# Patient Record
Sex: FEMALE | Race: WHITE | Employment: OTHER | URBAN - METROPOLITAN AREA
[De-identification: names, ages, dates, MRNs, and addresses within clinical notes are randomized per-mention and may not be internally consistent; named-entity substitution may affect disease eponyms.]

---

## 2017-01-12 ENCOUNTER — OFFICE VISIT (OUTPATIENT)
Dept: FAMILY MEDICINE CLINIC | Age: 62
End: 2017-01-12

## 2017-01-12 VITALS
OXYGEN SATURATION: 97 % | DIASTOLIC BLOOD PRESSURE: 92 MMHG | WEIGHT: 247 LBS | RESPIRATION RATE: 18 BRPM | SYSTOLIC BLOOD PRESSURE: 154 MMHG | BODY MASS INDEX: 35.36 KG/M2 | TEMPERATURE: 98 F | HEIGHT: 70 IN | HEART RATE: 62 BPM

## 2017-01-12 DIAGNOSIS — M54.50 ACUTE BILATERAL LOW BACK PAIN WITHOUT SCIATICA: Primary | ICD-10-CM

## 2017-01-12 DIAGNOSIS — F43.21 GRIEF: ICD-10-CM

## 2017-01-12 RX ORDER — DICLOFENAC SODIUM 50 MG/1
50 TABLET, DELAYED RELEASE ORAL 2 TIMES DAILY
Qty: 30 TAB | Refills: 1 | Status: SHIPPED | OUTPATIENT
Start: 2017-01-12 | End: 2018-01-15 | Stop reason: SDUPTHER

## 2017-01-12 RX ORDER — CYCLOBENZAPRINE HCL 5 MG
5 TABLET ORAL
Qty: 60 TAB | Refills: 1 | Status: SHIPPED | OUTPATIENT
Start: 2017-01-12 | End: 2020-06-25

## 2017-01-12 NOTE — MR AVS SNAPSHOT
Visit Information Date & Time Provider Department Dept. Phone Encounter #  
 1/12/2017  9:00 AM MD Irish HargroveGabriel Miłbecka 57 KAM [address] [facility] [id number] Your Appointments 1/24/2017 10:10 AM  
Nurse Visit with Elkin CoburnGabriel Harsha 57  ([facility]) Appt Note: coumadin check  $0cp [facility] [address], [address] [facility] [address]  
[phone]  
  
   
 [address], [address] [address] Upcoming Health Maintenance Date Due Hepatitis C Screening 1955 COLONOSCOPY 10/6/1974 ZOSTER VACCINE AGE 60> 10/6/2016 PAP AKA CERVICAL CYTOLOGY 10/21/2016 BREAST CANCER SCRN MAMMOGRAM 11/5/2017 DTaP/Tdap/Td series (2 - Td) 6/30/2025 Allergies as of 1/12/2017  Review Complete On: 1/12/2017 By: Beto Guardado MD  
  
 Severity Noted Reaction Type Reactions Iodinated Contrast Media - Oral And Iv Dye  09/24/2012   Side Effect Hives Lasix [Furosemide]  09/24/2012   Side Effect Hives Sulfa (Sulfonamide Antibiotics)  09/24/2012   Side Effect Hives Current Immunizations  Reviewed on 9/14/2016 Name Date Influenza Vaccine 10/21/2013 Influenza Vaccine Wicho [place]) 11/5/2014 Influenza Vaccine (Quad) PF 9/14/2016, 10/26/2015 Tdap 6/30/2015 Not reviewed this visit You Were Diagnosed With   
  
 Codes Comments Acute bilateral low back pain without sciatica    -  Primary ICD-10-CM: M54.5 ICD-9-CM: 724.2, 338.19 Grief     ICD-10-CM: C59.58 ICD-9-CM: 309.0 Vitals BP Pulse Temp Resp Height(growth percentile) Weight(growth percentile) (!) 154/92 (BP 1 Location: Left arm, BP Patient Position: Sitting) 62 98 °F (36.7 °C) 18 5' 10\" (1.778 m) 247 lb (112 kg) SpO2 BMI OB Status Smoking Status 97% 35.44 kg/m2 Postmenopausal Never Smoker Vitals History BMI and BSA Data Body Mass Index Body Surface Area  
 35.44 kg/m 2 2.35 m 2 Preferred Pharmacy Pharmacy Name Phone [facility] 2002 [facility], [address] [phone] Your Updated Medication List  
  
   
This list is accurate as of: 1/12/17  9:50 AM.  Always use your most recent med list.  
  
  
  
  
 atenolol-chlorthalidone 50-25 mg per tablet Commonly known as:  TENORETIC 50 Take 1 Tab by mouth daily. cyclobenzaprine 5 mg tablet Commonly known as:  FLEXERIL Take 1 Tab by mouth three (3) times daily as needed for Muscle Spasm(s). diclofenac EC 50 mg EC tablet Commonly known as:  VOLTAREN Take 1 Tab by mouth two (2) times a day. hydroCHLOROthiazide 12.5 mg capsule Commonly known as:  Alvia Boss TAKE ONE CAPSULE BY MOUTH ONCE DAILY  
  
 IRON (FERROUS SULFATE) PO Take  by mouth.  
  
 levothyroxine 25 mcg tablet Commonly known as:  SYNTHROID  
TAKE ONE TABLET BY MOUTH IN THE MORNING  
  
 potassium 99 mg tablet Take 99 mg by mouth daily. Pt is taking 2 tabs daily PREVACID PO Take  by mouth. traMADol 50 mg tablet Commonly known as:  ULTRAM  
TAKE ONE TABLET BY MOUTH EVERY 6 HOURS AS NEEDED FOR PAIN  
  
 traZODone 50 mg tablet Commonly known as:  Orma Paddy Take 1 Tab by mouth nightly. Prn sleep  
  
 venlafaxine 75 mg tablet Commonly known as:  [facility] Take one to two tablets every day  
  
 warfarin 7.5 mg tablet Commonly known as:  COUMADIN  
TAKE ONE TABLET BY MOUTH ONCE DAILY OR  AS  DIRECTED Prescriptions Sent to Pharmacy Refills  
 diclofenac EC (VOLTAREN) 50 mg EC tablet 1 Sig: Take 1 Tab by mouth two (2) times a day. Class: Normal  
 Pharmacy: [address], [address] Ph #: [phone] Route: Oral  
 cyclobenzaprine (FLEXERIL) 5 mg tablet 1 Sig: Take 1 Tab by mouth three (3) times daily as needed for Muscle Spasm(s).   
 Class: Normal  
 Pharm[facility] [address], [facility] [phone] Route: Oral  
  
We Performed the Following REFERRAL TO PHYSICAL THERAPY [ZLW95 Custom] Comments:  
 Please evaluate patient for low back pain. To-Do List   
 01/20/2017 9:20 AM  
  Appointment with [address] [address] at [address] [place] [address] ([phone]) Shower or bathe using soap and water. Do not use deodorant, powder, perfumes, or lotion the day of your exam.  If your prior mammograms were not [facility] please bring films with you or forward prior images 2 days before your procedure. Check in at registration 15min before your appointment time unless you were instructed to do otherwise. A script is not necessary, but if you have one, please bring it on the day of the mammogram or have it faxed to the department. [facility] [phone] [address] [place] [address]  [phone] [address]  [phone] [facility] [phone] [address] [phone] Referral Information Referral ID Referred By Referred To  
  
 [id number] Charly PIRES Not Available Visits Status Start Date End Date 1 New Request 1/12/17 1/12/18 If your referral has a status of pending review or denied, additional information will be sent to support the outcome of this decision. Patient Instructions Low Back Pain: Exercises Your Care Instructions Here are some examples of typical rehabilitation exercises for your condition. Start each exercise slowly. Ease off the exercise if you start to have pain. Your doctor or physical therapist will tell you when you can start these exercises and which ones will work best for you. How to do the exercises Press-up 1. Lie on your stomach, supporting your body with your forearms. 2. Press your elbows down into the floor to raise your upper back. As you do this, relax your stomach muscles and allow your back to arch without using your back muscles.  As your press up, do not let your hips or pelvis come off the floor. 3. Hold for 15 to 30 seconds, then relax. 4. Repeat 2 to 4 times. Alternate arm and leg (bird dog) exercise Note: Do this exercise slowly. Try to keep your body straight at all times, and do not let one hip drop lower than the other. 1. Start on the floor, on your hands and knees. 2. Tighten your belly muscles. 3. Raise one leg off the floor, and hold it straight out behind you. Be careful not to let your hip drop down, because that will twist your trunk. 4. Hold for about 6 seconds, then lower your leg and switch to the other leg. 5. Repeat 8 to 12 times on each leg. 6. Over time, work up to holding for 10 to 30 seconds each time. 7. If you feel stable and secure with your leg raised, try raising the opposite arm straight out in front of you at the same time. Knee-to-chest exercise 1. Lie on your back with your knees bent and your feet flat on the floor. 2. Bring one knee to your chest, keeping the other foot flat on the floor (or keeping the other leg straight, whichever feels better on your lower back). 3. Keep your lower back pressed to the floor. Hold for at least 15 to 30 seconds. 4. Relax, and lower the knee to the starting position. 5. Repeat with the other leg. Repeat 2 to 4 times with each leg. 6. To get more stretch, put your other leg flat on the floor while pulling your knee to your chest. 
Curl-ups 1. Lie on the floor on your back with your knees bent at a 90-degree angle. Your feet should be flat on the floor, about 12 inches from your buttocks. 2. Cross your arms over your chest. If this bothers your neck, try putting your hands behind your neck (not your head), with your elbows spread apart. 3. Slowly tighten your belly muscles and raise your shoulder blades off the floor. 4. Keep your head in line with your body, and do not press your chin to your chest. 
5. Hold this position for 1 or 2 seconds, then slowly lower yourself back down to the floor. 6. Repeat 8 to 12 times. Pelvic tilt exercise 1. Lie on your back with your knees bent. 2. \"Brace\" your stomach. This means to tighten your muscles by pulling in and imagining your belly button moving toward your spine. You should feel like your back is pressing to the floor and your hips and pelvis are rocking back. 3. Hold for about 6 seconds while you breathe smoothly. 4. Repeat 8 to 12 times. Heel dig bridging 1. Lie on your back with both knees bent and your ankles bent so that only your heels are digging into the floor. Your knees should be bent about 90 degrees. 2. Then push your heels into the floor, squeeze your buttocks, and lift your hips off the floor until your shoulders, hips, and knees are all in a straight line. 3. Hold for about 6 seconds as you continue to breathe normally, and then slowly lower your hips back down to the floor and rest for up to 10 seconds. 4. Do 8 to 12 repetitions. Hamstring stretch in doorway 1. Lie on your back in a doorway, with one leg through the open door. 2. Slide your leg up the wall to straighten your knee. You should feel a gentle stretch down the back of your leg. 3. Hold the stretch for at least 15 to 30 seconds. Do not arch your back, point your toes, or bend either knee. Keep one heel touching the floor and the other heel touching the wall. 4. Repeat with your other leg. 5. Do 2 to 4 times for each leg. Hip flexor stretch 1. Kneel on the floor with one knee bent and one leg behind you. Place your forward knee over your foot. Keep your other knee touching the floor. 2. Slowly push your hips forward until you feel a stretch in the upper thigh of your rear leg. 3. Hold the stretch for at least 15 to 30 seconds. Repeat with your other leg. 4. Do 2 to 4 times on each side. Wall sit 1. Stand with your back 10 to 12 inches away from a wall. 2. Lean into the wall until your back is flat against it. 3. Slowly slide down until your knees are slightly bent, pressing your lower back into the wall. 4. Hold for about 6 seconds, then slide back up the wall. 5. Repeat 8 to 12 times. Follow-up care is a key part of your treatment and safety. Be sure to make and go to all appointments, and call your doctor if you are having problems. It's also a good idea to know your test results and keep a list of the medicines you take. Where can you learn more? Go to http://jenelle-shravan.info/. Enter P253 in the search box to learn more about \"Low Back Pain: Exercises. \" Current as of: May 23, 2016 Content Version: 11.1 © 1914-6025 [employer]. Care instructions adapted under license by [employer] (which disclaims liability or warranty for this information). If you have questions about a medical condition or this instruction, always ask your healthcare professional. [address] any warranty or liability for your use of this information. Introducing [facility]! Jalen Domínguez introduces Code Blue patient portal. Now you can access parts of your medical record, email your doctor's office, and request medication refills online. 1. In your internet browser, go to [email or web address] 2. Click on the First Time User? Click Here link in the Sign In box. You will see the New Member Sign Up page. 3. Enter your Code Blue Access Code exactly as it appears below. You will not need to use this code after youve completed the sign-up process. If you do not sign up before the expiration date, you must request a new code. · Code Blue Access Code: [email or web address] Expires: 3/20/2017  9:55 AM 
 
4. Enter the last four digits of your Social Security Number (xxxx) and Date of Birth (mm/dd/yyyy) as indicated and click Submit. You will be taken to the next sign-up page. 5. Create a Code Blue ID.  This will be your Code Blue login ID and cannot be changed, so think of one that is secure and easy to remember. 6. Create a Phurnace Software password. You can change your password at any time. 7. Enter your Password Reset Question and Answer. This can be used at a later time if you forget your password. 8. Enter your e-mail address. You will receive e-mail notification when new information is available in [address] 9. Click Sign Up. You can now view and download portions of your medical record. 10. Click the Download Summary menu link to download a portable copy of your medical information. If you have questions, please visit the Frequently Asked Questions section of the [employer] website. Remember, Phurnace Software is NOT to be used for urgent needs. For medical emergencies, dial 911. Now available from your iPhone and Android! Please provide this summary of care documentation to your next provider. Your primary care clinician is listed as [address] If you have any questions after today's visit, please call [phone].

## 2017-01-12 NOTE — PATIENT INSTRUCTIONS

## 2017-01-12 NOTE — PROGRESS NOTES
SARKIS  Sarah Ball is a 62 y.o. female who presents with back pain that has been bothering her for 2 days. Pain is low, both sides. Started shortly after waking up about 2 days ago. Has not had any trauma to the area. No accidents. No unusual activity or lifting. What is unusual is that she has been sleeping on her 's side of the bed which is not nearly as well supported as her side. Of note she lost her  in November. Experiencing a grief reaction. She has chosen to sleep on her 's side of the bed to be near him. Tells me that she does not want to be in pain anymore, tearful. 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 long term use Coumadin    Thyroid disease        Meds:   Current Outpatient Prescriptions   Medication Sig Dispense Refill    diclofenac EC (VOLTAREN) 50 mg EC tablet Take 1 Tab by mouth two (2) times a day. 30 Tab 1    cyclobenzaprine (FLEXERIL) 5 mg tablet Take 1 Tab by mouth three (3) times daily as needed for Muscle Spasm(s). 60 Tab 1    warfarin (COUMADIN) 7.5 mg tablet TAKE ONE TABLET BY MOUTH ONCE DAILY OR  AS  DIRECTED 45 Tab 11    atenolol-chlorthalidone (TENORETIC 50) 50-25 mg per tablet Take 1 Tab by mouth daily. 30 Tab 5    traMADol (ULTRAM) 50 mg tablet TAKE ONE TABLET BY MOUTH EVERY 6 HOURS AS NEEDED FOR PAIN 60 Tab 0    levothyroxine (SYNTHROID) 25 mcg tablet TAKE ONE TABLET BY MOUTH IN THE MORNING 30 Tab 11    venlafaxine (EFFEXOR) 75 mg tablet Take one to two tablets every day 60 Tab 5    IRON, FERROUS SULFATE, PO Take  by mouth.       hydrochlorothiazide (MICROZIDE) 12.5 mg capsule TAKE ONE CAPSULE BY MOUTH ONCE DAILY 30 Cap 5    LANSOPRAZOLE (PREVACID PO) Take  by mouth.  potassium 99 mg tablet Take 99 mg by mouth daily. Pt is taking 2 tabs daily      traZODone (DESYREL) 50 mg tablet Take 1 Tab by mouth nightly. Prn sleep 30 Tab 2       Allergies: Allergies   Allergen Reactions    Iodinated Contrast Media - Oral And Iv Dye Hives    Lasix [Furosemide] Hives    Sulfa (Sulfonamide Antibiotics) Hives       Smoker:  History   Smoking Status    Never Smoker   Smokeless Tobacco    Never Used       ETOH:   History   Alcohol Use No       FH:   Family History   Problem Relation Age of Onset    Heart Disease Mother     Heart Disease Father        ROS:  As listed in HPI. In addition:  Constitutional:   No headache, fever, fatigue, weight loss or weight gain      Cardiac:    No chest pain      Resp:   No cough or shortness of breath      Neuro   No loss of consciousness, dizziness, seizures      Physical Exam:  Blood pressure (!) 154/92, pulse 62, temperature 98 °F (36.7 °C), resp. rate 18, height 5' 10\" (1.778 m), weight 247 lb (112 kg), SpO2 97 %. GEN: No apparent distress. Alert and oriented and responds to all questions appropriately. NECK:  Supple; bilateral trapezius muscle spasms that she does not complaint about but are exquisitely tender to palpation          Back: Bilateral erector spinae muscle spasms, external oblique spasms, bilateral SI joint tenderness. She is very tender in all of these points even with light palpation       Assessment and Plan     Low back pain, muscular. Most likely from sleeping in an unsupportive bed. Provided reassurance. Try Flexeril, diclofenac, heating pad. I provided her with exercises but strongly encouraged her to see physical therapy as I think she would benefit from other modalities    Grief  Had a significant grief reaction but within normal limits. Motions are playing a major role of the above pain.   Invite her to come back and speak with me for any reason      ICD-10-CM ICD-9-CM 1. Acute bilateral low back pain without sciatica M54.5 724.2 diclofenac EC (VOLTAREN) 50 mg EC tablet     338.19 REFERRAL TO PHYSICAL THERAPY      cyclobenzaprine (FLEXERIL) 5 mg tablet   2. Grief F43.20 309.0        AVS given.  Pt expressed understanding of instructions

## 2017-01-17 DIAGNOSIS — F32.A DEPRESSION, UNSPECIFIED DEPRESSION TYPE: ICD-10-CM

## 2017-01-17 RX ORDER — VENLAFAXINE 75 MG/1
75 TABLET ORAL 2 TIMES DAILY
Qty: 180 TAB | Refills: 3 | Status: SHIPPED | OUTPATIENT
Start: 2017-01-17 | End: 2018-01-29 | Stop reason: SDUPTHER

## 2017-01-24 ENCOUNTER — CLINICAL SUPPORT (OUTPATIENT)
Dept: FAMILY MEDICINE CLINIC | Age: 62
End: 2017-01-24

## 2017-01-24 VITALS
DIASTOLIC BLOOD PRESSURE: 87 MMHG | RESPIRATION RATE: 18 BRPM | HEIGHT: 70 IN | BODY MASS INDEX: 35.22 KG/M2 | WEIGHT: 246 LBS | TEMPERATURE: 97.5 F | HEART RATE: 69 BPM | OXYGEN SATURATION: 96 % | SYSTOLIC BLOOD PRESSURE: 150 MMHG

## 2017-01-24 DIAGNOSIS — Z87.74 H/O ATRIAL SEPTAL DEFECT REPAIR: ICD-10-CM

## 2017-01-24 DIAGNOSIS — Z86.711 HX PULMONARY EMBOLISM: Primary | ICD-10-CM

## 2017-01-24 LAB
INR BLD: 3.5
PT POC: 41.9 SEC
VALID INTERNAL CONTROL?: YES

## 2017-01-24 NOTE — PROGRESS NOTES
Chief Complaint   Patient presents with    Anticoagulation     Patient is here for a recheck of her INR. Patient is currently taking coumadin 7.5 daily except taking one and a half tablets on wednesdays. Visit Vitals    /87 (BP 1 Location: Right arm, BP Patient Position: Sitting)    Pulse 69    Temp 97.5 °F (36.4 °C) (Oral)    Resp 18    Ht 5' 10\" (1.778 m)    Wt 246 lb (111.6 kg)    SpO2 96%    BMI 35.3 kg/m2     Results for orders placed or performed in visit on 01/24/17   AMB POC PT/INR   Result Value Ref Range    VALID INTERNAL CONTROL POC Yes     Prothrombin time (POC) 41.9 sec    INR POC 3.5      Per Dr. Eliezer Nice patient advised to take coumadin 7.5 mg daily except take 1/2 tablet on Wednesdays. Return in 2 weeks for recheck.

## 2017-01-24 NOTE — PATIENT INSTRUCTIONS
Take 7.5 mg daily except take one half a tablet on wednesdays. Return in 2 weeks to recheck your INR.

## 2017-01-24 NOTE — MR AVS SNAPSHOT
Visit Information Date & Time Provider Department Dept. Phone Encounter #  
 1/24/2017 10:10 AM Luis Arreguin [facility] [phone] [id number] Upcoming Health Maintenance Date Due Hepatitis C Screening 1955 COLONOSCOPY 10/6/1974 ZOSTER VACCINE AGE 60> 10/6/2016 PAP AKA CERVICAL CYTOLOGY 10/21/2016 BREAST CANCER SCRN MAMMOGRAM 11/5/2017 DTaP/Tdap/Td series (2 - Td) 6/30/2025 Allergies as of 1/24/2017  Review Complete On: 1/24/2017 By: Betty Presume Severity Noted Reaction Type Reactions Iodinated Contrast Media - Oral And Iv Dye  09/24/2012   Side Effect Hives Lasix [Furosemide]  09/24/2012   Side Effect Hives Sulfa (Sulfonamide Antibiotics)  09/24/2012   Side Effect Hives Current Immunizations  Reviewed on 9/14/2016 Name Date Influenza Vaccine 10/21/2013 Influenza Vaccine Raheem Push) 11/5/2014 Influenza Vaccine (Quad) PF 9/14/2016, 10/26/2015 Tdap 6/30/2015 Not reviewed this visit You Were Diagnosed With   
  
 Codes Comments Hx pulmonary embolism    -  Primary ICD-10-CM: R56.884 ICD-9-CM: V12.55   
 H/O atrial septal defect repair     ICD-10-CM: Z98.890, Z87.74 ICD-9-CM: V15.1 Vitals BP Pulse Temp Resp Height(growth percentile) Weight(growth percentile) 150/87 (BP 1 Location: Right arm, BP Patient Position: Sitting) 69 97.5 °F (36.4 °C) (Oral) 18 5' 10\" (1.778 m) 246 lb (111.6 kg) SpO2 BMI OB Status Smoking Status 96% 35.3 kg/m2 Postmenopausal Never Smoker BMI and BSA Data Body Mass Index Body Surface Area  
 35.3 kg/m 2 2.35 m 2 Preferred Pharmacy Pharmacy Name Phone [facility] 2002 [facility] [address] [phone] Your Updated Medication List  
  
   
This list is accurate as of: 1/24/17 11:21 AM.  Always use your most recent med list.  
  
  
  
  
 atenolol-chlorthalidone 50-25 mg per tablet Commonly known as:  TENORETIC 50 Take 1 Tab by mouth daily. cyclobenzaprine 5 mg tablet Commonly known as:  FLEXERIL Take 1 Tab by mouth three (3) times daily as needed for Muscle Spasm(s). diclofenac EC 50 mg EC tablet Commonly known as:  VOLTAREN Take 1 Tab by mouth two (2) times a day. hydroCHLOROthiazide 12.5 mg capsule Commonly known as:  [place]ne Neelam TAKE ONE CAPSULE BY MOUTH ONCE DAILY  
  
 IRON (FERROUS SULFATE) PO Take  by mouth.  
  
 levothyroxine 25 mcg tablet Commonly known as:  SYNTHROID  
TAKE ONE TABLET BY MOUTH IN THE MORNING  
  
 potassium 99 mg tablet Take 99 mg by mouth daily. Pt is taking 2 tabs daily PREVACID PO Take  by mouth. traMADol 50 mg tablet Commonly known as:  ULTRAM  
TAKE ONE TABLET BY MOUTH EVERY 6 HOURS AS NEEDED FOR PAIN  
  
 traZODone 50 mg tablet Commonly known as:  Rebbecca Bunde Take 1 Tab by mouth nightly. Prn sleep  
  
 venlafaxine 75 mg tablet Commonly known as:  [facility] Take 1 Tab by mouth two (2) times a day. warfarin 7.5 mg tablet Commonly known as:  COUMADIN  
TAKE ONE TABLET BY MOUTH ONCE DAILY OR  AS  DIRECTED We Performed the Following AMB POC PT/INR [06001 CPT(R)] To-Do List   
 02/14/2017 10:40 AM  
  Appointment with [facility] 3 at [address] [place] [address] ([phone]) Shower or bathe using soap and water. Do not use deodorant, powder, perfumes, or lotion the day of your exam.  If your prior mammograms were not performed at [facility] 6 please bring films with you or forward prior images 2 days before your procedure. Check in at registration 15min before your appointment time unless you were instructed to do otherwise. A script is not necessary, but if you have one, please bring it on the day of the mammogram or have it faxed to the department.   [facility] [phone] [facility] [place]  [phone] [facility] [phone] [facility]  [phone] Count includes the [facility] [phone] [facility] [phone] [facility] Patient Instructions Take 7.5 mg daily except take one half a tablet on wednesdays. Return in 2 weeks to recheck your INR. Introducing [facility]! [employer] introduces Ohanae patient portal. Now you can access parts of your medical record, email your doctor's office, and request medication refills online. 1. In your internet browser, go to [email or web address] 2. Click on the First Time User? Click Here link in the Sign In box. You will see the New Member Sign Up page. 3. Enter your Ohanae Access Code exactly as it appears below. You will not need to use this code after youve completed the sign-up process. If you do not sign up before the expiration date, you must request a new code. · Ohanae Access Code: [email or web address] Expires: 3/20/2017  9:55 AM 
 
4. Enter the last four digits of your Social Security Number (xxxx) and Date of Birth (mm/dd/yyyy) as indicated and click Submit. You will be taken to the next sign-up page. 5. Create a Ohanae ID. This will be your Ohanae login ID and cannot be changed, so think of one that is secure and easy to remember. 6. Create a Ohanae password. You can change your password at any time. 7. Enter your Password Reset Question and Answer. This can be used at a later time if you forget your password. 8. Enter your e-mail address. You will receive e-mail notification when new information is available in [address] 9. Click Sign Up. You can now view and download portions of your medical record. 10. Click the Download Summary menu link to download a portable copy of your medical information. If you have questions, please visit the Frequently Asked Questions section of the Ohanae website. Remember, Ohanae is NOT to be used for urgent needs. For medical emergencies, dial 911. Now available from your iPhone and Android! Please provide this summary of care documentation to your next provider. Your primary care clinician is listed as [address] If you have any questions after today's visit, please call [phone].

## 2017-02-07 ENCOUNTER — CLINICAL SUPPORT (OUTPATIENT)
Dept: FAMILY MEDICINE CLINIC | Age: 62
End: 2017-02-07

## 2017-02-07 VITALS
WEIGHT: 249 LBS | SYSTOLIC BLOOD PRESSURE: 134 MMHG | HEART RATE: 88 BPM | BODY MASS INDEX: 35.65 KG/M2 | OXYGEN SATURATION: 99 % | DIASTOLIC BLOOD PRESSURE: 84 MMHG | RESPIRATION RATE: 18 BRPM | HEIGHT: 70 IN | TEMPERATURE: 98 F

## 2017-02-07 DIAGNOSIS — Z87.74 H/O ATRIAL SEPTAL DEFECT REPAIR: Primary | ICD-10-CM

## 2017-02-07 DIAGNOSIS — Z86.711 HX PULMONARY EMBOLISM: ICD-10-CM

## 2017-02-07 LAB
INR BLD: 3.3
PT POC: 39.8 SEC
VALID INTERNAL CONTROL?: YES

## 2017-02-07 NOTE — PROGRESS NOTES
Chief Complaint   Patient presents with    Anticoagulation     Patient is here for a recheck of her INR. Patient is currently taking coumadin 7.5 mg daily except take 1/2 tablet on Wednesdays. Visit Vitals    /84 (BP 1 Location: Left arm, BP Patient Position: Sitting)    Pulse 88    Temp 98 °F (36.7 °C) (Oral)    Resp 18    Ht 5' 10\" (1.778 m)    Wt 249 lb (112.9 kg)    SpO2 99%    BMI 35.73 kg/m2     Results for orders placed or performed in visit on 02/07/17   AMB POC PT/INR   Result Value Ref Range    VALID INTERNAL CONTROL POC Yes     Prothrombin time (POC) 39.8 sec    INR POC 3.3      Per Dr. Rose Berger patient advised to continue taking current dose of coumadin and return in one week to recheck INR.

## 2017-02-13 ENCOUNTER — OFFICE VISIT (OUTPATIENT)
Dept: FAMILY MEDICINE CLINIC | Age: 62
End: 2017-02-13

## 2017-02-13 VITALS
RESPIRATION RATE: 18 BRPM | TEMPERATURE: 97.6 F | HEIGHT: 70 IN | HEART RATE: 60 BPM | BODY MASS INDEX: 35.07 KG/M2 | WEIGHT: 245 LBS | DIASTOLIC BLOOD PRESSURE: 89 MMHG | SYSTOLIC BLOOD PRESSURE: 143 MMHG | OXYGEN SATURATION: 91 %

## 2017-02-13 DIAGNOSIS — I10 ESSENTIAL HYPERTENSION, BENIGN: ICD-10-CM

## 2017-02-13 DIAGNOSIS — Z82.49 FAMILY HISTORY OF PREMATURE CAD: ICD-10-CM

## 2017-02-13 DIAGNOSIS — Z13.39 SCREENING FOR ALCOHOLISM: ICD-10-CM

## 2017-02-13 DIAGNOSIS — I74.9 THROMBOEMBOLUS (HCC): ICD-10-CM

## 2017-02-13 DIAGNOSIS — Z00.00 ROUTINE GENERAL MEDICAL EXAMINATION AT A HEALTH CARE FACILITY: Primary | ICD-10-CM

## 2017-02-13 DIAGNOSIS — Z11.59 NEED FOR HEPATITIS C SCREENING TEST: ICD-10-CM

## 2017-02-13 DIAGNOSIS — E03.9 HYPOTHYROIDISM, UNSPECIFIED TYPE: ICD-10-CM

## 2017-02-13 LAB
INR BLD: 2.4
PT POC: 29 SEC
VALID INTERNAL CONTROL?: YES

## 2017-02-13 NOTE — PATIENT INSTRUCTIONS
Rotator Cuff: Exercises  Your Care Instructions  Here are some examples of typical rehabilitation exercises for your condition. Start each exercise slowly. Ease off the exercise if you start to have pain. Your doctor or physical therapist will tell you when you can start these exercises and which ones will work best for you. How to do the exercises  Pendulum swing    Note: If you have pain in your back, do not do this exercise. 1. Hold on to a table or the back of a chair with your good arm. Then bend forward a little and let your sore arm hang straight down. This exercise does not use the arm muscles. Rather, use your legs and your hips to create movement that makes your arm swing freely. 2. Use the movement from your hips and legs to guide the slightly swinging arm back and forth like a pendulum (or elephant trunk). Then guide it in circles that start small (about the size of a dinner plate). Make the circles a bit larger each day, as your pain allows. 3. Do this exercise for 5 minutes, 5 to 7 times each day. 4. As you have less pain, try bending over a little farther to do this exercise. This will increase the amount of movement at your shoulder. Posterior stretching exercise    1. Hold the elbow of your injured arm with your other hand. 2. Use your hand to pull your injured arm gently up and across your body. You will feel a gentle stretch across the back of your injured shoulder. 3. Hold for at least 15 to 30 seconds. Then slowly lower your arm. 4. Repeat 2 to 4 times. Up-the-back stretch    Note: Your doctor or physical therapist may want you to wait to do this stretch until you have regained most of your range of motion and strength. You can do this stretch in different ways. Hold any of these stretches for at least 15 to 30 seconds. Repeat them 2 to 4 times. 1. Put your hand in your back pocket. Let it rest there to stretch your shoulder.   2. With your other hand, hold your injured arm (palm outward) behind your back by the wrist. Pull your arm up gently to stretch your shoulder. 3. Next, put a towel over your other shoulder. Put the hand of your injured arm behind your back. Now hold the back end of the towel. With the other hand, hold the front end of the towel in front of your body. Pull gently on the front end of the towel. This will bring your hand farther up your back to stretch your shoulder. Overhead stretch    1. Standing about an arm's length away, grasp onto a solid surface. You could use a countertop, a doorknob, or the back of a sturdy chair. 2. With your knees slightly bent, bend forward with your arms straight. Lower your upper body, and let your shoulders stretch. 3. As your shoulders are able to stretch farther, you may need to take a step or two backward. 4. Hold for at least 15 to 30 seconds. Then stand up and relax. If you had stepped back during your stretch, step forward so you can keep your hands on the solid surface. 5. Repeat 2 to 4 times. Shoulder flexion (lying down)    Note: To make a wand for this exercise, use a piece of PVC pipe or a broom handle with the broom removed. Make the wand about a foot wider than your shoulders. 1. Lie on your back, holding a wand with both hands. Your palms should face down as you hold the wand. 2. Keeping your elbows straight, slowly raise your arms over your head. Raise them until you feel a stretch in your shoulders, upper back, and chest.  3. Hold for 15 to 30 seconds. 4. Repeat 2 to 4 times. Shoulder rotation (lying down)    Note: To make a wand for this exercise, use a piece of PVC pipe or a broom handle with the broom removed. Make the wand about a foot wider than your shoulders. 1. Lie on your back. Hold a wand with both hands with your elbows bent and palms up. 2. Keep your elbows close to your body, and move the wand across your body toward the sore arm. 3. Hold for 8 to 12 seconds. 4. Repeat 2 to 4 times.   Blake Garnett climbing (to the side)    Note: Avoid any movement that is straight to your side, and be careful not to arch your back. Your arm should stay about 30 degrees to the front of your side. 1. Stand with your side to a wall so that your fingers can just touch it at an angle about 30 degrees toward the front of your body. 2. Walk the fingers of your injured arm up the wall as high as pain permits. Try not to shrug your shoulder up toward your ear as you move your arm up. 3. Hold that position for a count of at least 15 to 20.  4. Walk your fingers back down to the starting position. 5. Repeat at least 2 to 4 times. Try to reach higher each time. Wall climbing (to the front)    Note: During this stretching exercise, be careful not to arch your back. 1. Face a wall, and stand so your fingers can just touch it. 2. Keeping your shoulder down, walk the fingers of your injured arm up the wall as high as pain permits. (Don't shrug your shoulder up toward your ear.)  3. Hold your arm in that position for at least 15 to 30 seconds. 4. Slowly walk your fingers back down to where you started. 5. Repeat at least 2 to 4 times. Try to reach higher each time. Shoulder blade squeeze    1. Stand with your arms at your sides, and squeeze your shoulder blades together. Do not raise your shoulders up as you squeeze. 2. Hold 6 seconds. 3. Repeat 8 to 12 times. Scapular exercise: Arm reach    1. Lie flat on your back. This exercise is a very slight motion that starts with your arms raised (elbows straight, arms straight). 2. From this position, reach higher toward the hemal or ceiling. Keep your elbows straight. All motion should be from your shoulder blade only. 3. Relax your arms back to where you started. 4. Repeat 8 to 12 times. Arm raise to the side    Note: During this strengthening exercise, your arm should stay about 30 degrees to the front of your side.   1. Slowly raise your injured arm to the side, with your thumb facing up. Raise your arm 60 degrees at the most (shoulder level is 90 degrees). 2. Hold the position for 3 to 5 seconds. Then lower your arm back to your side. If you need to, bring your \"good\" arm across your body and place it under the elbow as you lower your injured arm. Use your good arm to keep your injured arm from dropping down too fast.  3. Repeat 8 to 12 times. 4. When you first start out, don't hold any extra weight in your hand. As you get stronger, you may use a 1-pound to 2-pound dumbbell or a small can of food. Shoulder flexor and extensor exercise    Note: These are isometric exercises. That means you contract your muscles without actually moving. · Push forward (flex): Stand facing a wall or doorjamb, about 6 inches or less back. Hold your injured arm against your body. Make a closed fist with your thumb on top. Then gently push your hand forward into the wall with about 25% to 50% of your strength. Don't let your body move backward as you push. Hold for about 6 seconds. Relax for a few seconds. Repeat 8 to 12 times. · Push backward (extend): Stand with your back flat against a wall. Your upper arm should be against the wall, with your elbow bent 90 degrees (your hand straight ahead). Push your elbow gently back against the wall with about 25% to 50% of your strength. Don't let your body move forward as you push. Hold for about 6 seconds. Relax for a few seconds. Repeat 8 to 12 times. Scapular exercise: Wall push-ups    Note: This exercise is best done with your fingers somewhat turned out, rather than straight up and down. 1. Stand facing a wall, about 12 inches to 18 inches away. 2. Place your hands on the wall at shoulder height. 3. Slowly bend your elbows and bring your face to the wall. Keep your back and hips straight. 4. Push back to where you started. 5. Repeat 8 to 12 times. 6. When you can do this exercise against a wall comfortably, you can try it against a counter.  You can then slowly progress to the end of a couch, then to a sturdy chair, and finally to the floor. Scapular exercise: Retraction    Note: For this exercise, you will need elastic exercise material, such as surgical tubing or Thera-Band. 1. Put the band around a solid object at about waist level. (A bedpost will work well.) Each hand should hold an end of the band. 2. With your elbows at your sides and bent to 90 degrees, pull the band back. Your shoulder blades should move toward each other. Then move your arms back where you started. 3. Repeat 8 to 12 times. 4. If you have good range of motion in your shoulders, try this exercise with your arms lifted out to the sides. Keep your elbows at a 90-degree angle. Raise the elastic band up to about shoulder level. Pull the band back to move your shoulder blades toward each other. Then move your arms back where you started. Internal rotator strengthening exercise    1. Start by tying a piece of elastic exercise material to a doorknob. You can use surgical tubing or Thera-Band. 2. Stand or sit with your shoulder relaxed and your elbow bent 90 degrees. Your upper arm should rest comfortably against your side. Squeeze a rolled towel between your elbow and your body for comfort. This will help keep your arm at your side. 3. Hold one end of the elastic band in the hand of the painful arm. 4. Slowly rotate your forearm toward your body until it touches your belly. Slowly move it back to where you started. 5. Keep your elbow and upper arm firmly tucked against the towel roll or at your side. 6. Repeat 8 to 12 times. External rotator strengthening exercise    1. Start by tying a piece of elastic exercise material to a doorknob. You can use surgical tubing or Thera-Band. (You may also hold one end of the band in each hand.)  2. Stand or sit with your shoulder relaxed and your elbow bent 90 degrees. Your upper arm should rest comfortably against your side.  Squeeze a rolled towel between your elbow and your body for comfort. This will help keep your arm at your side. 3. Hold one end of the elastic band with the hand of the painful arm. 4. Start with your forearm across your belly. Slowly rotate the forearm out away from your body. Keep your elbow and upper arm tucked against the towel roll or the side of your body until you begin to feel tightness in your shoulder. Slowly move your arm back to where you started. 5. Repeat 8 to 12 times. Follow-up care is a key part of your treatment and safety. Be sure to make and go to all appointments, and call your doctor if you are having problems. It's also a good idea to know your test results and keep a list of the medicines you take. Where can you learn more? Go to http://jenelle-shravan.info/. Enter Charmaine Jiménez in the search box to learn more about \"Rotator Cuff: Exercises. \"  Current as of: May 23, 2016  Content Version: 11.1  © 6224-9158 [employer]. Care instructions adapted under license by [employer] (which disclaims liability or warranty for this information). If you have questions about a medical condition or this instruction, always ask your healthcare professional. [place] [address] any warranty or liability for your use of this information. Medicare Part B Preventive Services Limitations Recommendation Scheduled   Bone Mass Measurement  (age 72 & older, biennial) Requires diagnosis related to osteoporosis or estrogen deficiency.  Biennial benefit unless patient has history of long-term glucocorticoid tx or baseline is needed because initial test was by other method     Cardiovascular Screening Blood Tests (every 5 years)  Total cholesterol, HDL, Triglycerides Order as a panel if possible     Colorectal Cancer Screening  -Fecal occult blood test (annual)  -Flexible sigmoidoscopy (5y)  -Screening colonoscopy (10y)  -Barium Enema      Counseling to Prevent Tobacco Use (up to 8 sessions per year)  - Counseling greater than 3 and up to 10 minutes  - Counseling greater than 10 minutes Patients must be asymptomatic of tobacco-related conditions to receive as preventive service     Diabetes Screening Tests (at least every 3 years, Medicare covers annually or at 6-month intervals for prediabetic patients)    Fasting blood sugar (FBS) or glucose tolerance test (GTT) Patient must be diagnosed with one of the following:  -Hypertension, Dyslipidemia, obesity, previous impaired FBS or GTT  Or any two of the following: overweight, FH of diabetes, age ? 72, history of gestational diabetes, birth of baby weighing more than 9 pounds     Diabetes Self-Management Training (DSMT) (no USPSTF recommendation) Requires referral by treating physician for patient with diabetes or renal disease. 10 hours of initial DSMT session of no less than 30 minutes each in a continuous 12-month period. 2 hours of follow-up DSMT in subsequent years. Glaucoma Screening (no USPSTF recommendation) Diabetes mellitus, family history, , age 48 or over,  American, age 72 or over     Human Immunodeficiency Virus (HIV) Screening (annually for increased risk patients)  HIV-1 and HIV-2 by EIA, VICENTE, rapid antibody test, or oral mucosa transudate Patient must be at increased risk for HIV infection per USPSTF guidelines or pregnant. Tests covered annually for patients at increased risk. Pregnant patients may receive up to 3 test during pregnancy. Medical Nutrition Therapy (MNT) (for diabetes or renal disease not recommended schedule) Requires referral by treating physician for patient with diabetes or renal disease. Can be provided in same year as diabetes self-management training (DSMT), and CMS recommends medical nutrition therapy take place after DSMT. Up to 3 hours for initial year and 2 hours in subsequent years.      Shingles Vaccination A shingles vaccine is also recommended once in a lifetime after age 61     Seasonal Influenza Vaccination (annually)      Pneumococcal Vaccination (once after 72)      Hepatitis B Vaccinations (if medium/high risk) Medium/high risk factors:  End-stage renal disease,  Hemophiliacs who received Factor VIII or IX concentrates, Clients of institutions for the mentally retarded, Persons who live in the same house as a HepB virus carrier, Homosexual men, Illicit injectable drug abusers. Screening Mammography (biennial age 54-69) Annually (age 36 or over)     Screening Pap Tests and Pelvic Examination (up to age 79 and after 79 if unknown history or abnormal study last 10 years) Every 25 months except high risk     Ultrasound Screening for Abdominal Aortic Aneurysm (AAA) (once) Patient must be referred through [facility] and not have had a screening for abdominal aortic aneurysm before under Medicare.   Limited to patients who meet one of the following criteria:  - Men who are 73-68 years old and have smoked more than 100 cigarettes in their lifetime.  -Anyone with a FH of AAA  -Anyone recommended for screening by USPSTF

## 2017-02-13 NOTE — MR AVS SNAPSHOT
Visit Information Date & Time Provider Department Dept. Phone Encounter #  
 2/13/2017  1:00 PM Korey Unger MD [address] [facility] [phone] [id number] Upcoming Health Maintenance Date Due Hepatitis C Screening 1955 MEDICARE YEARLY EXAM 10/6/1974 COLONOSCOPY 10/6/1974 ZOSTER VACCINE AGE 60> 10/6/2016 PAP AKA CERVICAL CYTOLOGY 10/21/2016 BREAST CANCER SCRN MAMMOGRAM 11/5/2017 DTaP/Tdap/Td series (2 - Td) 6/30/2025 Allergies as of 2/13/2017  Review Complete On: 2/13/2017 By: Olya Martin Severity Noted Reaction Type Reactions Iodinated Contrast Media - Oral And Iv Dye  09/24/2012   Side Effect Hives Lasix [Furosemide]  09/24/2012   Side Effect Hives Sulfa (Sulfonamide Antibiotics)  09/24/2012   Side Effect Hives Current Immunizations  Reviewed on 9/14/2016 Name Date Influenza Vaccine 10/21/2013 Influenza Vaccine Nadean Borrero) 11/5/2014 Influenza Vaccine (Quad) PF 9/14/2016, 10/26/2015 Tdap 6/30/2015 Not reviewed this visit You Were Diagnosed With   
  
 Codes Comments Routine general medical examination at a health care facility    -  Primary ICD-10-CM: Z00.00 ICD-9-CM: V70.0 Thromboembolus ([facility] [address]     ICD-10-CM: I74.9 ICD-9-CM: 444.9 Screening for alcoholism     ICD-10-CM: Z13.89 ICD-9-CM: V79.1 Essential hypertension, benign     ICD-10-CM: I10 
ICD-9-CM: 401.1 Family history of premature CAD     ICD-10-CM: Z82.49 
ICD-9-CM: V17.3 BMI 35.0-35.9,adult     ICD-10-CM: K18.11 ICD-9-CM: V85.35 Need for hepatitis C screening test     ICD-10-CM: Z11.59 
ICD-9-CM: V73.89 Hypothyroidism, unspecified type     ICD-10-CM: E03.9 ICD-9-CM: 138. 9 Vitals BP Pulse Temp Resp Height(growth percentile) Weight(growth percentile) 143/89 (BP 1 Location: Right arm, BP Patient Position: Sitting) 60 97.6 °F (36.4 °C) 18 5' 10\" (1.778 m) 245 lb (111.1 kg) SpO2 BMI OB Status Smoking Status 91% 35.15 kg/m2 Postmenopausal Never Smoker BMI and BSA Data Body Mass Index Body Surface Area  
 35.15 kg/m 2 2.34 m 2 Preferred Pharmacy Pharmacy Name [facility] [address] [phone] Your Updated Medication List  
  
   
This list is accurate as of: 2/13/17  1:30 PM.  Always use your most recent med list.  
  
  
  
  
 atenolol-chlorthalidone 50-25 mg per tablet Commonly known as:  TENORETIC 50 Take 1 Tab by mouth daily. cyclobenzaprine 5 mg tablet Commonly known as:  FLEXERIL Take 1 Tab by mouth three (3) times daily as needed for Muscle Spasm(s). diclofenac EC 50 mg EC tablet Commonly known as:  VOLTAREN Take 1 Tab by mouth two (2) times a day. hydroCHLOROthiazide 12.5 mg capsule Commonly known as:  Toro Les TAKE ONE CAPSULE BY MOUTH ONCE DAILY  
  
 IRON (FERROUS SULFATE) PO Take  by mouth.  
  
 levothyroxine 25 mcg tablet Commonly known as:  SYNTHROID  
TAKE ONE TABLET BY MOUTH IN THE MORNING  
  
 potassium 99 mg tablet Take 99 mg by mouth daily. Pt is taking 2 tabs daily PREVACID PO Take  by mouth. traMADol 50 mg tablet Commonly known as:  ULTRAM  
TAKE ONE TABLET BY MOUTH EVERY 6 HOURS AS NEEDED FOR PAIN  
  
 traZODone 50 mg tablet Commonly known as:  Mark Fray Take 1 Tab by mouth nightly. Prn sleep  
  
 venlafaxine 75 mg tablet Commonly known as:  [facility] Take 1 Tab by mouth two (2) times a day. warfarin 7.5 mg tablet Commonly known as:  COUMADIN  
TAKE ONE TABLET BY MOUTH ONCE DAILY OR  AS  DIRECTED We Performed the Following AMB POC PT/INR [93045 CPT(R)] CBC WITH AUTOMATED DIFF [10248 CPT(R)] HCV AB W/RFLX TO MERCED [68113 CPT(R)] LIPID PANEL [28727 CPT(R)] METABOLIC PANEL, COMPREHENSIVE [92543 CPT(R)] TSH 3RD GENERATION [93528 CPT(R)] To-Do List   
 02/14/2017 10:40 AM  
  Appointment with [address] at [address] [place] [address] ([phone]) Shower or bathe using soap and water. Do not use deodorant, powder, perfumes, or lotion the day of your exam.  If your prior mammograms were not performed at [facility] 6 please bring films with you or forward prior images 2 days before your procedure. Check in at registration 15min before your appointment time unless you were instructed to do otherwise. A script is not necessary, but if you have one, please bring it on the day of the mammogram or have it faxed to the department. [facility] [phone] [facility]  [phone] [address]  [phone] [facility] [phone] [place] [phone] [facility] [phone] Patient Instructions Rotator Cuff: Exercises Your Care Instructions Here are some examples of typical rehabilitation exercises for your condition. Start each exercise slowly. Ease off the exercise if you start to have pain. Your doctor or physical therapist will tell you when you can start these exercises and which ones will work best for you. How to do the exercises Pendulum swing Note: If you have pain in your back, do not do this exercise. 1. Hold on to a table or the back of a chair with your good arm. Then bend forward a little and let your sore arm hang straight down. This exercise does not use the arm muscles. Rather, use your legs and your hips to create movement that makes your arm swing freely. 2. Use the movement from your hips and legs to guide the slightly swinging arm back and forth like a pendulum (or elephant trunk). Then guide it in circles that start small (about the size of a dinner plate). Make the circles a bit larger each day, as your pain allows. 3. Do this exercise for 5 minutes, 5 to 7 times each day. 4. As you have less pain, try bending over a little farther to do this exercise. This will increase the amount of movement at your shoulder. Posterior stretching exercise 1. Hold the elbow of your injured arm with your other hand. 2. Use your hand to pull your injured arm gently up and across your body. You will feel a gentle stretch across the back of your injured shoulder. 3. Hold for at least 15 to 30 seconds. Then slowly lower your arm. 4. Repeat 2 to 4 times. Up-the-back stretch Note: Your doctor or physical therapist may want you to wait to do this stretch until you have regained most of your range of motion and strength. You can do this stretch in different ways. Hold any of these stretches for at least 15 to 30 seconds. Repeat them 2 to 4 times. 1. Put your hand in your back pocket. Let it rest there to stretch your shoulder. 2. With your other hand, hold your injured arm (palm outward) behind your back by the wrist. Pull your arm up gently to stretch your shoulder. 3. Next, put a towel over your other shoulder. Put the hand of your injured arm behind your back. Now hold the back end of the towel. With the other hand, hold the front end of the towel in front of your body. Pull gently on the front end of the towel. This will bring your hand farther up your back to stretch your shoulder. Overhead stretch 1. Standing about an arm's length away, grasp onto a solid surface. You could use a countertop, a doorknob, or the back of a sturdy chair. 2. With your knees slightly bent, bend forward with your arms straight. Lower your upper body, and let your shoulders stretch. 3. As your shoulders are able to stretch farther, you may need to take a step or two backward. 4. Hold for at least 15 to 30 seconds. Then stand up and relax. If you had stepped back during your stretch, step forward so you can keep your hands on the solid surface. 5. Repeat 2 to 4 times. Shoulder flexion (lying down) Note: To make a wand for this exercise, use a piece of PVC pipe or a broom handle with the broom removed. Make the wand about a foot wider than your shoulders. 1. Lie on your back, holding a wand with both hands. Your palms should face down as you hold the wand. 2. Keeping your elbows straight, slowly raise your arms over your head. Raise them until you feel a stretch in your shoulders, upper back, and chest. 
3. Hold for 15 to 30 seconds. 4. Repeat 2 to 4 times. Shoulder rotation (lying down) Note: To make a wand for this exercise, use a piece of PVC pipe or a broom handle with the broom removed. Make the wand about a foot wider than your shoulders. 1. Lie on your back. Hold a wand with both hands with your elbows bent and palms up. 2. Keep your elbows close to your body, and move the wand across your body toward the sore arm. 3. Hold for 8 to 12 seconds. 4. Repeat 2 to 4 times. Wall climbing (to the side) Note: Avoid any movement that is straight to your side, and be careful not to arch your back. Your arm should stay about 30 degrees to the front of your side. 1. Stand with your side to a wall so that your fingers can just touch it at an angle about 30 degrees toward the front of your body. 2. Walk the fingers of your injured arm up the wall as high as pain permits. Try not to shrug your shoulder up toward your ear as you move your arm up. 3. Hold that position for a count of at least 15 to 20. 
4. Walk your fingers back down to the starting position. 5. Repeat at least 2 to 4 times. Try to reach higher each time. Wall climbing (to the front) Note: During this stretching exercise, be careful not to arch your back. 1. Face a wall, and stand so your fingers can just touch it. 2. Keeping your shoulder down, walk the fingers of your injured arm up the wall as high as pain permits. (Don't shrug your shoulder up toward your ear.) 3. Hold your arm in that position for at least 15 to 30 seconds. 4. Slowly walk your fingers back down to where you started. 5. Repeat at least 2 to 4 times. Try to reach higher each time. Shoulder blade squeeze 1. Stand with your arms at your sides, and squeeze your shoulder blades together. Do not raise your shoulders up as you squeeze. 2. Hold 6 seconds. 3. Repeat 8 to 12 times. Scapular exercise: Arm reach 1. Lie flat on your back. This exercise is a very slight motion that starts with your arms raised (elbows straight, arms straight). 2. From this position, reach higher toward the hemal or ceiling. Keep your elbows straight. All motion should be from your shoulder blade only. 3. Relax your arms back to where you started. 4. Repeat 8 to 12 times. Arm raise to the side Note: During this strengthening exercise, your arm should stay about 30 degrees to the front of your side. 1. Slowly raise your injured arm to the side, with your thumb facing up. Raise your arm 60 degrees at the most (shoulder level is 90 degrees). 2. Hold the position for 3 to 5 seconds. Then lower your arm back to your side. If you need to, bring your \"good\" arm across your body and place it under the elbow as you lower your injured arm. Use your good arm to keep your injured arm from dropping down too fast. 
3. Repeat 8 to 12 times. 4. When you first start out, don't hold any extra weight in your hand. As you get stronger, you may use a 1-pound to 2-pound dumbbell or a small can of food. Shoulder flexor and extensor exercise Note: These are isometric exercises. That means you contract your muscles without actually moving. · Push forward (flex): Stand facing a wall or doorjamb, about 6 inches or less back. Hold your injured arm against your body. Make a closed fist with your thumb on top. Then gently push your hand forward into the wall with about 25% to 50% of your strength. Don't let your body move backward as you push. Hold for about 6 seconds. Relax for a few seconds. Repeat 8 to 12 times. · Push backward (extend): Stand with your back flat against a wall. Your upper arm should be against the wall, with your elbow bent 90 degrees (your hand straight ahead). Push your elbow gently back against the wall with about 25% to 50% of your strength. Don't let your body move forward as you push. Hold for about 6 seconds. Relax for a few seconds. Repeat 8 to 12 times. Scapular exercise: Wall push-ups Note: This exercise is best done with your fingers somewhat turned out, rather than straight up and down. 1. Stand facing a wall, about 12 inches to 18 inches away. 2. Place your hands on the wall at shoulder height. 3. Slowly bend your elbows and bring your face to the wall. Keep your back and hips straight. 4. Push back to where you started. 5. Repeat 8 to 12 times. 6. When you can do this exercise against a wall comfortably, you can try it against a counter. You can then slowly progress to the end of a couch, then to a sturdy chair, and finally to the floor. Scapular exercise: Retraction Note: For this exercise, you will need elastic exercise material, such as surgical tubing or Thera-Band. 1. Put the band around a solid object at about waist level. (A bedpost will work well.) Each hand should hold an end of the band. 2. With your elbows at your sides and bent to 90 degrees, pull the band back. Your shoulder blades should move toward each other. Then move your arms back where you started. 3. Repeat 8 to 12 times. 4. If you have good range of motion in your shoulders, try this exercise with your arms lifted out to the sides. Keep your elbows at a 90-degree angle. Raise the elastic band up to about shoulder level. Pull the band back to move your shoulder blades toward each other. Then move your arms back where you started. Internal rotator strengthening exercise 1. Start by tying a piece of elastic exercise material to a doorknob. You can use surgical tubing or Thera-Band. 2. Stand or sit with your shoulder relaxed and your elbow bent 90 degrees. Your upper arm should rest comfortably against your side. Squeeze a rolled towel between your elbow and your body for comfort. This will help keep your arm at your side. 3. Hold one end of the elastic band in the hand of the painful arm. 4. Slowly rotate your forearm toward your body until it touches your belly. Slowly move it back to where you started. 5. Keep your elbow and upper arm firmly tucked against the towel roll or at your side. 6. Repeat 8 to 12 times. External rotator strengthening exercise 1. Start by tying a piece of elastic exercise material to a doorknob. You can use surgical tubing or Thera-Band. (You may also hold one end of the band in each hand.) 2. Stand or sit with your shoulder relaxed and your elbow bent 90 degrees. Your upper arm should rest comfortably against your side. Squeeze a rolled towel between your elbow and your body for comfort. This will help keep your arm at your side. 3. Hold one end of the elastic band with the hand of the painful arm. 4. Start with your forearm across your belly. Slowly rotate the forearm out away from your body. Keep your elbow and upper arm tucked against the towel roll or the side of your body until you begin to feel tightness in your shoulder. Slowly move your arm back to where you started. 5. Repeat 8 to 12 times. Follow-up care is a key part of your treatment and safety. Be sure to make and go to all appointments, and call your doctor if you are having problems. It's also a good idea to know your test results and keep a list of the medicines you take. Where can you learn more? Go to http://jenelle-shravan.info/. Enter Aloma Case in the search box to learn more about \"Rotator Cuff: Exercises. \" Current as of: May 23, 2016 Content Version: 11.1 © 0150-3047 [employer].  Care instructions adapted under license by [address] (which disclaims liability or warranty for this information). If you have questions about a medical condition or this instruction, always ask your healthcare professional. [address] any warranty or liability for your use of this information. Medicare Part B Preventive Services Limitations Recommendation Scheduled Bone Mass Measurement 
(age 72 & older, biennial) Requires diagnosis related to osteoporosis or estrogen deficiency. Biennial benefit unless patient has history of long-term glucocorticoid tx or baseline is needed because initial test was by other method Cardiovascular Screening Blood Tests (every 5 years) Total cholesterol, HDL, Triglycerides Order as a panel if possible Colorectal Cancer Screening 
-Fecal occult blood test (annual) -Flexible sigmoidoscopy (5y) 
-Screening colonoscopy (10y) -Barium Enema Counseling to Prevent Tobacco Use (up to 8 sessions per year) - Counseling greater than 3 and up to 10 minutes - Counseling greater than 10 minutes Patients must be asymptomatic of tobacco-related conditions to receive as preventive service Diabetes Screening Tests (at least every 3 years, Medicare covers annually or at 6-month intervals for prediabetic patients) Fasting blood sugar (FBS) or glucose tolerance test (GTT) Patient must be diagnosed with one of the following: 
-Hypertension, Dyslipidemia, obesity, previous impaired FBS or GTT 
Or any two of the following: overweight, FH of diabetes, age ? 72, history of gestational diabetes, birth of baby weighing more than 9 pounds Diabetes Self-Management Training (DSMT) (no USPSTF recommendation) Requires referral by treating physician for patient with diabetes or renal disease. 10 hours of initial DSMT session of no less than 30 minutes each in a continuous 12-month period. 2 hours of follow-up DSMT in subsequent years. Glaucoma Screening (no USPSTF recommendation) Diabetes mellitus, family history, , age 48 or over,  American, age 72 or over Human Immunodeficiency Virus (HIV) Screening (annually for increased risk patients) HIV-1 and HIV-2 by EIA, VICENTE, rapid antibody test, or oral mucosa transudate Patient must be at increased risk for HIV infection per USPSTF guidelines or pregnant. Tests covered annually for patients at increased risk. Pregnant patients may receive up to 3 test during pregnancy. Medical Nutrition Therapy (MNT) (for diabetes or renal disease not recommended schedule) Requires referral by treating physician for patient with diabetes or renal disease. Can be provided in same year as diabetes self-management training (DSMT), and CMS recommends medical nutrition therapy take place after DSMT. Up to 3 hours for initial year and 2 hours in subsequent years. Shingles Vaccination A shingles vaccine is also recommended once in a lifetime after age 61 Seasonal Influenza Vaccination (annually) Pneumococcal Vaccination (once after 65) Hepatitis B Vaccinations (if medium/high risk) Medium/high risk factors:  End-stage renal disease, Hemophiliacs who received Factor VIII or IX concentrates, Clients of institutions for the mentally retarded, Persons who live in the same house as a HepB virus carrier, Homosexual men, Illicit injectable drug abusers. Screening Mammography (biennial age 54-69) Annually (age 36 or over) Screening Pap Tests and Pelvic Examination (up to age 79 and after 79 if unknown history or abnormal study last 10 years) Every 24 months except high risk Ultrasound Screening for Abdominal Aortic Aneurysm (AAA) (once) Patient must be referred through IPPE and not have had a screening for abdominal aortic aneurysm before under Medicare.   Limited to patients who meet one of the following criteria: 
 - Men who are 73-68 years old and have smoked more than 100 cigarettes in their lifetime. 
-Anyone with a FH of AAA 
-Anyone [facility] by [facility] Introducing [place] [facility]! [employer] introduces RUN patient portal. Now you can access parts of your medical record, email your doctor's office, and request medication refills online. 1. In your internet browser, go to [email or web address] 2. Click on the First Time User? Click Here link in the Sign In box. You will see the New Member Sign Up page. 3. Enter your RUN Access Code exactly as it appears below. You will not need to use this code after youve completed the sign-up process. If you do not sign up before the expiration date, you must request a new code. · RUN Access Code: [email or web address] Expires: 3/20/2017  9:55 AM 
 
4. Enter the last four digits of your Social Security Number (xxxx) and Date of Birth (mm/dd/yyyy) as indicated and click Submit. You will be taken to the next sign-up page. 5. Create a RUN ID. This will be your RUN login ID and cannot be changed, so think of one that is secure and easy to remember. 6. Create a RUN password. You can change your password at any time. 7. Enter your Password Reset Question and Answer. This can be used at a later time if you forget your password. 8. Enter your e-mail address. You will receive e-mail notification when new information is available in [address] 9. Click Sign Up. You can now view and download portions of your medical record. 10. Click the Download Summary menu link to download a portable copy of your medical information. If you have questions, please visit the Frequently Asked Questions section of the RUN website. Remember, RUN is NOT to be used for urgent needs. For medical emergencies, dial 911. Now available from your iPhone and Android! Please provide this summary of care documentation to your next provider. Your primary care clinician is listed as [address] If you have any questions after today's visit, please call [phone].

## 2017-02-13 NOTE — PROGRESS NOTES
.  Chief Complaint   Patient presents with   Rachel Metzger Annual Wellness Visit    Coagulation disorder    Labs     . [facility]

## 2017-02-13 NOTE — ACP (ADVANCE CARE PLANNING)
Advance Care Planning (ACP) Provider Conversation Snapshot    Date of ACP Conversation: 02/13/17   Refer to honoring choices

## 2017-02-14 ENCOUNTER — HOSPITAL ENCOUNTER (OUTPATIENT)
Dept: MAMMOGRAPHY | Age: 62
Discharge: HOME | End: 2017-02-14
Attending: FAMILY MEDICINE
Payer: MEDICARE

## 2017-02-14 DIAGNOSIS — Z12.31 VISIT FOR SCREENING MAMMOGRAM: ICD-10-CM

## 2017-02-14 LAB
ALBUMIN SERPL-MCNC: 4.3 G/DL (ref 3.6–4.8)
ALBUMIN/GLOB SERPL: 1.2 {RATIO} (ref 1.1–2.5)
ALP SERPL-CCNC: 71 IU/L (ref 39–117)
ALT SERPL-CCNC: 23 IU/L (ref 0–32)
AST SERPL-CCNC: 22 IU/L (ref 0–40)
BASOPHILS # BLD AUTO: 0 X10E3/UL (ref 0–0.2)
BASOPHILS NFR BLD AUTO: 0 %
BILIRUB SERPL-MCNC: 0.5 MG/DL (ref 0–1.2)
BUN SERPL-MCNC: 12 MG/DL (ref 8–27)
BUN/CREAT SERPL: 19 (ref 11–26)
CALCIUM SERPL-MCNC: 9.3 MG/DL (ref 8.7–10.3)
CHLORIDE SERPL-SCNC: 97 MMOL/L (ref 96–106)
CHOLEST SERPL-MCNC: 176 MG/DL (ref 100–199)
CO2 SERPL-SCNC: 29 MMOL/L (ref 18–29)
CREAT SERPL-MCNC: 0.63 MG/DL (ref 0.57–1)
EOSINOPHIL # BLD AUTO: 0.1 X10E3/UL (ref 0–0.4)
EOSINOPHIL NFR BLD AUTO: 3 %
ERYTHROCYTE [DISTWIDTH] IN BLOOD BY AUTOMATED COUNT: 13.9 % (ref 12.3–15.4)
GLOBULIN SER CALC-MCNC: 3.5 G/DL (ref 1.5–4.5)
GLUCOSE SERPL-MCNC: 91 MG/DL (ref 65–99)
HCT VFR BLD AUTO: 42.6 % (ref 34–46.6)
HCV AB S/CO SERPL IA: <0.1 S/CO RATIO (ref 0–0.9)
HCV AB SERPL QL IA: NORMAL
HDLC SERPL-MCNC: 45 MG/DL
HGB BLD-MCNC: 15 G/DL (ref 11.1–15.9)
IMM GRANULOCYTES # BLD: 0 X10E3/UL (ref 0–0.1)
IMM GRANULOCYTES NFR BLD: 0 %
INTERPRETATION, 910389: NORMAL
LDLC SERPL CALC-MCNC: 107 MG/DL (ref 0–99)
LYMPHOCYTES # BLD AUTO: 2.3 X10E3/UL (ref 0.7–3.1)
LYMPHOCYTES NFR BLD AUTO: 45 %
MCH RBC QN AUTO: 29.8 PG (ref 26.6–33)
MCHC RBC AUTO-ENTMCNC: 35.2 G/DL (ref 31.5–35.7)
MCV RBC AUTO: 85 FL (ref 79–97)
MONOCYTES # BLD AUTO: 0.4 X10E3/UL (ref 0.1–0.9)
MONOCYTES NFR BLD AUTO: 7 %
NEUTROPHILS # BLD AUTO: 2.2 X10E3/UL (ref 1.4–7)
NEUTROPHILS NFR BLD AUTO: 45 %
PLATELET # BLD AUTO: 236 X10E3/UL (ref 150–379)
POTASSIUM SERPL-SCNC: 3.6 MMOL/L (ref 3.5–5.2)
PROT SERPL-MCNC: 7.8 G/DL (ref 6–8.5)
RBC # BLD AUTO: 5.04 X10E6/UL (ref 3.77–5.28)
SODIUM SERPL-SCNC: 140 MMOL/L (ref 134–144)
TRIGL SERPL-MCNC: 120 MG/DL (ref 0–149)
TSH SERPL DL<=0.005 MIU/L-ACNC: 5.67 UIU/ML (ref 0.45–4.5)
VLDLC SERPL CALC-MCNC: 24 MG/DL (ref 5–40)
WBC # BLD AUTO: 5 X10E3/UL (ref 3.4–10.8)

## 2017-02-14 PROCEDURE — 77067 SCR MAMMO BI INCL CAD: CPT

## 2017-03-13 ENCOUNTER — CLINICAL SUPPORT (OUTPATIENT)
Dept: FAMILY MEDICINE CLINIC | Age: 62
End: 2017-03-13

## 2017-03-13 VITALS
SYSTOLIC BLOOD PRESSURE: 139 MMHG | WEIGHT: 249 LBS | HEIGHT: 70 IN | HEART RATE: 62 BPM | OXYGEN SATURATION: 95 % | DIASTOLIC BLOOD PRESSURE: 90 MMHG | BODY MASS INDEX: 35.65 KG/M2

## 2017-03-13 DIAGNOSIS — Z86.711 HX PULMONARY EMBOLISM: Primary | ICD-10-CM

## 2017-03-13 DIAGNOSIS — Z51.81 ANTICOAGULATION GOAL OF INR 2 TO 3: ICD-10-CM

## 2017-03-13 DIAGNOSIS — Z79.01 ANTICOAGULATION GOAL OF INR 2 TO 3: ICD-10-CM

## 2017-03-13 LAB
INR BLD: 2.4
PT POC: 28.7 SEC
VALID INTERNAL CONTROL?: YES

## 2017-03-13 RX ORDER — GLUCOSAMINE SULFATE 1500 MG
POWDER IN PACKET (EA) ORAL DAILY
COMMUNITY

## 2017-03-13 NOTE — PATIENT INSTRUCTIONS
Continue Coumadin 7.5 mg tablet Take 1/2 tablet on Wednesdays and 7.5 mg the rest of the week. Return in one month to recheck coumadin level. Take Vitamin D 1000 units over the counter daily.

## 2017-03-13 NOTE — MR AVS SNAPSHOT
Visit Information Date & Time Provider Department Dept. Phone Encounter #  
 3/13/2017 10:10 AM Luis Arreguin [place] [id number] [phone] [id number] Upcoming Health Maintenance Date Due ZOSTER VACCINE AGE 60> 10/6/2016 PAP AKA CERVICAL CYTOLOGY 10/21/2016 MEDICARE YEARLY EXAM 2/14/2018 COLONOSCOPY 7/6/2018 BREAST CANCER SCRN MAMMOGRAM 2/14/2019 DTaP/Tdap/Td series (2 - Td) 6/30/2025 Allergies as of 3/13/2017  Review Complete On: 3/13/2017 By: Aryan Almeida LPN Severity Noted Reaction Type Reactions Iodinated Contrast Media - Oral And Iv Dye  09/24/2012   Side Effect Hives Lasix [Furosemide]  09/24/2012   Side Effect Hives Sulfa (Sulfonamide Antibiotics)  09/24/2012   Side Effect Hives Current Immunizations  Reviewed on 9/14/2016 Name Date Influenza Vaccine 10/21/2013 Influenza Vaccine Janyth Montrell) 11/5/2014 Influenza Vaccine (Quad) PF 9/14/2016, 10/26/2015 Tdap 6/30/2015 Not reviewed this visit You Were Diagnosed With   
  
 Codes Comments Hx pulmonary embolism    -  Primary ICD-10-CM: Q45.699 ICD-9-CM: V12.55 Anticoagulation goal of INR 2 to 3     ICD-10-CM: Z51.81, Z79.01 
ICD-9-CM: V58.83, V58.61 Vitals BP Pulse Height(growth percentile) Weight(growth percentile) SpO2 BMI  
 139/90 62 5' 10\" (1.778 m) 249 lb (112.9 kg) 95% 35.73 kg/m2 OB Status Smoking Status Postmenopausal Never Smoker Vitals History BMI and BSA Data Body Mass Index Body Surface Area 35.73 kg/m 2 2.36 m 2 Preferred Pharmacy Pharmacy Name Phone [facility] [address] [facility] [phone] Your Updated Medication List  
  
   
This list is accurate as of: 3/13/17 10:45 AM.  Always use your most recent med list.  
  
  
  
  
 atenolol-chlorthalidone 50-25 mg per tablet Commonly known as:  TENORETIC 50 Take 1 Tab by mouth daily. cyclobenzaprine 5 mg tablet Commonly known as:  FLEXERIL Take 1 Tab by mouth three (3) times daily as needed for Muscle Spasm(s). diclofenac EC 50 mg EC tablet Commonly known as:  VOLTAREN Take 1 Tab by mouth two (2) times a day. hydroCHLOROthiazide 12.5 mg capsule Commonly known as:  Brenna Sensor TAKE ONE CAPSULE BY MOUTH ONCE DAILY  
  
 IRON (FERROUS SULFATE) PO Take  by mouth.  
  
 levothyroxine 25 mcg tablet Commonly known as:  SYNTHROID  
TAKE ONE TABLET BY MOUTH IN THE MORNING  
  
 potassium 99 mg tablet Take 99 mg by mouth daily. Pt is taking 2 tabs daily PREVACID PO Take  by mouth. traZODone 50 mg tablet Commonly known as:  Marlin Bump Take 1 Tab by mouth nightly. Prn sleep  
  
 venlafaxine 75 mg tablet Commonly known as:  [facility] Take 1 Tab by mouth two (2) times a day. VITAMIN D3 1,000 unit Cap Generic drug:  cholecalciferol Take  by mouth daily. warfarin 7.5 mg tablet Commonly known as:  COUMADIN  
TAKE ONE TABLET BY MOUTH ONCE DAILY OR  AS  DIRECTED We Performed the Following AMB POC PT/INR [90453 CPT(R)] Patient Instructions Continue Coumadin 7.5 mg tablet Take 1/2 tablet on Wednesdays and 7.5 mg the rest of the week. Return in one month to recheck coumadin level. Take Vitamin D 1000 units over the counter daily. Introducing hospitals & HEALTH SERVICES! [employer] introduces Sgrouples patient portal. Now you can access parts of your medical record, email your doctor's office, and request medication refills online. 1. In your internet browser, go to [email or web address] 2. Click on the First Time User? Click Here link in the Sign In box. You will see the New Member Sign Up page. 3. Enter your Sgrouples Access Code exactly as it appears below. You will not need to use this code after youve completed the sign-up process.  If you do not sign up before the expiration date, you must request a new code. 
 
· Vistaar Access Code: [email or web address] Expires: 3/20/2017 10:55 AM 
 
4. Enter the last four digits of your Social Security Number (xxxx) and Date of Birth (mm/dd/yyyy) as indicated and click Submit. You will be taken to the next sign-up page. 5. Create a Vistaar ID. This will be your Vistaar login ID and cannot be changed, so think of one that is secure and easy to remember. 6. Create a Vistaar password. You can change your password at any time. 7. Enter your Password Reset Question and Answer. This can be used at a later time if you forget your password. 8. Enter your e-mail address. You will receive e-mail notification when new information is available in [address] 9. Click Sign Up. You can now view and download portions of your medical record. 10. Click the Download Summary menu link to download a portable copy of your medical information. If you have questions, please visit the Frequently Asked Questions section of the Vistaar website. Remember, Vistaar is NOT to be used for urgent needs. For medical emergencies, dial 911. Now available from your iPhone and Android! Please provide this summary of care documentation to your next provider. Your primary care clinician is listed as Abraham Posey. If you have any questions after today's visit, please call [phone].

## 2017-03-13 NOTE — PROGRESS NOTES
Patient presents for a PT/INR  See med list and patient instructions along with results for verbal orders by Dr. Oc Reese. Results for orders placed or performed in visit on 03/13/17   AMB POC PT/INR   Result Value Ref Range    VALID INTERNAL CONTROL POC Yes     Prothrombin time (POC) 28.7 sec    INR POC 2.4      Prior to Admission medications    Medication Sig Start Date End Date Taking? Authorizing Provider   cholecalciferol (VITAMIN D3) 1,000 unit cap Take  by mouth daily. Yes Historical Provider   venlafaxine (EFFEXOR) 75 mg tablet Take 1 Tab by mouth two (2) times a day. 1/17/17  Yes Jose Vaca MD   diclofenac EC (VOLTAREN) 50 mg EC tablet Take 1 Tab by mouth two (2) times a day. 1/12/17  Yes Jose Vaca MD   cyclobenzaprine (FLEXERIL) 5 mg tablet Take 1 Tab by mouth three (3) times daily as needed for Muscle Spasm(s). 1/12/17  Yes Jose Vaca MD   atenolol-chlorthalidone (TENORETIC 50) 50-25 mg per tablet Take 1 Tab by mouth daily. 10/20/16  Yes Lo Vela MD   levothyroxine (SYNTHROID) 25 mcg tablet TAKE ONE TABLET BY MOUTH IN THE MORNING 9/14/16  Yes Lo Vela MD   IRON, FERROUS SULFATE, PO Take  by mouth. Yes Historical Provider   hydrochlorothiazide (MICROZIDE) 12.5 mg capsule TAKE ONE CAPSULE BY MOUTH ONCE DAILY 12/23/15  Yes Lo Vela MD   traZODone (DESYREL) 50 mg tablet Take 1 Tab by mouth nightly. Prn sleep 9/23/15  Yes Lo Vela MD   LANSOPRAZOLE (PREVACID PO) Take  by mouth. Yes Historical Provider   potassium 99 mg tablet Take 99 mg by mouth daily. Pt is taking 2 tabs daily   Yes Historical Provider   warfarin (COUMADIN) 7.5 mg tablet TAKE ONE TABLET BY MOUTH ONCE DAILY OR  AS  DIRECTED  Patient taking differently: Take 1/2 tablet on Wednesdays and 7.5 mg the rest of the week 11/7/16   Lo Vela MD     Patient Instructions   Continue Coumadin 7.5 mg tablet Take 1/2 tablet on Wednesdays and 7.5 mg the rest of the week.  Return in one month to recheck coumadin level. Take Vitamin D 1000 units over the counter daily. She voiced understanding of all instructions and AVS given.

## 2017-04-10 ENCOUNTER — CLINICAL SUPPORT (OUTPATIENT)
Dept: FAMILY MEDICINE CLINIC | Age: 62
End: 2017-04-10

## 2017-04-10 VITALS
TEMPERATURE: 97.8 F | HEIGHT: 70 IN | DIASTOLIC BLOOD PRESSURE: 76 MMHG | SYSTOLIC BLOOD PRESSURE: 141 MMHG | HEART RATE: 58 BPM | WEIGHT: 249 LBS | OXYGEN SATURATION: 97 % | RESPIRATION RATE: 16 BRPM | BODY MASS INDEX: 35.65 KG/M2

## 2017-04-10 DIAGNOSIS — Z86.711 HX PULMONARY EMBOLISM: ICD-10-CM

## 2017-04-10 DIAGNOSIS — I74.9 THROMBOEMBOLUS (HCC): Primary | ICD-10-CM

## 2017-04-10 DIAGNOSIS — Z86.718 HISTORY OF DVT (DEEP VEIN THROMBOSIS): ICD-10-CM

## 2017-04-10 DIAGNOSIS — Z87.74 H/O ATRIAL SEPTAL DEFECT REPAIR: ICD-10-CM

## 2017-04-10 LAB
INR BLD: 2.3
PT POC: 28.1 SEC
VALID INTERNAL CONTROL?: YES

## 2017-04-10 NOTE — PROGRESS NOTES
Chief Complaint   Patient presents with    Anticoagulation     Patient is here for a recheck of her INR. Patient is currently taking coumadin 7.5 mg ( 1/2 tablet ) on Wednesdays and 7.5 mg the rest of the week. Visit Vitals    /76 (BP 1 Location: Left arm, BP Patient Position: Sitting)    Pulse (!) 58    Temp 97.8 °F (36.6 °C) (Oral)    Resp 16    Ht 5' 10\" (1.778 m)    Wt 249 lb (112.9 kg)    SpO2 97%    BMI 35.73 kg/m2     Results for orders placed or performed in visit on 04/10/17   AMB POC PT/INR   Result Value Ref Range    VALID INTERNAL CONTROL POC Yes     Prothrombin time (POC) 28.1 sec    INR POC 2.3      Patient advised to continue taking current dose of coumadin and return in one month for recheck of her INR.

## 2017-05-03 ENCOUNTER — TELEPHONE (OUTPATIENT)
Dept: FAMILY MEDICINE CLINIC | Age: 62
End: 2017-05-03

## 2017-05-03 DIAGNOSIS — I10 ESSENTIAL HYPERTENSION, BENIGN: ICD-10-CM

## 2017-05-03 RX ORDER — ATENOLOL AND CHLORTHALIDONE TABLET 50; 25 MG/1; MG/1
1 TABLET ORAL DAILY
Qty: 30 TAB | Refills: 11 | Status: SHIPPED | OUTPATIENT
Start: 2017-05-03 | End: 2018-05-15 | Stop reason: SDUPTHER

## 2017-05-17 ENCOUNTER — CLINICAL SUPPORT (OUTPATIENT)
Dept: FAMILY MEDICINE CLINIC | Age: 62
End: 2017-05-17

## 2017-05-17 VITALS
OXYGEN SATURATION: 96 % | SYSTOLIC BLOOD PRESSURE: 144 MMHG | HEART RATE: 70 BPM | WEIGHT: 249 LBS | DIASTOLIC BLOOD PRESSURE: 87 MMHG | BODY MASS INDEX: 33.72 KG/M2 | HEIGHT: 72 IN

## 2017-05-17 DIAGNOSIS — Z86.711 HX PULMONARY EMBOLISM: Primary | ICD-10-CM

## 2017-05-17 DIAGNOSIS — Z51.81 ANTICOAGULATION GOAL OF INR 2 TO 3: ICD-10-CM

## 2017-05-17 DIAGNOSIS — Z79.01 ANTICOAGULATION GOAL OF INR 2 TO 3: ICD-10-CM

## 2017-05-17 DIAGNOSIS — Z86.718 HISTORY OF DVT (DEEP VEIN THROMBOSIS): ICD-10-CM

## 2017-05-17 LAB
INR BLD: 2.1
PT POC: 24.9 SECONDS
VALID INTERNAL CONTROL?: YES

## 2017-05-17 RX ORDER — WARFARIN 7.5 MG/1
7.5 TABLET ORAL DAILY
COMMUNITY
End: 2017-11-20 | Stop reason: SDUPTHER

## 2017-05-17 NOTE — MR AVS SNAPSHOT
Visit Information Date & Time Provider Department Dept. Phone Encounter #  
 5/17/2017 10:00 AM Luis 197  [phone] [id number] Your Appointments 6/14/2017 10:00 AM  
Nurse Visit with Elkin Pham [address]  ([address]) Appt Note: pt inr $0cp [facility] [address], [address] [place] [address]  
[phone]  
  
   
 [address], [place] [address] [address] Upcoming Health Maintenance Date Due ZOSTER VACCINE AGE 60> 10/6/2016 PAP AKA CERVICAL CYTOLOGY 10/21/2016 INFLUENZA AGE 9 TO ADULT 8/1/2017 MEDICARE YEARLY EXAM 2/14/2018 COLONOSCOPY 7/6/2018 BREAST CANCER SCRN MAMMOGRAM 2/14/2019 DTaP/Tdap/Td series (2 - Td) 6/30/2025 Allergies as of 5/17/2017  Review Complete On: 5/17/2017 By: Cherie Pace LPN Severity Noted Reaction Type Reactions Iodinated Contrast Media - Oral And Iv Dye  09/24/2012   Side Effect Hives Lasix [Furosemide]  09/24/2012   Side Effect Hives Sulfa (Sulfonamide Antibiotics)  09/24/2012   Side Effect Hives Current Immunizations  Reviewed on 9/14/2016 Name Date Influenza Vaccine 10/21/2013 Influenza Vaccine Dailey Terrance) 11/5/2014 Influenza Vaccine (Quad) PF 9/14/2016, 10/26/2015 Tdap 6/30/2015 Not reviewed this visit You Were Diagnosed With   
  
 Codes Comments Hx pulmonary embolism    -  Primary ICD-10-CM: F89.891 ICD-9-CM: V12.55 Anticoagulation goal of INR 2 to 3     ICD-10-CM: Z51.81, Z79.01 
ICD-9-CM: V58.83, V58.61 History of DVT (deep vein thrombosis)     ICD-10-CM: R32.400 ICD-9-CM: V12.51 Vitals BP Pulse Height(growth percentile) Weight(growth percentile) SpO2 BMI  
 144/87 (BP 1 Location: Left arm, BP Patient Position: Sitting) 70 6' (1.829 m) 249 lb (112.9 kg) 96% 33.77 kg/m2 OB Status Smoking Status Postmenopausal Never Smoker Vitals History BMI and BSA Data Body Mass Index Body Surface Area  
 33.77 kg/m 2 2.39 m 2 Preferred Pharmacy Pharmacy Name Phone [facility] [address] [facility] [place] [address] Angy Delgadillo [phone] Your Updated Medication List  
  
   
This list is accurate as of: 5/17/17 11:37 AM.  Always use your most recent med list.  
  
  
  
  
 atenolol-chlorthalidone 50-25 mg per tablet Commonly known as:  TENORETIC 50 Take 1 Tab by mouth daily. COUMADIN 7.5 mg tablet Generic drug:  warfarin Take 7.5 mg by mouth daily. Take one tablet daily except Wednesdays take 1/2 of a tablet. cyclobenzaprine 5 mg tablet Commonly known as:  FLEXERIL Take 1 Tab by mouth three (3) times daily as needed for Muscle Spasm(s). diclofenac EC 50 mg EC tablet Commonly known as:  VOLTAREN Take 1 Tab by mouth two (2) times a day. hydroCHLOROthiazide 12.5 mg capsule Commonly known as:  Janalydyana Melvin TAKE ONE CAPSULE BY MOUTH ONCE DAILY  
  
 IRON (FERROUS SULFATE) PO Take  by mouth.  
  
 levothyroxine 25 mcg tablet Commonly known as:  SYNTHROID  
TAKE ONE TABLET BY MOUTH IN THE MORNING  
  
 potassium 99 mg tablet Take 99 mg by mouth daily. Pt is taking 2 tabs daily PREVACID PO Take  by mouth. traZODone 50 mg tablet Commonly known as:  Brittany Foil Take 1 Tab by mouth nightly. Prn sleep  
  
 venlafaxine 75 mg tablet Commonly known as:  [facility] Take 1 Tab by mouth two (2) times a day. VITAMIN D3 1,000 unit Cap Generic drug:  cholecalciferol Take  by mouth daily. May 2017 Details Sun Mon Tue Wed Thu Fri Sat  
   1  
  
  
  
   2  
  
  
  
   3  
  
  
  
   4  
  
  
  
   5  
  
  
  
   6  
  
  
  
  
  7  
  
  
  
   8  
  
  
  
   9  
  
  
  
   10  
  
  
  
   11  
  
  
  
   12  
  
  
  
   13  
  
  
  
  
  14  
  
  
  
   15  
  
  
  
   16  
  
  
  
   17  
  
3.75 mg See details 18  
  
7.5 mg  
  
   19  
  
7.5 mg  
  
   20 7.5 mg  
  
  
  21  
  
7.5 mg  
  
   22  
  
7.5 mg  
  
   23  
  
7.5 mg  
  
   24 3.75 mg  
  
   25  
  
7.5 mg  
  
   26  
  
7.5 mg  
  
   27  
  
7.5 mg  
  
  
  28  
  
7.5 mg  
  
   29  
  
7.5 mg  
  
   30  
  
7.5 mg  
  
   31  
  
3.75 mg Date Details 05/17 This INR check INR: 2.1 How to take your warfarin dose To take:  3.75 mg Take 0.5 of a 7.5 mg tablet. To take:  7.5 mg Take 1 of the 7.5 mg tablets. June 2017 Details Ro Single Tue Wed Thu Fri Sat  
      1  
  
7.5 mg  
  
   2  
  
7.5 mg  
  
   3  
  
7.5 mg  
  
  
  4  
  
7.5 mg  
  
   5  
  
7.5 mg  
  
   6  
  
7.5 mg  
  
   7  
  
3.75 mg  
  
   8  
  
7.5 mg  
  
   9  
  
7.5 mg  
  
   10  
  
7.5 mg  
  
  
  11  
  
7.5 mg  
  
   12  
  
7.5 mg  
  
   13  
  
7.5 mg  
  
   14  
  
3.75 mg  
  
   15  
  
7.5 mg  
  
   16  
  
7.5 mg  
  
   17  
  
  
  
  
  18  
  
  
  
   19  
  
  
  
   20  
  
  
  
   21  
  
  
  
   22  
  
  
  
   23  
  
  
  
   24  
  
  
  
  
  25  
  
  
  
   26  
  
  
  
   27  
  
  
  
   28  
  
  
  
   29  
  
  
  
   30  
  
  
  
   
 Date Details No additional details Date of next INR:  6/16/2017 How to take your warfarin dose To take:  3.75 mg Take 0.5 of a 7.5 mg tablet. To take:  7.5 mg Take 1 of the 7.5 mg tablets. We Performed the Following AMB POC PT/INR [04127 CPT(R)] Patient Instructions Continue Coumadin 7.5 mg tablet Take one tablet daily except Wednesdays take 1/2 of a tablet. Return in one month. Introducing [facility] & HEALTH SERVICES! [employer] introduces [employer] patient portal. Now you can access parts of your medical record, email your doctor's office, and request medication refills online. 1. In your internet browser, go to [email or web address] 2. Click on the First Time User? Click Here link in the Sign In box.  You will see the New Member Sign Up page. 3. Enter your Healthkart Access Code exactly as it appears below. You will not need to use this code after youve completed the sign-up process. If you do not sign up before the expiration date, you must request a new code. · Healthkart Access Code: [email or web address] Expires: 7/9/2017  9:58 AM 
 
4. Enter the last four digits of your Social Security Number (xxxx) and Date of Birth (mm/dd/yyyy) as indicated and click Submit. You will be taken to the next sign-up page. 5. Create a Healthkart ID. This will be your Healthkart login ID and cannot be changed, so think of one that is secure and easy to remember. 6. Create a Healthkart password. You can change your password at any time. 7. Enter your Password Reset Question and Answer. This can be used at a later time if you forget your password. 8. Enter your e-mail address. You will receive e-mail notification when new information is available in [address] 9. Click Sign Up. You can now view and download portions of your medical record. 10. Click the Download Summary menu link to download a portable copy of your medical information. If you have questions, please visit the Frequently Asked Questions section of the Healthkart website. Remember, Healthkart is NOT to be used for urgent needs. For medical emergencies, dial 911. Now available from your iPhone and Android! Please provide this summary of care documentation to your next provider. Your primary care clinician is listed as Margaret Cooper. If you have any questions after today's visit, please call [phone].

## 2017-05-17 NOTE — PROGRESS NOTES
Patient presents for a PT/INR  See med list and patient instructions along with results for verbal orders by Dr. Arpita Oliver  Results for orders placed or performed in visit on 05/17/17   AMB POC PT/INR   Result Value Ref Range    VALID INTERNAL CONTROL POC Yes     Prothrombin time (POC) 24.9 seconds    INR POC 2.1      Vitals:    05/17/17 1056   BP: 144/87   Pulse: 70   SpO2: 96%   Weight: 249 lb (112.9 kg)   Height: 6' (1.829 m)     Prior to Admission medications    Medication Sig Start Date End Date Taking? Authorizing Provider   warfarin (COUMADIN) 7.5 mg tablet Take 7.5 mg by mouth daily. Take one tablet daily except Wednesdays take 1/2 of a tablet. Yes Historical Provider   atenolol-chlorthalidone (TENORETIC 50) 50-25 mg per tablet Take 1 Tab by mouth daily. 5/3/17  Yes Bernard Arora MD   cholecalciferol (VITAMIN D3) 1,000 unit cap Take  by mouth daily. Yes Historical Provider   venlafaxine (EFFEXOR) 75 mg tablet Take 1 Tab by mouth two (2) times a day. 1/17/17  Yes Bernard Arora MD   diclofenac EC (VOLTAREN) 50 mg EC tablet Take 1 Tab by mouth two (2) times a day. 1/12/17  Yes Bernard Arora MD   cyclobenzaprine (FLEXERIL) 5 mg tablet Take 1 Tab by mouth three (3) times daily as needed for Muscle Spasm(s). 1/12/17  Yes Bernard Arora MD   levothyroxine (SYNTHROID) 25 mcg tablet TAKE ONE TABLET BY MOUTH IN THE MORNING 9/14/16  Yes Luli Gunter MD   IRON, FERROUS SULFATE, PO Take  by mouth. Yes Historical Provider   hydrochlorothiazide (MICROZIDE) 12.5 mg capsule TAKE ONE CAPSULE BY MOUTH ONCE DAILY 12/23/15  Yes Luli Gunter MD   traZODone (DESYREL) 50 mg tablet Take 1 Tab by mouth nightly. Prn sleep 9/23/15  Yes Luli Gunter MD   LANSOPRAZOLE (PREVACID PO) Take  by mouth. Yes Historical Provider   potassium 99 mg tablet Take 99 mg by mouth daily.  Pt is taking 2 tabs daily   Yes Historical Provider     Patient Instructions   Continue Coumadin 7.5 mg tablet Take one tablet daily except Wednesdays take 1/2 of a tablet. Return in one month. She voiced understanding of all instructions. She was emotional today with loss of her  we talked some about grieving and that what she is feeling is all part of grieving. She said she went to a counselor once and may go back again. I also told her seeing Dr. Bahman Dumont may be a good idea.

## 2017-05-17 NOTE — PATIENT INSTRUCTIONS
Continue Coumadin 7.5 mg tablet Take one tablet daily except Wednesdays take 1/2 of a tablet. Return in one month.

## 2017-06-14 ENCOUNTER — CLINICAL SUPPORT (OUTPATIENT)
Dept: FAMILY MEDICINE CLINIC | Age: 62
End: 2017-06-14

## 2017-06-14 VITALS
WEIGHT: 250.2 LBS | DIASTOLIC BLOOD PRESSURE: 95 MMHG | HEART RATE: 62 BPM | HEIGHT: 72 IN | BODY MASS INDEX: 33.89 KG/M2 | TEMPERATURE: 97.9 F | OXYGEN SATURATION: 97 % | SYSTOLIC BLOOD PRESSURE: 161 MMHG

## 2017-06-14 DIAGNOSIS — Z86.718 HISTORY OF DVT (DEEP VEIN THROMBOSIS): ICD-10-CM

## 2017-06-14 LAB
INR BLD: 2.3
PT POC: 27.2 SECONDS
VALID INTERNAL CONTROL?: YES

## 2017-06-14 NOTE — MR AVS SNAPSHOT
Visit Information Date & Time Provider Department Dept. Phone Encounter #  
 6/14/2017 10:00 AM Luis Arreguin [facility] [phone] [id number] Upcoming Health Maintenance Date Due ZOSTER VACCINE AGE 60> 10/6/2016 PAP AKA CERVICAL CYTOLOGY 10/21/2016 INFLUENZA AGE 9 TO ADULT 8/1/2017 MEDICARE YEARLY EXAM 2/14/2018 COLONOSCOPY 7/6/2018 BREAST CANCER SCRN MAMMOGRAM 2/14/2019 DTaP/Tdap/Td series (2 - Td) 6/30/2025 Allergies as of 6/14/2017  Review Complete On: 6/14/2017 By: Luis Demarco Severity Noted Reaction Type Reactions Iodinated Contrast Media - Oral And Iv Dye  09/24/2012   Side Effect Hives Lasix [Furosemide]  09/24/2012   Side Effect Hives Sulfa (Sulfonamide Antibiotics)  09/24/2012   Side Effect Hives Current Immunizations  Reviewed on 9/14/2016 Name Date Influenza Vaccine 10/21/2013 Influenza Vaccine Roslynn Sarks) 11/5/2014 Influenza Vaccine (Quad) PF 9/14/2016, 10/26/2015 Tdap 6/30/2015 Not reviewed this visit You Were Diagnosed With   
  
 Codes Comments History of DVT (deep vein thrombosis)     ICD-10-CM: J54.262 ICD-9-CM: V12.51 Vitals BP Pulse Temp Height(growth percentile) Weight(growth percentile) SpO2  
 (!) 161/95 62 97.9 °F (36.6 °C) 6' (1.829 m) 250 lb 3.2 oz (113.5 kg) 97% BMI OB Status Smoking Status 33.93 kg/m2 Postmenopausal Never Smoker Vitals History BMI and BSA Data Body Mass Index Body Surface Area  
 33.93 kg/m 2 2.4 m 2 Preferred Pharmacy Pharmacy Name Phone [facility] [address] [facility], [facility] [address] [facility] [phone] Your Updated Medication List  
  
   
This list is accurate as of: 6/14/17 10:17 AM.  Always use your most recent med list.  
  
  
  
  
 atenolol-chlorthalidone 50-25 mg per tablet Commonly known as:  TENORETIC 50 Take 1 Tab by mouth daily. COUMADIN 7.5 mg tablet Generic drug:  warfarin Take 7.5 mg by mouth daily. Take one tablet daily except Wednesdays take 1/2 of a tablet. cyclobenzaprine 5 mg tablet Commonly known as:  FLEXERIL Take 1 Tab by mouth three (3) times daily as needed for Muscle Spasm(s). diclofenac EC 50 mg EC tablet Commonly known as:  VOLTAREN Take 1 Tab by mouth two (2) times a day. hydroCHLOROthiazide 12.5 mg capsule Commonly known as:  Grazyna Stephania TAKE ONE CAPSULE BY MOUTH ONCE DAILY  
  
 IRON (FERROUS SULFATE) PO Take  by mouth.  
  
 levothyroxine 25 mcg tablet Commonly known as:  SYNTHROID  
TAKE ONE TABLET BY MOUTH IN THE MORNING  
  
 potassium 99 mg tablet Take 99 mg by mouth daily. Pt is taking 2 tabs daily PREVACID PO Take  by mouth. traZODone 50 mg tablet Commonly known as:  Mariea Ottawa Take 1 Tab by mouth nightly. Prn sleep  
  
 venlafaxine 75 mg tablet Commonly known as:  [facility] Take 1 Tab by mouth two (2) times a day. VITAMIN D3 1,000 unit Cap Generic drug:  cholecalciferol Take  by mouth daily. Description No changes in coumadin dose June 2017 Details Sun Mon Tue Wed Thu Fri Sat  
      1  
  
  
  
   2  
  
  
  
   3  
  
  
  
  
  4  
  
  
  
   5  
  
  
  
   6  
  
  
  
   7  
  
  
  
   8  
  
  
  
   9  
  
  
  
   10  
  
  
  
  
  11  
  
  
  
   12  
  
  
  
   13  
  
  
  
   14  
  
3.75 mg See details 15  
  
7.5 mg  
  
   16  
  
7.5 mg  
  
   17  
  
7.5 mg  
  
  
  18  
  
7.5 mg  
  
   19  
  
7.5 mg  
  
   20  
  
7.5 mg  
  
   21  
  
3.75 mg  
  
   22  
  
7.5 mg  
  
   23  
  
7.5 mg  
  
   24  
  
7.5 mg  
  
  
  25  
  
7.5 mg  
  
   26  
  
7.5 mg  
  
   27  
  
7.5 mg  
  
   28  
  
3.75 mg  
  
   29  
  
7.5 mg  
  
   30  
  
7.5 mg Date Details 06/14 This INR check INR: 2.3 How to take your warfarin dose To take:  3.75 mg Take half of a 7.5 mg tablet. To take:  7.5 mg Take one of the 7.5 mg tablets. July 2017 Details Papito Melgoza Tue Wed Thu Fri Sat  
        1  
  
7.5 mg  
  
  
  2  
  
7.5 mg  
  
   3  
  
7.5 mg  
  
   4  
  
7.5 mg  
  
   5  
  
3.75 mg  
  
   6  
  
7.5 mg  
  
   7  
  
7.5 mg  
  
   8  
  
7.5 mg  
  
  
  9  
  
7.5 mg  
  
   10  
  
7.5 mg  
  
   11  
  
7.5 mg  
  
   12  
  
3.75 mg  
  
   13  
  
7.5 mg  
  
   14  
  
7.5 mg  
  
   15  
  
  
  
  
  16  
  
  
  
   17  
  
  
  
   18  
  
  
  
   19  
  
  
  
   20  
  
  
  
   21  
  
  
  
   22  
  
  
  
  
  23  
  
  
  
   24  
  
  
  
   25  
  
  
  
   26  
  
  
  
   27  
  
  
  
   28  
  
  
  
   29  
  
  
  
  
  30  
  
  
  
   31  
  
  
  
       
 Date Details No additional details Date of next INR:  7/14/2017 How to take your warfarin dose To take:  3.75 mg Take half of a 7.5 mg tablet. To take:  7.5 mg Take one of the 7.5 mg tablets. We Performed the Following AMB POC PT/INR [73270 CPT(R)] Introducing hospitals & [facility] SERVICES! Sissy Miller introduces ToVieFor patient portal. Now you can access parts of your medical record, email your doctor's office, and request medication refills online. 1. In your internet browser, go to [email or web address] 2. Click on the First Time User? Click Here link in the Sign In box. You will see the New Member Sign Up page. 3. Enter your ToVieFor Access Code exactly as it appears below. You will not need to use this code after youve completed the sign-up process. If you do not sign up before the expiration date, you must request a new code. · ToVieFor Access Code: [email or web address] Expires: 7/9/2017  9:58 AM 
 
4. Enter the last four digits of your Social Security Number (xxxx) and Date of Birth (mm/dd/yyyy) as indicated and click Submit. You will be taken to the next sign-up page. 5. Create a Electronifie ID. This will be your Electronifie login ID and cannot be changed, so think of one that is secure and easy to remember. 6. Create a Electronifie password. You can change your password at any time. 7. Enter your Password Reset Question and Answer. This can be used at a later time if you forget your password. 8. Enter your e-mail address. You will receive e-mail notification when new information is available in [address] 9. Click Sign Up. You can now view and download portions of your medical record. 10. Click the Download Summary menu link to download a portable copy of your medical information. If you have questions, please visit the Frequently Asked Questions section of the Electronifie website. Remember, Electronifie is NOT to be used for urgent needs. For medical emergencies, dial 911. Now available from your iPhone and Android! Please provide this summary of care documentation to your next provider. Your primary care clinician is listed as Danis Melissa. If you have any questions after today's visit, please call [phone].

## 2017-06-14 NOTE — PROGRESS NOTES
Chief Complaint   Patient presents with    Anticoagulation     Patient is here for a recheck of her PT/INR. Patient is currently taking coumadin: one 7.5 mg tablet daily except Wednesdays take 1/2 of a tablet. Visit Vitals    BP (!) 161/95  Comment: patient was talking    Pulse 62    Temp 97.9 °F (36.6 °C)    Ht 6' (1.829 m)    Wt 250 lb 3.2 oz (113.5 kg)    SpO2 97%    BMI 33.93 kg/m2     161/95  137/91     Current Outpatient Prescriptions on File Prior to Visit   Medication Sig Dispense Refill    warfarin (COUMADIN) 7.5 mg tablet Take 7.5 mg by mouth daily. Take one tablet daily except Wednesdays take 1/2 of a tablet.  atenolol-chlorthalidone (TENORETIC 50) 50-25 mg per tablet Take 1 Tab by mouth daily. 30 Tab 11    cholecalciferol (VITAMIN D3) 1,000 unit cap Take  by mouth daily.  venlafaxine (EFFEXOR) 75 mg tablet Take 1 Tab by mouth two (2) times a day. 180 Tab 3    diclofenac EC (VOLTAREN) 50 mg EC tablet Take 1 Tab by mouth two (2) times a day. 30 Tab 1    cyclobenzaprine (FLEXERIL) 5 mg tablet Take 1 Tab by mouth three (3) times daily as needed for Muscle Spasm(s). 60 Tab 1    levothyroxine (SYNTHROID) 25 mcg tablet TAKE ONE TABLET BY MOUTH IN THE MORNING 30 Tab 11    IRON, FERROUS SULFATE, PO Take  by mouth.  hydrochlorothiazide (MICROZIDE) 12.5 mg capsule TAKE ONE CAPSULE BY MOUTH ONCE DAILY 30 Cap 5    traZODone (DESYREL) 50 mg tablet Take 1 Tab by mouth nightly. Prn sleep 30 Tab 2    LANSOPRAZOLE (PREVACID PO) Take  by mouth.  potassium 99 mg tablet Take 99 mg by mouth daily. Pt is taking 2 tabs daily       No current facility-administered medications on file prior to visit. Patient advised to continue taking current dose of coumadin. Patient given AVS. Coumadin Calender reviewed with patient. Patient verbalized understanding.

## 2017-07-14 ENCOUNTER — CLINICAL SUPPORT (OUTPATIENT)
Dept: FAMILY MEDICINE CLINIC | Age: 62
End: 2017-07-14

## 2017-07-14 VITALS
RESPIRATION RATE: 18 BRPM | DIASTOLIC BLOOD PRESSURE: 81 MMHG | OXYGEN SATURATION: 95 % | WEIGHT: 254.4 LBS | HEIGHT: 72 IN | TEMPERATURE: 97.7 F | HEART RATE: 59 BPM | BODY MASS INDEX: 34.46 KG/M2 | SYSTOLIC BLOOD PRESSURE: 136 MMHG

## 2017-07-14 DIAGNOSIS — I74.9 THROMBOEMBOLUS (HCC): Primary | ICD-10-CM

## 2017-07-14 LAB
INR BLD: 2.3
PT POC: 27.2 SECONDS
VALID INTERNAL CONTROL?: YES

## 2017-07-14 NOTE — PROGRESS NOTES
Pt here for INR check. INR 2.3, PT 27.2. Per verbal order from Dr Heather Hilliard, no change in current dose of Coumadin and return in 1 month for another INR check. Pt verbalized understanding.

## 2017-08-15 ENCOUNTER — CLINICAL SUPPORT (OUTPATIENT)
Dept: FAMILY MEDICINE CLINIC | Age: 62
End: 2017-08-15

## 2017-08-15 VITALS
DIASTOLIC BLOOD PRESSURE: 81 MMHG | HEIGHT: 72 IN | TEMPERATURE: 97.9 F | RESPIRATION RATE: 20 BRPM | BODY MASS INDEX: 34.95 KG/M2 | HEART RATE: 74 BPM | WEIGHT: 258 LBS | SYSTOLIC BLOOD PRESSURE: 138 MMHG | OXYGEN SATURATION: 95 %

## 2017-08-15 DIAGNOSIS — Z79.01 ANTICOAGULATION GOAL OF INR 2 TO 3: ICD-10-CM

## 2017-08-15 DIAGNOSIS — I74.9 THROMBOEMBOLUS (HCC): ICD-10-CM

## 2017-08-15 DIAGNOSIS — Z86.711 HX PULMONARY EMBOLISM: Primary | ICD-10-CM

## 2017-08-15 DIAGNOSIS — Z51.81 ANTICOAGULATION GOAL OF INR 2 TO 3: ICD-10-CM

## 2017-08-15 LAB
INR BLD: 1.7
PT POC: 20.3 SECONDS
VALID INTERNAL CONTROL?: YES

## 2017-08-15 NOTE — MR AVS SNAPSHOT
Visit Information Date & Time Provider Department Dept. Phone Encounter #  
 8/15/2017 10:20 AM Luis Arreguin [place] [address] [phone] [id number] Upcoming Health Maintenance Date Due ZOSTER VACCINE AGE 60> 8/6/2016 PAP AKA CERVICAL CYTOLOGY 10/21/2016 INFLUENZA AGE 9 TO ADULT 8/1/2017 MEDICARE YEARLY EXAM 2/14/2018 COLONOSCOPY 7/6/2018 BREAST CANCER SCRN MAMMOGRAM 2/14/2019 DTaP/Tdap/Td series (2 - Td) 6/30/2025 Allergies as of 8/15/2017  Review Complete On: 8/15/2017 By: Altagracia Thompson LPN Severity Noted Reaction Type Reactions Iodinated Contrast- Oral And Iv Dye  09/24/2012   Side Effect Hives Lasix [Furosemide]  09/24/2012   Side Effect Hives Sulfa (Sulfonamide Antibiotics)  09/24/2012   Side Effect Hives Current Immunizations  Reviewed on 9/14/2016 Name Date Influenza Vaccine 10/21/2013 Influenza Vaccine Aniceto Darlene) 11/5/2014 Influenza Vaccine (Quad) PF 9/14/2016, 10/26/2015 Tdap 6/30/2015 Not reviewed this visit You Were Diagnosed With   
  
 Codes Comments Hx pulmonary embolism    -  Primary ICD-10-CM: B16.428 ICD-9-CM: V12.55 Thromboembolus ([facility] [address]     ICD-10-CM: I74.9 ICD-9-CM: 444.9 Anticoagulation goal of INR 2 to 3     ICD-10-CM: Z51.81, Z79.01 
ICD-9-CM: V58.83, V58.61 Vitals BP Pulse Temp Resp Height(growth percentile) Weight(growth percentile) 138/81 (BP 1 Location: Left arm, BP Patient Position: Sitting) 74 97.9 °F (36.6 °C) 20 6' (1.829 m) 258 lb (117 kg) SpO2 BMI OB Status Smoking Status 95% 34.99 kg/m2 Postmenopausal Never Smoker BMI and BSA Data Body Mass Index Body Surface Area 34.99 kg/m 2 2.44 m 2 Preferred Pharmacy Pharmacy Name Phone [facility] [address] [facility], [facility] [phone] Your Updated Medication List  
  
   
This list is accurate as of: 8/15/17 10:36 AM.  Always use your most recent med list.  
  
  
  
  
 atenolol-chlorthalidone 50-25 mg per tablet Commonly known as:  TENORETIC 50 Take 1 Tab by mouth daily. COUMADIN 7.5 mg tablet Generic drug:  warfarin Take 7.5 mg by mouth daily. Take one tablet daily except Wednesdays take 1/2 of a tablet. cyclobenzaprine 5 mg tablet Commonly known as:  FLEXERIL Take 1 Tab by mouth three (3) times daily as needed for Muscle Spasm(s). diclofenac EC 50 mg EC tablet Commonly known as:  VOLTAREN Take 1 Tab by mouth two (2) times a day. hydroCHLOROthiazide 12.5 mg capsule Commonly known as:  Noretta Vanna TAKE ONE CAPSULE BY MOUTH ONCE DAILY  
  
 IRON (FERROUS SULFATE) PO Take  by mouth.  
  
 levothyroxine 25 mcg tablet Commonly known as:  SYNTHROID  
TAKE ONE TABLET BY MOUTH IN THE MORNING  
  
 potassium 99 mg tablet Take 99 mg by mouth daily. Pt is taking 2 tabs daily PREVACID PO Take  by mouth. traZODone 50 mg tablet Commonly known as:  Illene Dus Take 1 Tab by mouth nightly. Prn sleep  
  
 venlafaxine 75 mg tablet Commonly known as:  [facility] Take 1 Tab by mouth two (2) times a day. VITAMIN D3 1,000 unit Cap Generic drug:  cholecalciferol Take  by mouth daily. Description Take 7.5 mg daily August 2017 Details Sun Mon Tue Wed Thu Fri Sat  
    1  
  
  
  
   2  
  
  
  
   3  
  
  
  
   4  
  
  
  
   5  
  
  
  
  
  6  
  
  
  
   7  
  
  
  
   8  
  
  
  
   9  
  
  
  
   10  
  
  
  
   11  
  
  
  
   12  
  
  
  
  
  13  
  
  
  
   14  
  
  
  
   15  
  
7.5 mg  
See details    16  
  
7.5 mg  
  
   17  
  
7.5 mg  
  
   18  
  
7.5 mg  
  
   19  
  
7.5 mg  
  
  
  20  
  
7.5 mg  
  
   21  
  
7.5 mg  
  
   22  
  
7.5 mg  
  
   23  
  
7.5 mg  
  
   24  
  
7.5 mg  
  
   25  
  
7.5 mg  
  
   26  
  
7.5 mg  
  
  
  27  
  
7.5 mg  
  
   28  
  
7.5 mg  
  
   29  
  
7.5 mg  
  
   30  
  
  
  
   31  
  
 Date Details 08/15 This INR check INR: 1.7! Date of next INR:  8/29/2017 How to take your warfarin dose To take:  7.5 mg Take one of the 7.5 mg tablets. We Performed the Following AMB POC PT/INR [40789 CPT(R)] Introducing [facility] & [facility] SERVICES! Inez Bostones introduces Saplo patient portal. Now you can access parts of your medical record, email your doctor's office, and request medication refills online. 1. In your internet browser, go to [email or web address] 2. Click on the First Time User? Click Here link in the Sign In box. You will see the New Member Sign Up page. 3. Enter your Saplo Access Code exactly as it appears below. You will not need to use this code after youve completed the sign-up process. If you do not sign up before the expiration date, you must request a new code. · Saplo Access Code: [email or web address] Expires: 10/12/2017 10:22 AM 
 
4. Enter the last four digits of your Social Security Number (xxxx) and Date of Birth (mm/dd/yyyy) as indicated and click Submit. You will be taken to the next sign-up page. 5. Create a Saplo ID. This will be your Saplo login ID and cannot be changed, so think of one that is secure and easy to remember. 6. Create a Saplo password. You can change your password at any time. 7. Enter your Password Reset Question and Answer. This can be used at a later time if you forget your password. 8. Enter your e-mail address. You will receive e-mail notification when new information is available in [address] 9. Click Sign Up. You can now view and download portions of your medical record. 10. Click the Download Summary menu link to download a portable copy of your medical information. If you have questions, please visit the Frequently Asked Questions section of the Saplo website. Remember, Saplo is NOT to be used for urgent needs. For medical emergencies, dial 911. Now available from your iPhone and Android! Please provide this summary of care documentation to your next provider. Your primary care clinician is listed as Ghislaine Marcos. If you have any questions after today's visit, please call [phone].

## 2017-08-15 NOTE — PROGRESS NOTES
Patient presents for a PT/INR  See med list and patient instructions along with results for verbal orders by Dr. Keira Crisostomo. Results for orders placed or performed in visit on 08/15/17   AMB POC PT/INR   Result Value Ref Range    VALID INTERNAL CONTROL POC Yes     Prothrombin time (POC) 20.3 seconds    INR POC 1.7      Vitals:    08/15/17 1023   BP: 138/81   Pulse: 74   Resp: 20   Temp: 97.9 °F (36.6 °C)   SpO2: 95%   Weight: 258 lb (117 kg)   Height: 6' (1.829 m)     Prior to Admission medications    Medication Sig Start Date End Date Taking? Authorizing Provider   warfarin (COUMADIN) 7.5 mg tablet Take 7.5 mg by mouth daily. Take one tablet daily except Wednesdays take 1/2 of a tablet. Yes Historical Provider   atenolol-chlorthalidone (TENORETIC 50) 50-25 mg per tablet Take 1 Tab by mouth daily. 5/3/17  Yes Aleksandr Mendoza MD   cholecalciferol (VITAMIN D3) 1,000 unit cap Take  by mouth daily. Yes Historical Provider   venlafaxine (EFFEXOR) 75 mg tablet Take 1 Tab by mouth two (2) times a day. 1/17/17  Yes Aleksandr Mendoza MD   diclofenac EC (VOLTAREN) 50 mg EC tablet Take 1 Tab by mouth two (2) times a day. 1/12/17  Yes Aleksandr Mendoza MD   cyclobenzaprine (FLEXERIL) 5 mg tablet Take 1 Tab by mouth three (3) times daily as needed for Muscle Spasm(s). 1/12/17  Yes Aleksandr Mendoza MD   levothyroxine (SYNTHROID) 25 mcg tablet TAKE ONE TABLET BY MOUTH IN THE MORNING 9/14/16  Yes Chao Haines MD   IRON, FERROUS SULFATE, PO Take  by mouth. Yes Historical Provider   hydrochlorothiazide (MICROZIDE) 12.5 mg capsule TAKE ONE CAPSULE BY MOUTH ONCE DAILY 12/23/15  Yes Chao Haines MD   traZODone (DESYREL) 50 mg tablet Take 1 Tab by mouth nightly. Prn sleep 9/23/15  Yes Chao Haines MD   LANSOPRAZOLE (PREVACID PO) Take  by mouth. Yes Historical Provider   potassium 99 mg tablet Take 99 mg by mouth daily.  Pt is taking 2 tabs daily   Yes Historical Provider     Present dose is 3.75 mg on Wednesdays and 7.5 mg the rest of the week. Verbal orders from Dr. Irlanda Godinez are 7.5 mg every day and come back in 2 weeks. She voiced understanding of all instructions and AVS given with ulices.

## 2017-08-29 ENCOUNTER — CLINICAL SUPPORT (OUTPATIENT)
Dept: FAMILY MEDICINE CLINIC | Age: 62
End: 2017-08-29

## 2017-08-29 VITALS
RESPIRATION RATE: 18 BRPM | DIASTOLIC BLOOD PRESSURE: 95 MMHG | SYSTOLIC BLOOD PRESSURE: 151 MMHG | HEIGHT: 72 IN | OXYGEN SATURATION: 95 % | TEMPERATURE: 98.2 F | HEART RATE: 58 BPM | BODY MASS INDEX: 34.81 KG/M2 | WEIGHT: 257 LBS

## 2017-08-29 DIAGNOSIS — Z79.01 ANTICOAGULATION GOAL OF INR 2 TO 3: ICD-10-CM

## 2017-08-29 DIAGNOSIS — Z86.718 HISTORY OF DVT (DEEP VEIN THROMBOSIS): ICD-10-CM

## 2017-08-29 DIAGNOSIS — Z51.81 ANTICOAGULATION GOAL OF INR 2 TO 3: ICD-10-CM

## 2017-08-29 DIAGNOSIS — Z86.711 HX PULMONARY EMBOLISM: Primary | ICD-10-CM

## 2017-08-29 LAB
INR BLD: 3
PT POC: 35.5 SECONDS
VALID INTERNAL CONTROL?: YES

## 2017-08-29 NOTE — PROGRESS NOTES
Patient presents for a PT/INR  See med list and patient instructions along with results for verbal orders by Dr. Tiffani Painting. Results for orders placed or performed in visit on 08/29/17   AMB POC PT/INR   Result Value Ref Range    VALID INTERNAL CONTROL POC Yes     Prothrombin time (POC) 35.5 seconds    INR POC 3.0      BP is up today but is not normally up see below       Vitals:    08/29/17 1019 08/29/17 1029   BP: (!) 161/95 (!) 151/95   Pulse:  (!) 58   Resp:  18   Temp:  98.2 °F (36.8 °C)   SpO2:  95%   Weight:  257 lb (116.6 kg)   Height:  6' (1.829 m)     Prior to Admission medications    Medication Sig Start Date End Date Taking? Authorizing Provider   warfarin (COUMADIN) 7.5 mg tablet Take 7.5 mg by mouth daily. Take one tablet daily   Yes Historical Provider   atenolol-chlorthalidone (TENORETIC 50) 50-25 mg per tablet Take 1 Tab by mouth daily. 5/3/17  Yes Jenna Eden MD   cholecalciferol (VITAMIN D3) 1,000 unit cap Take  by mouth daily. Yes Historical Provider   venlafaxine (EFFEXOR) 75 mg tablet Take 1 Tab by mouth two (2) times a day. 1/17/17  Yes Jenna Eden MD   diclofenac EC (VOLTAREN) 50 mg EC tablet Take 1 Tab by mouth two (2) times a day. 1/12/17  Yes Jenna Eden MD   cyclobenzaprine (FLEXERIL) 5 mg tablet Take 1 Tab by mouth three (3) times daily as needed for Muscle Spasm(s). 1/12/17  Yes Jenna Eden MD   levothyroxine (SYNTHROID) 25 mcg tablet TAKE ONE TABLET BY MOUTH IN THE MORNING 9/14/16  Yes Amando Vera MD   IRON, FERROUS SULFATE, PO Take  by mouth. Yes Historical Provider   hydrochlorothiazide (MICROZIDE) 12.5 mg capsule TAKE ONE CAPSULE BY MOUTH ONCE DAILY 12/23/15  Yes Amando Vera MD   traZODone (DESYREL) 50 mg tablet Take 1 Tab by mouth nightly. Prn sleep 9/23/15  Yes Amando Vera MD   LANSOPRAZOLE (PREVACID PO) Take  by mouth. Yes Historical Provider   potassium 99 mg tablet Take 99 mg by mouth daily.  Pt is taking 2 tabs daily   Yes Historical Provider Per Dr. Akash Macias verbal order continue all med's as ordered and come back in 2 weeks to recheck BP and PT/INR. He said BP's are normally stable so he doesn't want to adjust medications until we recheck BP. She voiced understanding of all instructions and will come back on 9/7-8/17 because she will be out of town the next week.

## 2017-09-20 ENCOUNTER — OFFICE VISIT (OUTPATIENT)
Dept: FAMILY MEDICINE CLINIC | Age: 62
End: 2017-09-20

## 2017-09-20 VITALS
WEIGHT: 256 LBS | OXYGEN SATURATION: 93 % | HEIGHT: 72 IN | HEART RATE: 62 BPM | BODY MASS INDEX: 34.67 KG/M2 | TEMPERATURE: 97.7 F | DIASTOLIC BLOOD PRESSURE: 86 MMHG | RESPIRATION RATE: 12 BRPM | SYSTOLIC BLOOD PRESSURE: 131 MMHG

## 2017-09-20 DIAGNOSIS — Z23 ENCOUNTER FOR IMMUNIZATION: ICD-10-CM

## 2017-09-20 DIAGNOSIS — Z86.718 HISTORY OF DVT (DEEP VEIN THROMBOSIS): ICD-10-CM

## 2017-09-20 DIAGNOSIS — Z79.01 MONITORING FOR LONG-TERM ANTICOAGULANT USE: ICD-10-CM

## 2017-09-20 DIAGNOSIS — R06.09 DOE (DYSPNEA ON EXERTION): ICD-10-CM

## 2017-09-20 DIAGNOSIS — Z86.711 HX PULMONARY EMBOLISM: ICD-10-CM

## 2017-09-20 DIAGNOSIS — R06.81 WITNESSED EPISODE OF APNEA: Primary | ICD-10-CM

## 2017-09-20 DIAGNOSIS — Z51.81 MONITORING FOR LONG-TERM ANTICOAGULANT USE: ICD-10-CM

## 2017-09-20 LAB
INR BLD: 2.6
PT POC: 31.3 SECONDS
VALID INTERNAL CONTROL?: YES

## 2017-09-20 NOTE — MR AVS SNAPSHOT
Visit Information Date & Time Provider Department Dept. Phone Encounter #  
 9/20/2017  3:20 PM Bhanu Delacruz MD [address] [facility] [phone] [id number] Upcoming Health Maintenance Date Due ZOSTER VACCINE AGE 60> 8/6/2016 PAP AKA CERVICAL CYTOLOGY 10/21/2016 INFLUENZA AGE 9 TO ADULT 8/1/2017 MEDICARE YEARLY EXAM 2/14/2018 COLONOSCOPY 7/6/2018 BREAST CANCER SCRN MAMMOGRAM 2/14/2019 DTaP/Tdap/Td series (2 - Td) 6/30/2025 Allergies as of 9/20/2017  Review Complete On: 9/20/2017 By: Terri Pierre Severity Noted Reaction Type Reactions Iodinated Contrast- Oral And Iv Dye  09/24/2012   Side Effect Hives Lasix [Furosemide]  09/24/2012   Side Effect Hives Sulfa (Sulfonamide Antibiotics)  09/24/2012   Side Effect Hives Current Immunizations  Reviewed on 9/20/2017 Name Date Influenza Vaccine 10/21/2013 Influenza Vaccine Joanette [place]) 11/5/2014 Influenza Vaccine (Quad) PF 9/20/2017, 9/14/2016, 10/26/2015 Tdap 6/30/2015 Reviewed by Cam Morgan LPN on 1/52/7316 at  3:58 PM  
You Were Diagnosed With   
  
 Codes Comments Witnessed episode of apnea    -  Primary ICD-10-CM: R06.81 
ICD-9-CM: 786.03 Encounter for immunization     ICD-10-CM: C27 ICD-9-CM: V03.89 Hx pulmonary embolism     ICD-10-CM: M84.705 ICD-9-CM: V12.55 Monitoring for long-term anticoagulant use     ICD-10-CM: Z51.81, Z79.01 
ICD-9-CM: V58.61   
 MANE (dyspnea on exertion)     ICD-10-CM: R06.09 
ICD-9-CM: 786.09 Vitals BP Pulse Temp Resp Height(growth percentile) Weight(growth percentile) 131/86 (BP 1 Location: Left arm, BP Patient Position: Sitting) 62 97.7 °F (36.5 °C) 12 6' (1.829 m) 256 lb (116.1 kg) SpO2 BMI OB Status Smoking Status 93% 34.72 kg/m2 Postmenopausal Never Smoker BMI and BSA Data Body Mass Index Body Surface Area 34.72 kg/m 2 2.43 m 2 Preferred Pharmacy Pharmacy Name Phone [facility], [address] [phone] Your Updated Medication List  
  
   
This list is accurate as of: 9/20/17  4:15 PM.  Always use your most recent med list.  
  
  
  
  
 atenolol-chlorthalidone 50-25 mg per tablet Commonly known as:  TENORETIC 50 Take 1 Tab by mouth daily. COUMADIN 7.5 mg tablet Generic drug:  warfarin Take 7.5 mg by mouth daily. Take one tablet daily  
  
 cyclobenzaprine 5 mg tablet Commonly known as:  FLEXERIL Take 1 Tab by mouth three (3) times daily as needed for Muscle Spasm(s). diclofenac EC 50 mg EC tablet Commonly known as:  VOLTAREN Take 1 Tab by mouth two (2) times a day. hydroCHLOROthiazide 12.5 mg capsule Commonly known as:  Karyle Spatz TAKE ONE CAPSULE BY MOUTH ONCE DAILY  
  
 IRON (FERROUS SULFATE) PO Take  by mouth.  
  
 levothyroxine 25 mcg tablet Commonly known as:  SYNTHROID  
TAKE ONE TABLET BY MOUTH IN THE MORNING  
  
 potassium 99 mg tablet Take 99 mg by mouth daily. Pt is taking 2 tabs daily PREVACID PO Take  by mouth. traZODone 50 mg tablet Commonly known as:  [place]ia Take 1 Tab by mouth nightly. Prn sleep  
  
 venlafaxine 75 mg tablet Commonly known as:  [facility] Take 1 Tab by mouth two (2) times a day. VITAMIN D3 1,000 unit Cap Generic drug:  cholecalciferol Take  by mouth daily. We Performed the Following ADMIN INFLUENZA VIRUS VAC [ [facility]] AMB POC PT/INR [34541 CPT(R)] INFLUENZA VIRUS VAC QUAD,SPLIT,PRESV FREE SYRINGE IM [id number] CPT(R)] SLEEP MEDICINE REFERRAL [VQE366 Custom] Comments:  
 Orders: 
Direct Referral for Study - Please arrange a sleep study consult only if sleep study is positive. To-Do List   
 09/20/2017 ECG:  STRESS TEST CARDIAC Referral Information Referral ID Referred By Referred To  
  
 [id number] Jame PIRES Not Available Visits Status Start Date End Date 1 New Request 9/20/17 9/20/18 If your referral has a status of pending review or denied, additional information will be sent to support the outcome of this decision. Patient Instructions Vaccine Information Statement Influenza (Flu) Vaccine (Inactivated or Recombinant): What you need to know Many Vaccine Information Statements are available in Azeri and other languages. See www.immunize.org/vis Hojas de Información Sobre Vacunas están disponibles en Español y en muchos otros idiomas. Visite www.immunize.org/vis 1. Why get vaccinated? Influenza (flu) is a contagious disease that spreads around the [facility] every year, usually between October and May. Flu is caused by influenza viruses, and is spread mainly by coughing, sneezing, and close contact. Anyone can get flu. Flu strikes suddenly and can last several days. Symptoms vary by age, but can include: 
 fever/chills  sore throat  muscle aches  fatigue  cough  headache  runny or stuffy nose Flu can also lead to pneumonia and blood infections, and cause diarrhea and seizures in children. If you have a medical condition, such as heart or lung disease, flu can make it worse. Flu is more dangerous for some people. Infants and young children, people 72years of age and older, pregnant women, and people with certain health conditions or a weakened immune system are at greatest risk. Each year thousands of people in the [facility] die from flu, and many more are hospitalized. Flu vaccine can: 
 keep you from getting flu, 
 make flu less severe if you do get it, and 
 keep you from spreading flu to your family and other people. 2. Inactivated and recombinant flu vaccines A dose of flu vaccine is recommended every flu season. Children 6 months through 6years of age may need two doses during the same flu season. Everyone else needs only one dose each flu season. Some inactivated flu vaccines contain a very small amount of a mercury-based preservative called thimerosal. Studies have not shown thimerosal in vaccines to be harmful, but flu vaccines that do not contain thimerosal are available. There is no live flu virus in flu shots. They cannot cause the flu. There are many flu viruses, and they are always changing. Each year a new flu vaccine is made to protect against three or four viruses that are likely to cause disease in the upcoming flu season. But even when the vaccine doesnt exactly match these viruses, it may still provide some protection Flu vaccine cannot prevent: 
 flu that is caused by a virus not covered by the vaccine, or 
 illnesses that look like flu but are not. It takes about 2 weeks for protection to develop after vaccination, and protection lasts through the flu season. 3. Some people should not get this vaccine Tell the person who is giving you the vaccine:  If you have any severe, life-threatening allergies. If you ever had a life-threatening allergic reaction after a dose of flu vaccine, or have a severe allergy to any part of this vaccine, you may be advised not to get vaccinated. Most, but not all, types of flu vaccine contain a small amount of egg protein.  If you ever had Guillain-Barré Syndrome (also called GBS). Some people with a history of GBS should not get this vaccine. This should be discussed with your doctor.  If you are not feeling well. It is usually okay to get flu vaccine when you have a mild illness, but you might be asked to come back when you feel better. 4. Risks of a vaccine reaction With any medicine, including vaccines, there is a chance of reactions. These are usually mild and go away on their own, but serious reactions are also possible. Most people who get a flu shot do not have any problems with it. Minor problems following a flu shot include:  
 soreness, redness, or swelling where the shot was given  hoarseness  sore, red or itchy eyes  cough  fever  aches  headache  itching  fatigue If these problems occur, they usually begin soon after the shot and last 1 or 2 days. More serious problems following a flu shot can include the following:  There may be a small increased risk of Guillain-Barré Syndrome (GBS) after inactivated flu vaccine. This risk has been estimated at 1 or 2 additional cases per million people vaccinated. This is much lower than the risk of severe complications from flu, which can be prevented by flu vaccine.  Young children who get the flu shot along with pneumococcal vaccine (PCV13) and/or DTaP vaccine at the same time might be slightly more likely to have a seizure caused by fever. Ask your doctor for more information. Tell your doctor if a child who is getting flu vaccine has ever had a seizure. Problems that could happen after any injected vaccine:  People sometimes faint after a medical procedure, including vaccination. Sitting or lying down for about 15 minutes can help prevent fainting, and injuries caused by a fall. Tell your doctor if you feel dizzy, or have vision changes or ringing in the ears.  Some people get severe pain in the shoulder and have difficulty moving the arm where a shot was given. This happens very rarely.  Any medication can cause a severe allergic reaction. Such reactions from a vaccine are very rare, estimated at about 1 in a million doses, and would happen within a few minutes to a few hours after the vaccination. As with any medicine, there is a very remote chance of a vaccine causing a serious injury or death. The safety of vaccines is always being monitored. For more information, visit: www.cdc.gov/vaccinesafety/ 
 
5. What if there is a serious reaction? What should I look for?  Look for anything that concerns you, such as signs of a severe allergic reaction, very high fever, or unusual behavior. Signs of a severe allergic reaction can include hives, swelling of the face and throat, difficulty breathing, a fast heartbeat, dizziness, and weakness  usually within a few minutes to a few hours after the vaccination. What should I do?  If you think it is a severe allergic reaction or other emergency that cant wait, call 9-1-1 and get the person to the nearest hospital. Otherwise, call your doctor.  Reactions should be reported to the Vaccine Adverse Event Reporting System (VAERS). Your doctor should file this report, or you can do it yourself through  the VAERS web site at www.vaers. [facility].gov, or by calling 5-836.741.8624. VAERS does not give medical advice. 6. The National Vaccine Injury Compensation Program 
 
The [facility] Vaccine Injury Compensation Program (VICP) is a federal program that was created to compensate people who may have been injured by certain vaccines. Persons who believe they may have been injured by a vaccine can learn about the program and about filing a claim by calling 1-466.298.3959 or visiting the [facility] [address] website at www.[facility].gov/vaccinecompensation. There is a time limit to file a claim for compensation. 7. How can I learn more?  Ask your healthcare provider. He or she can give you the vaccine package insert or suggest other sources of information.  Call your local or state health department.  Contact the Centers for Disease Control and Prevention (CDC): 
- Call 3-463.923.4602 (1-800-CDC-INFO) or 
- Visit CDCs website at www.cdc.gov/flu Vaccine Information Statement Inactivated Influenza Vaccine 8/7/2015 
[address] [id number]-62 Department of Health and [employer] Centers for Disease Control and Prevention Office Use Only Introducing [address]! Earl Simmons introduces [employer] patient portal. Now you can access parts of your medical record, email your doctor's office, and request medication refills online. 1. In your internet browser, go to [email or web address] 2. Click on the First Time User? Click Here link in the Sign In box. You will see the New Member Sign Up page. 3. Enter your [employer] Access Code exactly as it appears below. You will not need to use this code after youve completed the sign-up process. If you do not sign up before the expiration date, you must request a new code. · [employer] Access Code: [email or web address] Expires: 10/12/2017 10:22 AM 
 
4. Enter the last four digits of your Social Security Number (xxxx) and Date of Birth (mm/dd/yyyy) as indicated and click Submit. You will be taken to the next sign-up page. 5. Create a Teladoc ID. This will be your Tel[employer] login ID and cannot be changed, so think of one that is secure and easy to remember. 6. Create a Tel[employer] password. You can change your password at any time. 7. Enter your Password Reset Question and Answer. This can be used at a later time if you forget your password. 8. Enter your e-mail address. You will receive e-mail notification when new information is available in [address] 9. Click Sign Up. You can now view and download portions of your medical record. 10. Click the Download Summary menu link to download a portable copy of your medical information. If you have questions, please visit the Frequently Asked Questions section of the [employer] website. Remember, [employer] is NOT to be used for urgent needs. For medical emergencies, dial 911. Now available from your iPhone and Android! Please provide this summary of care documentation to your next provider. Your primary care clinician is listed as Noah Nelson. If you have any questions after today's visit, please call [phone].

## 2017-09-20 NOTE — PROGRESS NOTES
SARKIS  Cristiane Lee is a 62 y.o. female who presents with concern about fatigue and dyspnea on exertion. Says that she just wants to sleep all the time. Thinks that she is a little bit on the depressed side. She is more tearful than usual today. Lost her  in December and has had good visits and bad visits with us. She is having a bad visit today. Has been thinking about him more recently. Went out to his shed and was reminded of him. Just went to [place] with a bunch of her girlfriends. Says that she does just fine when she is around lots of people. While down in [place] she noticed a few things. Her girlfriends who are sleeping in the same room as her told her that she stopped breathing at night when she was asleep. This worried her. She also noticed that when she had to walk up to the third floor condo she would get about 1-1/2 flights of stairs and before having to stop and catch her breath. Felt like her heart was racing. This worried her. This is much more exercise than she typically gets. Can do one flight of stairs no problem. Did not have chest pain. Recovered within a few minutes and was able to continue her climb. She wants to go to the gym but wants to make sure her heart is okay. Has gained about 10 pounds in the last year. This bothers her because her clothes do not fit, she wants to try to lose weight hence going to the gym. 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 long term use Coumadin    Thyroid disease        Meds:   Current Outpatient Prescriptions   Medication Sig Dispense Refill    warfarin (COUMADIN) 7.5 mg tablet Take 7.5 mg by mouth daily.  Take one tablet daily      atenolol-chlorthalidone (TENORETIC 50) 50-25 mg per tablet Take 1 Tab by mouth daily. 30 Tab 11    cholecalciferol (VITAMIN D3) 1,000 unit cap Take  by mouth daily.  venlafaxine (EFFEXOR) 75 mg tablet Take 1 Tab by mouth two (2) times a day. 180 Tab 3    diclofenac EC (VOLTAREN) 50 mg EC tablet Take 1 Tab by mouth two (2) times a day. 30 Tab 1    cyclobenzaprine (FLEXERIL) 5 mg tablet Take 1 Tab by mouth three (3) times daily as needed for Muscle Spasm(s). 60 Tab 1    levothyroxine (SYNTHROID) 25 mcg tablet TAKE ONE TABLET BY MOUTH IN THE MORNING 30 Tab 11    IRON, FERROUS SULFATE, PO Take  by mouth.  hydrochlorothiazide (MICROZIDE) 12.5 mg capsule TAKE ONE CAPSULE BY MOUTH ONCE DAILY 30 Cap 5    traZODone (DESYREL) 50 mg tablet Take 1 Tab by mouth nightly. Prn sleep 30 Tab 2    LANSOPRAZOLE (PREVACID PO) Take  by mouth.  potassium 99 mg tablet Take 99 mg by mouth daily. Pt is taking 2 tabs daily         Allergies: Allergies   Allergen Reactions    Iodinated Contrast- Oral And Iv Dye Hives    Lasix [Furosemide] Hives    Sulfa (Sulfonamide Antibiotics) Hives       Smoker:  History   Smoking Status    Never Smoker   Smokeless Tobacco    Never Used       ETOH:   History   Alcohol Use No       FH:   Family History   Problem Relation Age of Onset    Heart Disease Mother     Heart Disease Father        ROS:   As listed in HPI. In addition:  Constitutional:   No headache, fever, fatigue, weight loss or weight gain      Cardiac:    No chest pain      Resp:   No cough or shortness of breath      Neuro   No loss of consciousness, dizziness, seizures      Physical Exam:  Blood pressure 131/86, pulse 62, temperature 97.7 °F (36.5 °C), resp. rate 12, height 6' (1.829 m), weight 256 lb (116.1 kg), SpO2 93 %. GEN: No apparent distress. Alert and oriented and responds to all questions appropriately. NEUROLOGIC:  No focal neurologic deficits. Strength and sensation grossly intact. Coordination and gait grossly intact. EXT: Well perfused.   Lymphedema  SKIN: No obvious rashes. CV regular rate and rhythm no murmur  Lungs clear to auscultation bilaterally       Assessment and Plan     Witnessed apnea  [place] sleepiness score 12, stop bang 6  Direct referral for study    INR check, 2.6  No change    Dyspnea on exertion  Sounds like she is out of shape, able to do 1 flight of stairs without 1.5 flights of stairs. Wants to go to the gym. Stress test would be appropriate, treadmill preferred    Obese,  Recommended weight watchers over gym if she has tight finances. Grief  Going on for 10 months. She does feel like she is slowly getting better. I have not seen her for a while so cannot speak to this. Does have a counselor but is only seen her 3 times. Encouraged her to get back in with counselor. To continue the Effexor    Sleeping well, not really taking trazodone. Since she is feeling a little fatigued offered to check thyroid level, she declined for now      ICD-10-CM ICD-9-CM    1. Witnessed episode of apnea R06.81 786.03 SLEEP MEDICINE REFERRAL   2. Encounter for immunization Z23 V03.89 INFLUENZA VIRUS VAC QUAD,SPLIT,PRESV FREE SYRINGE IM      ADMIN INFLUENZA VIRUS VAC   3. Hx pulmonary embolism--due to DVT Z86.711 V12.55 AMB POC PT/INR   4. Monitoring for long-term anticoagulant use Z51.81 V58.61 AMB POC PT/INR    Z79.01     5. MANE (dyspnea on exertion) R06.09 786.09 STRESS TEST CARDIAC       AVS given.  Pt expressed understanding of instructions

## 2017-09-20 NOTE — PATIENT INSTRUCTIONS
Vaccine Information Statement    Influenza (Flu) Vaccine (Inactivated or Recombinant): What you need to know    Many Vaccine Information Statements are available in Syriac and other languages. See www.immunize.org/vis  Hojas de Información Sobre Vacunas están disponibles en Español y en muchos otros idiomas. Visite www.immunize.org/vis    1. Why get vaccinated? Influenza (flu) is a contagious disease that spreads around the [place] every year, usually between October and May. Flu is caused by influenza viruses, and is spread mainly by coughing, sneezing, and close contact. Anyone can get flu. Flu strikes suddenly and can last several days. Symptoms vary by age, but can include:   fever/chills   sore throat   muscle aches   fatigue   cough   headache    runny or stuffy nose    Flu can also lead to pneumonia and blood infections, and cause diarrhea and seizures in children. If you have a medical condition, such as heart or lung disease, flu can make it worse. Flu is more dangerous for some people. Infants and young children, people 72years of age and older, pregnant women, and people with certain health conditions or a weakened immune system are at greatest risk. Each year thousands of people in the [facility] die from flu, and many more are hospitalized. Flu vaccine can:   keep you from getting flu,   make flu less severe if you do get it, and   keep you from spreading flu to your family and other people. 2. Inactivated and recombinant flu vaccines    A dose of flu vaccine is recommended every flu season. Children 6 months through 6years of age may need two doses during the same flu season. Everyone else needs only one dose each flu season.        Some inactivated flu vaccines contain a very small amount of a mercury-based preservative called thimerosal. Studies have not shown thimerosal in vaccines to be harmful, but flu vaccines that do not contain thimerosal are available. There is no live flu virus in flu shots. They cannot cause the flu. There are many flu viruses, and they are always changing. Each year a new flu vaccine is made to protect against three or four viruses that are likely to cause disease in the upcoming flu season. But even when the vaccine doesnt exactly match these viruses, it may still provide some protection    Flu vaccine cannot prevent:   flu that is caused by a virus not covered by the vaccine, or   illnesses that look like flu but are not. It takes about 2 weeks for protection to develop after vaccination, and protection lasts through the flu season. 3. Some people should not get this vaccine    Tell the person who is giving you the vaccine:     If you have any severe, life-threatening allergies. If you ever had a life-threatening allergic reaction after a dose of flu vaccine, or have a severe allergy to any part of this vaccine, you may be advised not to get vaccinated. Most, but not all, types of flu vaccine contain a small amount of egg protein.  If you ever had Guillain-Barré Syndrome (also called GBS). Some people with a history of GBS should not get this vaccine. This should be discussed with your doctor.  If you are not feeling well. It is usually okay to get flu vaccine when you have a mild illness, but you might be asked to come back when you feel better. 4. Risks of a vaccine reaction    With any medicine, including vaccines, there is a chance of reactions. These are usually mild and go away on their own, but serious reactions are also possible. Most people who get a flu shot do not have any problems with it.      Minor problems following a flu shot include:    soreness, redness, or swelling where the shot was given     hoarseness   sore, red or itchy eyes   cough   fever   aches   headache   itching   fatigue  If these problems occur, they usually begin soon after the shot and last 1 or 2 days. More serious problems following a flu shot can include the following:     There may be a small increased risk of Guillain-Barré Syndrome (GBS) after inactivated flu vaccine. This risk has been estimated at 1 or 2 additional cases per million people vaccinated. This is much lower than the risk of severe complications from flu, which can be prevented by flu vaccine.  Young children who get the flu shot along with pneumococcal vaccine (PCV13) and/or DTaP vaccine at the same time might be slightly more likely to have a seizure caused by fever. Ask your doctor for more information. Tell your doctor if a child who is getting flu vaccine has ever had a seizure. Problems that could happen after any injected vaccine:      People sometimes faint after a medical procedure, including vaccination. Sitting or lying down for about 15 minutes can help prevent fainting, and injuries caused by a fall. Tell your doctor if you feel dizzy, or have vision changes or ringing in the ears.  Some people get severe pain in the shoulder and have difficulty moving the arm where a shot was given. This happens very rarely.  Any medication can cause a severe allergic reaction. Such reactions from a vaccine are very rare, estimated at about 1 in a million doses, and would happen within a few minutes to a few hours after the vaccination. As with any medicine, there is a very remote chance of a vaccine causing a serious injury or death. The safety of vaccines is always being monitored. For more information, visit: www.cdc.gov/vaccinesafety/    5. What if there is a serious reaction? What should I look for?  Look for anything that concerns you, such as signs of a severe allergic reaction, very high fever, or unusual behavior.     Signs of a severe allergic reaction can include hives, swelling of the face and throat, difficulty breathing, a fast heartbeat, dizziness, and weakness - usually within a few minutes to a few hours after the vaccination. What should I do?  If you think it is a severe allergic reaction or other emergency that cant wait, call 9-1-1 and get the person to the nearest hospital. Otherwise, call your doctor.  Reactions should be reported to the Vaccine Adverse Event Reporting System (VAERS). Your doctor should file this report, or you can do it yourself through  the VAERS web site at www.vaers. [facility].gov, or by calling 3-280.906.4701. VAERS does not give medical advice. 6. The National Vaccine Injury Compensation Program    The [facility] Vaccine Injury Compensation Program (VICP) is a federal program that was created to compensate people who may have been injured by certain vaccines. Persons who believe they may have been injured by a vaccine can learn about the program and about filing a claim by calling 4-970.873.7904 or visiting the [employer] website at www.[facility].gov/vaccinecompensation. There is a time limit to file a claim for compensation. 7. How can I learn more?  Ask your healthcare provider. He or she can give you the vaccine package insert or suggest other sources of information.  Call your local or state health department.  Contact the Centers for Disease Control and Prevention (CDC):  - Call 2-552.611.1799 (1-800-CDC-INFO) or  - Visit CDCs website at www.cdc.gov/flu    Vaccine Information Statement   Inactivated Influenza Vaccine   8/7/2015  [address] VI[address] HUGO Johnson [id number]    Department of Health and Human Services  Centers for Disease Control and Prevention    Office Use Only

## 2017-09-20 NOTE — PROGRESS NOTES
Shira Bullard is a 62 y.o. female who presents for routine immunizations. She denies any symptoms , reactions or allergies that would exclude them from being immunized today. Risks and adverse reactions were discussed and the VIS was given to them. All questions were addressed. She was observed for 10 min post injection. There were no reactions observed.     Nidia Danielson LPN

## 2017-09-21 ENCOUNTER — TELEPHONE (OUTPATIENT)
Dept: SLEEP MEDICINE | Age: 62
End: 2017-09-21

## 2017-09-21 DIAGNOSIS — G47.33 OBSTRUCTIVE SLEEP APNEA (ADULT) (PEDIATRIC): Primary | ICD-10-CM

## 2017-09-21 NOTE — TELEPHONE ENCOUNTER
STUDY ORDER CONFIRMATION  Janett Gagan 1955      Type of Study Requested: Direct Referral for Study - Please arrange a sleep study consult only if sleep study is positive. Referring Physician: Constantino Hallman    Date of Study: 11/5/17  Spoke with: patient         Height: 5'10  Weight: 255  (over 499 lb can only be done at [facility])  BMI: 41      Snoring      yes    Excessive Daytime Sleepiness   yes    Witnessed Apnea     yes    Hypertension      yes    Cardiovascular disease (CHF-Heart Failure)  no    COPD or Emphysema?    no  On Oxygen? lpm Day/Night   no    Restless Leg Symptoms-   Do you experience an uncomfortable sensation in your legs   especially at night?    no   Kicking?     no   Twitching?     no    Do you experience insomnia?   no  Sleep talking/walking? yes  Do you ever wake with a rapid heart rate?  yes  Unusual movements in sleep (violent, flailing limbs? )no  Night Terrors?      no  Sleep Paralysis or Sleep Hallucinations:  (while waking from sleep or dream, you cannot move) no  Do you/have you had seizures?   no  Have you had a Stroke, CVA or TIA?   no       When? Do you take any medications for the following? Pain       no  Anxiety       yes  Sleep       no               Have you been in hospital?    no   Has it been at least 72 hrs. since discharge? no   Discharge Date   (If not at least 72 hrs, cannot see pt. for study)    Are you currently on an antibiotic?   no  If yes, for what reason? Do you need anything from us for your employer? no    Are you a CDL, DOT or other Certified ? no     Have you had a sleep study before? Yes- 2002 no records available  If yes, when and where? n  Are you currently using CPAP?   no  If yes, what is the setting? Do you have any special needs?   Wheelchair      no  Help to and from the bathroom   no  Other?      no    (If yes to any special needs a care giver may need to come with the patient and stay the night.)    Will someone need to accompany you on the night of your study? (Primarily for parents of minors, patients with special needs, elderly, long distance travel). Other family,  may stay until study begins. \"We want to be sure to take the best care of you. Are there Cultural or Spiritual needs that we should be aware of or are there any concerns that I can address for you?        no    If yes, describe:        Attach Medication List    If yes to any \"*\" or special needs, discuss with the Lead Tech    Notes:     Study date:11/5/17  Time:9:30pm Location:[facility]      Fior Aceves    Date: 9/21/17

## 2017-09-25 DIAGNOSIS — G47.33 OBSTRUCTIVE SLEEP APNEA (ADULT) (PEDIATRIC): ICD-10-CM

## 2017-09-25 NOTE — TELEPHONE ENCOUNTER
STUDY ORDER CONFIRMATION    Study Indication:   G47.33  Study: Diagnostic polysomnogram - CPT 61456: Split Study if patient meets the criteria.   Study Instructions / additional orders:

## 2017-09-27 ENCOUNTER — TELEPHONE (OUTPATIENT)
Dept: FAMILY MEDICINE CLINIC | Age: 62
End: 2017-09-27

## 2017-09-27 NOTE — TELEPHONE ENCOUNTER
[facility] cardiology letting Dr Bassem Vera know that the normal stress test could not be done. She said that there is a note in the chart regarding this.

## 2017-10-02 DIAGNOSIS — E03.9 HYPOTHYROIDISM, UNSPECIFIED TYPE: ICD-10-CM

## 2017-10-02 RX ORDER — LEVOTHYROXINE SODIUM 25 UG/1
TABLET ORAL
Qty: 30 TAB | Refills: 11 | Status: SHIPPED | OUTPATIENT
Start: 2017-10-02 | End: 2018-02-28 | Stop reason: SDUPTHER

## 2017-10-16 ENCOUNTER — TELEPHONE (OUTPATIENT)
Dept: FAMILY MEDICINE CLINIC | Age: 62
End: 2017-10-16

## 2017-10-20 ENCOUNTER — CLINICAL SUPPORT (OUTPATIENT)
Dept: FAMILY MEDICINE CLINIC | Age: 62
End: 2017-10-20

## 2017-10-20 VITALS
WEIGHT: 254 LBS | OXYGEN SATURATION: 95 % | HEIGHT: 72 IN | TEMPERATURE: 98.1 F | RESPIRATION RATE: 20 BRPM | BODY MASS INDEX: 34.4 KG/M2 | SYSTOLIC BLOOD PRESSURE: 139 MMHG | HEART RATE: 64 BPM | DIASTOLIC BLOOD PRESSURE: 88 MMHG

## 2017-10-20 DIAGNOSIS — Z51.81 ANTICOAGULATION GOAL OF INR 2 TO 3: ICD-10-CM

## 2017-10-20 DIAGNOSIS — Z79.01 ANTICOAGULATION GOAL OF INR 2 TO 3: ICD-10-CM

## 2017-10-20 DIAGNOSIS — R06.09 DOE (DYSPNEA ON EXERTION): ICD-10-CM

## 2017-10-20 DIAGNOSIS — Z86.718 HISTORY OF DVT (DEEP VEIN THROMBOSIS): ICD-10-CM

## 2017-10-20 DIAGNOSIS — Z86.711 HX PULMONARY EMBOLISM: Primary | ICD-10-CM

## 2017-10-20 LAB
INR BLD: 2.8
PT POC: 34.4 SECONDS
VALID INTERNAL CONTROL?: YES

## 2017-10-20 NOTE — PROGRESS NOTES
Patient presents for a PT/INR  See med list and patient instructions along with results for verbal orders by Dr. Luis E Albert. Results for orders placed or performed in visit on 10/20/17   AMB POC PT/INR   Result Value Ref Range    VALID INTERNAL CONTROL POC Yes     Prothrombin time (POC) 34.4 seconds    INR POC 2.8      Vitals:    10/20/17 1014   BP: 139/88   Pulse: 64   Resp: 20   Temp: 98.1 °F (36.7 °C)   SpO2: 95%   Weight: 254 lb (115.2 kg)   Height: 6' (1.829 m)     Prior to Admission medications    Medication Sig Start Date End Date Taking? Authorizing Provider   levothyroxine (SYNTHROID) 25 mcg tablet TAKE ONE TABLET BY MOUTH IN THE MORNING 10/2/17  Yes Codey Estrada MD   warfarin (COUMADIN) 7.5 mg tablet Take 7.5 mg by mouth daily. Take one tablet daily   Yes Historical Provider   atenolol-chlorthalidone (TENORETIC 50) 50-25 mg per tablet Take 1 Tab by mouth daily. 5/3/17  Yes Codey Estrada MD   cholecalciferol (VITAMIN D3) 1,000 unit cap Take  by mouth daily. Yes Historical Provider   venlafaxine (EFFEXOR) 75 mg tablet Take 1 Tab by mouth two (2) times a day. 1/17/17  Yes Codey Estrada MD   diclofenac EC (VOLTAREN) 50 mg EC tablet Take 1 Tab by mouth two (2) times a day. 1/12/17  Yes Codey Estrada MD   cyclobenzaprine (FLEXERIL) 5 mg tablet Take 1 Tab by mouth three (3) times daily as needed for Muscle Spasm(s). 1/12/17  Yes Codey Estrada MD   IRON, FERROUS SULFATE, PO Take  by mouth. Yes Historical Provider   hydrochlorothiazide (MICROZIDE) 12.5 mg capsule TAKE ONE CAPSULE BY MOUTH ONCE DAILY 12/23/15  Yes Felecia Valladares MD   traZODone (DESYREL) 50 mg tablet Take 1 Tab by mouth nightly. Prn sleep 9/23/15  Yes Felecia Valladares MD   LANSOPRAZOLE (PREVACID PO) Take  by mouth. Yes Historical Provider   potassium 99 mg tablet Take 99 mg by mouth daily. Pt is taking 2 tabs daily   Yes Historical Provider     Per Dr. Esthela Looney verbal order she is to stay on coumadin 7.5 mg daily.   See anti-coag episode for instructions which are on AVS.    AVS and verbal instructions given she voiced understanding of all instructions.   Also per Dr. Eddi Diamond verbal order nuclear stress test ordered because they were unable to do regular stress test.

## 2017-11-05 ENCOUNTER — HOSPITAL ENCOUNTER (OUTPATIENT)
Dept: SLEEP MEDICINE | Age: 62
Discharge: HOME | End: 2017-11-05
Payer: MEDICARE

## 2017-11-05 VITALS
BODY MASS INDEX: 35.77 KG/M2 | WEIGHT: 255.5 LBS | HEART RATE: 70 BPM | TEMPERATURE: 97.8 F | DIASTOLIC BLOOD PRESSURE: 103 MMHG | OXYGEN SATURATION: 96 % | SYSTOLIC BLOOD PRESSURE: 177 MMHG | HEIGHT: 71 IN

## 2017-11-05 PROCEDURE — 95810 POLYSOM 6/> YRS 4/> PARAM: CPT

## 2017-11-13 ENCOUNTER — OFFICE VISIT (OUTPATIENT)
Dept: SLEEP MEDICINE | Age: 62
End: 2017-11-13

## 2017-11-13 VITALS
BODY MASS INDEX: 35.84 KG/M2 | OXYGEN SATURATION: 95 % | DIASTOLIC BLOOD PRESSURE: 87 MMHG | TEMPERATURE: 97.6 F | SYSTOLIC BLOOD PRESSURE: 130 MMHG | WEIGHT: 256 LBS | HEART RATE: 63 BPM | HEIGHT: 71 IN

## 2017-11-13 DIAGNOSIS — I10 ESSENTIAL HYPERTENSION, BENIGN: ICD-10-CM

## 2017-11-13 DIAGNOSIS — G47.33 OBSTRUCTIVE SLEEP APNEA SYNDROME: Primary | ICD-10-CM

## 2017-11-13 NOTE — PATIENT INSTRUCTIONS
[address] [facility] [place] [place], [address]  Tel.  [phone]  Fax. [address] [facility] [address]  [place], [address] [facility]  Tel.  [phone]  Fax. [phone] [address]  Tel.  [phone]  Fax. [phone]     Learning About CPAP for Sleep Apnea  What is CPAP? CPAP is a small machine that you use at home every night while you sleep. It increases air pressure in your throat to keep your airway open. When you have sleep apnea, this can help you sleep better so you feel much better. CPAP stands for \"continuous positive airway pressure. \"  The CPAP machine will have one of the following:  · A mask that covers your nose and mouth  · Prongs that fit into your nose  · A mask that covers your nose only, the most common type. This type is called NCPAP. The N stands for \"nasal.\"  Why is it done? CPAP is usually the best treatment for obstructive sleep apnea. It is the first treatment choice and the most widely used. Your doctor may suggest CPAP if you have:  · Moderate to severe sleep apnea. · Sleep apnea and coronary artery disease (CAD) or heart failure. How does it help? · CPAP can help you have more normal sleep, so you feel less sleepy and more alert during the daytime. · CPAP may help keep heart failure or other heart problems from getting worse. · NCPAP may help lower your blood pressure. · If you use CPAP, your bed partner may also sleep better because you are not snoring or restless. What are the side effects? Some people who use CPAP have:  · A dry or stuffy nose and a sore throat. · Irritated skin on the face. · Sore eyes. · Bloating. If you have any of these problems, work with your doctor to fix them. Here are some things you can try:  · Be sure the mask or nasal prongs fit well. · See if your doctor can adjust the pressure of your CPAP. · If your nose is dry, try a humidifier.   · If your nose is runny or stuffy, try decongestant medicine or a steroid nasal spray. If these things do not help, you might try a different type of machine. Some machines have air pressure that adjusts on its own. Others have air pressures that are different when you breathe in than when you breathe out. This may reduce discomfort caused by too much pressure in your nose. Where can you learn more? Go to [employer]be  Enter Janece Fuentes in the search box to learn more about \"Learning About CPAP for Sleep Apnea. \"   © 1044-5075 Healthwise, [employer]. Care instructions adapted under license by [employer] (which disclaims liability or warranty for this information). This care instruction is for use with your licensed healthcare professional. If you have questions about a medical condition or this instruction, always ask your healthcare professional. [address] any warranty or liability for your use of this information. Content Version: 6.2.15694; Last Revised: January 11, 2010  PROPER SLEEP HYGIENE    What to avoid  · Do not have drinks with caffeine, such as coffee or black tea, for 8 hours before bed. · Do not smoke or use other types of tobacco near bedtime. Nicotine is a stimulant and can keep you awake. · Avoid drinking alcohol late in the evening, because it can cause you to wake in the middle of the night. · Do not eat a big meal close to bedtime. If you are hungry, eat a light snack. · Do not drink a lot of water close to bedtime, because the need to urinate may wake you up during the night. · Do not read or watch TV in bed. Use the bed only for sleeping and sexual activity. What to try  · Go to bed at the same time every night, and wake up at the same time every morning. Do not take naps during the day. · Keep your bedroom quiet, dark, and cool. · Get regular exercise, but not within 3 to 4 hours of your bedtime. .  · Sleep on a comfortable pillow and mattress.   · If watching the clock makes you anxious, turn it facing away from you so you cannot see the time. · If you worry when you lie down, start a worry book. Well before bedtime, write down your worries, and then set the book and your concerns aside. · Try meditation or other relaxation techniques before you go to bed. · If you cannot fall asleep, get up and go to another room until you feel sleepy. Do something relaxing. Repeat your bedtime routine before you go to bed again. · Make your house quiet and calm about an hour before bedtime. Turn down the lights, turn off the TV, log off the computer, and turn down the volume on music. This can help you relax after a busy day. Drowsy Driving: The [employer] cites drowsiness as a causing factor in more than 528,360 police reported crashes annually, resulting in 76,000 injuries and 1,500 deaths. Other surveys suggest 55% of people polled have driven while drowsy in the past year, 23% had fallen asleep but not crashed, 3% crashed, and 2% had and accident due to drowsy driving. Who is at risk? Young Drivers: One study of drowsy driving accidents states that 55% of the drivers were under 25 years. Of those, 75% were male. Shift Workers and Travelers: People who work overnight or travel across time zones frequently are at higher risk of experiencing Circadian Rhythm Disorders. They are trying to work and function when their body is programed to sleep. Sleep Deprived: Lack of sleep has a serious impact on your ability to pay attention or focus on a task. Consistently getting less than the average of 8 hours your body needs creates partial or cumulative sleep deprivation. Untreated Sleep Disorders: Sleep Apnea, Narcolepsy, R.L.S., and other sleep disorders (untreated) prevent a person from getting enough restful sleep. This leads to excessive daytime sleepiness and increases the risk for drowsy driving accidents by up to 7 times.   Medications / Alcohol: Even over the counter medications can cause drowsiness. Medications that impair a drivers attention should have a warning label. Alcohol naturally makes you sleepy and on its own can cause accidents. Combined with excessive drowsiness its effects are amplified. Signs of Drowsy Driving:   * You don't remember driving the last few miles   * You may drift out of your sofia   * You are unable to focus and your thoughts wander   * You may yawn more often than normal   * You have difficulty keeping your eyes open / nodding off   * Missing traffic signs, speeding, or tailgating  Prevention-   Good sleep hygiene, lifestyle and behavioral choices have the most impact on drowsy driving. There is no substitute for sleep and the average person requires 8 hours nightly. If you find yourself driving drowsy, stop and sleep. Consider the sleep hygiene tips provided during your visit as well. Medication Refill Policy: Refills for all medications require 1 week advance notice. Please have your pharmacy fax a refill request. We are unable to fax, or call in \"controled substance\" medications and you will need to pick these prescriptions up from our office. Direct Hit Activation    Thank you for requesting access to Direct Hit. Please follow the instructions below to securely access and download your online medical record. Direct Hit allows you to send messages to your doctor, view your test results, renew your prescriptions, schedule appointments, and more. How Do I Sign Up? 1. In your internet browser, go to [email or web address]. 2. Click on the First Time User? Click Here link in the Sign In box. You will see the New Member Sign Up page. 3. Enter your Direct Hit Access Code exactly as it appears below. You will not need to use this code after youve completed the sign-up process. If you do not sign up before the expiration date, you must request a new code.     Direct Hit Access Code: [email or web address]  Expires: 2018  4:04 PM (This is the date your [employer] access code will )    4. Enter the last four digits of your Social Security Number (xxxx) and Date of Birth (mm/dd/yyyy) as indicated and click Submit. You will be taken to the next sign-up page. 5. Create a [employer] ID. This will be your [employer] login ID and cannot be changed, so think of one that is secure and easy to remember. 6. Create a [employer] password. You can change your password at any time. 7. Enter your Password Reset Question and Answer. This can be used at a later time if you forget your password. 8. Enter your e-mail address. You will receive e-mail notification when new information is available in [address] 9. Click Sign Up. You can now view and download portions of your medical record. 10. Click the Download Summary menu link to download a portable copy of your medical information. Additional Information    If you have questions, please call [phone]. Remember, [employer] is NOT to be used for urgent needs. For medical emergencies, dial 911.

## 2017-11-13 NOTE — PROGRESS NOTES
217 [facility] [place]. [place], [address]  Tel.  [phone]  Fax. 100 [facility] 60  SISTER [facility], [address]  Tel.  [phone]  Fax. [phone] [facility]  Joselin Amos  Tel.  [phone]  Fax. [phone]         Subjective:      Janett Singleton is an 65 y.o. female referred for evaluation for a sleep disorder. She complains of snoring, snorting, periods of not breathing associated with excessive daytime sleepiness. Symptoms began a few years ago, gradually worsening since that time. She usually can fall asleep in 30 minutes. Family or house members note snoring, periods of not breathing. She denies falling asleep while driving. Hu Vazquez does wake up frequently at night. She is not bothered by waking up too early and left unable to get back to sleep. She actually sleeps about 11 hours at night and wakes up about 3 times during the night. She does not work shifts:  .   Janett indicates she does get too little sleep at night. Her bedtime is 0830. She awakens at 0730. She does take naps. She takes 5 naps a week lasting 1, Hour(s). She has the following observed behaviors: Loud snoring, Light snoring, Sleep talking, Pauses in breathing, Twitching of legs or feet;  . Other remarks: waking with gasp or snort    Polysomnogram was performed and the results of the study were explained to the patient. Please refer to interpretation report for further details. Apnea/Hypopnea index of 26 which indicates moderate apnea. She continues to have snoring, snorting, periods of not breathing.       [place] Sleepiness Score: 9       Allergies   Allergen Reactions    Iodinated Contrast- Oral And Iv Dye Hives    Lasix [Furosemide] Hives    Sulfa (Sulfonamide Antibiotics) Hives         Current Outpatient Prescriptions:     levothyroxine (SYNTHROID) 25 mcg tablet, TAKE ONE TABLET BY MOUTH IN THE MORNING, Disp: 30 Tab, Rfl: 11    warfarin (COUMADIN) 7.5 mg tablet, Take 7.5 mg by mouth daily. Take one tablet daily, Disp: , Rfl:     atenolol-chlorthalidone (TENORETIC 50) 50-25 mg per tablet, Take 1 Tab by mouth daily. , Disp: 30 Tab, Rfl: 11    cholecalciferol (VITAMIN D3) 1,000 unit cap, Take  by mouth daily. , Disp: , Rfl:     venlafaxine (EFFEXOR) 75 mg tablet, Take 1 Tab by mouth two (2) times a day., Disp: 180 Tab, Rfl: 3    diclofenac EC (VOLTAREN) 50 mg EC tablet, Take 1 Tab by mouth two (2) times a day., Disp: 30 Tab, Rfl: 1    cyclobenzaprine (FLEXERIL) 5 mg tablet, Take 1 Tab by mouth three (3) times daily as needed for Muscle Spasm(s). , Disp: 60 Tab, Rfl: 1    IRON, FERROUS SULFATE, PO, Take  by mouth., Disp: , Rfl:     hydrochlorothiazide (MICROZIDE) 12.5 mg capsule, TAKE ONE CAPSULE BY MOUTH ONCE DAILY, Disp: 30 Cap, Rfl: 5    traZODone (DESYREL) 50 mg tablet, Take 1 Tab by mouth nightly. Prn sleep, Disp: 30 Tab, Rfl: 2    LANSOPRAZOLE (PREVACID PO), Take  by mouth., Disp: , Rfl:     potassium 99 mg tablet, Take 99 mg by mouth daily. Pt is taking 2 tabs daily, Disp: , Rfl:      She  has a past medical history of Arthritis; ASD (atrial septal defect); Depression; Hypertension; Lymphedema of leg; Thromboembolus ([address] and Thyroid disease. She  has a past surgical history that includes cardiac surg procedure unlist; gyn; orthopaedic; and colonoscopy,jerry lowery (7/16/2015). She family history includes Heart Disease in her father and mother. She  reports that she has never smoked. She has never used smokeless tobacco. She reports that she does not drink alcohol or use illicit drugs. Review of Systems:  Constitutional:  + weight gain. Eyes:  No blurred vision.   CVS:  No significant chest pain  Pulm:  No significant shortness of breath  GI:  No significant nausea or vomiting  :  No significant nocturia  Musculoskeletal:  No significant joint pain at night  Skin:  No significant rashes  Neuro:  No significant dizziness   Psych:  Treated for depression since the death of her  less than a year ago. Sleep Review of Systems: notable for no difficulty falling asleep; +frequent awakenings at night;  regular dreaming noted; no nightmares ; no early morning headaches; no memory problems; no concentration issues; no history of any automobile or occupational accidents due to daytime drowsiness. Objective:     Visit Vitals    /87    Pulse 63    Temp 97.6 °F (36.4 °C) (Oral)    Ht 5' 10.5\" (1.791 m)    Wt 256 lb (116.1 kg)    SpO2 95%    BMI 36.21 kg/m2         General:   Not in acute distress   Eyes:  Anicteric sclerae, no obvious strabismus   Nose:  No obvious nasal septum deviation    Oropharynx:   Class 3 oropharyngeal outlet, thick tongue base, enlarged and boggy uvula, low-lying soft palate, narrow tonsilo-pharyngeal pilars   Tonsils:   tonsils are present and normal   Neck:   Neck circ. in \"inches\": 16; midline trachea   Chest/Lungs:  Equal lung expansion, clear on auscultation    CVS:  Normal rate, regular rhythm; no JVD   Skin:  Warm to touch; no obvious rashes   Neuro:  No focal deficits ; no obvious tremor    Psych:  Normal affect,  normal countenance;          Assessment:       ICD-10-CM ICD-9-CM    1. Obstructive sleep apnea syndrome G47.33 327.23 SLEEP LAB (PAP TITRATION)   2. Essential hypertension, benign I10 401.1          Plan:     * Obstructive Sleep Apnea - treatment options were reviewed with the patient in detail today and she  Would like to proceed with a trial of CPAP. We have thus scheduled her for a PAP trial and mask fitting session. she was encouraged to start a dedicated weight loss regimen. she will be seen in the sleep disorder center 4-6 weeks after CPAP initiation to gauge treatment response and compliance. All of her questions were addressed. * * She was provided information on sleep apnea including coresponding risk factors and the importance of proper treatment.   * Counseling was provided regarding proper sleep hygiene and safe driving. * I have reviewed medicare requirements regarding PAP usage  I have counseled the patient regarding the benefits of weight loss. 2. Hypertension - she continues on her current regimen. I have reviewed the relationship between hypertension as it relates to sleep-disordered breathing. Thank you for allowing us to participate in your patient's medical care. We'll keep you updated on these investigations.   Trey Louis MD  Diplomate in Sleep Medicine  [facility]

## 2017-11-13 NOTE — Clinical Note
Thank you for the referral.  I will keep you informed of her progress.  [address], [place]eo

## 2017-11-15 DIAGNOSIS — R06.09 DYSPNEA ON EXERTION: Primary | ICD-10-CM

## 2017-11-15 NOTE — PROGRESS NOTES
Insurance will not cover stress test but will cover Stress echo.  Per Dr. Jameel Acosta verbal order it was put in

## 2017-11-16 ENCOUNTER — HOSPITAL ENCOUNTER (OUTPATIENT)
Dept: NON INVASIVE DIAGNOSTICS | Age: 62
Discharge: HOME | End: 2017-11-16
Attending: FAMILY MEDICINE
Payer: MEDICARE

## 2017-11-16 ENCOUNTER — HOSPITAL ENCOUNTER (OUTPATIENT)
Dept: NUCLEAR MEDICINE | Age: 62
Discharge: HOME | End: 2017-11-16
Attending: FAMILY MEDICINE
Payer: MEDICARE

## 2017-11-16 DIAGNOSIS — R06.09 DYSPNEA ON EXERTION: ICD-10-CM

## 2017-11-16 LAB
ATTENDING PHYSICIAN, CST07: NORMAL
DIAGNOSIS, 93000: NORMAL
DUKE TM SCORE RESULT, CST14: NORMAL
DUKE TREADMILL SCORE, CST13: 5456
ECG INTERP BEFORE EX, CST11: NORMAL
ECG INTERP DURING EX, CST12: NORMAL
FUNCTIONAL CAPACITY, CST17: NORMAL
KNOWN CARDIAC CONDITION, CST08: NORMAL
MAX. DIASTOLIC BP, CST04: 81 MMHG
MAX. HEART RATE, CST05: 70 BPM
MAX. SYSTOLIC BP, CST03: 150 MMHG
OVERALL BP RESPONSE TO EXERCISE, CST16: NORMAL
OVERALL HR RESPONSE TO EXERCISE, CST15: NORMAL
PEAK EX METS, CST10: 1 METS
PROTOCOL NAME, CST01: NORMAL
TEST INDICATION, CST09: NORMAL

## 2017-11-16 PROCEDURE — 74011250636 HC RX REV CODE- 250/636

## 2017-11-16 PROCEDURE — 78451 HT MUSCLE IMAGE SPECT SING: CPT

## 2017-11-16 PROCEDURE — 93017 CV STRESS TEST TRACING ONLY: CPT

## 2017-11-16 RX ORDER — SODIUM CHLORIDE 0.9 % (FLUSH) 0.9 %
SYRINGE (ML) INJECTION
Status: COMPLETED
Start: 2017-11-16 | End: 2017-11-16

## 2017-11-16 RX ORDER — SODIUM CHLORIDE 0.9 % (FLUSH) 0.9 %
10 SYRINGE (ML) INJECTION AS NEEDED
Status: DISCONTINUED | OUTPATIENT
Start: 2017-11-16 | End: 2017-11-20 | Stop reason: HOSPADM

## 2017-11-16 RX ADMIN — REGADENOSON 0.4 MG: 0.08 INJECTION, SOLUTION INTRAVENOUS at 08:52

## 2017-11-16 RX ADMIN — Medication 10 ML: at 08:52

## 2017-11-17 ENCOUNTER — CLINICAL SUPPORT (OUTPATIENT)
Dept: FAMILY MEDICINE CLINIC | Age: 62
End: 2017-11-17

## 2017-11-17 ENCOUNTER — TELEPHONE (OUTPATIENT)
Dept: FAMILY MEDICINE CLINIC | Age: 62
End: 2017-11-17

## 2017-11-17 VITALS
TEMPERATURE: 98 F | SYSTOLIC BLOOD PRESSURE: 139 MMHG | HEIGHT: 71 IN | BODY MASS INDEX: 35.56 KG/M2 | OXYGEN SATURATION: 96 % | DIASTOLIC BLOOD PRESSURE: 77 MMHG | HEART RATE: 64 BPM | WEIGHT: 254 LBS | RESPIRATION RATE: 16 BRPM

## 2017-11-17 DIAGNOSIS — Z86.711 HX PULMONARY EMBOLISM: ICD-10-CM

## 2017-11-17 DIAGNOSIS — Z87.74 H/O ATRIAL SEPTAL DEFECT REPAIR: Primary | ICD-10-CM

## 2017-11-17 LAB
INR BLD: 2.5
PT POC: 29.6 SECONDS
VALID INTERNAL CONTROL?: YES

## 2017-11-17 NOTE — PROGRESS NOTES
Chief Complaint   Patient presents with    Anticoagulation     Patient is here for a recheck of her INR. Patient is taking 7.5 mg of coumadin daily. Visit Vitals    /77 (BP 1 Location: Left arm, BP Patient Position: Sitting)    Pulse 64    Temp 98 °F (36.7 °C) (Oral)    Resp 16    Ht 5' 10.5\" (1.791 m)    Wt 254 lb (115.2 kg)    SpO2 96%    BMI 35.93 kg/m2     Results for orders placed or performed in visit on 11/17/17   AMB POC PT/INR   Result Value Ref Range    VALID INTERNAL CONTROL POC Yes     Prothrombin time (POC) 29.6 seconds    INR POC 2.5      Patient advised to continue taking current dose of coumadin and return in one month for recheck.

## 2017-11-17 NOTE — MR AVS SNAPSHOT
Visit Information Date & Time Provider Department Dept. Phone Encounter #  
 11/17/2017 10:00 AM Luis Arreguin [facility] 174 [phone] [id number] Upcoming Health Maintenance Date Due ZOSTER VACCINE AGE 60> 8/6/2016 PAP AKA CERVICAL CYTOLOGY 10/21/2016 MEDICARE YEARLY EXAM 2/14/2018 COLONOSCOPY 7/6/2018 BREAST CANCER SCRN MAMMOGRAM 2/14/2019 DTaP/Tdap/Td series (2 - Td) 6/30/2025 Allergies as of 11/17/2017  Review Complete On: 11/17/2017 By: Charla Jones Severity Noted Reaction Type Reactions Iodinated Contrast- Oral And Iv Dye  09/24/2012   Side Effect Hives Lasix [Furosemide]  09/24/2012   Side Effect Hives Sulfa (Sulfonamide Antibiotics)  09/24/2012   Side Effect Hives Current Immunizations  Reviewed on 9/20/2017 Name Date Influenza Vaccine 10/21/2013 Influenza Vaccine Veronia Yvonne) 11/5/2014 Influenza Vaccine (Quad) PF 9/20/2017, 9/14/2016, 10/26/2015 Tdap 6/30/2015 Not reviewed this visit You Were Diagnosed With   
  
 Codes Comments H/O atrial septal defect repair    -  Primary ICD-10-CM: J45.323, Z87.74 ICD-9-CM: V15.1 Hx pulmonary embolism     ICD-10-CM: F07.208 ICD-9-CM: V12.55 Vitals BP Pulse Temp Resp Height(growth percentile) Weight(growth percentile) 139/77 (BP 1 Location: Left arm, BP Patient Position: Sitting) 64 98 °F (36.7 °C) (Oral) 16 5' 10.5\" (1.791 m) 254 lb (115.2 kg) SpO2 BMI OB Status Smoking Status 96% 35.93 kg/m2 Postmenopausal Never Smoker Vitals History BMI and BSA Data Body Mass Index Body Surface Area  
 35.93 kg/m 2 2.39 m 2 Preferred Pharmacy Pharmacy Name Phone [facility] 2002 [facility], [address] [facility] [phone] Your Updated Medication List  
  
   
This list is accurate as of: 11/17/17 10:17 AM.  Always use your most recent med list.  
  
  
  
  
 atenolol-chlorthalidone 50-25 mg per tablet Commonly known as:  TENORETIC 50 Take 1 Tab by mouth daily. COUMADIN 7.5 mg tablet Generic drug:  warfarin Take 7.5 mg by mouth daily. Take one tablet daily  
  
 cyclobenzaprine 5 mg tablet Commonly known as:  FLEXERIL Take 1 Tab by mouth three (3) times daily as needed for Muscle Spasm(s). diclofenac EC 50 mg EC tablet Commonly known as:  VOLTAREN Take 1 Tab by mouth two (2) times a day. hydroCHLOROthiazide 12.5 mg capsule Commonly known as:  Tovar Sandra TAKE ONE CAPSULE BY MOUTH ONCE DAILY  
  
 IRON (FERROUS SULFATE) PO Take  by mouth.  
  
 levothyroxine 25 mcg tablet Commonly known as:  SYNTHROID  
TAKE ONE TABLET BY MOUTH IN THE MORNING  
  
 potassium 99 mg tablet Take 99 mg by mouth daily. Pt is taking 2 tabs daily PREVACID PO Take  by mouth. traZODone 50 mg tablet Commonly known as:  Emaline Sober Take 1 Tab by mouth nightly. Prn sleep  
  
 venlafaxine 75 mg tablet Commonly known as:  [facility] Take 1 Tab by mouth two (2) times a day. VITAMIN D3 1,000 unit Cap Generic drug:  cholecalciferol Take  by mouth daily. November 2017 Details Sun Mon Tue Wed Thu Fri Sat  
     1  
  
  
  
   2  
  
  
  
   3  
  
  
  
   4  
  
  
  
  
  5  
  
  
  
   6  
  
  
  
   7  
  
  
  
   8  
  
  
  
   9  
  
  
  
   10  
  
  
  
   11  
  
  
  
  
  12  
  
  
  
   13  
  
  
  
   14  
  
  
  
   15  
  
  
  
   16  
  
  
  
   17  
  
7.5 mg  
See details 18  
  
7.5 mg  
  
  
  19  
  
7.5 mg  
  
   20  
  
7.5 mg  
  
   21  
  
7.5 mg  
  
   22  
  
7.5 mg  
  
   23  
  
7.5 mg  
  
   24  
  
7.5 mg  
  
   25  
  
7.5 mg  
  
  
  26  
  
7.5 mg  
  
   27  
  
7.5 mg  
  
   28  
  
7.5 mg  
  
   29  
  
7.5 mg  
  
   30  
  
7.5 mg Date Details 11/17 This INR check INR: 2.5 How to take your warfarin dose To take:  7.5 mg Take one of the 7.5 mg tablets. December 2017 Details Norberto Chamberlain Wed Thu Fri Sat  
       1  
  
7.5 mg  
  
   2  
  
7.5 mg  
  
  
  3  
  
7.5 mg  
  
   4  
  
7.5 mg  
  
   5  
  
7.5 mg  
  
   6  
  
7.5 mg  
  
   7  
  
7.5 mg  
  
   8  
  
7.5 mg  
  
   9  
  
7.5 mg  
  
  
  10  
  
7.5 mg  
  
   11  
  
7.5 mg  
  
   12  
  
7.5 mg  
  
   13  
  
7.5 mg  
  
   14  
  
7.5 mg  
  
   15  
  
7.5 mg  
  
   16  
  
  
  
  
  17  
  
  
  
   18  
  
  
  
   19  
  
  
  
   20  
  
  
  
   21  
  
  
  
   22  
  
  
  
   23  
  
  
  
  
  24  
  
  
  
   25  
  
  
  
   26  
  
  
  
   27  
  
  
  
   28  
  
  
  
   29  
  
  
  
   30  
  
  
  
  
  31  
  
  
  
        
 Date Details No additional details Date of next INR:  12/15/2017 How to take your warfarin dose To take:  7.5 mg Take one of the 7.5 mg tablets. We Performed the Following AMB POC PT/INR [82695 CPT(R)] To-Do List   
 11/17/2017  1:00 PM  
  Appointment with [address] 2 at [facility] ([phone])  
  
 01/09/2018 9:30 PM  
  Appointment with 13[facility] 3 [facility] at [facility] ([phone]) 1. Do not take a nap the day of the study  2. No caffeine after 12 noon the day of the study  3. Bring a 2 piece sleeping garment  4. Hair should be clean and dry, no oils, sprays, powders and remove wigs, weaves or other hair accessories  6. Patient should eat dinner prior to arriving for the test and a light breakfast will be provided upon discharge in the morning  7. Patient encouraged to bring activity items such as books, magazines, laptop, IPAD, etc, as well as toiletry items needed for the next morning  8. Bring all medications with you to the center  9. For specific questions please contact the sleep center directly, 830a to 5p  10. Do not arrive more than 15 minutes prior to appt time and use the doorbell to enter the sleep center. Introducing [facility] & HEALTH SERVICES! [facility] introduces [employer] patient portal. Now you can access parts of your medical record, email your doctor's office, and request medication refills online. 1. In your internet browser, go to [email or web address] 2. Click on the First Time User? Click Here link in the Sign In box. You will see the New Member Sign Up page. 3. Enter your [employer] Access Code exactly as it appears below. You will not need to use this code after youve completed the sign-up process. If you do not sign up before the expiration date, you must request a new code. · [employer] Access Code: [email or web address] Expires: 1/16/2018  4:04 PM 
 
4. Enter the last four digits of your Social Security Number (xxxx) and Date of Birth (mm/dd/yyyy) as indicated and click Submit. You will be taken to the next sign-up page. 5. Create a [employer] ID. This will be your [employer] login ID and cannot be changed, so think of one that is secure and easy to remember. 6. Create a [employer] password. You can change your password at any time. 7. Enter your Password Reset Question and Answer. This can be used at a later time if you forget your password. 8. Enter your e-mail address. You will receive e-mail notification when new information is available in [address] 9. Click Sign Up. You can now view and download portions of your medical record. 10. Click the Download Summary menu link to download a portable copy of your medical information. If you have questions, please visit the Frequently Asked Questions section of the [employer] website. Remember, [employer] is NOT to be used for urgent needs. For medical emergencies, dial 911. Now available from your iPhone and Android! Please provide this summary of care documentation to your next provider. Your primary care clinician is listed as Jared Miller. If you have any questions after today's visit, please call [phone].

## 2017-11-20 NOTE — TELEPHONE ENCOUNTER
Chief Complaint   Patient presents with    Medication Refill     Last seen 9/20/17  Last INR 11/17/17

## 2017-11-21 RX ORDER — WARFARIN 7.5 MG/1
7.5 TABLET ORAL DAILY
Qty: 90 TAB | Refills: 3 | Status: SHIPPED | OUTPATIENT
Start: 2017-11-21 | End: 2018-12-04 | Stop reason: SDUPTHER

## 2017-12-15 ENCOUNTER — CLINICAL SUPPORT (OUTPATIENT)
Dept: FAMILY MEDICINE CLINIC | Age: 62
End: 2017-12-15

## 2017-12-15 VITALS
HEIGHT: 71 IN | HEART RATE: 64 BPM | BODY MASS INDEX: 35.78 KG/M2 | SYSTOLIC BLOOD PRESSURE: 143 MMHG | WEIGHT: 255.6 LBS | DIASTOLIC BLOOD PRESSURE: 88 MMHG | OXYGEN SATURATION: 97 % | RESPIRATION RATE: 12 BRPM | TEMPERATURE: 98.5 F

## 2017-12-15 DIAGNOSIS — Z79.01 ANTICOAGULATION GOAL OF INR 2 TO 3: ICD-10-CM

## 2017-12-15 DIAGNOSIS — I74.9 THROMBOEMBOLUS (HCC): Primary | ICD-10-CM

## 2017-12-15 DIAGNOSIS — Z51.81 ANTICOAGULATION GOAL OF INR 2 TO 3: ICD-10-CM

## 2017-12-15 LAB
INR BLD: 3.6
PT POC: 43.1 SECONDS
VALID INTERNAL CONTROL?: YES

## 2017-12-15 NOTE — MR AVS SNAPSHOT
Visit Information Date & Time Provider Department Dept. Phone Encounter #  
 12/15/2017  1:10 PM Luis Arreguin [place] [address] [phone] [id number] Upcoming Health Maintenance Date Due ZOSTER VACCINE AGE 60> 8/6/2016 PAP AKA CERVICAL CYTOLOGY 10/21/2016 MEDICARE YEARLY EXAM 2/14/2018 COLONOSCOPY 7/6/2018 DTaP/Tdap/Td series (2 - Td) 6/30/2025 Allergies as of 12/15/2017  Review Complete On: 12/15/2017 By: Cassie Verma LPN Severity Noted Reaction Type Reactions Iodinated Contrast- Oral And Iv Dye  09/24/2012   Side Effect Hives Lasix [Furosemide]  09/24/2012   Side Effect Hives Sulfa (Sulfonamide Antibiotics)  09/24/2012   Side Effect Hives Current Immunizations  Reviewed on 9/20/2017 Name Date Influenza Vaccine 10/21/2013 Influenza Vaccine Janyth Montrell) 11/5/2014 Influenza Vaccine (Quad) PF 9/20/2017, 9/14/2016, 10/26/2015 Tdap 6/30/2015 Not reviewed this visit You Were Diagnosed With   
  
 Codes Comments [facility]    -  Primary ICD-10-CM: I74.9 ICD-9-CM: 444.9 Anticoagulation goal of INR 2 to 3     ICD-10-CM: Z51.81, Z79.01 
ICD-9-CM: V58.83, V58.61 Vitals BP Pulse Temp Resp Height(growth percentile) Weight(growth percentile) 143/88 (BP 1 Location: Left arm, BP Patient Position: Sitting) 64 98.5 °F (36.9 °C) (Oral) 12 5' 10.5\" (1.791 m) 255 lb 9.6 oz (115.9 kg) SpO2 BMI OB Status Smoking Status 97% 36.16 kg/m2 Postmenopausal Never Smoker Vitals History BMI and BSA Data Body Mass Index Body Surface Area  
 36.16 kg/m 2 2.4 m 2 Preferred Pharmacy Pharmacy Name Phone [facility] 2002 [facility], [facility] [phone] Your Updated Medication List  
  
   
This list is accurate as of: 12/15/17  1:56 PM.  Always use your most recent med list.  
  
  
  
  
 atenolol-chlorthalidone 50-25 mg per tablet Commonly known as:  TENORETIC 50 Take 1 Tab by mouth daily. cyclobenzaprine 5 mg tablet Commonly known as:  FLEXERIL Take 1 Tab by mouth three (3) times daily as needed for Muscle Spasm(s). diclofenac EC 50 mg EC tablet Commonly known as:  VOLTAREN Take 1 Tab by mouth two (2) times a day. hydroCHLOROthiazide 12.5 mg capsule Commonly known as:  Ferdie Cea TAKE ONE CAPSULE BY MOUTH ONCE DAILY  
  
 IRON (FERROUS SULFATE) PO Take  by mouth.  
  
 levothyroxine 25 mcg tablet Commonly known as:  SYNTHROID  
TAKE ONE TABLET BY MOUTH IN THE MORNING  
  
 potassium 99 mg tablet Take 99 mg by mouth daily. Pt is taking 2 tabs daily PREVACID PO Take  by mouth. traZODone 50 mg tablet Commonly known as:  Lin Au Take 1 Tab by mouth nightly. Prn sleep  
  
 venlafaxine 75 mg tablet Commonly known as:  [facility] Take 1 Tab by mouth two (2) times a day. VITAMIN D3 1,000 unit Cap Generic drug:  cholecalciferol Take  by mouth daily. warfarin 7.5 mg tablet Commonly known as:  COUMADIN Take 1 Tab by mouth daily. Take one tablet daily Description 12/15/17 change coumadin dose to 0.5 tab on Tuesdays and Thursday, and the rest of the week 1 tablet per day. December 2017 Details Sun Mon Tue Wed Thu Fri Sat  
       1  
  
  
  
   2  
  
  
  
  
  3  
  
  
  
   4  
  
  
  
   5  
  
  
  
   6  
  
  
  
   7  
  
  
  
   8  
  
  
  
   9  
  
  
  
  
  10  
  
  
  
   11  
  
  
  
   12  
  
  
  
   13  
  
  
  
   14  
  
  
  
   15  
  
7.5 mg  
See details    16  
  
7.5 mg  
  
  
  17  
  
7.5 mg  
  
   18  
  
7.5 mg  
  
   19  
  
3.75 mg  
  
   20  
  
7.5 mg  
  
   21  
  
3.75 mg  
  
   22  
  
7.5 mg  
  
   23  
  
7.5 mg  
  
  
  24  
  
7.5 mg  
  
   25  
  
7.5 mg  
  
   26  
  
3.75 mg  
  
   27  
  
7.5 mg  
  
   28  
  
3.75 mg  
  
   29  
  
7.5 mg  
  
   30  
  
  
  
  
  31  
  
  
  
 Date Details 12/15 This INR check INR: 3.6! Date of next INR:  12/29/2017 How to take your warfarin dose To take:  3.75 mg Take half of a 7.5 mg tablet. To take:  7.5 mg Take one of the 7.5 mg tablets. We Performed the Following AMB POC PT/INR [77050 CPT(R)] To-Do List   
 01/09/2018 9:30 PM  
  Appointment with BED 3 [facility] at [facility] [address] [place], [place] [address]ie [facility] ([phone]) 1. Do not take a nap the day of the study  2. No caffeine after 12 noon the day of the study  3. Bring a 2 piece sleeping garment  4. Hair should be clean and dry, no oils, sprays, powders and remove wigs, weaves or other hair accessories  6. Patient should eat dinner prior to arriving for the test and a light breakfast will be provided upon discharge in the morning  7. Patient encouraged to bring activity items such as books, magazines, laptop, IPAD, etc, as well as toiletry items needed for the next morning  8. Bring all medications with you to the center  9. For specific questions please contact the sleep center directly, 830a to 5p  10. Do not arrive more than 15 minutes prior to appt time and use the doorbell to enter the sleep center. Introducing [facility] & HEALTH SERVICES! Celia Rowe introduces [employer] patient portal. Now you can access parts of your medical record, email your doctor's office, and request medication refills online. 1. In your internet browser, go to [email or web address] 2. Click on the First Time User? Click Here link in the Sign In box. You will see the New Member Sign Up page. 3. Enter your [employer] Access Code exactly as it appears below. You will not need to use this code after youve completed the sign-up process. If you do not sign up before the expiration date, you must request a new code. · [employer] Access Code: [email or web address] Expires: 1/16/2018  4:04 PM 
 
4.  Enter the last four digits of your Social Security Number (xxxx) and Date of Birth (mm/dd/yyyy) as indicated and click Submit. You will be taken to the next sign-up page. 5. Create a [employer] Mobile ID. This will be your [employer] Mobile login ID and cannot be changed, so think of one that is secure and easy to remember. 6. Create a [employer] Mobile password. You can change your password at any time. 7. Enter your Password Reset Question and Answer. This can be used at a later time if you forget your password. 8. Enter your e-mail address. You will receive e-mail notification when new information is available in [address] 9. Click Sign Up. You can now view and download portions of your medical record. 10. Click the Download Summary menu link to download a portable copy of your medical information. If you have questions, please visit the Frequently Asked Questions section of the [employer] Mobile website. Remember, [employer] Mobile is NOT to be used for urgent needs. For medical emergencies, dial 911. Now available from your iPhone and Android! Please provide this summary of care documentation to your next provider. Your primary care clinician is listed as James Horton. If you have any questions after today's visit, please call [phone].

## 2017-12-15 NOTE — PROGRESS NOTES
Presents for pt/inr due to anticoagulation management. Results for orders placed or performed in visit on 12/15/17   AMB POC PT/INR   Result Value Ref Range    VALID INTERNAL CONTROL POC Yes     Prothrombin time (POC) 43.1 seconds    INR POC 3.6      Visit Vitals    /88 (BP 1 Location: Left arm, BP Patient Position: Sitting)    Pulse 64    Temp 98.5 °F (36.9 °C) (Oral)    Resp 12    Ht 5' 10.5\" (1.791 m)    Wt 255 lb 9.6 oz (115.9 kg)    SpO2 97%    BMI 36.16 kg/m2     Prior to Admission medications    Medication Sig Start Date End Date Taking? Authorizing Provider   warfarin (COUMADIN) 7.5 mg tablet Take 1 Tab by mouth daily. Take one tablet daily 11/21/17  Yes Zohreh Badillo MD   levothyroxine (SYNTHROID) 25 mcg tablet TAKE ONE TABLET BY MOUTH IN THE MORNING 10/2/17  Yes Zohreh Badillo MD   atenolol-chlorthalidone (TENORETIC 50) 50-25 mg per tablet Take 1 Tab by mouth daily. 5/3/17  Yes Zohreh Badillo MD   cholecalciferol (VITAMIN D3) 1,000 unit cap Take  by mouth daily. Yes Historical Provider   venlafaxine (EFFEXOR) 75 mg tablet Take 1 Tab by mouth two (2) times a day. 1/17/17  Yes Zohreh Badillo MD   diclofenac EC (VOLTAREN) 50 mg EC tablet Take 1 Tab by mouth two (2) times a day. 1/12/17  Yes Zohreh Badillo MD   cyclobenzaprine (FLEXERIL) 5 mg tablet Take 1 Tab by mouth three (3) times daily as needed for Muscle Spasm(s). 1/12/17  Yes Zohreh Badillo MD   IRON, FERROUS SULFATE, PO Take  by mouth. Yes Historical Provider   hydrochlorothiazide (MICROZIDE) 12.5 mg capsule TAKE ONE CAPSULE BY MOUTH ONCE DAILY 12/23/15  Yes Jayda Aquino MD   traZODone (DESYREL) 50 mg tablet Take 1 Tab by mouth nightly. Prn sleep 9/23/15  Yes Jayda Aquino MD   LANSOPRAZOLE (PREVACID PO) Take  by mouth. Yes Historical Provider   potassium 99 mg tablet Take 99 mg by mouth daily.  Pt is taking 2 tabs daily   Yes Historical Provider     Verbal orders received from Dr. Daly Gutirerez to change coumadin dose on Tuesdays and Thursdays to 0.5 tabs and the rest of the week take 1 tab. AVS given with anticoag calender. Pt verbalized understanding of verbal orders and pt instructions.

## 2017-12-26 ENCOUNTER — OFFICE VISIT (OUTPATIENT)
Dept: FAMILY MEDICINE CLINIC | Age: 62
End: 2017-12-26

## 2017-12-26 VITALS
TEMPERATURE: 98.2 F | BODY MASS INDEX: 35.42 KG/M2 | RESPIRATION RATE: 12 BRPM | DIASTOLIC BLOOD PRESSURE: 85 MMHG | HEIGHT: 71 IN | HEART RATE: 71 BPM | WEIGHT: 253 LBS | OXYGEN SATURATION: 94 % | SYSTOLIC BLOOD PRESSURE: 125 MMHG

## 2017-12-26 DIAGNOSIS — J06.9 URI WITH COUGH AND CONGESTION: Primary | ICD-10-CM

## 2017-12-26 DIAGNOSIS — J34.89 SINUS PRESSURE: ICD-10-CM

## 2017-12-26 DIAGNOSIS — R52 BODY ACHES: ICD-10-CM

## 2017-12-26 RX ORDER — BENZONATATE 200 MG/1
200 CAPSULE ORAL
Qty: 21 CAP | Refills: 1 | Status: SHIPPED | OUTPATIENT
Start: 2017-12-26 | End: 2018-01-02

## 2017-12-26 RX ORDER — DOXYCYCLINE 100 MG/1
100 TABLET ORAL 2 TIMES DAILY
Qty: 20 TAB | Refills: 0 | Status: SHIPPED | OUTPATIENT
Start: 2017-12-26 | End: 2018-01-05

## 2018-01-09 ENCOUNTER — CLINICAL SUPPORT (OUTPATIENT)
Dept: FAMILY MEDICINE CLINIC | Age: 63
End: 2018-01-09

## 2018-01-09 VITALS
TEMPERATURE: 98 F | HEIGHT: 71 IN | WEIGHT: 255 LBS | DIASTOLIC BLOOD PRESSURE: 85 MMHG | RESPIRATION RATE: 16 BRPM | OXYGEN SATURATION: 95 % | HEART RATE: 63 BPM | BODY MASS INDEX: 35.7 KG/M2 | SYSTOLIC BLOOD PRESSURE: 159 MMHG

## 2018-01-09 DIAGNOSIS — Z86.711 HX PULMONARY EMBOLISM: Primary | ICD-10-CM

## 2018-01-09 DIAGNOSIS — I74.9 THROMBOEMBOLUS (HCC): ICD-10-CM

## 2018-01-09 LAB
INR BLD: 2.2
PT POC: 26.2 SECONDS
VALID INTERNAL CONTROL?: YES

## 2018-01-09 NOTE — PROGRESS NOTES
Chief Complaint   Patient presents with    Anticoagulation     Patient is here for a recheck of her INR. Patient is taking 1/2 tablet on Tuesday and Thursday. Taking 7.5 mg all other days. Visit Vitals    /85 (BP 1 Location: Left arm, BP Patient Position: Sitting)    Pulse 63    Temp 98 °F (36.7 °C) (Oral)    Resp 16    Ht 5' 10.5\" (1.791 m)    Wt 255 lb (115.7 kg)    SpO2 95%    BMI 36.07 kg/m2     Results for orders placed or performed in visit on 01/09/18   AMB POC PT/INR   Result Value Ref Range    VALID INTERNAL CONTROL POC Yes     Prothrombin time (POC) 26.2 seconds    INR POC 2.2      Patient advised to continue taking coumadin as directed and return in one month  For recheck of INR. Patient verbalized understanding.

## 2018-01-09 NOTE — MR AVS SNAPSHOT
Visit Information Date & Time Provider Department Dept. Phone Encounter #  
 1/9/2018 10:10 AM Luis Arreguin [place] [address] [phone] [id number] Upcoming Health Maintenance Date Due ZOSTER VACCINE AGE 60> 8/6/2016 PAP AKA CERVICAL CYTOLOGY 10/21/2016 COLONOSCOPY 7/6/2018 MEDICARE YEARLY EXAM 2/14/2018 BREAST CANCER SCRN MAMMOGRAM 2/14/2019 DTaP/Tdap/Td series (2 - Td) 6/30/2025 Allergies as of 1/9/2018  Review Complete On: 1/9/2018 By: Francis Huerta Severity Noted Reaction Type Reactions Iodinated Contrast- Oral And Iv Dye  09/24/2012   Side Effect Hives Lasix [Furosemide]  09/24/2012   Side Effect Hives Sulfa (Sulfonamide Antibiotics)  09/24/2012   Side Effect Hives Current Immunizations  Reviewed on 9/20/2017 Name Date Influenza Vaccine 10/21/2013 Influenza Vaccine Teodora New) 11/5/2014 Influenza Vaccine (Quad) PF 9/20/2017, 9/14/2016, 10/26/2015 Tdap 6/30/2015 Not reviewed this visit You Were Diagnosed With   
  
 Codes Comments Hx pulmonary embolism    -  Primary ICD-10-CM: N46.414 ICD-9-CM: V12.55 Thromboembolus ([address]     ICD-10-CM: I74.9 ICD-9-CM: 804. 9 Vitals BP Pulse Temp Resp Height(growth percentile) Weight(growth percentile) 159/85 (BP 1 Location: Left arm, BP Patient Position: Sitting) 63 98 °F (36.7 °C) (Oral) 16 5' 10.5\" (1.791 m) 255 lb (115.7 kg) SpO2 BMI OB Status Smoking Status 95% 36.07 kg/m2 Postmenopausal Never Smoker BMI and BSA Data Body Mass Index Body Surface Area 36.07 kg/m 2 2.4 m 2 Preferred Pharmacy Pharmacy Name Phone [facility] [address] [facility] [phone] Your Updated Medication List  
  
   
This list is accurate as of: 1/9/18 10:56 AM.  Always use your most recent med list.  
  
  
  
  
 atenolol-chlorthalidone 50-25 mg per tablet Commonly known as:  TENORETIC 50 Take 1 Tab by mouth daily. cyclobenzaprine 5 mg tablet Commonly known as:  FLEXERIL Take 1 Tab by mouth three (3) times daily as needed for Muscle Spasm(s). diclofenac EC 50 mg EC tablet Commonly known as:  VOLTAREN Take 1 Tab by mouth two (2) times a day. hydroCHLOROthiazide 12.5 mg capsule Commonly known as:  Donnald Limb TAKE ONE CAPSULE BY MOUTH ONCE DAILY  
  
 IRON (FERROUS SULFATE) PO Take  by mouth.  
  
 levothyroxine 25 mcg tablet Commonly known as:  SYNTHROID  
TAKE ONE TABLET BY MOUTH IN THE MORNING  
  
 potassium 99 mg tablet Take 99 mg by mouth daily. Pt is taking 2 tabs daily PREVACID PO Take  by mouth. traZODone 50 mg tablet Commonly known as:  Daniel Singh Take 1 Tab by mouth nightly. Prn sleep  
  
 venlafaxine 75 mg tablet Commonly known as:  [facility] [place] Take 1 Tab by mouth two (2) times a day. VITAMIN D3 1,000 unit Cap Generic drug:  cholecalciferol Take  by mouth daily. warfarin 7.5 mg tablet Commonly known as:  COUMADIN Take 1 Tab by mouth daily. Take one tablet daily January 2018 Details Sun Mon Tue Wed Thu Fri Sat  
   1  
  
  
  
   2  
  
  
  
   3  
  
  
  
   4  
  
  
  
   5  
  
  
  
   6  
  
  
  
  
  7  
  
  
  
   8  
  
  
  
   9  
  
3.75 mg See details 10  
  
7.5 mg  
  
   11  
  
3.75 mg  
  
   12  
  
7.5 mg  
  
   13  
  
7.5 mg  
  
  
  14  
  
7.5 mg  
  
   15  
  
7.5 mg  
  
   16  
  
3.75 mg  
  
   17  
  
7.5 mg  
  
   18  
  
3.75 mg  
  
   19  
  
7.5 mg  
  
   20  
  
7.5 mg  
  
  
  21  
  
7.5 mg  
  
   22  
  
7.5 mg  
  
   23  
  
3.75 mg  
  
   24  
  
7.5 mg  
  
   25  
  
3.75 mg  
  
   26  
  
7.5 mg  
  
   27  
  
7.5 mg  
  
  
  28  
  
7.5 mg  
  
   29  
  
7.5 mg  
  
   30  
  
3.75 mg  
  
   31  
  
7.5 mg Date Details 01/09 This INR check INR: 2.2 Date of next INR: No date specified How to take your warfarin dose To take:  3.75 mg Take half of a 7.5 mg tablet. To take:  7.5 mg Take one of the 7.5 mg tablets. We Performed the Following AMB POC PT/INR [13985 CPT(R)] To-Do List   
 01/09/2018  9:30 PM  
  Appointment with [facility] at [address] 1 [address] ([phone]) 1. Do not take a nap the day of the study  2. No caffeine after 12 noon the day of the study  3. Bring a 2 piece sleeping garment  4. Hair should be clean and dry, no oils, sprays, powders and remove wigs, weaves or other hair accessories  6. Patient should eat dinner prior to arriving for the test and a light breakfast will be provided upon discharge in the morning  7. Patient encouraged to bring activity items such as books, magazines, laptop, IPAD, etc, as well as toiletry items needed for the next morning  8. Bring all medications with you to the center  9. For specific questions please contact the sleep center directly, [phone] to [phone]  10. Do not arrive more than 15 minutes prior to appt time and use the doorbell to enter the sleep center. Introducing [facility]! Izzy Davenport introduces [employer] patient portal. Now you can access parts of your medical record, email your doctor's office, and request medication refills online. 1. In your internet browser, go to [email or web address] 2. Click on the First Time User? Click Here link in the Sign In box. You will see the New Member Sign Up page. 3. Enter your [employer] Access Code exactly as it appears below. You will not need to use this code after youve completed the sign-up process. If you do not sign up before the expiration date, you must request a new code. · [employer] Access Code: [email or web address] Expires: 1/16/2018  4:04 PM 
 
4. Enter the last four digits of your Social Security Number (xxxx) and Date of Birth (mm/dd/yyyy) as indicated and click Submit. You will be taken to the next sign-up page. 5. Create a [employer] ID. This will be your [employer] login ID and cannot be changed, so think of one that is secure and easy to remember. 6. Create a [employer] password. You can change your password at any time. 7. Enter your Password Reset Question and Answer. This can be used at a later time if you forget your password. 8. Enter your e-mail address. You will receive e-mail notification when new information is available in [address] 9. Click Sign Up. You can now view and download portions of your medical record. 10. Click the Download Summary menu link to download a portable copy of your medical information. If you have questions, please visit the Frequently Asked Questions section of the [employer] website. Remember, [employer] is NOT to be used for urgent needs. For medical emergencies, dial 911. Now available from your iPhone and Android! Please provide this summary of care documentation to your next provider. Your primary care clinician is listed as Yolette Riggs. If you have any questions after today's visit, please call [phone].

## 2018-01-15 ENCOUNTER — OFFICE VISIT (OUTPATIENT)
Dept: FAMILY MEDICINE CLINIC | Age: 63
End: 2018-01-15

## 2018-01-15 VITALS
RESPIRATION RATE: 16 BRPM | DIASTOLIC BLOOD PRESSURE: 88 MMHG | HEIGHT: 71 IN | BODY MASS INDEX: 35.56 KG/M2 | TEMPERATURE: 98.1 F | HEART RATE: 69 BPM | SYSTOLIC BLOOD PRESSURE: 148 MMHG | WEIGHT: 254 LBS | OXYGEN SATURATION: 96 %

## 2018-01-15 DIAGNOSIS — M70.42 PREPATELLAR BURSITIS OF LEFT KNEE: Primary | ICD-10-CM

## 2018-01-15 DIAGNOSIS — M25.562 ACUTE PAIN OF LEFT KNEE: ICD-10-CM

## 2018-01-15 RX ORDER — DICLOFENAC SODIUM 75 MG/1
75 TABLET, DELAYED RELEASE ORAL
Qty: 60 TAB | Refills: 2 | Status: SHIPPED | OUTPATIENT
Start: 2018-01-15 | End: 2020-06-25

## 2018-01-15 RX ORDER — DOXYCYCLINE 100 MG/1
100 TABLET ORAL 2 TIMES DAILY
Qty: 20 TAB | Refills: 0 | Status: SHIPPED | OUTPATIENT
Start: 2018-01-15 | End: 2018-01-25

## 2018-01-15 NOTE — PROGRESS NOTES
SARKIS Bledsoe is a 65 y.o. female who presents with left knee pain after a fall. She fell 2 days ago on concrete. She was carrying boxes and tripped over something that resulted in the fall. Landed directly on the left knee. Experienced immediate pain but was able to bear weight. Experience swelling and bruising immediately afterward and thinks that it is getting a little bit better every day but because of a history of blood clots she came in because of her concern. Took a picture of the knee yesterday to show me and it does in fact look less purple but a little bit more red. 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 long term use Coumadin    Thyroid disease        Meds:   Current Outpatient Prescriptions   Medication Sig Dispense Refill    diclofenac EC (VOLTAREN) 75 mg EC tablet Take 1 Tab by mouth two (2) times daily as needed. 60 Tab 2    doxycycline (ADOXA) 100 mg tablet Take 1 Tab by mouth two (2) times a day for 10 days. 20 Tab 0    warfarin (COUMADIN) 7.5 mg tablet Take 1 Tab by mouth daily. Take one tablet daily (Patient taking differently: Take 7.5 mg by mouth daily. Take 1/2 tab on Tuesday and Thursday. Take 7.5 mg rest of days.) 90 Tab 3    levothyroxine (SYNTHROID) 25 mcg tablet TAKE ONE TABLET BY MOUTH IN THE MORNING 30 Tab 11    atenolol-chlorthalidone (TENORETIC 50) 50-25 mg per tablet Take 1 Tab by mouth daily. 30 Tab 11    cholecalciferol (VITAMIN D3) 1,000 unit cap Take  by mouth daily.  venlafaxine (EFFEXOR) 75 mg tablet Take 1 Tab by mouth two (2) times a day. 180 Tab 3    cyclobenzaprine (FLEXERIL) 5 mg tablet Take 1 Tab by mouth three (3) times daily as needed for Muscle Spasm(s). 60 Tab 1    IRON, FERROUS SULFATE, PO Take  by mouth.       hydrochlorothiazide (MICROZIDE) 12.5 mg capsule TAKE ONE CAPSULE BY MOUTH ONCE DAILY 30 Cap 5    traZODone (DESYREL) 50 mg tablet Take 1 Tab by mouth nightly. Prn sleep 30 Tab 2    LANSOPRAZOLE (PREVACID PO) Take  by mouth.  potassium 99 mg tablet Take 99 mg by mouth daily. Pt is taking 2 tabs daily         Allergies: Allergies   Allergen Reactions    Iodinated Contrast- Oral And Iv Dye Hives    Lasix [Furosemide] Hives    Sulfa (Sulfonamide Antibiotics) Hives       Smoker:  History   Smoking Status    Never Smoker   Smokeless Tobacco    Never Used       ETOH:   History   Alcohol Use No       FH:   Family History   Problem Relation Age of Onset    Heart Disease Mother     Heart Disease Father        ROS:   As listed in HPI. In addition:  Constitutional:   No headache, fever, fatigue, weight loss or weight gain      Cardiac:    No chest pain      Resp:   No cough or shortness of breath      Neuro   No loss of consciousness, dizziness, seizures      Physical Exam:  Blood pressure 148/88, pulse 69, temperature 98.1 °F (36.7 °C), resp. rate 16, height 5' 10.5\" (1.791 m), weight 254 lb (115.2 kg), SpO2 96 %. GEN: No apparent distress. Alert and oriented and responds to all questions appropriately. NEUROLOGIC:  No focal neurologic deficits. Strength and sensation grossly intact. Coordination and gait grossly intact. EXT: Well perfused. No edema. SKIN: No obvious rashes. Left knee examined, significant bruising as pictured with some central erythema. Prepatellar bursa appears to be involved. The entire area of bruising is full and tense which could represent a hematoma or a bursitis. There are some fluid-filled bulla overlying the bursa. It is mildly tender to palpation but not exquisitely so. Gait is normal, non-limping, not painful to walk. Assessment and Plan     Prepatellar bursitis due to trauma. Infection? Hard to tell. Not as tender as one would expect with infection.   Patient also feels like she is doing a little bit better every day. Conservative treatment would be compression, NSAIDs, ice to start with that. Doxycycline in an abundance of caution  Educated extensively that if she feels like this is getting worse or not getting better she is to see me in 2-3 days. If she is doing a little better every day can forego this. ICD-10-CM ICD-9-CM    1. Prepatellar bursitis of left knee M70.42 726.65 diclofenac EC (VOLTAREN) 75 mg EC tablet      doxycycline (ADOXA) 100 mg tablet   2. Acute pain of left knee M25.562 719.46        AVS given.  Pt expressed understanding of instructions

## 2018-01-24 ENCOUNTER — OFFICE VISIT (OUTPATIENT)
Dept: FAMILY MEDICINE CLINIC | Age: 63
End: 2018-01-24

## 2018-01-24 VITALS
WEIGHT: 258 LBS | RESPIRATION RATE: 12 BRPM | SYSTOLIC BLOOD PRESSURE: 154 MMHG | OXYGEN SATURATION: 94 % | HEIGHT: 71 IN | TEMPERATURE: 97.9 F | BODY MASS INDEX: 36.12 KG/M2 | DIASTOLIC BLOOD PRESSURE: 82 MMHG | HEART RATE: 61 BPM

## 2018-01-24 DIAGNOSIS — M71.10 SEPTIC BURSITIS: ICD-10-CM

## 2018-01-24 DIAGNOSIS — M70.42 PREPATELLAR BURSITIS OF LEFT KNEE: Primary | ICD-10-CM

## 2018-01-24 DIAGNOSIS — I10 ESSENTIAL HYPERTENSION, BENIGN: ICD-10-CM

## 2018-01-24 NOTE — PATIENT INSTRUCTIONS
Issa Gaston  1 pm appointment  Please arrive by 12:40         Kneecap Bursitis: Care Instructions  Your Care Instructions    Bursitis is inflammation of the bursa. A bursa is a small sac of fluid that cushions a joint and helps it move easily. Bursitis of the kneecap is inflammation of the bursa found between the front of the kneecap and the skin. Kneeling for a long time can cause kneecap bursitis, which can develop into an egg-shaped bump on the front of the kneecap. Bursitis usually gets better if you avoid the activity that caused it. If it lasts or gets worse despite home treatment, your doctor may draw fluid from the bursa through a needle. This may relieve your pain and help your doctor know if you have an infection. If so, your doctor will prescribe antibiotics. If you have inflammation only, you may get a corticosteroid shot to reduce swelling and pain. Your doctor may recommend physical therapy and stretching activities. Rarely, surgery is needed to drain or remove the bursa. Follow-up care is a key part of your treatment and safety. Be sure to make and go to all appointments, and call your doctor if you are having problems. It's also a good idea to know your test results and keep a list of the medicines you take. How can you care for yourself at home? · Put ice or a cold pack on your kneecap for 10 to 20 minutes at a time. Put a thin cloth between the ice and your skin. · After 3 days of using ice, you may use heat on your kneecap. You can use a hot water bottle, a heating pad set on low, or a warm, moist towel. · Prop up the sore leg on a pillow when you ice it or anytime you sit or lie down during the next 3 days. Try to keep it above the level of your heart. This will help reduce swelling. · Rest your knee. Stop any activities that cause pain. Switch to activities that do not stress your knee. · Take your medicines exactly as prescribed.  Call your doctor if you think you are having a problem with your medicine. · Ask your doctor if you can take an over-the-counter pain medicine, such as acetaminophen (Tylenol), ibuprofen (Advil, Motrin), or naproxen (Aleve). Be safe with medicines. Read and follow all instructions on the label. · To prevent and ease kneecap bursitis during work, play, and daily activities:  [address] when kneeling on hard surfaces. Avoid kneeling for too long at a time. ¨ Strengthen and stretch your leg muscles. ¨ Avoid deep knee bends. · You can slowly return to the activity that caused the pain, but do it with less effort until you can do it without pain or swelling. Be sure to warm up before and stretch after you do the activity. When should you call for help? Call your doctor now or seek immediate medical care if:  ? · You have a fever. ? · You have increased swelling or redness in your knee area. ? · You cannot use your knee, or the pain in your knee gets worse. ? Watch closely for changes in your health, and be sure to contact your doctor if:  ? · You have pain for 2 weeks or longer despite home treatment. Where can you learn more? Go to http://jenelle-shravan.info/. Enter J336 in the search box to learn more about \"Kneecap Bursitis: Care Instructions. \"  Current as of: March 21, 2017  Content Version: 11.4  © 8196-6529 [employer]. Care instructions adapted under license by [employer] (which disclaims liability or warranty for this information). If you have questions about a medical condition or this instruction, always ask your healthcare professional. [place] [address] any warranty or liability for your use of this information.

## 2018-01-24 NOTE — PROGRESS NOTES
SARKIS Amor is a 65 y.o. female who presents to follow-up on left knee prepatellar bursitis. Recall that she fell on 1/13 and landed directly on the left knee. Experienced immediate pain but was able to bear weight. This area the knee immediately bruised and she took a picture on 1/14 which allowed me to compare during her visit on 1/15. I placed her on doxycycline because of the concern of possible septic bursitis and asked her to return for failure to improve. Also placed her on diclofenac and recommended compression. Over the last week it has gotten less purple and more red. It is still very swollen. Does not hurt as much. 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 long term use Coumadin    Thyroid disease        Meds:   Current Outpatient Prescriptions   Medication Sig Dispense Refill    diclofenac EC (VOLTAREN) 75 mg EC tablet Take 1 Tab by mouth two (2) times daily as needed. 60 Tab 2    doxycycline (ADOXA) 100 mg tablet Take 1 Tab by mouth two (2) times a day for 10 days. 20 Tab 0    warfarin (COUMADIN) 7.5 mg tablet Take 1 Tab by mouth daily. Take one tablet daily (Patient taking differently: Take 7.5 mg by mouth daily. Take 1/2 tab on Tuesday and Thursday. Take 7.5 mg rest of days.) 90 Tab 3    levothyroxine (SYNTHROID) 25 mcg tablet TAKE ONE TABLET BY MOUTH IN THE MORNING 30 Tab 11    atenolol-chlorthalidone (TENORETIC 50) 50-25 mg per tablet Take 1 Tab by mouth daily. 30 Tab 11    cholecalciferol (VITAMIN D3) 1,000 unit cap Take  by mouth daily.  venlafaxine (EFFEXOR) 75 mg tablet Take 1 Tab by mouth two (2) times a day. 180 Tab 3    cyclobenzaprine (FLEXERIL) 5 mg tablet Take 1 Tab by mouth three (3) times daily as needed for Muscle Spasm(s).  60 Tab 1    IRON, FERROUS SULFATE, PO Take  by mouth.  hydrochlorothiazide (MICROZIDE) 12.5 mg capsule TAKE ONE CAPSULE BY MOUTH ONCE DAILY 30 Cap 5    traZODone (DESYREL) 50 mg tablet Take 1 Tab by mouth nightly. Prn sleep 30 Tab 2    LANSOPRAZOLE (PREVACID PO) Take  by mouth.  potassium 99 mg tablet Take 99 mg by mouth daily. Pt is taking 2 tabs daily         Allergies: Allergies   Allergen Reactions    Iodinated Contrast- Oral And Iv Dye Hives    Lasix [Furosemide] Hives    Sulfa (Sulfonamide Antibiotics) Hives       Smoker:  History   Smoking Status    Never Smoker   Smokeless Tobacco    Never Used       ETOH:   History   Alcohol Use No       FH:   Family History   Problem Relation Age of Onset    Heart Disease Mother     Heart Disease Father        ROS:   As listed in HPI. In addition:  Constitutional:   No headache, fever, fatigue, weight loss or weight gain      Cardiac:    No chest pain      Resp:   No cough or shortness of breath      Neuro   No loss of consciousness, dizziness, seizures      Physical Exam:  Blood pressure 154/82, pulse 61, temperature 97.9 °F (36.6 °C), resp. rate 12, height 5' 10.5\" (1.791 m), weight 258 lb (117 kg), SpO2 94 %. GEN: No apparent distress. Alert and oriented and responds to all questions appropriately. NEUROLOGIC:  No focal neurologic deficits. Strength and sensation grossly intact. Coordination and gait grossly intact. EXT: Well perfused. No edema. SKIN: No obvious rashes. Left knee examined, not tense swelling overlying the left bursa. Painful to palpation. Now erythema anxious. There is a scab over the bursa that is a healing bulla from last week. There is no drainage from the bursa. The skin overlying is erythematous. Not obviously cellulitic. See picture.   She continues to not be painful to walk, gait is normal               Assessment and Plan     Prepatellar bursitis, concern for septic bursitis at this point given the course  No systemic symptoms. Continue doxycycline to complete the antibiotic course. Will run out Friday morning. Continue NSAIDs if they are effective  Continue Tylenol if effective    I think she needs to be evaluated for drainage. Has seen [facility] in the past Dr. Breezy Browne    Blood pressure is elevated will give her the benefit of the neck and pain. Need to keep an eye on this though since she is always pretty borderline. ICD-10-CM ICD-9-CM    1. Prepatellar bursitis of left knee M70.42 726.65    2. Septic bursitis M71.10 727.3      041.9    3. Essential hypertension, benign I10 401.1        AVS given.  Pt expressed understanding of instructions

## 2018-01-24 NOTE — MR AVS SNAPSHOT
[address] [facility] 
 
 
 [address], [place] [address] [place] [address] [facility] 
[phone] Patient: Antione Amor MRN: [id number] :1955 Visit Information Date & Time Provider Department Dept. Phone Encounter #  
 2018 10:00 AM MD Vero Knott [phone] [id number] Your Appointments 2018 10:10 AM  
[facility]  [facility] Appt Note: coumadin [address], [address] [facility] [address]  
[phone]  
  
   
 [address], [place] [address] [address] Upcoming Health Maintenance Date Due ZOSTER VACCINE AGE 60> 2016 PAP AKA CERVICAL CYTOLOGY 10/21/2016 COLONOSCOPY 2018 MEDICARE YEARLY EXAM 2018 BREAST CANCER SCRN MAMMOGRAM 2019 DTaP/Tdap/Td series (2 - Td) 2025 Allergies as of 2018  Review Complete On: 1/15/2018 By: Umu Palafox MD  
  
 Severity Noted Reaction Type Reactions Iodinated Contrast- Oral And Iv Dye  2012   Side Effect Hives Lasix [Furosemide]  2012   Side Effect Hives Sulfa (Sulfonamide Antibiotics)  2012   Side Effect Hives Current Immunizations  Reviewed on 2017 Name Date Influenza Vaccine 10/21/2013 Influenza Vaccine Deborha Inoue) 2014 Influenza Vaccine (Quad) PF 2017, 2016, 10/26/2015 Tdap 2015 Not reviewed this visit Vitals BP Pulse Temp Resp Height(growth percentile) Weight(growth percentile) 154/82 (BP 1 Location: Left arm, BP Patient Position: Sitting) 61 97.9 °F (36.6 °C) 12 5' 10.5\" (1.791 m) 258 lb (117 kg) SpO2 BMI OB Status Smoking Status 94% 36.5 kg/m2 Postmenopausal Never Smoker BMI and BSA Data Body Mass Index Body Surface Area  
 36.5 kg/m 2 2.41 m 2 Preferred Pharmacy Pharmacy Name Phone [facility] [phone] [facility] [phone] Your Updated Medication List  
  
   
This list is accurate as of: 1/24/18 11:01 AM.  Always use your most recent med list.  
  
  
  
  
 atenolol-chlorthalidone 50-25 mg per tablet Commonly known as:  TENORETIC 50 Take 1 Tab by mouth daily. cyclobenzaprine 5 mg tablet Commonly known as:  FLEXERIL Take 1 Tab by mouth three (3) times daily as needed for Muscle Spasm(s). diclofenac EC 75 mg EC tablet Commonly known as:  VOLTAREN Take 1 Tab by mouth two (2) times daily as needed. doxycycline 100 mg tablet Commonly known as:  ADOXA Take 1 Tab by mouth two (2) times a day for 10 days. hydroCHLOROthiazide 12.5 mg capsule Commonly known as:  Sen Najjar TAKE ONE CAPSULE BY MOUTH ONCE DAILY  
  
 IRON (FERROUS SULFATE) PO Take  by mouth.  
  
 levothyroxine 25 mcg tablet Commonly known as:  SYNTHROID  
TAKE ONE TABLET BY MOUTH IN THE MORNING  
  
 potassium 99 mg tablet Take 99 mg by mouth daily. Pt is taking 2 tabs daily PREVACID PO Take  by mouth. traZODone 50 mg tablet Commonly known as:  Lojennifer Boo Take 1 Tab by mouth nightly. Prn sleep  
  
 venlafaxine 75 mg tablet Commonly known as:  [facility] Take 1 Tab by mouth two (2) times a day. VITAMIN D3 1,000 unit Cap Generic drug:  cholecalciferol Take  by mouth daily. warfarin 7.5 mg tablet Commonly known as:  COUMADIN Take 1 Tab by mouth daily. Take one tablet daily Patient Instructions Srinath Corrales [address] 1 pm appointment Please arrive by 12:40 Kneecap Bursitis: Care Instructions Your Care Instructions Bursitis is inflammation of the bursa. A bursa is a small sac of fluid that cushions a joint and helps it move easily.  Bursitis of the kneecap is inflammation of the bursa found between the front of the kneecap and the skin. Kneeling for a long time can cause kneecap bursitis, which can develop into an egg-shaped bump on the front of the kneecap. Bursitis usually gets better if you avoid the activity that caused it. If it lasts or gets worse despite home treatment, your doctor may draw fluid from the bursa through a needle. This may relieve your pain and help your doctor know if you have an infection. If so, your doctor will prescribe antibiotics. If you have inflammation only, you may get a corticosteroid shot to reduce swelling and pain. Your doctor may recommend physical therapy and stretching activities. Rarely, surgery is needed to drain or remove the bursa. Follow-up care is a key part of your treatment and safety. Be sure to make and go to all appointments, and call your doctor if you are having problems. It's also a good idea to know your test results and keep a list of the medicines you take. How can you care for yourself at home? · Put ice or a cold pack on your kneecap for 10 to 20 minutes at a time. Put a thin cloth between the ice and your skin. · After 3 days of using ice, you may use heat on your kneecap. You can use a hot water bottle, a heating pad set on low, or a warm, moist towel. · Prop up the sore leg on a pillow when you ice it or anytime you sit or lie down during the next 3 days. Try to keep it above the level of your heart. This will help reduce swelling. · Rest your knee. Stop any activities that cause pain. Switch to activities that do not stress your knee. · Take your medicines exactly as prescribed. Call your doctor if you think you are having a problem with your medicine. · Ask your doctor if you can take an over-the-counter pain medicine, such as acetaminophen (Tylenol), ibuprofen (Advil, Motrin), or naproxen (Aleve). Be safe with medicines. Read and follow all instructions on the label. · To prevent and ease kneecap bursitis during work, play, and daily activities: ¨ Wear kneepads when kneeling on hard surfaces. Avoid kneeling for too long at a time. ¨ Strengthen and stretch your leg muscles. ¨ Avoid deep knee bends. · You can slowly return to the activity that caused the pain, but do it with less effort until you can do it without pain or swelling. Be sure to warm up before and stretch after you do the activity. When should you call for help? Call your doctor now or seek immediate medical care if: 
? · You have a fever. ? · You have increased swelling or redness in your knee area. ? · You cannot use your knee, or the pain in your knee gets worse. ? Watch closely for changes in your health, and be sure to contact your doctor if: 
? · You have pain for 2 weeks or longer despite home treatment. Where can you learn more? Go to http://jenelle-shravan.info/. Enter T407 in the search box to learn more about \"Kneecap Bursitis: Care Instructions. \" Current as of: March 21, 2017 Content Version: 11.4 © 2165-5842 [employer]. Care instructions adapted under license by [employer] (which disclaims liability or warranty for this information). If you have questions about a medical condition or this instruction, always ask your healthcare professional. [address] any warranty or liability for your use of this information. Introducing [facility]! Geovanna Hickey introduces [employer] patient portal. Now you can access parts of your medical record, email your doctor's office, and request medication refills online. 1. In your internet browser, go to [email or web address] 2. Click on the First Time User? Click Here link in the Sign In box. You will see the New Member Sign Up page. 3. Enter your [employer] Access Code exactly as it appears below. You will not need to use this code after youve completed the sign-up process.  If you do not sign up before the expiration date, you must request a new code. · [employer] Access Code: [email or web address] Expires: 4/24/2018 11:01 AM 
 
4. Enter the last four digits of your Social Security Number (xxxx) and Date of Birth (mm/dd/yyyy) as indicated and click Submit. You will be taken to the next sign-up page. 5. Create a [employer] ID. This will be your [employer] login ID and cannot be changed, so think of one that is secure and easy to remember. 6. Create a [employer] password. You can change your password at any time. 7. Enter your Password Reset Question and Answer. This can be used at a later time if you forget your password. 8. Enter your e-mail address. You will receive e-mail notification when new information is available in [address] 9. Click Sign Up. You can now view and download portions of your medical record. 10. Click the Download Summary menu link to download a portable copy of your medical information. If you have questions, please visit the Frequently Asked Questions section of the [employer] website. Remember, [employer] is NOT to be used for urgent needs. For medical emergencies, dial 911. Now available from your iPhone and Android! Please provide this summary of care documentation to your next provider. Your primary care clinician is listed as Reny Raphael. If you have any questions after today's visit, please call [phone].

## 2018-01-29 DIAGNOSIS — F32.A DEPRESSION, UNSPECIFIED DEPRESSION TYPE: ICD-10-CM

## 2018-01-29 RX ORDER — VENLAFAXINE 75 MG/1
TABLET ORAL
Qty: 180 TAB | Refills: 3 | Status: SHIPPED | OUTPATIENT
Start: 2018-01-29 | End: 2019-02-11 | Stop reason: SDUPTHER

## 2018-01-29 NOTE — TELEPHONE ENCOUNTER
Patient down to last 2 pills so she says she needs med as soon as possible.  She can be reached at [phone]

## 2018-02-13 ENCOUNTER — CLINICAL SUPPORT (OUTPATIENT)
Dept: FAMILY MEDICINE CLINIC | Age: 63
End: 2018-02-13

## 2018-02-13 VITALS
SYSTOLIC BLOOD PRESSURE: 139 MMHG | RESPIRATION RATE: 20 BRPM | OXYGEN SATURATION: 98 % | DIASTOLIC BLOOD PRESSURE: 88 MMHG | TEMPERATURE: 98 F | HEART RATE: 63 BPM | HEIGHT: 70 IN | WEIGHT: 258 LBS | BODY MASS INDEX: 36.94 KG/M2

## 2018-02-13 DIAGNOSIS — I74.9 THROMBOEMBOLUS (HCC): Primary | ICD-10-CM

## 2018-02-13 DIAGNOSIS — Z86.711 HX PULMONARY EMBOLISM: ICD-10-CM

## 2018-02-13 LAB
INR BLD: 2.1
PT POC: 25.4 SECONDS
VALID INTERNAL CONTROL?: YES

## 2018-02-13 NOTE — PROGRESS NOTES
Patient presents for a PT/INR  See med list and patient instructions along with results for verbal orders by Dr. Obdulio Sr. Results for orders placed or performed in visit on 02/13/18   AMB POC PT/INR   Result Value Ref Range    VALID INTERNAL CONTROL POC Yes     Prothrombin time (POC) 25.4 seconds    INR POC 2.1         Vitals:    02/13/18 1038   BP: 139/88   Pulse: 63   Resp: 20   Temp: 98 °F (36.7 °C)   TempSrc: Oral   SpO2: 98%   Weight: 258 lb (117 kg)   Height: 5' 10\" (1.778 m)     Prior to Admission medications    Medication Sig Start Date End Date Taking? Authorizing Provider   venlafaxine (EFFEXOR) 75 mg tablet TAKE ONE TABLET BY MOUTH TWICE DAILY 1/29/18  Yes Mae Leija NP   diclofenac EC (VOLTAREN) 75 mg EC tablet Take 1 Tab by mouth two (2) times daily as needed. 1/15/18  Yes Caty Werner MD   warfarin (COUMADIN) 7.5 mg tablet Take 1 Tab by mouth daily. Take one tablet daily  Patient taking differently: Take 7.5 mg by mouth daily. Take 1/2 tab on Tuesday and Thursday. Take 7.5 mg rest of days. 11/21/17  Yes Caty Werner MD   levothyroxine (SYNTHROID) 25 mcg tablet TAKE ONE TABLET BY MOUTH IN THE MORNING 10/2/17  Yes Caty Werner MD   atenolol-chlorthalidone (TENORETIC 50) 50-25 mg per tablet Take 1 Tab by mouth daily. 5/3/17  Yes Caty Werner MD   cholecalciferol (VITAMIN D3) 1,000 unit cap Take  by mouth daily. Yes Historical Provider   cyclobenzaprine (FLEXERIL) 5 mg tablet Take 1 Tab by mouth three (3) times daily as needed for Muscle Spasm(s). 1/12/17  Yes Caty Werner MD   IRON, FERROUS SULFATE, PO Take  by mouth. Yes Historical Provider   hydrochlorothiazide (MICROZIDE) 12.5 mg capsule TAKE ONE CAPSULE BY MOUTH ONCE DAILY 12/23/15  Yes Brigitte Garza MD   traZODone (DESYREL) 50 mg tablet Take 1 Tab by mouth nightly. Prn sleep 9/23/15  Yes Brigitte Garza MD   LANSOPRAZOLE (PREVACID PO) Take  by mouth. Yes Historical Provider   potassium 99 mg tablet Take 99 mg by mouth daily. Pt is taking 2 tabs daily   Yes Historical Provider     Per Dr. Cade Do order she is to continue coumadin 3.75 mg on Tuesdays and Thursdays and 7.5 mg the rest of the week. See anti-coag episode for instructions which are on AVS.    AVS and verbal instructions given she voiced understanding of all instructions.

## 2018-02-15 ENCOUNTER — OFFICE VISIT (OUTPATIENT)
Dept: FAMILY MEDICINE CLINIC | Age: 63
End: 2018-02-15

## 2018-02-15 VITALS
RESPIRATION RATE: 16 BRPM | WEIGHT: 256 LBS | HEART RATE: 62 BPM | TEMPERATURE: 98.2 F | HEIGHT: 70 IN | SYSTOLIC BLOOD PRESSURE: 160 MMHG | DIASTOLIC BLOOD PRESSURE: 90 MMHG | OXYGEN SATURATION: 97 % | BODY MASS INDEX: 36.65 KG/M2

## 2018-02-15 DIAGNOSIS — I10 ESSENTIAL HYPERTENSION, BENIGN: Primary | ICD-10-CM

## 2018-02-15 DIAGNOSIS — Z77.21 EXPOSURE TO POTENTIALLY HAZARDOUS BODY FLUIDS: ICD-10-CM

## 2018-02-15 DIAGNOSIS — E03.9 HYPOTHYROIDISM, UNSPECIFIED TYPE: ICD-10-CM

## 2018-02-15 DIAGNOSIS — Z72.51 UNPROTECTED SEXUAL INTERCOURSE: ICD-10-CM

## 2018-02-15 DIAGNOSIS — N89.8 VAGINAL ODOR: ICD-10-CM

## 2018-02-15 DIAGNOSIS — N89.8 VAGINAL DISCHARGE: ICD-10-CM

## 2018-02-15 DIAGNOSIS — I89.0 CHRONIC ACQUIRED LYMPHEDEMA: ICD-10-CM

## 2018-02-15 LAB
BILIRUB UR QL STRIP: NEGATIVE
GLUCOSE UR-MCNC: NEGATIVE MG/DL
KETONES P FAST UR STRIP-MCNC: NEGATIVE MG/DL
PH UR STRIP: 6.5 [PH] (ref 4.6–8)
PROT UR QL STRIP: NEGATIVE
SP GR UR STRIP: 1.01 (ref 1–1.03)
UA UROBILINOGEN AMB POC: NORMAL (ref 0.2–1)
URINALYSIS CLARITY POC: CLEAR
URINALYSIS COLOR POC: YELLOW
URINE BLOOD POC: NEGATIVE
URINE LEUKOCYTES POC: NEGATIVE
URINE NITRITES POC: NEGATIVE

## 2018-02-15 NOTE — PROGRESS NOTES
Chief Complaint   Patient presents with    Personal Problem     Patient is here to be evaluated for vaginal odor.

## 2018-02-15 NOTE — PROGRESS NOTES
Subjective:     Janett Angelo is a 65 y.o. female who presents today with the following:  Chief Complaint   Patient presents with    Personal Problem     Vaginal odor  Patient recently started intercourse with new partner, he noted vaginal odor. Denies noticing this herself. Denies vaginal discharge, fever, chills, nausea, vomiting, abdominal pain. Does not use protection during intercourse. Does not have any other sexual partners; does not think her partner does either. HTN  Janett Angelo is a 65 y.o. female with hypertension. Hypertension ROS: taking medications as instructed, no medication side effects noted, no TIA's, no chest pain on exertion, no dyspnea on exertion, no swelling of ankles. New concerns: taking medications regularly but is nervous today to come to the doctor. Hypothyroid  Patient with history of hypothyroid. Taking Synthroid without issues. Denies constipation, weight gain, fatigue. Labs checked about a year ago. ROS:  Gen: denies fever, chills, fatigue, weight loss, weight gain  HEENT:denies blurry vision, nasal congestion, sore throat  Resp: denies dypsnea, cough, wheezing  CV: denies chest pain, palpitations, lower extremity edema  Abd: denies nausea, vomiting, diarrhea, constipation  Neuro: denies numbness/tingling  Endo: denies polyuria, polydipsia, heat/cold intolerance  Heme: no lymphadenopathy    Allergies   Allergen Reactions    Iodinated Contrast- Oral And Iv Dye Hives    Lasix [Furosemide] Hives    Sulfa (Sulfonamide Antibiotics) Hives         Current Outpatient Prescriptions:     venlafaxine (EFFEXOR) 75 mg tablet, TAKE ONE TABLET BY MOUTH TWICE DAILY, Disp: 180 Tab, Rfl: 3    diclofenac EC (VOLTAREN) 75 mg EC tablet, Take 1 Tab by mouth two (2) times daily as needed. , Disp: 60 Tab, Rfl: 2    warfarin (COUMADIN) 7.5 mg tablet, Take 1 Tab by mouth daily. Take one tablet daily (Patient taking differently: Take 7.5 mg by mouth daily.  Take 1/2 tab on Tuesday and Thursday. Take 7.5 mg rest of days.), Disp: 90 Tab, Rfl: 3    levothyroxine (SYNTHROID) 25 mcg tablet, TAKE ONE TABLET BY MOUTH IN THE MORNING, Disp: 30 Tab, Rfl: 11    atenolol-chlorthalidone (TENORETIC 50) 50-25 mg per tablet, Take 1 Tab by mouth daily. , Disp: 30 Tab, Rfl: 11    cholecalciferol (VITAMIN D3) 1,000 unit cap, Take  by mouth daily. , Disp: , Rfl:     cyclobenzaprine (FLEXERIL) 5 mg tablet, Take 1 Tab by mouth three (3) times daily as needed for Muscle Spasm(s). , Disp: 60 Tab, Rfl: 1    IRON, FERROUS SULFATE, PO, Take  by mouth., Disp: , Rfl:     hydrochlorothiazide (MICROZIDE) 12.5 mg capsule, TAKE ONE CAPSULE BY MOUTH ONCE DAILY, Disp: 30 Cap, Rfl: 5    traZODone (DESYREL) 50 mg tablet, Take 1 Tab by mouth nightly. Prn sleep, Disp: 30 Tab, Rfl: 2    LANSOPRAZOLE (PREVACID PO), Take  by mouth., Disp: , Rfl:     potassium 99 mg tablet, Take 99 mg by mouth daily. Pt is taking 2 tabs daily, Disp: , Rfl:     Past Medical History:   Diagnosis Date    Arthritis     ASD (atrial septal defect)     age 7 septal defect closures MCV    Depression     Hypertension     Lymphedema of leg     Thromboembolus ([address]     hx bilateral DVT.  long term use Coumadin    Thyroid disease        Past Surgical History:   Procedure Laterality Date    CARDIAC SURG PROCEDURE RILEYIST      Vicki Johnson  7/16/2015         HX GYN      btl    HX ORTHOPAEDIC      carpal tunnel x3       History   Smoking Status    Never Smoker   Smokeless Tobacco    Never Used       Social History     Social History    Marital status:      Spouse name: N/A    Number of children: N/A    Years of education: N/A     Social History Main Topics    Smoking status: Never Smoker    Smokeless tobacco: Never Used    Alcohol use No    Drug use: No    Sexual activity: Yes     Partners: Male     Other Topics Concern    None     Social History Narrative       Family History   Problem Relation Age of Onset    Heart Disease Mother     Heart Disease Father          Objective:     Visit Vitals    BP (!) 167/96 (BP 1 Location: Left arm, BP Patient Position: Sitting)    Pulse 62    Temp 98.2 °F (36.8 °C) (Oral)    Resp 16    Ht 5' 10\" (1.778 m)    Wt 256 lb (116.1 kg)    SpO2 97%    BMI 36.73 kg/m2     Gen: alert, oriented, no acute distress  Head: normocephalic, atraumatic  Eyes:sclera clear, conjunctiva clear  Oral: moist mucus membranes, no oral lesions, no pharyngeal exudate, no pharyngeal erythema  Neck: symmetric normal sized thyroid, no carotid bruits, no JVD  Resp: Normal work of breathing, lungs CTAB, no w/r/r  CV: S1, S2 normal.  No murmurs, rubs, or gallops. Abd:  Normal bowel sounds. Soft, not tender, not distended. :  External genitalia without erythema, exudate or discharge. Vaginal vault is without discharge. No cervical motion tenderness is seen. No masses are palpated. Results for orders placed or performed in visit on 02/15/18   AMB POC URINALYSIS DIP STICK AUTO W/O MICRO   Result Value Ref Range    Color (UA POC) Yellow     Clarity (UA POC) Clear     Glucose (UA POC) Negative Negative    Bilirubin (UA POC) Negative Negative    Ketones (UA POC) Negative Negative    Specific gravity (UA POC) 1.015 1.001 - 1.035    Blood (UA POC) Negative Negative    pH (UA POC) 6.5 4.6 - 8.0    Protein (UA POC) Negative Negative    Urobilinogen (UA POC) 0.2 mg/dL 0.2 - 1    Nitrites (UA POC) Negative Negative    Leukocyte esterase (UA POC) Negative Negative       Assessment/ Plan:   Diagnoses and all orders for this visit:    1. Essential hypertension, benign  -     METABOLIC PANEL, COMPREHENSIVE  -     T4, FREE  -     TSH 3RD GENERATION  -     HIV 1/2 AG/AB, 4TH GENERATION,W RFLX CONFIRM    2. Chronic acquired lymphedema--legs--onset age ~36yo  -     LIPID PANEL  -     METABOLIC PANEL, COMPREHENSIVE  -     T4, FREE  -     TSH 3RD GENERATION  -     HIV 1/2 AG/AB, 4TH GENERATION,W RFLX CONFIRM    3. Hypothyroidism, unspecified type  -     LIPID PANEL  -     METABOLIC PANEL, COMPREHENSIVE  -     T4, FREE  -     TSH 3RD GENERATION  -     HIV 1/2 AG/AB, 4TH GENERATION,W RFLX CONFIRM  -     T3 TOTAL    4. Exposure to potentially hazardous body fluids  -     METABOLIC PANEL, COMPREHENSIVE  -     HIV 1/2 AG/AB, 4TH GENERATION,W RFLX CONFIRM  -     NUSWAB VAGINITIS PLUS    5. Vaginal odor  -     NUSWAB VAGINITIS PLUS    6. Unprotected sexual intercourse  -     NUSWAB VAGINITIS PLUS    7. Vaginal discharge  -     NUSWAB VAGINITIS PLUS  -     AMB POC URINALYSIS DIP STICK AUTO W/O MICRO     Discussed that ABN was triggered for Nuswab, patient voiced understanding. Check labs, follow up based on results. Due for medicare wellness. Verbal and written instructions (see AVS) provided.  Patient expresses understanding of diagnosis and treatment plan. Joann Camara.  Sumi Saavedra MD

## 2018-02-17 LAB
A VAGINAE DNA VAG QL NAA+PROBE: NORMAL SCORE
ALBUMIN SERPL-MCNC: 4.2 G/DL (ref 3.6–4.8)
ALBUMIN/GLOB SERPL: 1.3 {RATIO} (ref 1.2–2.2)
ALP SERPL-CCNC: 73 IU/L (ref 39–117)
ALT SERPL-CCNC: 28 IU/L (ref 0–32)
AST SERPL-CCNC: 20 IU/L (ref 0–40)
BILIRUB SERPL-MCNC: 0.4 MG/DL (ref 0–1.2)
BUN SERPL-MCNC: 12 MG/DL (ref 8–27)
BUN/CREAT SERPL: 16 (ref 12–28)
BVAB2 DNA VAG QL NAA+PROBE: NORMAL SCORE
C ALBICANS DNA VAG QL NAA+PROBE: NEGATIVE
C GLABRATA DNA VAG QL NAA+PROBE: NEGATIVE
C TRACH RRNA SPEC QL NAA+PROBE: NEGATIVE
CALCIUM SERPL-MCNC: 9.2 MG/DL (ref 8.7–10.3)
CHLORIDE SERPL-SCNC: 99 MMOL/L (ref 96–106)
CHOLEST SERPL-MCNC: 209 MG/DL (ref 100–199)
CO2 SERPL-SCNC: 28 MMOL/L (ref 18–29)
CREAT SERPL-MCNC: 0.76 MG/DL (ref 0.57–1)
GFR SERPLBLD CREATININE-BSD FMLA CKD-EPI: 85 ML/MIN/1.73
GFR SERPLBLD CREATININE-BSD FMLA CKD-EPI: 98 ML/MIN/1.73
GLOBULIN SER CALC-MCNC: 3.3 G/DL (ref 1.5–4.5)
GLUCOSE SERPL-MCNC: 120 MG/DL (ref 65–99)
HDLC SERPL-MCNC: 57 MG/DL
HIV 1+2 AB+HIV1 P24 AG SERPL QL IA: NON REACTIVE
INTERPRETATION, 910389: NORMAL
LDLC SERPL CALC-MCNC: 127 MG/DL (ref 0–99)
MEGA1 DNA VAG QL NAA+PROBE: NORMAL SCORE
N GONORRHOEA RRNA SPEC QL NAA+PROBE: NEGATIVE
POTASSIUM SERPL-SCNC: 3.7 MMOL/L (ref 3.5–5.2)
PROT SERPL-MCNC: 7.5 G/DL (ref 6–8.5)
SODIUM SERPL-SCNC: 142 MMOL/L (ref 134–144)
T VAGINALIS RRNA SPEC QL NAA+PROBE: NEGATIVE
T3 SERPL-MCNC: 151 NG/DL (ref 71–180)
T4 FREE SERPL-MCNC: 1.17 NG/DL (ref 0.82–1.77)
TRIGL SERPL-MCNC: 124 MG/DL (ref 0–149)
TSH SERPL DL<=0.005 MIU/L-ACNC: 6.39 UIU/ML (ref 0.45–4.5)
VLDLC SERPL CALC-MCNC: 25 MG/DL (ref 5–40)

## 2018-02-26 ENCOUNTER — TELEPHONE (OUTPATIENT)
Dept: FAMILY MEDICINE CLINIC | Age: 63
End: 2018-02-26

## 2018-02-26 DIAGNOSIS — E03.9 HYPOTHYROIDISM, UNSPECIFIED TYPE: ICD-10-CM

## 2018-02-26 NOTE — TELEPHONE ENCOUNTER
Labs show no sign of GYN infection. Her thyroid function is a little low-we should increase her dose of thyroid medication to 50 mcg and recheck in 6 weeks. If she is ok with this I will send in new dose to [facility] in [facility].

## 2018-02-28 RX ORDER — LEVOTHYROXINE SODIUM 50 UG/1
TABLET ORAL
Qty: 30 TAB | Refills: 2 | Status: SHIPPED | OUTPATIENT
Start: 2018-02-28 | End: 2018-07-06 | Stop reason: SDUPTHER

## 2018-02-28 NOTE — PROGRESS NOTES
Results given in phone encounter. She will follow up in 6 weeks for thyroid recheck after increasing dose of medicine.

## 2018-02-28 NOTE — TELEPHONE ENCOUNTER
Ms marisol notified of results voiced understanding and said to send new Rx to [address] [place] [address].

## 2018-03-13 ENCOUNTER — CLINICAL SUPPORT (OUTPATIENT)
Dept: FAMILY MEDICINE CLINIC | Age: 63
End: 2018-03-13

## 2018-03-13 VITALS
OXYGEN SATURATION: 96 % | RESPIRATION RATE: 18 BRPM | SYSTOLIC BLOOD PRESSURE: 135 MMHG | HEIGHT: 70 IN | DIASTOLIC BLOOD PRESSURE: 83 MMHG | HEART RATE: 70 BPM | BODY MASS INDEX: 36.65 KG/M2 | WEIGHT: 256 LBS | TEMPERATURE: 98 F

## 2018-03-13 DIAGNOSIS — Z51.81 ANTICOAGULATION GOAL OF INR 2 TO 3: ICD-10-CM

## 2018-03-13 DIAGNOSIS — Z86.711 HX PULMONARY EMBOLISM: Primary | ICD-10-CM

## 2018-03-13 DIAGNOSIS — Z79.01 ANTICOAGULATION GOAL OF INR 2 TO 3: ICD-10-CM

## 2018-03-13 LAB
INR BLD: 2
PT POC: 24.4 SECONDS
VALID INTERNAL CONTROL?: YES

## 2018-03-13 NOTE — MR AVS SNAPSHOT
Fabian Call 
 
 
 [address] 
[phone] Patient: Rusty Ascencio MRN: [id number] :1955 Visit Information Date & Time Provider Department Dept. Phone Encounter #  
 3/13/2018 10:10 AM MD Irish KelseyGabriel Miła 57 [facility] 846 [phone] [id number] Upcoming Health Maintenance Date Due ZOSTER VACCINE AGE 60> 2016 PAP AKA CERVICAL CYTOLOGY 10/21/2016 MEDICARE YEARLY EXAM 2018 COLONOSCOPY 2018 BREAST CANCER SCRN MAMMOGRAM 2019 DTaP/Tdap/Td series (2 - Td) 2025 Allergies as of 3/13/2018  Review Complete On: 3/13/2018 By: Al Asif LPN Severity Noted Reaction Type Reactions Iodinated Contrast- Oral And Iv Dye  2012   Side Effect Hives Lasix [Furosemide]  2012   Side Effect Hives Sulfa (Sulfonamide Antibiotics)  2012   Side Effect Hives Current Immunizations  Reviewed on 2017 Name Date Influenza Vaccine 10/21/2013 Influenza Vaccine Lyle Raker) 2014 Influenza Vaccine (Quad) PF 2017, 2016, 10/26/2015 Tdap 2015 Not reviewed this visit You Were Diagnosed With   
  
 Codes Comments Hx pulmonary embolism    -  Primary ICD-10-CM: E72.281 ICD-9-CM: V12.55 Anticoagulation goal of INR 2 to 3     ICD-10-CM: Z51.81, Z79.01 
ICD-9-CM: V58.83, V58.61 Vitals BP Pulse Temp Resp Height(growth percentile) Weight(growth percentile) 135/83 (BP 1 Location: Right arm, BP Patient Position: Sitting) 70 98 °F (36.7 °C) (Oral) 18 5' 10\" (1.778 m) 256 lb (116.1 kg) SpO2 BMI OB Status Smoking Status 96% 36.73 kg/m2 Postmenopausal Never Smoker BMI and BSA Data Body Mass Index Body Surface Area  
 36.73 kg/m 2 2.39 m 2 Preferred Pharmacy Pharmacy Name Phone [facility]  [address]ins [address] [facility] [phone] Your Updated Medication List  
  
   
This list is accurate as of 3/13/18 10:16 AM.  Always use your most recent med list.  
  
  
  
  
 atenolol-chlorthalidone 50-25 mg per tablet Commonly known as:  TENORETIC 50 Take 1 Tab by mouth daily. cyclobenzaprine 5 mg tablet Commonly known as:  FLEXERIL Take 1 Tab by mouth three (3) times daily as needed for Muscle Spasm(s). diclofenac EC 75 mg EC tablet Commonly known as:  VOLTAREN Take 1 Tab by mouth two (2) times daily as needed. hydroCHLOROthiazide 12.5 mg capsule Commonly known as:  Rehana Carrillo TAKE ONE CAPSULE BY MOUTH ONCE DAILY  
  
 IRON (FERROUS SULFATE) PO Take  by mouth.  
  
 levothyroxine 50 mcg tablet Commonly known as:  SYNTHROID  
TAKE ONE TABLET BY MOUTH IN THE MORNING  
  
 potassium 99 mg tablet Take 99 mg by mouth daily. Pt is taking 2 tabs daily PREVACID PO Take  by mouth. traZODone 50 mg tablet Commonly known as:  Cezar Darter Take 1 Tab by mouth nightly. Prn sleep  
  
 venlafaxine 75 mg tablet Commonly known as:  EFFEXOR  
TAKE ONE TABLET BY MOUTH TWICE DAILY  
  
 VITAMIN D3 1,000 unit Cap Generic drug:  cholecalciferol Take  by mouth daily. warfarin 7.5 mg tablet Commonly known as:  COUMADIN Take 1 Tab by mouth daily. Take one tablet daily March 2018 Details Sun Mon Tue Wed Thu Fri Sat  
      1  
  
  
  
   2  
  
  
  
   3  
  
  
  
  
  4  
  
  
  
   5  
  
  
  
   6  
  
  
  
   7  
  
  
  
   8  
  
  
  
   9  
  
  
  
   10  
  
  
  
  
  11  
  
  
  
   12  
  
  
  
   13  
  
3.75 mg See details    14  
  
7.5 mg  
  
   15  
  
3.75 mg  
  
   16  
  
7.5 mg  
  
   17  
  
7.5 mg  
  
  
  18  
  
7.5 mg  
  
   19  
  
7.5 mg  
  
   20  
  
3.75 mg  
  
   21  
  
7.5 mg  
  
   22  
  
3.75 mg  
  
   23  
  
7.5 mg  
  
   24  
  
7.5 mg  
  
  
  25  
  
7.5 mg  
  
   26  
  
7.5 mg  
  
   27  
  
3.75 mg  
  
   28  
  
7.5 mg  
  
 29  
  
3.75 mg  
  
   30  
  
7.5 mg  
  
   31  
  
7.5 mg Date Details 03/13 This INR check INR: 2.0 How to take your warfarin dose To take:  3.75 mg Take half of a 7.5 mg tablet. To take:  7.5 mg Take one of the 7.5 mg tablets. April 2018 Details Tawnya Andrade Tue Wed Thu Fri Sat  
  1  
  
7.5 mg  
  
   2  
  
7.5 mg  
  
   3  
  
3.75 mg  
  
   4  
  
7.5 mg  
  
   5  
  
3.75 mg  
  
   6  
  
7.5 mg  
  
   7  
  
7.5 mg  
  
  
  8  
  
7.5 mg  
  
   9  
  
7.5 mg  
  
   10  
  
3.75 mg  
  
   11  
  
7.5 mg  
  
   12  
  
3.75 mg  
  
   13  
  
7.5 mg  
  
   14  
  
  
  
  
  15  
  
  
  
   16  
  
  
  
   17  
  
  
  
   18  
  
  
  
   19  
  
  
  
   20  
  
  
  
   21  
  
  
  
  
  22  
  
  
  
   23  
  
  
  
   24  
  
  
  
   25  
  
  
  
   26  
  
  
  
   27  
  
  
  
   28  
  
  
  
  
  29  
  
  
  
   30  
  
  
  
       
 Date Details No additional details Date of next INR:  4/13/2018 How to take your warfarin dose To take:  3.75 mg Take half of a 7.5 mg tablet. To take:  7.5 mg Take one of the 7.5 mg tablets. We Performed the Following AMB POC PT/INR [90640 CPT(R)] Introducing [facility] & [facility] SERVICES! [employer] introduces [employer] patient portal. Now you can access parts of your medical record, email your doctor's office, and request medication refills online. 1. In your internet browser, go to [email or web address] 2. Click on the First Time User? Click Here link in the Sign In box. You will see the New Member Sign Up page. 3. Enter your [employer] Access Code exactly as it appears below. You will not need to use this code after youve completed the sign-up process. If you do not sign up before the expiration date, you must request a new code. · [employer] Access Code: [email or web address] Expires: 4/24/2018 12:01 PM 
 
 4. Enter the last four digits of your Social Security Number (xxxx) and Date of Birth (mm/dd/yyyy) as indicated and click Submit. You will be taken to the next sign-up page. 5. Create a [employer] ID. This will be your [employer] login ID and cannot be changed, so think of one that is secure and easy to remember. 6. Create a [employer] password. You can change your password at any time. 7. Enter your Password Reset Question and Answer. This can be used at a later time if you forget your password. 8. Enter your e-mail address. You will receive e-mail notification when new information is available in [address] 9. Click Sign Up. You can now view and download portions of your medical record. 10. Click the Download Summary menu link to download a portable copy of your medical information. If you have questions, please visit the Frequently Asked Questions section of the [employer] website. Remember, [employer] is NOT to be used for urgent needs. For medical emergencies, dial 911. Now available from your iPhone and Android! Please provide this summary of care documentation to your next provider. Your primary care clinician is listed as Rehan Lr. If you have any questions after today's visit, please call [phone].

## 2018-03-13 NOTE — PROGRESS NOTES
Patient presents for a PT/INR  See med list and patient instructions along with results for verbal orders by Dr. Johanna Gramajo  Results for orders placed or performed in visit on 03/13/18   AMB POC PT/INR   Result Value Ref Range    VALID INTERNAL CONTROL POC Yes     Prothrombin time (POC) 24.4 seconds    INR POC 2.0         Vitals:    03/13/18 1013   BP: 135/83   Pulse: 70   Resp: 18   Temp: 98 °F (36.7 °C)   TempSrc: Oral   SpO2: 96%   Weight: 256 lb (116.1 kg)   Height: 5' 10\" (1.778 m)     Prior to Admission medications    Medication Sig Start Date End Date Taking? Authorizing Provider   levothyroxine (SYNTHROID) 50 mcg tablet TAKE ONE TABLET BY MOUTH IN THE MORNING 2/28/18  Yes Rene Garcia MD   venlafaxine (EFFEXOR) 75 mg tablet TAKE ONE TABLET BY MOUTH TWICE DAILY 1/29/18  Yes Mae Leija NP   diclofenac EC (VOLTAREN) 75 mg EC tablet Take 1 Tab by mouth two (2) times daily as needed. 1/15/18  Yes Annie Henderson MD   warfarin (COUMADIN) 7.5 mg tablet Take 1 Tab by mouth daily. Take one tablet daily  Patient taking differently: Take 7.5 mg by mouth daily. Take 1/2 tab on Tuesday and Thursday. Take 7.5 mg rest of days. 11/21/17  Yes Annie Henderson MD   atenolol-chlorthalidone (TENORETIC 50) 50-25 mg per tablet Take 1 Tab by mouth daily. 5/3/17  Yes Annie Henderson MD   cholecalciferol (VITAMIN D3) 1,000 unit cap Take  by mouth daily. Yes Historical Provider   cyclobenzaprine (FLEXERIL) 5 mg tablet Take 1 Tab by mouth three (3) times daily as needed for Muscle Spasm(s). 1/12/17  Yes Annie Henderson MD   IRON, FERROUS SULFATE, PO Take  by mouth. Yes Historical Provider   hydrochlorothiazide (MICROZIDE) 12.5 mg capsule TAKE ONE CAPSULE BY MOUTH ONCE DAILY 12/23/15  Yes Frieda Halsted, MD   traZODone (DESYREL) 50 mg tablet Take 1 Tab by mouth nightly. Prn sleep 9/23/15  Yes Frieda Halsted, MD   LANSOPRAZOLE (PREVACID PO) Take  by mouth.    Yes Historical Provider   potassium 99 mg tablet Take 99 mg by mouth daily. Pt is taking 2 tabs daily   Yes Historical Provider     Per Dr. Kelvin Fish verbal order continue current dose of coumadin 3.75 mg on Tuesdays and Thursdays and recheck in one month. See anti-coag episode for instructions which are on AVS.    AVS and verbal instructions given she voiced understanding of all instructions.

## 2018-03-16 ENCOUNTER — HOSPITAL ENCOUNTER (OUTPATIENT)
Dept: MAMMOGRAPHY | Age: 63
Discharge: HOME | End: 2018-03-16
Attending: FAMILY MEDICINE
Payer: MEDICARE

## 2018-03-16 DIAGNOSIS — Z12.39 SCREENING BREAST EXAMINATION: ICD-10-CM

## 2018-03-16 PROCEDURE — 77067 SCR MAMMO BI INCL CAD: CPT

## 2018-04-10 ENCOUNTER — CLINICAL SUPPORT (OUTPATIENT)
Dept: FAMILY MEDICINE CLINIC | Age: 63
End: 2018-04-10

## 2018-04-10 VITALS
RESPIRATION RATE: 18 BRPM | HEART RATE: 64 BPM | HEIGHT: 70 IN | BODY MASS INDEX: 36.51 KG/M2 | DIASTOLIC BLOOD PRESSURE: 84 MMHG | WEIGHT: 255 LBS | TEMPERATURE: 98.3 F | SYSTOLIC BLOOD PRESSURE: 141 MMHG | OXYGEN SATURATION: 96 %

## 2018-04-10 DIAGNOSIS — Z86.711 HX PULMONARY EMBOLISM: Primary | ICD-10-CM

## 2018-04-10 DIAGNOSIS — Z51.81 ANTICOAGULATION GOAL OF INR 2 TO 3: ICD-10-CM

## 2018-04-10 DIAGNOSIS — Z79.01 ANTICOAGULATION GOAL OF INR 2 TO 3: ICD-10-CM

## 2018-04-10 LAB
INR BLD: 2.6
PT POC: 30.6 SECONDS
VALID INTERNAL CONTROL?: YES

## 2018-04-10 NOTE — PROGRESS NOTES
Patient presents for a PT/INR  See med list and patient instructions along with results for verbal orders by Dr. Radha Hall [facility]  Results for orders placed or performed in visit on 04/10/18   AMB POC PT/INR   Result Value Ref Range    VALID INTERNAL CONTROL POC Yes     Prothrombin time (POC) 30.6 seconds    INR POC 2.6         Vitals:    04/10/18 1036   BP: 141/84   Pulse: 64   Resp: 18   Temp: 98.3 °F (36.8 °C)   TempSrc: Oral   SpO2: 96%   Weight: 255 lb (115.7 kg)   Height: 5' 10\" (1.778 m)     Prior to Admission medications    Medication Sig Start Date End Date Taking? Authorizing Provider   levothyroxine (SYNTHROID) 50 mcg tablet TAKE ONE TABLET BY MOUTH IN THE MORNING 2/28/18  Yes Mary Cantrell MD   venlafaxine (EFFEXOR) 75 mg tablet TAKE ONE TABLET BY MOUTH TWICE DAILY 1/29/18  Yes Mae Leija NP   diclofenac EC (VOLTAREN) 75 mg EC tablet Take 1 Tab by mouth two (2) times daily as needed. 1/15/18  Yes Fadia Brown MD   warfarin (COUMADIN) 7.5 mg tablet Take 1 Tab by mouth daily. Take one tablet daily  Patient taking differently: Take 7.5 mg by mouth daily. Take 1/2 tab on Tuesday and Thursday. Take 7.5 mg rest of days. 11/21/17  Yes Fadia Brown MD   atenolol-chlorthalidone (TENORETIC 50) 50-25 mg per tablet Take 1 Tab by mouth daily. 5/3/17  Yes Fadia Brown MD   cholecalciferol (VITAMIN D3) 1,000 unit cap Take  by mouth daily. Yes Historical Provider   cyclobenzaprine (FLEXERIL) 5 mg tablet Take 1 Tab by mouth three (3) times daily as needed for Muscle Spasm(s). 1/12/17  Yes Fadia Brown MD   IRON, FERROUS SULFATE, PO Take  by mouth. Yes Historical Provider   hydrochlorothiazide (MICROZIDE) 12.5 mg capsule TAKE ONE CAPSULE BY MOUTH ONCE DAILY 12/23/15  Yes Chato Aguayo MD   traZODone (DESYREL) 50 mg tablet Take 1 Tab by mouth nightly. Prn sleep 9/23/15  Yes Chato Aguayo MD   LANSOPRAZOLE (PREVACID PO) Take  by mouth.    Yes Historical Provider   potassium 99 mg tablet Take 99 mg by mouth daily. Pt is taking 2 tabs daily   Yes Historical Provider     Per Dr. Jaguar Blair verbal instructions she is to continue coumadin 7.5 mg tablet, taking 1/2 a tablet Tuesdays and Thursdays and one tablet daily the rest of the week. See anti-coag episode for instructions which are on AVS.    AVS and verbal instructions given she voiced understanding of all instructions.

## 2018-04-10 NOTE — MR AVS SNAPSHOT
[facility] 
 
 
 [address] 
[phone] Patient: Sallie Dorman MRN: [id number] :1955 Visit Information Date & Time Provider Department Dept. Phone Encounter #  
 4/10/2018 10:10 AM Donnella Ahumada, MD [facility] [phone] [id number] Upcoming Health Maintenance Date Due ZOSTER VACCINE AGE 60> 2016 PAP AKA CERVICAL CYTOLOGY 10/21/2016 MEDICARE YEARLY EXAM 3/14/2018 COLONOSCOPY 2018 BREAST CANCER SCRN MAMMOGRAM 3/16/2020 DTaP/Tdap/Td series (2 - Td) 2025 Allergies as of 4/10/2018  Review Complete On: 4/10/2018 By: Alireza Riggins LPN Severity Noted Reaction Type Reactions Iodinated Contrast- Oral And Iv Dye  2012   Side Effect Hives Lasix [Furosemide]  2012   Side Effect Hives Sulfa (Sulfonamide Antibiotics)  2012   Side Effect Hives Current Immunizations  Reviewed on 2017 Name Date Influenza Vaccine 10/21/2013 Influenza Vaccine Deri Mulling) 2014 Influenza Vaccine (Quad) PF 2017, 2016, 10/26/2015 Tdap 2015 Not reviewed this visit You Were Diagnosed With   
  
 Codes Comments Hx pulmonary embolism    -  Primary ICD-10-CM: A58.404 ICD-9-CM: V12.55 Anticoagulation goal of INR 2 to 3     ICD-10-CM: Z51.81, Z79.01 
ICD-9-CM: V58.83, V58.61 Vitals BP Pulse Temp Resp Height(growth percentile) Weight(growth percentile) 141/84 (BP 1 Location: Left arm, BP Patient Position: Sitting) 64 98.3 °F (36.8 °C) (Oral) 18 5' 10\" (1.778 m) 255 lb (115.7 kg) SpO2 BMI OB Status Smoking Status 96% 36.59 kg/m2 Postmenopausal Never Smoker BMI and BSA Data Body Mass Index Body Surface Area  
 36.59 kg/m 2 2.39 m 2 Preferred Pharmacy Pharmacy Name Phone [facility]  [address] [facility] [phone] Your Updated Medication List  
  
   
This list is accurate as of 4/10/18 10:43 AM.  Always use your most recent med list.  
  
  
  
  
 atenolol-chlorthalidone 50-25 mg per tablet Commonly known as:  TENORETIC 50 Take 1 Tab by mouth daily. cyclobenzaprine 5 mg tablet Commonly known as:  FLEXERIL Take 1 Tab by mouth three (3) times daily as needed for Muscle Spasm(s). diclofenac EC 75 mg EC tablet Commonly known as:  VOLTAREN Take 1 Tab by mouth two (2) times daily as needed. hydroCHLOROthiazide 12.5 mg capsule Commonly known as:  Omar Dose TAKE ONE CAPSULE BY MOUTH ONCE DAILY  
  
 IRON (FERROUS SULFATE) PO Take  by mouth.  
  
 levothyroxine 50 mcg tablet Commonly known as:  SYNTHROID  
TAKE ONE TABLET BY MOUTH IN THE MORNING  
  
 potassium 99 mg tablet Take 99 mg by mouth daily. Pt is taking 2 tabs daily PREVACID PO Take  by mouth. traZODone 50 mg tablet Commonly known as:  Cassandra Runner Take 1 Tab by mouth nightly. Prn sleep  
  
 venlafaxine 75 mg tablet Commonly known as:  EFFEXOR  
TAKE ONE TABLET BY MOUTH TWICE DAILY  
  
 VITAMIN D3 1,000 unit Cap Generic drug:  cholecalciferol Take  by mouth daily. warfarin 7.5 mg tablet Commonly known as:  COUMADIN Take 1 Tab by mouth daily. Take one tablet daily April 2018 Details Sun Mon Tue Wed Thu Fri Sat  
  1  
  
  
  
   2  
  
  
  
   3  
  
  
  
   4  
  
  
  
   5  
  
  
  
   6  
  
  
  
   7  
  
  
  
  
  8  
  
  
  
   9 10  
  
3.75 mg See details 11  
  
7.5 mg  
  
   12  
  
3.75 mg  
  
   13  
  
7.5 mg  
  
   14  
  
7.5 mg  
  
  
  15  
  
7.5 mg  
  
   16  
  
7.5 mg  
  
   17  
  
3.75 mg  
  
   18  
  
7.5 mg  
  
   19  
  
3.75 mg  
  
   20  
  
7.5 mg  
  
   21  
  
7.5 mg  
  
  
  22  
  
7.5 mg  
  
   23  
  
7.5 mg  
  
   24 3.75 mg  
  
   25  
  
7.5 mg  
  
   26  
  
3.75 mg  
  
   27  
  
7.5 mg  
  
   28 7.5 mg  
  
  
  29  
  
7.5 mg  
  
   30  
  
7.5 mg Date Details 04/10 This INR check INR: 2.6 How to take your warfarin dose To take:  3.75 mg Take half of a 7.5 mg tablet. To take:  7.5 mg Take one of the 7.5 mg tablets. May 2018 Details Alisia Hazard Tue Wed Thu Fri Sat  
    1  
  
3.75 mg  
  
   2  
  
7.5 mg  
  
   3  
  
3.75 mg  
  
   4  
  
7.5 mg  
  
   5  
  
7.5 mg  
  
  
  6  
  
7.5 mg  
  
   7  
  
7.5 mg  
  
   8  
  
3.75 mg  
  
   9  
  
  
  
   10  
  
  
  
   11  
  
  
  
   12  
  
  
  
  
  13  
  
  
  
   14  
  
  
  
   15  
  
  
  
   16  
  
  
  
   17  
  
  
  
   18  
  
  
  
   19  
  
  
  
  
  20  
  
  
  
   21  
  
  
  
   22  
  
  
  
   23  
  
  
  
   24  
  
  
  
   25  
  
  
  
   26  
  
  
  
  
  27  
  
  
  
   28  
  
  
  
   29  
  
  
  
   30  
  
  
  
   31  
  
  
  
    
 Date Details No additional details Date of next INR:  5/8/2018 How to take your warfarin dose To take:  3.75 mg Take half of a 7.5 mg tablet. To take:  7.5 mg Take one of the 7.5 mg tablets. We Performed the Following AMB POC PT/INR [23682 CPT(R)] Introducing [facility] & [facility] SERVICES! Dear Janett: Thank you for requesting a "[employer] account. Our records indicate that you already have an active "[employer] account. You can access your account anytime at [email or web address] Did you know that you can access your hospital and ER discharge instructions at any time in "[employer]? You can also review all of your test results from your hospital stay or ER visit. Additional Information If you have questions, please visit the Frequently Asked Questions section of the "[employer] website at [email or web address] Remember, "[employer] is NOT to be used for urgent needs. For medical emergencies, dial 911. Now available from your iPhone and Android! Please provide this summary of care documentation to your next provider. Your primary care clinician is listed as Darrian Pineda. If you have any questions after today's visit, please call [phone].

## 2018-04-18 ENCOUNTER — OFFICE VISIT (OUTPATIENT)
Dept: FAMILY MEDICINE CLINIC | Age: 63
End: 2018-04-18

## 2018-04-18 VITALS
TEMPERATURE: 94 F | HEIGHT: 70 IN | BODY MASS INDEX: 36.36 KG/M2 | OXYGEN SATURATION: 94 % | RESPIRATION RATE: 14 BRPM | WEIGHT: 254 LBS | HEART RATE: 69 BPM | DIASTOLIC BLOOD PRESSURE: 78 MMHG | SYSTOLIC BLOOD PRESSURE: 136 MMHG

## 2018-04-18 DIAGNOSIS — E66.01 SEVERE OBESITY (BMI 35.0-39.9) WITH COMORBIDITY (HCC): ICD-10-CM

## 2018-04-18 DIAGNOSIS — I10 ESSENTIAL HYPERTENSION, BENIGN: ICD-10-CM

## 2018-04-18 DIAGNOSIS — S76.012A STRAIN OF HIP FLEXOR, LEFT, INITIAL ENCOUNTER: ICD-10-CM

## 2018-04-18 DIAGNOSIS — M70.62 TROCHANTERIC BURSITIS, LEFT HIP: ICD-10-CM

## 2018-04-18 DIAGNOSIS — I74.9 THROMBOEMBOLUS (HCC): ICD-10-CM

## 2018-04-18 DIAGNOSIS — I82.409 RECURRENT DEEP VEIN THROMBOSIS (DVT) (HCC): ICD-10-CM

## 2018-04-18 DIAGNOSIS — M17.12 PATELLOFEMORAL ARTHRITIS OF LEFT KNEE: ICD-10-CM

## 2018-04-18 DIAGNOSIS — M71.22 BAKER'S CYST OF KNEE, LEFT: Primary | ICD-10-CM

## 2018-04-18 LAB
INR BLD: 2.2
PT POC: 26.1 SECONDS
VALID INTERNAL CONTROL?: YES

## 2018-04-18 NOTE — PROGRESS NOTES
SARKIS Dillard is a 65 y.o. female who presents with pain behind her left knee that started 3 days ago. She is concerned about a blood clot. She saw me a few months ago for prepatellar bursitis caused by falling on her knee. Was seen by orthopedics for this who suggested conservative therapy. She never did end up getting it drained. She had an x-ray done at the orthopedist which showed patellofemoral arthritis and to a lesser extent and medial and lateral arthritis. Last month or so patient has been going to the gym. She has been feeling pretty good but for the last week has gotten dizzy and has not been to the gym. She has been doing spring cleaning instead which involves quite a lot of up and down and twisting motions. She also has done a lot of walking. Sometime in the midst of all this she developed pain behind the knee joint and stadium knee stiffness    PMHx:  Past Medical History:   Diagnosis Date    Arthritis     ASD (atrial septal defect)     age 7 septal defect closures MCV    Depression     Hypertension     Lymphedema of leg     Thromboembolus ([address]     hx bilateral DVT. long term use Coumadin    Thyroid disease        Meds:   Current Outpatient Prescriptions   Medication Sig Dispense Refill    levothyroxine (SYNTHROID) 50 mcg tablet TAKE ONE TABLET BY MOUTH IN THE MORNING 30 Tab 2    venlafaxine (EFFEXOR) 75 mg tablet TAKE ONE TABLET BY MOUTH TWICE DAILY 180 Tab 3    diclofenac EC (VOLTAREN) 75 mg EC tablet Take 1 Tab by mouth two (2) times daily as needed. 60 Tab 2    warfarin (COUMADIN) 7.5 mg tablet Take 1 Tab by mouth daily. Take one tablet daily (Patient taking differently: Take 7.5 mg by mouth daily. Take 1/2 tab on Tuesday and Thursday. Take 7.5 mg rest of days.) 90 Tab 3    atenolol-chlorthalidone (TENORETIC 50) 50-25 mg per tablet Take 1 Tab by mouth daily. 30 Tab 11    cholecalciferol (VITAMIN D3) 1,000 unit cap Take  by mouth daily.       cyclobenzaprine (FLEXERIL) 5 mg tablet Take 1 Tab by mouth three (3) times daily as needed for Muscle Spasm(s). 60 Tab 1    IRON, FERROUS SULFATE, PO Take  by mouth.  hydrochlorothiazide (MICROZIDE) 12.5 mg capsule TAKE ONE CAPSULE BY MOUTH ONCE DAILY 30 Cap 5    traZODone (DESYREL) 50 mg tablet Take 1 Tab by mouth nightly. Prn sleep 30 Tab 2    LANSOPRAZOLE (PREVACID PO) Take  by mouth.  potassium 99 mg tablet Take 99 mg by mouth daily. Pt is taking 2 tabs daily         Allergies: Allergies   Allergen Reactions    Iodinated Contrast- Oral And Iv Dye Hives    Lasix [Furosemide] Hives    Sulfa (Sulfonamide Antibiotics) Hives       Smoker:  History   Smoking Status    Never Smoker   Smokeless Tobacco    Never Used       ETOH:   History   Alcohol Use No       FH:   Family History   Problem Relation Age of Onset    Heart Disease Mother     Heart Disease Father        ROS:   As listed in HPI. In addition:  Constitutional:   No headache, fever, fatigue, weight loss or weight gain      Cardiac:    No chest pain      Resp:   No cough or shortness of breath      Neuro   No loss of consciousness, dizziness, seizures      Physical Exam:  Blood pressure 136/78, pulse 69, temperature (!) 94 °F (34.4 °C), resp. rate 14, height 5' 10\" (1.778 m), weight 254 lb (115.2 kg), SpO2 94 %. GEN: No apparent distress. Alert and oriented and responds to all questions appropriately. NEUROLOGIC:  No focal neurologic deficits. Strength and sensation grossly intact. Coordination and gait grossly intact. EXT: Well perfused. No edema. SKIN: No obvious rashes. The exam is somewhat limited by body habitus. The knee joint is stable. There is medial joint line tenderness and minimal pain with patellar grind.   There is significant swelling in the popliteal space which is a little high for a Baker's cyst but certainly feels cystic       Assessment and Plan     Patellofemoral arthritis, Baker's cyst, change in gait is resulting in left trochanteric bursitis and left hip flexor strain. Went over the underlying cause of these issues beginning with the knee arthritis and emphasized the need to treat her knee arthritis in order to improve the pain caused by a Baker's cyst  Conservative therapy would be physical therapy, continue going to the gym, compression of the knee joint  Steroid injection would be optional and she declines today  Needs to avoid limping to avoid exacerbating her trochanteric bursitis. Emphasized need for weight loss    INR is 2.2 today continue current Coumadin    Hypertension well controlled    She recently had follow-up on chronic medical issues in February 2018. Will be due for wellness visit and in August (six-month follow-up)      ICD-10-CM ICD-9-CM    1. Baker's cyst of knee, left M71.22 727.51    2. Thromboembolus (HCC) I74.9 444.9 AMB POC PT/INR   3. Severe obesity (BMI 35.0-39. 9) with comorbidity ([facility] E66.01 278.01    4. Patellofemoral arthritis of left knee M17.12 716.96    5. Trochanteric bursitis, left hip M70.62 726.5    6. Strain of hip flexor, left, initial encounter S76.012A 843.9    7. Recurrent deep vein thrombosis (DVT) ([facility]) I82.409 453.40    8. Essential hypertension, benign I10 401.1        AVS given.  Pt expressed understanding of instructions

## 2018-04-18 NOTE — MR AVS SNAPSHOT
Amber Harder 
 
 
 [address], [place] [address] [place] [address] [facility] 
[phone] Patient: Adis Murphy MRN: [id number] :[id number] Visit Information Date & Time Provider Department Dept. Phone Encounter #  
 2018  8:40 AM MD Vero Dixon [phone] [id number] Your Appointments 2018 10:10 AM  
Nurse Visit with MD Vero Dixon  ([facility]) Appt Note: inr  
 [address], [address] [address] [facility] [address]  
[phone]  
  
   
 [address], [place] [address] [address] Upcoming Health Maintenance Date Due ZOSTER VACCINE AGE 60> 2016 PAP AKA CERVICAL CYTOLOGY 10/21/2016 MEDICARE YEARLY EXAM 3/14/2018 COLONOSCOPY 2018 BREAST CANCER SCRN MAMMOGRAM 3/16/2020 DTaP/Tdap/Td series (2 - Td) 2025 Allergies as of 2018  Review Complete On: 2018 By: Petr Abie Severity Noted Reaction Type Reactions Iodinated Contrast- Oral And Iv Dye  2012   Side Effect Hives Lasix [Furosemide]  2012   Side Effect Hives Sulfa (Sulfonamide Antibiotics)  2012   Side Effect Hives Current Immunizations  Reviewed on 2017 Name Date Influenza Vaccine 10/21/2013 Influenza Vaccine Remus Ligas) 2014 Influenza Vaccine (Quad) PF 2017, 2016, 10/26/2015 Tdap 2015 Not reviewed this visit You Were Diagnosed With   
  
 Codes Comments [facility]    -  Primary ICD-10-CM: I74.9 ICD-9-CM: 304. 9 Vitals BP Pulse Temp Resp Height(growth percentile) Weight(growth percentile) 136/78 69 (!) 94 °F (34.4 °C) 14 5' 10\" (1.778 m) 254 lb (115.2 kg) SpO2 BMI OB Status Smoking Status 94% 36.45 kg/m2 Postmenopausal Never Smoker Vitals History BMI and BSA Data  Body Mass Index Body Surface Area  
 36.45 kg/m 2 2.39 m 2  
  
  
 Preferred Pharmacy Pharmacy Name Phone [facility] [phone] [facility] [phone] Your Updated Medication List  
  
   
This list is accurate as of 4/18/18  9:07 AM.  Always use your most recent med list.  
  
  
  
  
 atenolol-chlorthalidone 50-25 mg per tablet Commonly known as:  TENORETIC 50 Take 1 Tab by mouth daily. cyclobenzaprine 5 mg tablet Commonly known as:  FLEXERIL Take 1 Tab by mouth three (3) times daily as needed for Muscle Spasm(s). diclofenac EC 75 mg EC tablet Commonly known as:  VOLTAREN Take 1 Tab by mouth two (2) times daily as needed. hydroCHLOROthiazide 12.5 mg capsule Commonly known as:  Trish Rosado TAKE ONE CAPSULE BY MOUTH ONCE DAILY  
  
 IRON (FERROUS SULFATE) PO Take  by mouth.  
  
 levothyroxine 50 mcg tablet Commonly known as:  SYNTHROID  
TAKE ONE TABLET BY MOUTH IN THE MORNING  
  
 potassium 99 mg tablet Take 99 mg by mouth daily. Pt is taking 2 tabs daily PREVACID PO Take  by mouth. traZODone 50 mg tablet Commonly known as:  Angelika Hesselbach Take 1 Tab by mouth nightly. Prn sleep  
  
 venlafaxine 75 mg tablet Commonly known as:  EFFEXOR  
TAKE ONE TABLET BY MOUTH TWICE DAILY  
  
 VITAMIN D3 1,000 unit Cap Generic drug:  cholecalciferol Take  by mouth daily. warfarin 7.5 mg tablet Commonly known as:  COUMADIN Take 1 Tab by mouth daily. Take one tablet daily We Performed the Following AMB POC PT/INR [68988 CPT(R)] Patient Instructions Baker's Cyst: Care Instructions Your Care Instructions A Baker's cyst is a swelling behind the knee. It may cause pain or stiffness when you bend your knee or straighten it all the way. Baker's cysts are also called popliteal cysts. If you have arthritis or another condition that is the cause of the Baker's cyst, your doctor may treat that condition.  A Baker's cyst may go away on its own. If not, or if it is causing a lot of discomfort, your doctor may drain the fluid that has built up behind the knee. In some cases, a Baker's cyst is removed in surgery. There are things you can do at home, such as staying off your leg, to reduce the swelling and pain. Follow-up care is a key part of your treatment and safety. Be sure to make and go to all appointments, and call your doctor if you are having problems. It's also a good idea to know your test results and keep a list of the medicines you take. How can you care for yourself at home? · Rest your knee as much as possible. · Ask your doctor if you can take an over-the-counter pain medicine, such as acetaminophen (Tylenol), ibuprofen (Advil, Motrin), or naproxen (Aleve). Be safe with medicines. Read and follow all instructions on the label. · Use a cane, a crutch, a walker, or another device if you need help to get around. These can help rest your knees. · If you have an elastic bandage, make sure it is snug but not so tight that your leg is numb, tingles, or swells below the bandage. Ask your doctor if you can loosen the bandage if it is too tight. · Follow your doctor's instructions about how much weight you can put on your knee. · Stay at a healthy weight. Being overweight puts extra strain on your knee. When should you call for help? Call 911 anytime you think you may need emergency care. For example, call if: 
? · You have chest pain, are short of breath, or you cough up blood. ?Call your doctor now or seek immediate medical care if: 
? · You have new or worse pain. ? · Your foot is cool or pale or changes color. ? · You have tingling, weakness, or numbness in your foot or toes. ? · You have signs of a blood clot in your leg (called a deep vein thrombosis), such as: 
¨ Pain in your calf, back of the knee, thigh, or groin. ¨ Redness or swelling in your leg. ?Watch closely for changes in your health, and be sure to contact your doctor if: 
? · You do not get better as expected. Where can you learn more? Go to http://jenelle-shravan.info/. Enter X416 in the search box to learn more about \"Baker's Cyst: Care Instructions. \" Current as of: March 21, 2017 Content Version: 11.4 © 0354-1687 [employer]. Care instructions adapted under license by [employer] (which disclaims liability or warranty for this information). If you have questions about a medical condition or this instruction, always ask your healthcare professional. [place] [address] any warranty or liability for your use of this information. Knee Arthritis: Exercises Your Care Instructions Here are some examples of exercises for knee arthritis. Start each exercise slowly. Ease off the exercise if you start to have pain. Your doctor or physical therapist will tell you when you can start these exercises and which ones will work best for you. How to do the exercises Knee flexion with heel slide 1. Lie on your back with your knees bent. 2. Slide your heel back by bending your affected knee as far as you can. Then hook your other foot around your ankle to help pull your heel even farther back. 3. Hold for about 6 seconds, then rest for up to 10 seconds. 4. Repeat 8 to 12 times. 5. Switch legs and repeat steps 1 through 4, even if only one knee is sore. [facility] 1. Sit with your affected leg straight and supported on the floor or a firm bed. Place a small, rolled-up towel under your knee. Your other leg should be bent, with that foot flat on the floor. 2. Tighten the thigh muscles of your affected leg by pressing the back of your knee down into the towel. 3. Hold for about 6 seconds, then rest for up to 10 seconds. 4. Repeat 8 to 12 times. 5. Switch legs and repeat steps 1 through 4, even if only one knee is sore. Straight-leg raises to the front 1. Lie on your back with your good knee bent so that your foot rests flat on the floor. Your affected leg should be straight. Make sure that your low back has a normal curve. You should be able to slip your hand in between the floor and the small of your back, with your palm touching the floor and your back touching the back of your hand. 2. Tighten the thigh muscles in your affected leg by pressing the back of your knee flat down to the floor. Hold your knee straight. 3. Keeping the thigh muscles tight and your leg straight, lift your affected leg up so that your heel is about 12 inches off the floor. Hold for about 6 seconds, then lower slowly. 4. Relax for up to 10 seconds between repetitions. 5. Repeat 8 to 12 times. 6. Switch legs and repeat steps 1 through 5, even if only one knee is sore. Active knee flexion 1. Lie on your stomach with your knees straight. If your kneecap is uncomfortable, roll up a washcloth and put it under your leg just above your kneecap. 2. Lift the foot of your affected leg by bending the knee so that you bring the foot up toward your buttock. If this motion hurts, try it without bending your knee quite as far. This may help you avoid any painful motion. 3. Slowly move your leg up and down. 4. Repeat 8 to 12 times. 5. Switch legs and repeat steps 1 through 4, even if only one knee is sore. Quadriceps stretch (facedown) 1. Lie flat on your stomach, and rest your face on the floor. 2. Wrap a towel or belt strap around the lower part of your affected leg. Then use the towel or belt strap to slowly pull your heel toward your buttock until you feel a stretch. 3. Hold for about 15 to 30 seconds, then relax your leg against the towel or belt strap. 4. Repeat 2 to 4 times. 5. Switch legs and repeat steps 1 through 4, even if only one knee is sore. Stationary exercise bike 1. If you do not have a stationary exercise bike at home, you can find one to ride at your local health club or community center. 2. Adjust the height of the bike seat so that your knee is slightly bent when your leg is extended downward. If your knee hurts when the pedal reaches the top, you can raise the seat so that your knee does not bend as much. 3. Start slowly. At first, try to do 5 to 10 minutes of cycling with little to no resistance. Then increase your time and the resistance bit by bit until you can do 20 to 30 minutes without pain. 4. If you start to have pain, rest your knee until your pain gets back to the level that is normal for you. Or cycle for less time or with less effort. Follow-up care is a key part of your treatment and safety. Be sure to make and go to all appointments, and call your doctor if you are having problems. It's also a good idea to know your test results and keep a list of the medicines you take. Where can you learn more? Go to http://jenelle-shravan.info/. Enter C159 in the search box to learn more about \"Knee Arthritis: Exercises. \" Current as of: March 21, 2017 Content Version: 11.4 © 8346-9780 [employer]. Care instructions adapted under license by [employer] (which disclaims liability or warranty for this information). If you have questions about a medical condition or this instruction, always ask your healthcare professional. [place] [address] any warranty or liability for your use of this information. Introducing \[facility] [place] [facility]\"" & HEALTH SERVICES! Dear Janett: Thank you for requesting a [facility] account. Our records indicate that you already have an active [facility] account. You can access your account anytime at [email or web address] Did you know that you can access your hospital and ER discharge instructions at any time in [facility]?   You can also review all of your test results from your hospital stay or ER visit. Additional Information If you have questions, please visit the Frequently Asked Questions section of the [employer] website at [email or web address] Remember, [employer] is NOT to be used for urgent needs. For medical emergencies, dial 911. Now available from your iPhone and Android! Please provide this summary of care documentation to your next provider. Your primary care clinician is listed as Delfino Sanchez. If you have any questions after today's visit, please call [phone].

## 2018-04-18 NOTE — PATIENT INSTRUCTIONS
Baker's Cyst: Care Instructions  Your Care Instructions    A Baker's cyst is a swelling behind the knee. It may cause pain or stiffness when you bend your knee or straighten it all the way. Baker's cysts are also called popliteal cysts. If you have arthritis or another condition that is the cause of the Baker's cyst, your doctor may treat that condition. A Baker's cyst may go away on its own. If not, or if it is causing a lot of discomfort, your doctor may drain the fluid that has built up behind the knee. In some cases, a Baker's cyst is removed in surgery. There are things you can do at home, such as staying off your leg, to reduce the swelling and pain. Follow-up care is a key part of your treatment and safety. Be sure to make and go to all appointments, and call your doctor if you are having problems. It's also a good idea to know your test results and keep a list of the medicines you take. How can you care for yourself at home? · Rest your knee as much as possible. · Ask your doctor if you can take an over-the-counter pain medicine, such as acetaminophen (Tylenol), ibuprofen (Advil, Motrin), or naproxen (Aleve). Be safe with medicines. Read and follow all instructions on the label. · Use a cane, a crutch, a walker, or another device if you need help to get around. These can help rest your knees. · If you have an elastic bandage, make sure it is snug but not so tight that your leg is numb, tingles, or swells below the bandage. Ask your doctor if you can loosen the bandage if it is too tight. · Follow your doctor's instructions about how much weight you can put on your knee. · Stay at a healthy weight. Being overweight puts extra strain on your knee. When should you call for help? Call 911 anytime you think you may need emergency care. For example, call if:  ? · You have chest pain, are short of breath, or you cough up blood.    ?Call your doctor now or seek immediate medical care if:  ? · You have new or worse pain. ? · Your foot is cool or pale or changes color. ? · You have tingling, weakness, or numbness in your foot or toes. ? · You have signs of a blood clot in your leg (called a deep vein thrombosis), such as:  ¨ Pain in your calf, back of the knee, thigh, or groin. ¨ Redness or swelling in your leg. ? Watch closely for changes in your health, and be sure to contact your doctor if:  ? · You do not get better as expected. Where can you learn more? Go to http://jenelle-shravan.info/. Enter A541 in the search box to learn more about \"Baker's Cyst: Care Instructions. \"  Current as of: March 21, 2017  Content Version: 11.4  © 0685-6687 [employer]. Care instructions adapted under license by [employer] (which disclaims liability or warranty for this information). If you have questions about a medical condition or this instruction, always ask your healthcare professional. [place] [address] any warranty or liability for your use of this information. Knee Arthritis: Exercises  Your Care Instructions  Here are some examples of exercises for knee arthritis. Start each exercise slowly. Ease off the exercise if you start to have pain. Your doctor or physical therapist will tell you when you can start these exercises and which ones will work best for you. How to do the exercises  Knee flexion with heel slide    1. Lie on your back with your knees bent. 2. Slide your heel back by bending your affected knee as far as you can. Then hook your other foot around your ankle to help pull your heel even farther back. 3. Hold for about 6 seconds, then rest for up to 10 seconds. 4. Repeat 8 to 12 times. 5. Switch legs and repeat steps 1 through 4, even if only one knee is sore. Quad sets    1. Sit with your affected leg straight and supported on the floor or a firm bed. Place a small, rolled-up towel under your knee.  Your other leg should be bent, with that foot flat on the floor. 2. Tighten the thigh muscles of your affected leg by pressing the back of your knee down into the towel. 3. Hold for about 6 seconds, then rest for up to 10 seconds. 4. Repeat 8 to 12 times. 5. Switch legs and repeat steps 1 through 4, even if only one knee is sore. Straight-leg raises to the front    1. Lie on your back with your good knee bent so that your foot rests flat on the floor. Your affected leg should be straight. Make sure that your low back has a normal curve. You should be able to slip your hand in between the floor and the small of your back, with your palm touching the floor and your back touching the back of your hand. 2. Tighten the thigh muscles in your affected leg by pressing the back of your knee flat down to the floor. Hold your knee straight. 3. Keeping the thigh muscles tight and your leg straight, lift your affected leg up so that your heel is about 12 inches off the floor. Hold for about 6 seconds, then lower slowly. 4. Relax for up to 10 seconds between repetitions. 5. Repeat 8 to 12 times. 6. Switch legs and repeat steps 1 through 5, even if only one knee is sore. Active knee flexion    1. Lie on your stomach with your knees straight. If your kneecap is uncomfortable, roll up a washcloth and put it under your leg just above your kneecap. 2. Lift the foot of your affected leg by bending the knee so that you bring the foot up toward your buttock. If this motion hurts, try it without bending your knee quite as far. This may help you avoid any painful motion. 3. Slowly move your leg up and down. 4. Repeat 8 to 12 times. 5. Switch legs and repeat steps 1 through 4, even if only one knee is sore. Quadriceps stretch (facedown)    1. Lie flat on your stomach, and rest your face on the floor. 2. Wrap a towel or belt strap around the lower part of your affected leg.  Then use the towel or belt strap to slowly pull your heel toward your buttock until you feel a stretch. 3. Hold for about 15 to 30 seconds, then relax your leg against the towel or belt strap. 4. Repeat 2 to 4 times. 5. Switch legs and repeat steps 1 through 4, even if only one knee is sore. Stationary exercise bike    1. If you do not have a stationary exercise bike at home, you can find one to ride at your local health club or community center. 2. Adjust the height of the bike seat so that your knee is slightly bent when your leg is extended downward. If your knee hurts when the pedal reaches the top, you can raise the seat so that your knee does not bend as much. 3. Start slowly. At first, try to do 5 to 10 minutes of cycling with little to no resistance. Then increase your time and the resistance bit by bit until you can do 20 to 30 minutes without pain. 4. If you start to have pain, rest your knee until your pain gets back to the level that is normal for you. Or cycle for less time or with less effort. Follow-up care is a key part of your treatment and safety. Be sure to make and go to all appointments, and call your doctor if you are having problems. It's also a good idea to know your test results and keep a list of the medicines you take. Where can you learn more? Go to http://jenelle-shravan.info/. Enter C159 in the search box to learn more about \"Knee Arthritis: Exercises. \"  Current as of: March 21, 2017  Content Version: 11.4  © 2112-0740 Healthwise, Incorporated. Care instructions adapted under license by [employer] (which disclaims liability or warranty for this information). If you have questions about a medical condition or this instruction, always ask your healthcare professional. [address] any warranty or liability for your use of this information.

## 2018-04-23 ENCOUNTER — TELEPHONE (OUTPATIENT)
Dept: FAMILY MEDICINE CLINIC | Age: 63
End: 2018-04-23

## 2018-04-23 NOTE — TELEPHONE ENCOUNTER
Patient seen in ER on Friday night.  She wanted to know if she should still f/u with physical therapy

## 2018-04-24 ENCOUNTER — OFFICE VISIT (OUTPATIENT)
Dept: FAMILY MEDICINE CLINIC | Age: 63
End: 2018-04-24

## 2018-04-24 VITALS
BODY MASS INDEX: 36.65 KG/M2 | SYSTOLIC BLOOD PRESSURE: 166 MMHG | OXYGEN SATURATION: 95 % | DIASTOLIC BLOOD PRESSURE: 93 MMHG | HEIGHT: 70 IN | HEART RATE: 62 BPM | WEIGHT: 256 LBS | TEMPERATURE: 98.2 F | RESPIRATION RATE: 18 BRPM

## 2018-04-24 DIAGNOSIS — M25.562 ACUTE PAIN OF LEFT KNEE: Primary | ICD-10-CM

## 2018-04-24 DIAGNOSIS — M25.462 KNEE EFFUSION, LEFT: ICD-10-CM

## 2018-04-24 DIAGNOSIS — I10 ESSENTIAL HYPERTENSION, BENIGN: ICD-10-CM

## 2018-04-24 DIAGNOSIS — I82.409 RECURRENT DEEP VEIN THROMBOSIS (DVT) (HCC): ICD-10-CM

## 2018-04-24 NOTE — PROGRESS NOTES
Mary Lou Hankins is a 65 y.o. female    Chief Complaint   Patient presents with   [facility] Follow Up     went to Emergency Center on 4/20/18 for left leg pain     1. Have you been to the ER, urgent care clinic since your last visit? Hospitalized since your last visit?see above    2. Have you seen or consulted any other health care providers outside of the [facility] since your last visit? Include any pap smears or colon screening.  No      Health Maintenance Due   Topic Date Due    ZOSTER VACCINE AGE 60>  08/06/2016    PAP AKA CERVICAL CYTOLOGY  10/21/2016    MEDICARE YEARLY EXAM  03/14/2018    COLONOSCOPY  07/06/2018

## 2018-04-24 NOTE — PATIENT INSTRUCTIONS

## 2018-04-24 NOTE — PROGRESS NOTES
HPI  Geri Morris is a 65 y.o. female who presents to follow-up from ER visit \"[facility]\" on 4/20 for left knee pain. Evidently she was out shopping that day. Noticed a little pop when she turned a corner on that knee and by the time she got home she felt like there was so much swelling and pain that she could barely get out of the car. She was transported to the emergency room by a relative. They asked a lot of questions about why she was taking Coumadin chronically that really worried her and they did an ultrasound of the leg looking for a DVT which was apparently negative but she said that there was something about the ultrasound results that worried her but could not remember what it was. She does not have the results with her today. The emergency room visit was on Friday. She was feeling better by Saturday. She was able to walk around normally by Saturday evening and has been feeling progressively better since then. It is still bothering her a little bit when she walks. It is still stiffening up when she sits. She has an appointment for physical therapy this Wednesday. She is taking Aleve for symptoms with mild relief. She is wearing a knee sleeve    PMHx:  Past Medical History:   Diagnosis Date    Arthritis     ASD (atrial septal defect)     age 7 septal defect closures MCV    Depression     Hypertension     Lymphedema of leg     Thromboembolus ([address]     hx bilateral DVT. long term use Coumadin    Thyroid disease        Meds:   Current Outpatient Prescriptions   Medication Sig Dispense Refill    levothyroxine (SYNTHROID) 50 mcg tablet TAKE ONE TABLET BY MOUTH IN THE MORNING 30 Tab 2    venlafaxine (EFFEXOR) 75 mg tablet TAKE ONE TABLET BY MOUTH TWICE DAILY 180 Tab 3    diclofenac EC (VOLTAREN) 75 mg EC tablet Take 1 Tab by mouth two (2) times daily as needed. 60 Tab 2    warfarin (COUMADIN) 7.5 mg tablet Take 1 Tab by mouth daily.  Take one tablet daily (Patient taking differently: Take 7.5 mg by mouth daily. Take 1/2 tab on Tuesday and Thursday. Take 7.5 mg rest of days.) 90 Tab 3    atenolol-chlorthalidone (TENORETIC 50) 50-25 mg per tablet Take 1 Tab by mouth daily. 30 Tab 11    cholecalciferol (VITAMIN D3) 1,000 unit cap Take  by mouth daily.  cyclobenzaprine (FLEXERIL) 5 mg tablet Take 1 Tab by mouth three (3) times daily as needed for Muscle Spasm(s). 60 Tab 1    IRON, FERROUS SULFATE, PO Take  by mouth.  hydrochlorothiazide (MICROZIDE) 12.5 mg capsule TAKE ONE CAPSULE BY MOUTH ONCE DAILY 30 Cap 5    traZODone (DESYREL) 50 mg tablet Take 1 Tab by mouth nightly. Prn sleep 30 Tab 2    LANSOPRAZOLE (PREVACID PO) Take  by mouth.  potassium 99 mg tablet Take 99 mg by mouth daily. Pt is taking 2 tabs daily         Allergies: Allergies   Allergen Reactions    Iodinated Contrast- Oral And Iv Dye Hives    Lasix [Furosemide] Hives    Sulfa (Sulfonamide Antibiotics) Hives       Smoker:  History   Smoking Status    Never Smoker   Smokeless Tobacco    Never Used       ETOH:   History   Alcohol Use No       FH:   Family History   Problem Relation Age of Onset    Heart Disease Mother     Heart Disease Father        ROS:   As listed in HPI. In addition:  Constitutional:   No headache, fever, fatigue, weight loss or weight gain      Cardiac:    No chest pain      Resp:   No cough or shortness of breath      Neuro   No loss of consciousness, dizziness, seizures      Physical Exam:  Blood pressure (!) 166/93, pulse 62, temperature 98.2 °F (36.8 °C), temperature source Oral, resp. rate 18, height 5' 10\" (1.778 m), weight 256 lb (116.1 kg), SpO2 95 %. GEN: No apparent distress. Alert and oriented and responds to all questions appropriately. NEUROLOGIC:  No focal neurologic deficits. Strength and sensation grossly intact. Coordination and gait grossly intact. EXT: Well perfused. No edema. SKIN: No obvious rashes. Left knee examined.   Since I saw her last there is a large effusion on the knee that is new. The Baker's cyst feels about the same size. Be a prepatellar bursa effusion that is a little bigger than the last visit a week ago but it is hard to tell. There is some medial joint line tenderness that is stable from last visit       Assessment and Plan     Left knee arthritis, likely mild meniscal injury while shopping. This resulted in an effusion of the symptoms of brought her to the emergency room. Recommended she proceed with physical therapy. Because she is feeling better every day she might be able to tolerate physical therapy as she currently is. She will likely benefit from drainage and injection of the knee joint but she would like to hold off on this for now. She declines orthopedic referral.  She does not want to consider any type of surgery. She will follow-up if she feels the need for injection to help her work with physical therapy    Continue NSAIDs    Continue knee sleeve compression    She had a duplex at the emergency room that I would like to get the records for from [facility] emergency center    Hypertension  Elevated today because of pain/anxiety. Typically well controlled. Keep an eye on this      ICD-10-CM ICD-9-CM    1. Acute pain of left knee M25.562 719.46    2. Knee effusion, left M25.462 719.06    3. Recurrent deep vein thrombosis (DVT) ([facility]) I82.409 453.40    4. Essential hypertension, benign I10 401.1        AVS given.  Pt expressed understanding of instructions

## 2018-05-08 ENCOUNTER — CLINICAL SUPPORT (OUTPATIENT)
Dept: FAMILY MEDICINE CLINIC | Age: 63
End: 2018-05-08

## 2018-05-08 VITALS
SYSTOLIC BLOOD PRESSURE: 147 MMHG | WEIGHT: 256 LBS | HEIGHT: 70 IN | RESPIRATION RATE: 16 BRPM | TEMPERATURE: 98.2 F | DIASTOLIC BLOOD PRESSURE: 86 MMHG | OXYGEN SATURATION: 95 % | BODY MASS INDEX: 36.65 KG/M2 | HEART RATE: 64 BPM

## 2018-05-08 DIAGNOSIS — I82.409 RECURRENT DEEP VEIN THROMBOSIS (DVT) (HCC): Primary | ICD-10-CM

## 2018-05-08 DIAGNOSIS — Z86.711 HX PULMONARY EMBOLISM: ICD-10-CM

## 2018-05-08 DIAGNOSIS — Z79.01 ANTICOAGULATION GOAL OF INR 2 TO 3: ICD-10-CM

## 2018-05-08 DIAGNOSIS — Z51.81 ANTICOAGULATION GOAL OF INR 2 TO 3: ICD-10-CM

## 2018-05-08 LAB
INR BLD: 3.1
PT POC: 37.1 SECONDS
VALID INTERNAL CONTROL?: YES

## 2018-05-08 RX ORDER — NAPROXEN SODIUM 220 MG
220 TABLET ORAL
COMMUNITY
End: 2020-10-16

## 2018-05-08 NOTE — PROGRESS NOTES
Patient presents for a PT/INR  See med list and patient instructions along with results for verbal orders by Dr. Avery Stapleton  Results for orders placed or performed in visit on 05/08/18   AMB POC PT/INR   Result Value Ref Range    VALID INTERNAL CONTROL POC Yes     Prothrombin time (POC) 37.1 seconds    INR POC 3.1         Vitals:    05/08/18 1510   BP: 147/86   Pulse: 64   Resp: 16   Temp: 98.2 °F (36.8 °C)   TempSrc: Oral   SpO2: 95%   Weight: 256 lb (116.1 kg)   Height: 5' 10\" (1.778 m)     Prior to Admission medications    Medication Sig Start Date End Date Taking? Authorizing Provider   naproxen sodium (ALEVE) 220 mg tablet Take 220 mg by mouth two (2) times daily as needed. Yes Historical Provider   levothyroxine (SYNTHROID) 50 mcg tablet TAKE ONE TABLET BY MOUTH IN THE MORNING 2/28/18  Yes Celestina Davis MD   venlafaxine (EFFEXOR) 75 mg tablet TAKE ONE TABLET BY MOUTH TWICE DAILY 1/29/18  Yes Mae Leija NP   diclofenac EC (VOLTAREN) 75 mg EC tablet Take 1 Tab by mouth two (2) times daily as needed. 1/15/18  Yes Philly Malagon MD   warfarin (COUMADIN) 7.5 mg tablet Take 1 Tab by mouth daily. Take one tablet daily  Patient taking differently: Take 7.5 mg by mouth daily. Take 1/2 tab on Tuesday and Thursday. Take 7.5 mg rest of days. 11/21/17  Yes Philly Malagon MD   atenolol-chlorthalidone (TENORETIC 50) 50-25 mg per tablet Take 1 Tab by mouth daily. 5/3/17  Yes Philly Malagon MD   cholecalciferol (VITAMIN D3) 1,000 unit cap Take  by mouth daily. Yes Historical Provider   cyclobenzaprine (FLEXERIL) 5 mg tablet Take 1 Tab by mouth three (3) times daily as needed for Muscle Spasm(s). 1/12/17  Yes Philly Malagon MD   IRON, FERROUS SULFATE, PO Take  by mouth. Yes Historical Provider   hydrochlorothiazide (MICROZIDE) 12.5 mg capsule TAKE ONE CAPSULE BY MOUTH ONCE DAILY 12/23/15  Yes Carolyn Mccain MD   traZODone (DESYREL) 50 mg tablet Take 1 Tab by mouth nightly.  Prn sleep 9/23/15  Yes Carolyn Mccain MD LANSOPRAZOLE (PREVACID PO) Take  by mouth. Yes Historical Provider   potassium 99 mg tablet Take 99 mg by mouth daily. Pt is taking 2 tabs daily   Yes Historical Provider     Per Dr. Trudy Brown verbal order continue present dose and return in 2 weeks  See anti-coag episode for instructions which are on AVS.    AVS and verbal instructions given she voiced understanding of all instructions.

## 2018-05-14 ENCOUNTER — OFFICE VISIT (OUTPATIENT)
Dept: FAMILY MEDICINE CLINIC | Age: 63
End: 2018-05-14

## 2018-05-14 VITALS
WEIGHT: 255 LBS | HEIGHT: 70 IN | HEART RATE: 77 BPM | BODY MASS INDEX: 36.51 KG/M2 | RESPIRATION RATE: 14 BRPM | TEMPERATURE: 98 F | SYSTOLIC BLOOD PRESSURE: 155 MMHG | DIASTOLIC BLOOD PRESSURE: 78 MMHG | OXYGEN SATURATION: 93 %

## 2018-05-14 DIAGNOSIS — M25.562 ACUTE PAIN OF LEFT KNEE: Primary | ICD-10-CM

## 2018-05-14 DIAGNOSIS — M25.462 EFFUSION OF LEFT KNEE: ICD-10-CM

## 2018-05-14 RX ORDER — TRIAMCINOLONE ACETONIDE 40 MG/ML
40 INJECTION, SUSPENSION INTRA-ARTICULAR; INTRAMUSCULAR ONCE
Qty: 1 ML | Refills: 0
Start: 2018-05-14 | End: 2018-05-14

## 2018-05-14 RX ORDER — LIDOCAINE HYDROCHLORIDE 10 MG/ML
3 INJECTION INFILTRATION; PERINEURAL ONCE
Qty: 3 ML | Refills: 0
Start: 2018-05-14 | End: 2018-05-14

## 2018-05-14 NOTE — PATIENT INSTRUCTIONS
Knee Arthritis: Exercises  Your Care Instructions  Here are some examples of exercises for knee arthritis. Start each exercise slowly. Ease off the exercise if you start to have pain. Your doctor or physical therapist will tell you when you can start these exercises and which ones will work best for you. How to do the exercises  Knee flexion with heel slide    1. Lie on your back with your knees bent. 2. Slide your heel back by bending your affected knee as far as you can. Then hook your other foot around your ankle to help pull your heel even farther back. 3. Hold for about 6 seconds, then rest for up to 10 seconds. 4. Repeat 8 to 12 times. 5. Switch legs and repeat steps 1 through 4, even if only one knee is sore. Quad sets    1. Sit with your affected leg straight and supported on the floor or a firm bed. Place a small, rolled-up towel under your knee. Your other leg should be bent, with that foot flat on the floor. 2. Tighten the thigh muscles of your affected leg by pressing the back of your knee down into the towel. 3. Hold for about 6 seconds, then rest for up to 10 seconds. 4. Repeat 8 to 12 times. 5. Switch legs and repeat steps 1 through 4, even if only one knee is sore. Straight-leg raises to the front    1. Lie on your back with your good knee bent so that your foot rests flat on the floor. Your affected leg should be straight. Make sure that your low back has a normal curve. You should be able to slip your hand in between the floor and the small of your back, with your palm touching the floor and your back touching the back of your hand. 2. Tighten the thigh muscles in your affected leg by pressing the back of your knee flat down to the floor. Hold your knee straight. 3. Keeping the thigh muscles tight and your leg straight, lift your affected leg up so that your heel is about 12 inches off the floor. Hold for about 6 seconds, then lower slowly.   4. Relax for up to 10 seconds between repetitions. 5. Repeat 8 to 12 times. 6. Switch legs and repeat steps 1 through 5, even if only one knee is sore. Active knee flexion    1. Lie on your stomach with your knees straight. If your kneecap is uncomfortable, roll up a washcloth and put it under your leg just above your kneecap. 2. Lift the foot of your affected leg by bending the knee so that you bring the foot up toward your buttock. If this motion hurts, try it without bending your knee quite as far. This may help you avoid any painful motion. 3. Slowly move your leg up and down. 4. Repeat 8 to 12 times. 5. Switch legs and repeat steps 1 through 4, even if only one knee is sore. Quadriceps stretch (facedown)    1. Lie flat on your stomach, and rest your face on the floor. 2. Wrap a towel or belt strap around the lower part of your affected leg. Then use the towel or belt strap to slowly pull your heel toward your buttock until you feel a stretch. 3. Hold for about 15 to 30 seconds, then relax your leg against the towel or belt strap. 4. Repeat 2 to 4 times. 5. Switch legs and repeat steps 1 through 4, even if only one knee is sore. Stationary exercise bike    1. If you do not have a stationary exercise bike at home, you can find one to ride at your local health club or community center. 2. Adjust the height of the bike seat so that your knee is slightly bent when your leg is extended downward. If your knee hurts when the pedal reaches the top, you can raise the seat so that your knee does not bend as much. 3. Start slowly. At first, try to do 5 to 10 minutes of cycling with little to no resistance. Then increase your time and the resistance bit by bit until you can do 20 to 30 minutes without pain. 4. If you start to have pain, rest your knee until your pain gets back to the level that is normal for you. Or cycle for less time or with less effort. Follow-up care is a key part of your treatment and safety.  Be sure to make and go to all appointments, and call your doctor if you are having problems. It's also a good idea to know your test results and keep a list of the medicines you take. Where can you learn more? Go to http://jenelle-shravan.info/. Enter C159 in the search box to learn more about \"Knee Arthritis: Exercises. \"  Current as of: March 21, 2017  Content Version: 11.4  © 9718-0219 [employer]. Care instructions adapted under license by [employer] (which disclaims liability or warranty for this information). If you have questions about a medical condition or this instruction, always ask your healthcare professional. [address] any warranty or liability for your use of this information.

## 2018-05-14 NOTE — MR AVS SNAPSHOT
Danis Wyman 
 
 
 [address], [place] [place] [address] 
[phone] Patient: Cristiane Lee MRN: [id number] :1955 Visit Information Date & Time Provider Department Dept. Phone Encounter #  
 2018  4:00 PM MD Vero Jordan [phone] [id number] Your Appointments 2018 10:15 AM  
Nurse Visit with MD Vero Jordan 57  ([facility]) Appt Note: inr  
 [address], [address] [facility] [address]  
[phone]  
  
   
 [address], [address] [address] Upcoming Health Maintenance Date Due ZOSTER VACCINE AGE 60> 2016 PAP AKA CERVICAL CYTOLOGY 10/21/2016 MEDICARE YEARLY EXAM 3/14/2018 COLONOSCOPY 2018 Influenza Age 5 to Adult 2018 BREAST CANCER SCRN MAMMOGRAM 3/16/2020 DTaP/Tdap/Td series (2 - Td) 2025 Allergies as of 2018  Review Complete On: 2018 By: Saba Lee Severity Noted Reaction Type Reactions Iodinated Contrast- Oral And Iv Dye  2012   Side Effect Hives Lasix [Furosemide]  2012   Side Effect Hives Sulfa (Sulfonamide Antibiotics)  2012   Side Effect Hives Current Immunizations  Reviewed on 2017 Name Date Influenza Vaccine 10/21/2013 Influenza Vaccine Stanford Mahmood) 2014 Influenza Vaccine (Quad) PF 2017, 2016, 10/26/2015 Tdap 2015 Not reviewed this visit You Were Diagnosed With   
  
 Codes Comments Acute pain of left knee    -  Primary ICD-10-CM: D66.371 ICD-9-CM: 719.46 Vitals BP Pulse Temp Resp Height(growth percentile) Weight(growth percentile) 155/78 (BP 1 Location: Left arm, BP Patient Position: Sitting) 77 98 °F (36.7 °C) 14 5' 10\" (1.778 m) 255 lb (115.7 kg) SpO2 BMI OB Status Smoking Status 93% 36.59 kg/m2 Postmenopausal Never Smoker Vitals History BMI and BSA Data Body Mass Index Body Surface Area  
 36.59 kg/m 2 2.39 m 2 Preferred Pharmacy Pharmacy Name Phone [facility] [address] [facility] [phone] Your Updated Medication List  
  
   
This list is accurate as of 5/14/18  4:57 PM.  Always use your most recent med list.  
  
  
  
  
 ALEVE 220 mg tablet Generic drug:  naproxen sodium Take 220 mg by mouth two (2) times daily as needed. atenolol-chlorthalidone 50-25 mg per tablet Commonly known as:  TENORETIC 50 Take 1 Tab by mouth daily. cyclobenzaprine 5 mg tablet Commonly known as:  FLEXERIL Take 1 Tab by mouth three (3) times daily as needed for Muscle Spasm(s). diclofenac EC 75 mg EC tablet Commonly known as:  VOLTAREN Take 1 Tab by mouth two (2) times daily as needed. hydroCHLOROthiazide 12.5 mg capsule Commonly known as:  Trish Rosado TAKE ONE CAPSULE BY MOUTH ONCE DAILY  
  
 IRON (FERROUS SULFATE) PO Take  by mouth.  
  
 levothyroxine 50 mcg tablet Commonly known as:  SYNTHROID  
TAKE ONE TABLET BY MOUTH IN THE MORNING  
  
 lidocaine 10 mg/mL (1 %) injection Commonly known as:  XYLOCAINE  
3 mL by Intra artICUlar route once for 1 dose. potassium 99 mg tablet Take 99 mg by mouth daily. Pt is taking 2 tabs daily PREVACID PO Take  by mouth. traZODone 50 mg tablet Commonly known as:  Angelika Dunlapbach Take 1 Tab by mouth nightly. Prn sleep  
  
 triamcinolone acetonide 40 mg/mL injection Commonly known as:  KENALOG  
1 mL by IntraMUSCular route once for 1 dose. venlafaxine 75 mg tablet Commonly known as:  EFFEXOR  
TAKE ONE TABLET BY MOUTH TWICE DAILY  
  
 VITAMIN D3 1,000 unit Cap Generic drug:  cholecalciferol Take  by mouth daily. warfarin 7.5 mg tablet Commonly known as:  COUMADIN Take 1 Tab by mouth daily. Take one tablet daily We Performed the Following DRAIN/INJECT LARGE JOINT/BURSA [id number] CPT(R)] TRIAMCINOLONE ACETONIDE INJ [ \[facility] [place] [facility]\""] Patient Instructions Knee Arthritis: Exercises Your Care Instructions Here are some examples of exercises for knee arthritis. Start each exercise slowly. Ease off the exercise if you start to have pain. Your doctor or physical therapist will tell you when you can start these exercises and which ones will work best for you. How to do the exercises Knee flexion with heel slide 1. Lie on your back with your knees bent. 2. Slide your heel back by bending your affected knee as far as you can. Then hook your other foot around your ankle to help pull your heel even farther back. 3. Hold for about 6 seconds, then rest for up to 10 seconds. 4. Repeat 8 to 12 times. 5. Switch legs and repeat steps 1 through 4, even if only one knee is sore. BenchBanking 1. Sit with your affected leg straight and supported on the floor or a firm bed. Place a small, rolled-up towel under your knee. Your other leg should be bent, with that foot flat on the floor. 2. Tighten the thigh muscles of your affected leg by pressing the back of your knee down into the towel. 3. Hold for about 6 seconds, then rest for up to 10 seconds. 4. Repeat 8 to 12 times. 5. Switch legs and repeat steps 1 through 4, even if only one knee is sore. Straight-leg raises to the front 1. Lie on your back with your good knee bent so that your foot rests flat on the floor. Your affected leg should be straight. Make sure that your low back has a normal curve. You should be able to slip your hand in between the floor and the small of your back, with your palm touching the floor and your back touching the back of your hand. 2. Tighten the thigh muscles in your affected leg by pressing the back of your knee flat down to the floor. Hold your knee straight.  
3. Keeping the thigh muscles tight and your leg straight, lift your affected leg up so that your heel is about 12 inches off the floor. Hold for about 6 seconds, then lower slowly. 4. Relax for up to 10 seconds between repetitions. 5. Repeat 8 to 12 times. 6. Switch legs and repeat steps 1 through 5, even if only one knee is sore. Active knee flexion 1. Lie on your stomach with your knees straight. If your kneecap is uncomfortable, roll up a washcloth and put it under your leg just above your kneecap. 2. Lift the foot of your affected leg by bending the knee so that you bring the foot up toward your buttock. If this motion hurts, try it without bending your knee quite as far. This may help you avoid any painful motion. 3. Slowly move your leg up and down. 4. Repeat 8 to 12 times. 5. Switch legs and repeat steps 1 through 4, even if only one knee is sore. Quadriceps stretch (facedown) 1. Lie flat on your stomach, and rest your face on the floor. 2. Wrap a towel or belt strap around the lower part of your affected leg. Then use the towel or belt strap to slowly pull your heel toward your buttock until you feel a stretch. 3. Hold for about 15 to 30 seconds, then relax your leg against the towel or belt strap. 4. Repeat 2 to 4 times. 5. Switch legs and repeat steps 1 through 4, even if only one knee is sore. Stationary exercise bike 1. If you do not have a stationary exercise bike at home, you can find one to ride at your local health club or community center. 2. Adjust the height of the bike seat so that your knee is slightly bent when your leg is extended downward. If your knee hurts when the pedal reaches the top, you can raise the seat so that your knee does not bend as much. 3. Start slowly. At first, try to do 5 to 10 minutes of cycling with little to no resistance. Then increase your time and the resistance bit by bit until you can do 20 to 30 minutes without pain.  
4. If you start to have pain, rest your knee until your pain gets back to the level that is normal for you. Or cycle for less time or with less effort. Follow-up care is a key part of your treatment and safety. Be sure to make and go to all appointments, and call your doctor if you are having problems. It's also a good idea to know your test results and keep a list of the medicines you take. Where can you learn more? Go to http://jenelle-shravan.info/. Enter C159 in the search box to learn more about \"Knee Arthritis: Exercises. \" Current as of: March 21, 2017 Content Version: 11.4 © 1628-7556 [employer] [place]. Care instructions adapted under license by [employer] (which disclaims liability or warranty for this information). If you have questions about a medical condition or this instruction, always ask your healthcare professional. [address] any warranty or liability for your use of this information. Introducing [facility]! Dear Janett: Thank you for requesting a SegmentF[employer] account. Our records indicate that you already have an active SegmentF[employer] account. You can access your account anytime at [email or web address] Did you know that you can access your hospital and ER discharge instructions at any time in SegmentFault? You can also review all of your test results from your hospital stay or ER visit. Additional Information If you have questions, please visit the Frequently Asked Questions section of the SegmentFault website at [email or web address] Remember, SegmentFault is NOT to be used for urgent needs. For medical emergencies, dial 911. Now available from your iPhone and Android! Please provide this summary of care documentation to your next provider. Your primary care clinician is listed as Rehan Valverde. If you have any questions after today's visit, please call [phone].

## 2018-05-14 NOTE — PROGRESS NOTES
HPI Sharyle Setter is a 65 y.o. female who presents with left knee pain. she is seen a few times for this recently. Started about 1 month ago. Started out after doing some spring cleaning which involved a lot of up and down and twisting motions. Developed a little effusion, pain behind the knee joint and stadium knee stiffness. Refer to physical therapy. NSAIDs. About a week later ended up in the emergency room because she felt a pop in her knee when she turned a corner on that knee. The pain was so significant and brought her to the emergency room. They did a duplex which I do not have the records of. Since that time she has been wearing a knee sleeve and taking NSAIDs. 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 long term use Coumadin    Thyroid disease        Meds:   Current Outpatient Prescriptions   Medication Sig Dispense Refill    triamcinolone acetonide (KENALOG) 40 mg/mL injection 1 mL by IntraMUSCular route once for 1 dose. 1 mL 0    lidocaine (XYLOCAINE) 10 mg/mL (1 %) injection 3 mL by Intra artICUlar route once for 1 dose. 3 mL 0    naproxen sodium (ALEVE) 220 mg tablet Take 220 mg by mouth two (2) times daily as needed.  levothyroxine (SYNTHROID) 50 mcg tablet TAKE ONE TABLET BY MOUTH IN THE MORNING 30 Tab 2    venlafaxine (EFFEXOR) 75 mg tablet TAKE ONE TABLET BY MOUTH TWICE DAILY 180 Tab 3    diclofenac EC (VOLTAREN) 75 mg EC tablet Take 1 Tab by mouth two (2) times daily as needed. 60 Tab 2    warfarin (COUMADIN) 7.5 mg tablet Take 1 Tab by mouth daily. Take one tablet daily (Patient taking differently: Take 7.5 mg by mouth daily. Take 1/2 tab on Tuesday and Thursday. Take 7.5 mg rest of days.) 90 Tab 3    atenolol-chlorthalidone (TENORETIC 50) 50-25 mg per tablet Take 1 Tab by mouth daily. 30 Tab 11    cholecalciferol (VITAMIN D3) 1,000 unit cap Take  by mouth daily.  cyclobenzaprine (FLEXERIL) 5 mg tablet Take 1 Tab by mouth three (3) times daily as needed for Muscle Spasm(s). 60 Tab 1    IRON, FERROUS SULFATE, PO Take  by mouth.  hydrochlorothiazide (MICROZIDE) 12.5 mg capsule TAKE ONE CAPSULE BY MOUTH ONCE DAILY 30 Cap 5    traZODone (DESYREL) 50 mg tablet Take 1 Tab by mouth nightly. Prn sleep 30 Tab 2    LANSOPRAZOLE (PREVACID PO) Take  by mouth.  potassium 99 mg tablet Take 99 mg by mouth daily. Pt is taking 2 tabs daily         Allergies: Allergies   Allergen Reactions    Iodinated Contrast- Oral And Iv Dye Hives    Lasix [Furosemide] Hives    Sulfa (Sulfonamide Antibiotics) Hives       Smoker:  History   Smoking Status    Never Smoker   Smokeless Tobacco    Never Used       ETOH:   History   Alcohol Use No       FH:   Family History   Problem Relation Age of Onset    Heart Disease Mother     Heart Disease Father        ROS:   As listed in HPI. In addition:  Constitutional:   No headache, fever, fatigue, weight loss or weight gain      Cardiac:    No chest pain      Resp:   No cough or shortness of breath      Neuro   No loss of consciousness, dizziness, seizures      Physical Exam:  Blood pressure 155/78, pulse 77, temperature 98 °F (36.7 °C), resp. rate 14, height 5' 10\" (1.778 m), weight 255 lb (115.7 kg), SpO2 93 %. GEN: No apparent distress. Alert and oriented and responds to all questions appropriately. NEUROLOGIC:  No focal neurologic deficits. Strength and sensation grossly intact. Coordination and gait grossly intact. EXT: Well perfused. No edema. SKIN: No obvious rashes. Moderate to severe knee effusion. Exam is limited by her body habitus and a swollen prepatellar bursa (not inflamed).   The knee in general is tender to palpation with no particular point tenderness. There is pain in the popliteal space medially and swelling laterally. No obvious crepitus. Assessment and Plan     Knee effusion, knee pain  Strong suspicion for Baker's cyst based on exam  Based on the history she has given me over the last month this appears to be exacerbated by twisting motion. Likely arthritis combined with meniscal injury. No improvement in the last month, might of even worsened a little bit acutely. She has tried physical therapy, NSAIDs, compression sleeve, ice. She would like to consider injection today. Based on exam decided to see if draining the effusion will help. Lateral approach was used. Injection site was identified and marked and cleaned with ChloraPrep and anesthetized with cold spray. A 21-gauge needle on a 20 cc syringe was advanced into the joint space with a small amount of clear serous fluid (immeasurable) obtained before patient clamped down because of discomfort. Unable to get to relax so abandoned into the joint injection. The needle was grasped in place with hemostats and using sterile technique the 20 cc syringe was removed and replaced with medication syringe. Mixture of 40 mg Kenalog, 3 cc 2% lidocaine was injected into the joint. 5 minutes after procedure patient walked around and noticed significant improvement in her joint pain. She was still limping a little bit but admits this is probably out of habit. Continue NSAIDs, compression. Do not overdo it on the knee. Continue physical therapy      ICD-10-CM ICD-9-CM    1. Acute pain of left knee M25.562 719.46 DRAIN/INJECT LARGE JOINT/BURSA      TRIAMCINOLONE ACETONIDE INJ      triamcinolone acetonide (KENALOG) 40 mg/mL injection      lidocaine (XYLOCAINE) 10 mg/mL (1 %) injection   2. Effusion of left knee M25.462 719.06        AVS given.  Pt expressed understanding of instructions    [address] NOTE        Chart reviewed for the following:   Silvia Boswell MD, have reviewed the History, Physical and updated the Allergic reactions for June [address] performed immediately prior to start of procedure:   Silvia Boswell MD, have performed the following reviews on June Howard prior to the start of the procedure:            * Patient was identified by name and date of birth   * Agreement on procedure being performed was verified  * Risks and Benefits explained to the patient  * Procedure site verified and marked as necessary  * Patient was positioned for comfort  * Consent was signed and verified     Time: 450      Date of procedure: 5/14/2018    Procedure performed by:  Ellie Don MD    Provider assisted by: Self    Patient assisted by: friend    How tolerated by patient: tolerated the procedure well with no complications    Post Procedural Pain Scale: 0 - No Hurt    Comments: none

## 2018-05-15 DIAGNOSIS — I10 ESSENTIAL HYPERTENSION, BENIGN: ICD-10-CM

## 2018-05-16 RX ORDER — ATENOLOL AND CHLORTHALIDONE TABLET 50; 25 MG/1; MG/1
TABLET ORAL
Qty: 30 TAB | Refills: 11 | Status: SHIPPED | OUTPATIENT
Start: 2018-05-16 | End: 2018-06-14 | Stop reason: RX

## 2018-05-29 ENCOUNTER — CLINICAL SUPPORT (OUTPATIENT)
Dept: FAMILY MEDICINE CLINIC | Age: 63
End: 2018-05-29

## 2018-05-29 VITALS
OXYGEN SATURATION: 98 % | TEMPERATURE: 98.2 F | DIASTOLIC BLOOD PRESSURE: 79 MMHG | WEIGHT: 252.4 LBS | HEIGHT: 70 IN | RESPIRATION RATE: 16 BRPM | SYSTOLIC BLOOD PRESSURE: 157 MMHG | BODY MASS INDEX: 36.13 KG/M2 | HEART RATE: 62 BPM

## 2018-05-29 DIAGNOSIS — M25.562 CHRONIC PAIN OF LEFT KNEE: ICD-10-CM

## 2018-05-29 DIAGNOSIS — I82.409 RECURRENT DEEP VEIN THROMBOSIS (DVT) (HCC): Primary | ICD-10-CM

## 2018-05-29 DIAGNOSIS — G89.29 CHRONIC PAIN OF LEFT KNEE: ICD-10-CM

## 2018-05-29 LAB
INR BLD: 1.6
PT POC: NORMAL SECONDS
VALID INTERNAL CONTROL?: YES

## 2018-05-29 NOTE — MR AVS SNAPSHOT
[facility] 
 
 
 [address] [place] [address] 
[phone] Patient: Cyndi Tavares MRN: [id number] Visit Information Date & Time Provider Department Dept. Phone Encounter #  
 2018 10:15 AM Arnaldo Espinosa MD [address] [facility] [phone] [id number] Upcoming Health Maintenance Date Due ZOSTER VACCINE AGE 60> 2016 PAP AKA CERVICAL CYTOLOGY 10/21/2016 MEDICARE YEARLY EXAM 3/14/2018 COLONOSCOPY 2018 Influenza Age 5 to Adult 2018 BREAST CANCER SCRN MAMMOGRAM 3/16/2020 DTaP/Tdap/Td series (2 - Td) 2025 Allergies as of 2018  Review Complete On: 2018 By: Arnaldo Espinosa MD  
  
 Severity Noted Reaction Type Reactions Iodinated Contrast- Oral And Iv Dye  2012   Side Effect Hives Lasix [Furosemide]  2012   Side Effect Hives Sulfa (Sulfonamide Antibiotics)  2012   Side Effect Hives Current Immunizations  Reviewed on 2017 Name Date Influenza Vaccine 10/21/2013 Influenza Vaccine Vee Saber) 2014 Influenza Vaccine (Quad) PF 2017, 2016, 10/26/2015 Tdap 2015 Not reviewed this visit You Were Diagnosed With   
  
 Codes Comments Recurrent deep vein thrombosis (DVT) (HCC)    -  Primary ICD-10-CM: R87.936 ICD-9-CM: 453.40 Vitals BP Pulse Temp Resp Height(growth percentile) Weight(growth percentile) 157/79 (BP 1 Location: Right arm, BP Patient Position: Sitting) 62 98.2 °F (36.8 °C) (Oral) 16 5' 10\" (1.778 m) 252 lb 6.4 oz (114.5 kg) SpO2 BMI OB Status Smoking Status 98% 36.22 kg/m2 Postmenopausal Never Smoker Vitals History BMI and BSA Data Body Mass Index Body Surface Area  
 36.22 kg/m 2 2.38 m 2 Preferred Pharmacy Pharmacy Name Phone [facility]  [address], [address] [place] [phone] Your Updated Medication List  
 This list is accurate as of 5/29/18 11:13 AM.  Always use your most recent med list.  
  
  
  
  
 ALEVE 220 mg tablet Generic drug:  naproxen sodium Take 220 mg by mouth two (2) times daily as needed. atenolol-chlorthalidone 50-25 mg per tablet Commonly known as:  TENORETIC  
TAKE ONE TABLET BY MOUTH ONCE DAILY  
  
 cyclobenzaprine 5 mg tablet Commonly known as:  FLEXERIL Take 1 Tab by mouth three (3) times daily as needed for Muscle Spasm(s). diclofenac EC 75 mg EC tablet Commonly known as:  VOLTAREN Take 1 Tab by mouth two (2) times daily as needed. hydroCHLOROthiazide 12.5 mg capsule Commonly known as:  Connee Mage TAKE ONE CAPSULE BY MOUTH ONCE DAILY  
  
 IRON (FERROUS SULFATE) PO Take  by mouth.  
  
 levothyroxine 50 mcg tablet Commonly known as:  SYNTHROID  
TAKE ONE TABLET BY MOUTH IN THE MORNING  
  
 potassium 99 mg tablet Take 99 mg by mouth daily. Pt is taking 2 tabs daily PREVACID PO Take  by mouth. traZODone 50 mg tablet Commonly known as:  Samule Grills Take 1 Tab by mouth nightly. Prn sleep  
  
 venlafaxine 75 mg tablet Commonly known as:  EFFEXOR  
TAKE ONE TABLET BY MOUTH TWICE DAILY  
  
 VITAMIN D3 1,000 unit Cap Generic drug:  cholecalciferol Take  by mouth daily. warfarin 7.5 mg tablet Commonly known as:  COUMADIN Take 1 Tab by mouth daily. Take one tablet daily May 2018 Details Sun Mon Tue Wed Thu Fri Sat  
    1  
  
  
  
   2  
  
  
  
   3  
  
  
  
   4  
  
  
  
   5  
  
  
  
  
  6  
  
  
  
   7  
  
  
  
   8  
  
  
  
   9  
  
  
  
   10  
  
  
  
   11  
  
  
  
   12  
  
  
  
  
  13  
  
  
  
   14  
  
  
  
   15  
  
  
  
   16  
  
  
  
   17  
  
  
  
   18  
  
  
  
   19  
  
  
  
  
  20  
  
  
  
   21  
  
  
  
   22  
  
  
  
   23  
  
  
  
   24  
  
  
  
   25  
  
  
  
   26  
  
  
  
  
  27  
  
  
  
   28  
  
  
  
   29  
  
7.5 mg  
 See details 30  
  
7.5 mg  
  
   31  
  
7.5 mg Date Details 05/29 This INR check INR: 1.6! How to take your warfarin dose To take:  7.5 mg Take one of the 7.5 mg tablets. June 2018 Details Blake Gómez Tue Wed Thu Fri Sat  
       1  
  
7.5 mg  
  
   2  
  
7.5 mg  
  
  
  3  
  
7.5 mg  
  
   4  
  
7.5 mg  
  
   5  
  
7.5 mg  
  
   6  
  
7.5 mg  
  
   7  
  
7.5 mg  
  
   8  
  
7.5 mg  
  
   9  
  
7.5 mg  
  
  
  10  
  
7.5 mg  
  
   11  
  
7.5 mg  
  
   12  
  
7.5 mg  
  
   13  
  
  
  
   14  
  
  
  
   15  
  
  
  
   16  
  
  
  
  
  17  
  
  
  
   18  
  
  
  
   19  
  
  
  
   20  
  
  
  
   21  
  
  
  
   22  
  
  
  
   23  
  
  
  
  
  24  
  
  
  
   25  
  
  
  
   26  
  
  
  
   27  
  
  
  
   28  
  
  
  
   29  
  
  
  
   30  
  
  
  
  
 Date Details No additional details Date of next INR:  6/12/2018 How to take your warfarin dose To take:  7.5 mg Take one of the 7.5 mg tablets. We Performed the Following AMB POC PT/INR [18674 CPT(R)] Introducing [facility] SERVICES! Dear Janett: Thank you for requesting a [employer] account. Our records indicate that you already have an active [employer] account. You can access your account anytime at [email or web address] Did you know that you can access your hospital and ER discharge instructions at any time in [employer]? You can also review all of your test results from your hospital stay or ER visit. Additional Information If you have questions, please visit the Frequently Asked Questions section of the [employer] website at [email or web address] Remember, [employer] is NOT to be used for urgent needs. For medical emergencies, dial 911. Now available from your iPhone and Android! Please provide this summary of care documentation to your next provider. Your primary care clinician is listed as Trey Werner. If you have any questions after today's visit, please call [phone].

## 2018-05-29 NOTE — PROGRESS NOTES
Chief Complaint   Patient presents with    Coagulation disorder     check       1. Have you been to the ER, urgent care clinic since your last visit? Hospitalized since your last visit? no    2. Have you seen or consulted any other health care providers outside of the [address] [place] [address] since your last visit? Include any pap smears or colon screening.   no

## 2018-05-29 NOTE — PROGRESS NOTES
SARKIS Prabhakar is a 65 y.o. female who presents for Coumadin check. She has been eating more salads in the last 3 weeks since we have seen her. Her INR was 3.1 at the last check and is 1.6 today with no medication change. She has been avoiding dark leafy vegetables but did not realize that the sounds that she was eating her vitamin K. She is trying to eat healthier and would like to sustain her current diet. I given her a injection in the left knee at our last visit. It is significantly improved. Almost no pain. 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 long term use Coumadin    Thyroid disease        Meds:   Current Outpatient Prescriptions   Medication Sig Dispense Refill    atenolol-chlorthalidone (TENORETIC) 50-25 mg per tablet TAKE ONE TABLET BY MOUTH ONCE DAILY 30 Tab 11    naproxen sodium (ALEVE) 220 mg tablet Take 220 mg by mouth two (2) times daily as needed.  levothyroxine (SYNTHROID) 50 mcg tablet TAKE ONE TABLET BY MOUTH IN THE MORNING 30 Tab 2    venlafaxine (EFFEXOR) 75 mg tablet TAKE ONE TABLET BY MOUTH TWICE DAILY 180 Tab 3    diclofenac EC (VOLTAREN) 75 mg EC tablet Take 1 Tab by mouth two (2) times daily as needed. 60 Tab 2    warfarin (COUMADIN) 7.5 mg tablet Take 1 Tab by mouth daily. Take one tablet daily (Patient taking differently: Take 7.5 mg by mouth daily. Take 1/2 tab on Tuesday and Thursday. Take 7.5 mg rest of days.) 90 Tab 3    cholecalciferol (VITAMIN D3) 1,000 unit cap Take  by mouth daily.  cyclobenzaprine (FLEXERIL) 5 mg tablet Take 1 Tab by mouth three (3) times daily as needed for Muscle Spasm(s). 60 Tab 1    IRON, FERROUS SULFATE, PO Take  by mouth.       hydrochlorothiazide (MICROZIDE) 12.5 mg capsule TAKE ONE CAPSULE BY MOUTH ONCE DAILY 30 Cap 5    traZODone (DESYREL) 50 mg tablet Take 1 Tab by mouth nightly. Prn sleep 30 Tab 2    LANSOPRAZOLE (PREVACID PO) Take  by mouth.  potassium 99 mg tablet Take 99 mg by mouth daily. Pt is taking 2 tabs daily         Allergies: Allergies   Allergen Reactions    Iodinated Contrast- Oral And Iv Dye Hives    Lasix [Furosemide] Hives    Sulfa (Sulfonamide Antibiotics) Hives       Smoker:  History   Smoking Status    Never Smoker   Smokeless Tobacco    Never Used       ETOH:   History   Alcohol Use No       FH:   Family History   Problem Relation Age of Onset    Heart Disease Mother     Heart Disease Father        ROS:   As listed in HPI. In addition:  Constitutional:   No headache, fever, fatigue, weight loss or weight gain      Cardiac:    No chest pain      Resp:   No cough or shortness of breath      Neuro   No loss of consciousness, dizziness, seizures      Physical Exam:  Blood pressure 157/79, pulse 62, temperature 98.2 °F (36.8 °C), temperature source Oral, resp. rate 16, height 5' 10\" (1.778 m), weight 252 lb 6.4 oz (114.5 kg), SpO2 98 %. GEN: No apparent distress. Alert and oriented and responds to all questions appropriately. NEUROLOGIC:  No focal neurologic deficits. Strength and sensation grossly intact. Coordination and gait grossly intact. EXT: Well perfused. No edema. SKIN: No obvious rashes. Assessment and Plan     Chronic anticoagulation  INR 1.6 today. Currently taking Coumadin 7.5 mg 5 days, 3.75 milligrams 2 days. Has recently increased her solid intake trying to eat healthier  Increase 15% to 7.5 mg daily  Follow-up 2 weeks for Coumadin check. Knee pain, suspect arthritis and meniscus injury. Significant improvement after steroid injection. She has decided not to go back to physical therapy for the moment but would like to go back to the gym.   Encourage this and asked her to take it easy. If develops knee pain while going to the gym would be worthwhile going back to physical therapy as a next step      ICD-10-CM ICD-9-CM    1. Recurrent deep vein thrombosis (DVT) (HCC) I82.409 453.40 AMB POC PT/INR   2. Chronic pain of left knee M25.562 719.46     G89.29 338.29        AVS given.  Pt expressed understanding of instructions

## 2018-06-12 ENCOUNTER — CLINICAL SUPPORT (OUTPATIENT)
Dept: FAMILY MEDICINE CLINIC | Age: 63
End: 2018-06-12

## 2018-06-12 VITALS
HEART RATE: 59 BPM | RESPIRATION RATE: 20 BRPM | OXYGEN SATURATION: 96 % | SYSTOLIC BLOOD PRESSURE: 136 MMHG | DIASTOLIC BLOOD PRESSURE: 88 MMHG | BODY MASS INDEX: 36.51 KG/M2 | WEIGHT: 255 LBS | TEMPERATURE: 98 F | HEIGHT: 70 IN

## 2018-06-12 DIAGNOSIS — Z86.711 HX PULMONARY EMBOLISM: Primary | ICD-10-CM

## 2018-06-12 LAB
INR BLD: 2.7
PT POC: 32.5 SECONDS
VALID INTERNAL CONTROL?: YES

## 2018-06-12 NOTE — PROGRESS NOTES
Chief Complaint   Patient presents with    Coagulation disorder     PT INR CHECK     June Janith Sacks is a 65 y.o. female who presents today for Anticoagulation monitoring. Indication: PE  INR Goal: 2.0-3.0. Current dose:  Coumadin 7.5 mg daily. Missed Coumadin Doses:  None  Medication Changes:  no  Dietary Changes:  yes - Increase in green, leafy salads    Symptoms: taking coumadin appropriately without any bleeding. Latest INRs:  Lab Results   Component Value Date/Time    INR 1.4 (H) 12/28/2012 11:10 AM    INR POC 1.6 05/29/2018 10:38 AM    INR POC 3.1 05/08/2018 03:09 PM    INR POC 2.2 04/18/2018 08:49 AM    Prothrombin time 14.9 (H) 12/28/2012 11:10 AM        New Coumadin dose:.current treatment plan is effective, no change in therapy per Dr. Musa Sims. Next check to be scheduled for  4 weeks per Dr. Musa Sims.

## 2018-06-14 RX ORDER — HYDROCHLOROTHIAZIDE 25 MG/1
25 TABLET ORAL DAILY
Qty: 90 TAB | Refills: 3 | Status: SHIPPED | OUTPATIENT
Start: 2018-06-14 | End: 2019-06-12 | Stop reason: SDUPTHER

## 2018-06-14 NOTE — TELEPHONE ENCOUNTER
Patient's atenolol-chlorthalidone is on back order. Pharmacy is requesting an alternative medication. Will send in hydrochlorothiazide 25 mg. This is a similar medication and she has been on this in the past.  Please double check with patient to make sure she is not currently taking this medication. (I am pretty sure she is no longer taking it although it is still on her medication list, last prescribed in 2015)    This will be a slight dose change. Need to check her blood pressure about 2 weeks after starting medicine.   Can be done as part of Coumadin check

## 2018-06-26 ENCOUNTER — CLINICAL SUPPORT (OUTPATIENT)
Dept: FAMILY MEDICINE CLINIC | Age: 63
End: 2018-06-26

## 2018-06-26 VITALS
HEART RATE: 80 BPM | WEIGHT: 257 LBS | HEIGHT: 70 IN | SYSTOLIC BLOOD PRESSURE: 150 MMHG | TEMPERATURE: 98.5 F | OXYGEN SATURATION: 97 % | BODY MASS INDEX: 36.79 KG/M2 | DIASTOLIC BLOOD PRESSURE: 89 MMHG | RESPIRATION RATE: 18 BRPM

## 2018-06-26 DIAGNOSIS — I82.409 RECURRENT DEEP VEIN THROMBOSIS (DVT) (HCC): ICD-10-CM

## 2018-06-26 DIAGNOSIS — Z79.01 ANTICOAGULATION GOAL OF INR 2 TO 3: ICD-10-CM

## 2018-06-26 DIAGNOSIS — Z86.711 HX PULMONARY EMBOLISM: Primary | ICD-10-CM

## 2018-06-26 DIAGNOSIS — Z51.81 ANTICOAGULATION GOAL OF INR 2 TO 3: ICD-10-CM

## 2018-06-26 LAB
INR BLD: 3.1
PT POC: 37.5 SECONDS
VALID INTERNAL CONTROL?: YES

## 2018-06-26 NOTE — MR AVS SNAPSHOT
[address] [facility] 
 
 
 [address], [place] [address] [place] [address] [facility] 
[phone] Patient: Rody Dillard MRN: [id number] :1955 Visit Information Date & Time Provider Department Dept. Phone Encounter #  
 2018 11:10 AM MD Vero Weaver [phone] [id number] Your Appointments 7/10/2018  9:30 AM  
Nurse Visit with MD Vero Weaver 58  ([address]) Appt Note: inr  
 [address], [address] [facility] [address]  
[phone]  
  
   
 [address], [place] [address] [address] Upcoming Health Maintenance Date Due ZOSTER VACCINE AGE 60> 2016 PAP AKA CERVICAL CYTOLOGY 10/21/2016 MEDICARE YEARLY EXAM 3/14/2018 COLONOSCOPY 2018 Influenza Age 5 to Adult 2018 BREAST CANCER SCRN MAMMOGRAM 3/16/2020 DTaP/Tdap/Td series (2 - Td) 2025 Allergies as of 2018  Review Complete On: 2018 By: Shelley Sawyer LPN Severity Noted Reaction Type Reactions Iodinated Contrast- Oral And Iv Dye  2012   Side Effect Hives Lasix [Furosemide]  2012   Side Effect Hives Sulfa (Sulfonamide Antibiotics)  2012   Side Effect Hives Current Immunizations  Reviewed on 2017 Name Date Influenza Vaccine 10/21/2013 Influenza Vaccine Mace Payor) 2014 Influenza Vaccine (Quad) PF 2017, 2016, 10/26/2015 Tdap 2015 Not reviewed this visit You Were Diagnosed With   
  
 Codes Comments Hx pulmonary embolism    -  Primary ICD-10-CM: B50.238 ICD-9-CM: V12.55 Recurrent deep vein thrombosis (DVT) (HCC)     ICD-10-CM: I82.409 ICD-9-CM: 453.40 Anticoagulation goal of INR 2 to 3     ICD-10-CM: Z51.81, Z79.01 
ICD-9-CM: V58.83, V58.61 Vitals BP Pulse Temp Resp Height(growth percentile) Weight(growth percentile) 150/89 (BP 1 Location: Right arm, BP Patient Position: Sitting) 80 98.5 °F (36.9 °C) 18 5' 10\" (1.778 m) 257 lb (116.6 kg) SpO2 BMI OB Status Smoking Status 97% 36.88 kg/m2 Postmenopausal Never Smoker BMI and BSA Data Body Mass Index Body Surface Area  
 36.88 kg/m 2 2.4 m 2 Preferred Pharmacy Pharmacy Name Phone [facility] [address] [facility] Cristofer Robles [address] [facility] [phone] Your Updated Medication List  
  
   
This list is accurate as of 6/26/18 11:44 AM.  Always use your most recent med list.  
  
  
  
  
 ALEVE 220 mg tablet Generic drug:  naproxen sodium Take 220 mg by mouth two (2) times daily as needed. cyclobenzaprine 5 mg tablet Commonly known as:  FLEXERIL Take 1 Tab by mouth three (3) times daily as needed for Muscle Spasm(s). diclofenac EC 75 mg EC tablet Commonly known as:  VOLTAREN Take 1 Tab by mouth two (2) times daily as needed. hydroCHLOROthiazide 25 mg tablet Commonly known as:  HYDRODIURIL Take 1 Tab by mouth daily. IRON (FERROUS SULFATE) PO Take  by mouth.  
  
 levothyroxine 50 mcg tablet Commonly known as:  SYNTHROID  
TAKE ONE TABLET BY MOUTH IN THE MORNING  
  
 potassium 99 mg tablet Take 99 mg by mouth daily. Pt is taking 2 tabs daily PREVACID PO Take  by mouth. traZODone 50 mg tablet Commonly known as:  Judithann Lade Take 1 Tab by mouth nightly. Prn sleep  
  
 venlafaxine 75 mg tablet Commonly known as:  EFFEXOR  
TAKE ONE TABLET BY MOUTH TWICE DAILY  
  
 VITAMIN D3 1,000 unit Cap Generic drug:  cholecalciferol Take  by mouth daily. warfarin 7.5 mg tablet Commonly known as:  COUMADIN Take 1 Tab by mouth daily. Take one tablet daily June 2018 Details Sun Mon Tue Wed Thu Fri Sat  
       1  
  
  
  
   2  
  
  
  
  
  3  
  
  
  
   4  
  
  
  
   5  
  
  
  
   6  
  
  
  
   7  
  
  
  
   8  
  
  
  
   9  
  
  
  
  
  10  
  
 11  
  
  
  
   12  
  
  
  
   13  
  
  
  
   14  
  
  
  
   15  
  
  
  
   16  
  
  
  
  
  17  
  
  
  
   18  
  
  
  
   19  
  
  
  
   20  
  
  
  
   21  
  
  
  
   22  
  
  
  
   23  
  
  
  
  
  24  
  
  
  
   25  
  
  
  
   26  
  
7.5 mg  
See details 27  
  
7.5 mg  
  
   28  
  
7.5 mg  
  
   29  
  
7.5 mg  
  
   30  
  
7.5 mg Date Details 06/26 This INR check INR: 3.1! How to take your warfarin dose To take:  7.5 mg Take one of the 7.5 mg tablets. July 2018 Details Hayes Rang Tue Wed Thu Fri Sat  
  1  
  
7.5 mg  
  
   2  
  
7.5 mg  
  
   3  
  
7.5 mg  
  
   4  
  
7.5 mg  
  
   5  
  
7.5 mg  
  
   6  
  
7.5 mg  
  
   7  
  
7.5 mg  
  
  
  8  
  
7.5 mg  
  
   9  
  
7.5 mg  
  
   10  
  
7.5 mg  
  
   11  
  
  
  
   12  
  
  
  
   13  
  
  
  
   14  
  
  
  
  
  15  
  
  
  
   16  
  
  
  
   17  
  
  
  
   18  
  
  
  
   19  
  
  
  
   20  
  
  
  
   21  
  
  
  
  
  22  
  
  
  
   23  
  
  
  
   24  
  
  
  
   25  
  
  
  
   26  
  
  
  
   27  
  
  
  
   28  
  
  
  
  
  29  
  
  
  
   30  
  
  
  
   31  
  
  
  
      
 Date Details No additional details Date of next INR:  7/10/2018 How to take your warfarin dose To take:  7.5 mg Take one of the 7.5 mg tablets. We Performed the Following AMB POC PT/INR [01801 CPT(R)] Introducing [facility]! Dear Janett: Thank you for requesting a [employer] account. Our records indicate that you already have an active [employer] account. You can access your account anytime at [email or web address] Did you know that you can access your hospital and ER discharge instructions at any time in [employer]? You can also review all of your test results from your hospital stay or ER visit. Additional Information If you have questions, please visit the Frequently Asked Questions section of the [employer] website at [email or web address] Remember, [employer] is NOT to be used for urgent needs. For medical emergencies, dial 911. Now available from your iPhone and Android! Please provide this summary of care documentation to your next provider. Your primary care clinician is listed as Kayla Mancini. If you have any questions after today's visit, please call [phone].

## 2018-06-26 NOTE — PROGRESS NOTES
Patient presents for a PT/INR  See med list and patient instructions along with results for verbal orders by Dr. Bryson Drivers  Results for orders placed or performed in visit on 06/26/18   AMB POC PT/INR   Result Value Ref Range    VALID INTERNAL CONTROL POC Yes     Prothrombin time (POC) 37.5 seconds    INR POC 3.1         Vitals:    06/26/18 1114   BP: 150/89   Pulse: 80   Resp: 18   Temp: 98.5 °F (36.9 °C)   SpO2: 97%   Weight: 257 lb (116.6 kg)   Height: 5' 10\" (1.778 m)     Prior to Admission medications    Medication Sig Start Date End Date Taking? Authorizing Provider   hydroCHLOROthiazide (HYDRODIURIL) 25 mg tablet Take 1 Tab by mouth daily. 6/14/18  Yes Aleena Fernández MD   naproxen sodium (ALEVE) 220 mg tablet Take 220 mg by mouth two (2) times daily as needed. Yes Historical Provider   levothyroxine (SYNTHROID) 50 mcg tablet TAKE ONE TABLET BY MOUTH IN THE MORNING 2/28/18  Yes Pernell Mayorga MD   venlafaxine (EFFEXOR) 75 mg tablet TAKE ONE TABLET BY MOUTH TWICE DAILY 1/29/18  Yes Mae Leija NP   warfarin (COUMADIN) 7.5 mg tablet Take 1 Tab by mouth daily. Take one tablet daily  Patient taking differently: Take 7.5 mg by mouth daily. Take 1/2 tab on Tuesday and Thursday. Take 7.5 mg rest of days. 11/21/17  Yes Aleena Fernández MD   cholecalciferol (VITAMIN D3) 1,000 unit cap Take  by mouth daily. Yes Historical Provider   cyclobenzaprine (FLEXERIL) 5 mg tablet Take 1 Tab by mouth three (3) times daily as needed for Muscle Spasm(s). 1/12/17  Yes Aleena Fernández MD   IRON, FERROUS SULFATE, PO Take  by mouth. Yes Historical Provider   traZODone (DESYREL) 50 mg tablet Take 1 Tab by mouth nightly. Prn sleep 9/23/15  Yes Bette Fleming MD   LANSOPRAZOLE (PREVACID PO) Take  by mouth. Yes Historical Provider   potassium 99 mg tablet Take 99 mg by mouth daily. Pt is taking 2 tabs daily   Yes Historical Provider   diclofenac EC (VOLTAREN) 75 mg EC tablet Take 1 Tab by mouth two (2) times daily as needed. 1/15/18   Barry Morin MD    Per Dr. Sharmaine Lobo continue coumadin 7.5 mg daily. He also said to have her set up a 2 week appointment with her to address her edema in legs feet and ankles, and her generalized achy joints  See anti-coag episode for instructions which are on AVS.    AVS and verbal instructions given she voiced understanding of all instructions.

## 2018-07-03 DIAGNOSIS — E03.9 HYPOTHYROIDISM, UNSPECIFIED TYPE: ICD-10-CM

## 2018-07-05 RX ORDER — LEVOTHYROXINE SODIUM 50 UG/1
TABLET ORAL
Qty: 30 TAB | Refills: 2 | OUTPATIENT
Start: 2018-07-05

## 2018-07-06 DIAGNOSIS — E03.9 HYPOTHYROIDISM, UNSPECIFIED TYPE: ICD-10-CM

## 2018-07-06 RX ORDER — LEVOTHYROXINE SODIUM 50 UG/1
TABLET ORAL
Qty: 30 TAB | Refills: 2 | Status: CANCELLED | OUTPATIENT
Start: 2018-07-06

## 2018-07-06 RX ORDER — LEVOTHYROXINE SODIUM 50 UG/1
TABLET ORAL
Qty: 30 TAB | Refills: 2 | Status: SHIPPED | OUTPATIENT
Start: 2018-07-06 | End: 2018-07-09 | Stop reason: SDUPTHER

## 2018-07-10 ENCOUNTER — OFFICE VISIT (OUTPATIENT)
Dept: FAMILY MEDICINE CLINIC | Age: 63
End: 2018-07-10

## 2018-07-10 VITALS
OXYGEN SATURATION: 94 % | HEIGHT: 70 IN | DIASTOLIC BLOOD PRESSURE: 103 MMHG | TEMPERATURE: 97.8 F | WEIGHT: 258 LBS | SYSTOLIC BLOOD PRESSURE: 159 MMHG | BODY MASS INDEX: 36.94 KG/M2 | HEART RATE: 80 BPM

## 2018-07-10 DIAGNOSIS — Z86.711 HX PULMONARY EMBOLISM: ICD-10-CM

## 2018-07-10 DIAGNOSIS — I89.0 CHRONIC ACQUIRED LYMPHEDEMA: ICD-10-CM

## 2018-07-10 DIAGNOSIS — I10 ESSENTIAL HYPERTENSION: ICD-10-CM

## 2018-07-10 DIAGNOSIS — I10 ESSENTIAL HYPERTENSION, BENIGN: ICD-10-CM

## 2018-07-10 DIAGNOSIS — M75.102 ROTATOR CUFF SYNDROME OF LEFT SHOULDER: Primary | ICD-10-CM

## 2018-07-10 LAB
INR BLD: 2.7
PT POC: 32.3 SECONDS
VALID INTERNAL CONTROL?: YES

## 2018-07-10 RX ORDER — LEVOTHYROXINE SODIUM 50 UG/1
TABLET ORAL
Qty: 30 TAB | Refills: 2 | Status: SHIPPED | OUTPATIENT
Start: 2018-07-10 | End: 2018-12-12 | Stop reason: SDUPTHER

## 2018-07-10 NOTE — MR AVS SNAPSHOT
[address] [facility] [address] 
 
 
 [address] 
[phone] Patient: Inna Rico MRN: [id number] :1955 Visit Information Date & Time Provider Department Dept. Phone Encounter #  
 7/10/2018 10:40 AM Korey Unger MD [address] [facility] [phone] [id number] Upcoming Health Maintenance Date Due ZOSTER VACCINE AGE 60> 2016 PAP AKA CERVICAL CYTOLOGY 10/21/2016 MEDICARE YEARLY EXAM 3/14/2018 COLONOSCOPY 2018 Influenza Age 5 to Adult 2018 BREAST CANCER SCRN MAMMOGRAM 3/16/2020 DTaP/Tdap/Td series (2 - Td) 2025 Allergies as of 7/10/2018  Review Complete On: 7/10/2018 By: Olya Martin Severity Noted Reaction Type Reactions Iodinated Contrast- Oral And Iv Dye  2012   Side Effect Hives Lasix [Furosemide]  2012   Side Effect Hives Sulfa (Sulfonamide Antibiotics)  2012   Side Effect Hives Current Immunizations  Reviewed on 2017 Name Date Influenza Vaccine 10/21/2013 Influenza Vaccine Nadean Borrero) 2014 Influenza Vaccine (Quad) PF 2017, 2016, 10/26/2015 Tdap 2015 Not reviewed this visit You Were Diagnosed With   
  
 Codes Comments Hx pulmonary embolism    -  Primary ICD-10-CM: X57.131 ICD-9-CM: V12.55 Rotator cuff syndrome of left shoulder     ICD-10-CM: M75.102 ICD-9-CM: 726.10 Essential hypertension     ICD-10-CM: I10 
ICD-9-CM: 401.9 Vitals Height(growth percentile) OB Status Smoking Status 5' 10\" (1.778 m) Postmenopausal Never Smoker Preferred Pharmacy Pharmacy Name Phone [facility]  Cristofer Blevins [address] [facility] [phone] Your Updated Medication List  
  
   
This list is accurate as of 7/10/18 10:45 AM.  Always use your most recent med list.  
  
  
  
  
 ALEVE 220 mg tablet Generic drug:  naproxen sodium Take 220 mg by mouth two (2) times daily as needed. cyclobenzaprine 5 mg tablet Commonly known as:  FLEXERIL Take 1 Tab by mouth three (3) times daily as needed for Muscle Spasm(s). diclofenac EC 75 mg EC tablet Commonly known as:  VOLTAREN Take 1 Tab by mouth two (2) times daily as needed. hydroCHLOROthiazide 25 mg tablet Commonly known as:  HYDRODIURIL Take 1 Tab by mouth daily. IRON (FERROUS SULFATE) PO Take  by mouth.  
  
 levothyroxine 50 mcg tablet Commonly known as:  SYNTHROID  
TAKE ONE TABLET BY MOUTH IN THE MORNING  
  
 potassium 99 mg tablet Take 99 mg by mouth daily. Pt is taking 2 tabs daily PREVACID PO Take  by mouth. traZODone 50 mg tablet Commonly known as:  Mitchell Cower Take 1 Tab by mouth nightly. Prn sleep  
  
 venlafaxine 75 mg tablet Commonly known as:  EFFEXOR  
TAKE ONE TABLET BY MOUTH TWICE DAILY  
  
 VITAMIN D3 1,000 unit Cap Generic drug:  cholecalciferol Take  by mouth daily. warfarin 7.5 mg tablet Commonly known as:  COUMADIN Take 1 Tab by mouth daily. Take one tablet daily July 2018 Details Sun Mon Tue Wed Thu Fri Sat  
  1  
  
  
  
   2  
  
  
  
   3  
  
  
  
   4  
  
  
  
   5  
  
  
  
   6  
  
  
  
   7  
  
  
  
  
  8  
  
  
  
   9  
  
  
  
   10  
  
7.5 mg  
See details 11  
  
7.5 mg  
  
   12  
  
7.5 mg  
  
   13  
  
7.5 mg  
  
   14  
  
7.5 mg  
  
  
  15  
  
7.5 mg  
  
   16  
  
7.5 mg  
  
   17  
  
7.5 mg  
  
   18  
  
7.5 mg  
  
   19  
  
7.5 mg  
  
   20  
  
7.5 mg  
  
   21  
  
7.5 mg  
  
  
  22  
  
7.5 mg  
  
   23  
  
7.5 mg  
  
   24  
  
7.5 mg  
  
   25  
  
7.5 mg  
  
   26  
  
7.5 mg  
  
   27  
  
7.5 mg  
  
   28  
  
7.5 mg  
  
  
  29  
  
7.5 mg  
  
   30  
  
7.5 mg  
  
   31  
  
7.5 mg Date Details 07/10 This INR check INR: 2.7 How to take your warfarin dose To take:  7.5 mg Take one of the 7.5 mg tablets. August 2018 Details Carolina Chamberlain Wed Thu Fri Sat  
     1  
  
7.5 mg  
  
   2  
  
7.5 mg  
  
   3  
  
7.5 mg  
  
   4  
  
7.5 mg  
  
  
  5  
  
7.5 mg  
  
   6  
  
7.5 mg  
  
   7  
  
7.5 mg  
  
   8  
  
7.5 mg  
  
   9  
  
7.5 mg  
  
   10  
  
7.5 mg  
  
   11  
  
  
  
  
  12  
  
  
  
   13  
  
  
  
   14  
  
  
  
   15  
  
  
  
   16  
  
  
  
   17  
  
  
  
   18  
  
  
  
  
  19  
  
  
  
   20  
  
  
  
   21  
  
  
  
   22  
  
  
  
   23  
  
  
  
   24  
  
  
  
   25  
  
  
  
  
  26  
  
  
  
   27  
  
  
  
   28  
  
  
  
   29  
  
  
  
   30  
  
  
  
   31  
  
  
  
   
 Date Details No additional details Date of next INR:  8/10/2018 How to take your warfarin dose To take:  7.5 mg Take one of the 7.5 mg tablets. We Performed the Following AMB POC PT/INR [67310 CPT(R)] Patient Instructions Knee Arthritis: Exercises Your Care Instructions Here are some examples of exercises for knee arthritis. Start each exercise slowly. Ease off the exercise if you start to have pain. Your doctor or physical therapist will tell you when you can start these exercises and which ones will work best for you. How to do the exercises Knee flexion with heel slide 1. Lie on your back with your knees bent. 2. Slide your heel back by bending your affected knee as far as you can. Then hook your other foot around your ankle to help pull your heel even farther back. 3. Hold for about 6 seconds, then rest for up to 10 seconds. 4. Repeat 8 to 12 times. 5. Switch legs and repeat steps 1 through 4, even if only one knee is sore. [employer] 1. Sit with your affected leg straight and supported on the floor or a firm bed. Place a small, rolled-up towel under your knee. Your other leg should be bent, with that foot flat on the floor. 2. Tighten the thigh muscles of your affected leg by pressing the back of your knee down into the towel. 3. Hold for about 6 seconds, then rest for up to 10 seconds. 4. Repeat 8 to 12 times. 5. Switch legs and repeat steps 1 through 4, even if only one knee is sore. Straight-leg raises to the front 1. Lie on your back with your good knee bent so that your foot rests flat on the floor. Your affected leg should be straight. Make sure that your low back has a normal curve. You should be able to slip your hand in between the floor and the small of your back, with your palm touching the floor and your back touching the back of your hand. 2. Tighten the thigh muscles in your affected leg by pressing the back of your knee flat down to the floor. Hold your knee straight. 3. Keeping the thigh muscles tight and your leg straight, lift your affected leg up so that your heel is about 12 inches off the floor. Hold for about 6 seconds, then lower slowly. 4. Relax for up to 10 seconds between repetitions. 5. Repeat 8 to 12 times. 6. Switch legs and repeat steps 1 through 5, even if only one knee is sore. Active knee flexion 1. Lie on your stomach with your knees straight. If your kneecap is uncomfortable, roll up a washcloth and put it under your leg just above your kneecap. 2. Lift the foot of your affected leg by bending the knee so that you bring the foot up toward your buttock. If this motion hurts, try it without bending your knee quite as far. This may help you avoid any painful motion. 3. Slowly move your leg up and down. 4. Repeat 8 to 12 times. 5. Switch legs and repeat steps 1 through 4, even if only one knee is sore. Quadriceps stretch (facedown) 1. Lie flat on your stomach, and rest your face on the floor. 2. Wrap a towel or belt strap around the lower part of your affected leg. Then use the towel or belt strap to slowly pull your heel toward your buttock until you feel a stretch. 3. Hold for about 15 to 30 seconds, then relax your leg against the towel or belt strap. 4. Repeat 2 to 4 times. 5. Switch legs and repeat steps 1 through 4, even if only one knee is sore. Stationary exercise bike 1. If you do not have a stationary exercise bike at home, you can find one to ride at your local health club or community center. 2. Adjust the height of the bike seat so that your knee is slightly bent when your leg is extended downward. If your knee hurts when the pedal reaches the top, you can raise the seat so that your knee does not bend as much. 3. Start slowly. At first, try to do 5 to 10 minutes of cycling with little to no resistance. Then increase your time and the resistance bit by bit until you can do 20 to 30 minutes without pain. 4. If you start to have pain, rest your knee until your pain gets back to the level that is normal for you. Or cycle for less time or with less effort. Follow-up care is a key part of your treatment and safety. Be sure to make and go to all appointments, and call your doctor if you are having problems. It's also a good idea to know your test results and keep a list of the medicines you take. Where can you learn more? Go to http://jenelle-shravan.info/. Enter C159 in the search box to learn more about \"Knee Arthritis: Exercises. \" Current as of: March 21, 2017 Content Version: 11.4 © 1959-6313 [employer]. Care instructions adapted under license by [employer] (which disclaims liability or warranty for this information). If you have questions about a medical condition or this instruction, always ask your healthcare professional. [place] [address] any warranty or liability for your use of this information. Rotator Cuff: Exercises Your Care Instructions Here are some examples of typical rehabilitation exercises for your condition. Start each exercise slowly. Ease off the exercise if you start to have pain. Your doctor or physical therapist will tell you when you can start these exercises and which ones will work best for you. How to do the exercises Pendulum swing If you have pain in your back, do not do this exercise. 6. Hold on to a table or the back of a chair with your good arm. Then bend forward a little and let your sore arm hang straight down. This exercise does not use the arm muscles. Rather, use your legs and your hips to create movement that makes your arm swing freely. 7. Use the movement from your hips and legs to guide the slightly swinging arm back and forth like a pendulum (or elephant trunk). Then guide it in circles that start small (about the size of a dinner plate). Make the circles a bit larger each day, as your pain allows. 8. Do this exercise for 5 minutes, 5 to 7 times each day. 9. As you have less pain, try bending over a little farther to do this exercise. This will increase the amount of movement at your shoulder. Posterior stretching exercise 6. Hold the elbow of your injured arm with your other hand. 7. Use your hand to pull your injured arm gently up and across your body. You will feel a gentle stretch across the back of your injured shoulder. 8. Hold for at least 15 to 30 seconds. Then slowly lower your arm. 9. Repeat 2 to 4 times. Up-the-back stretch Your doctor or physical therapist may want you to wait to do this stretch until you have regained most of your range of motion and strength. You can do this stretch in different ways. Hold any of these stretches for at least 15 to 30 seconds. Repeat them 2 to 4 times. 7. Put your hand in your back pocket. Let it rest there to stretch your shoulder. 8. With your other hand, hold your injured arm (palm outward) behind your back by the wrist. Pull your arm up gently to stretch your shoulder. 9. Next, put a towel over your other shoulder. Put the hand of your injured arm behind your back. Now hold the back end of the towel. With the other hand, hold the front end of the towel in front of your body. Pull gently on the front end of the towel. This will bring your hand farther up your back to stretch your shoulder. Overhead stretch 6. Standing about an arm's length away, grasp onto a solid surface. You could use a countertop, a doorknob, or the back of a sturdy chair. 7. With your knees slightly bent, bend forward with your arms straight. Lower your upper body, and let your shoulders stretch. 8. As your shoulders are able to stretch farther, you may need to take a step or two backward. 9. Hold for at least 15 to 30 seconds. Then stand up and relax. If you had stepped back during your stretch, step forward so you can keep your hands on the solid surface. 10. Repeat 2 to 4 times. Shoulder flexion (lying down) To make a wand for this exercise, use a piece of PVC pipe or a broom handle with the broom removed. Make the wand about a foot wider than your shoulders. 6. Lie on your back, holding a wand with both hands. Your palms should face down as you hold the wand. 7. Keeping your elbows straight, slowly raise your arms over your head. Raise them until you feel a stretch in your shoulders, upper back, and chest. 
8. Hold for 15 to 30 seconds. 9. Repeat 2 to 4 times. Shoulder rotation (lying down) To make a wand for this exercise, use a piece of PVC pipe or a broom handle with the broom removed. Make the wand about a foot wider than your shoulders. 5. Lie on your back. Hold a wand with both hands with your elbows bent and palms up. 6. Keep your elbows close to your body, and move the wand across your body toward the sore arm. 7. Hold for 8 to 12 seconds. 8. Repeat 2 to 4 times. Wall climbing (to the side) Avoid any movement that is straight to your side, and be careful not to arch your back. Your arm should stay about 30 degrees to the front of your side. 1. Stand with your side to a wall so that your fingers can just touch it at an angle about 30 degrees toward the front of your body. 2. Walk the fingers of your injured arm up the wall as high as pain permits. Try not to shrug your shoulder up toward your ear as you move your arm up. 3. Hold that position for a count of at least 15 to 20. 
4. Walk your fingers back down to the starting position. 5. Repeat at least 2 to 4 times. Try to reach higher each time. Wall climbing (to the front) During this stretching exercise, be careful not to arch your back. 1. Face a wall, and stand so your fingers can just touch it. 2. Keeping your shoulder down, walk the fingers of your injured arm up the wall as high as pain permits. (Don't shrug your shoulder up toward your ear.) 3. Hold your arm in that position for at least 15 to 30 seconds. 4. Slowly walk your fingers back down to where you started. 5. Repeat at least 2 to 4 times. Try to reach higher each time. Shoulder blade squeeze 1. Stand with your arms at your sides, and squeeze your shoulder blades together. Do not raise your shoulders up as you squeeze. 2. Hold 6 seconds. 3. Repeat 8 to 12 times. Scapular exercise: Arm reach 1. Lie flat on your back. This exercise is a very slight motion that starts with your arms raised (elbows straight, arms straight). 2. From this position, reach higher toward the hemal or ceiling. Keep your elbows straight. All motion should be from your shoulder blade only. 3. Relax your arms back to where you started. 4. Repeat 8 to 12 times. Arm raise to the side During this strengthening exercise, your arm should stay about 30 degrees to the front of your side. 1. Slowly raise your injured arm to the side, with your thumb facing up. Raise your arm 60 degrees at the most (shoulder level is 90 degrees). 2. Hold the position for 3 to 5 seconds. Then lower your arm back to your side. If you need to, bring your \"good\" arm across your body and place it under the elbow as you lower your injured arm. Use your good arm to keep your injured arm from dropping down too fast. 
3. Repeat 8 to 12 times. 4. When you first start out, don't hold any extra weight in your hand. As you get stronger, you may use a 1-pound to 2-pound dumbbell or a small can of food. Shoulder flexor and extensor exercise These are isometric exercises. That means you contract your muscles without actually moving. 1. Push forward (flex): Stand facing a wall or doorjamb, about 6 inches or less back. Hold your injured arm against your body. Make a closed fist with your thumb on top. Then gently push your hand forward into the wall with about 25% to 50% of your strength. Don't let your body move backward as you push. Hold for about 6 seconds. Relax for a few seconds. Repeat 8 to 12 times. 2. Push backward (extend): Stand with your back flat against a wall. Your upper arm should be against the wall, with your elbow bent 90 degrees (your hand straight ahead). Push your elbow gently back against the wall with about 25% to 50% of your strength. Don't let your body move forward as you push. Hold for about 6 seconds. Relax for a few seconds. Repeat 8 to 12 times. Scapular exercise: Wall push-ups This exercise is best done with your fingers somewhat turned out, rather than straight up and down. 1. Stand facing a wall, about 12 inches to [address] your hands on the wall at shoulder height. 3. Slowly bend your elbows and bring your face to the wall. Keep your back and hips straight. 4. Push back to where you started. 5. Repeat 8 to 12 times. 6. When you can do this exercise against a wall comfortably, you can try it against a counter. You can then slowly progress to the end of a couch, then to a sturdy chair, and finally to the floor. Scapular exercise: Retraction For this exercise, you will need elastic exercise material, such as surgical tubing or Thera-Band. 1. Put the band around a solid object at about waist level. (A bedpost will work well.) Each hand should hold an end of the band. 2. With your elbows at your sides and bent to 90 degrees, pull the band back. Your shoulder blades should move toward each other. Then move your arms back where you started. 3. Repeat 8 to 12 times. 4. If you have good range of motion in your shoulders, try this exercise with your arms lifted out to the sides. Keep your elbows at a 90-degree angle. Raise the elastic band up to about shoulder level. Pull the band back to move your shoulder blades toward each other. Then move your arms back where you started. Internal rotator strengthening exercise 1. Start by tying a piece of elastic exercise material to a doorknob. You can use surgical tubing or Thera-Band. 2. Stand or sit with your shoulder relaxed and your elbow bent 90 degrees. Your upper arm should rest comfortably against your side. Squeeze a rolled towel between your elbow and your body for comfort. This will help keep your arm at your side. 3. Hold one end of the elastic band in the hand of the painful arm. 4. Slowly rotate your forearm toward your body until it touches your belly. Slowly move it back to where you started. 5. Keep your elbow and upper arm firmly tucked against the towel roll or at your side. 6. Repeat 8 to 12 times. External rotator strengthening exercise 1. Start by tying a piece of elastic exercise material to a doorknob. You can use surgical tubing or Thera-Band. (You may also hold one end of the band in each hand.) 2. Stand or sit with your shoulder relaxed and your elbow bent 90 degrees. Your upper arm should rest comfortably against your side. Squeeze a rolled towel between your elbow and your body for comfort. This will help keep your arm at your side. 3. Hold one end of the elastic band with the hand of the painful arm. 4. Start with your forearm across your belly. Slowly rotate the forearm out away from your body. Keep your elbow and upper arm tucked against the towel roll or the side of your body until you begin to feel tightness in your shoulder. Slowly move your arm back to where you started. 5. Repeat 8 to 12 times. Follow-up care is a key part of your treatment and safety. Be sure to make and go to all appointments, and call your doctor if you are having problems. It's also a good idea to know your test results and keep a list of the medicines you take. Where can you learn more? Go to http://jenelle-shravan.info/. Enter Saritha Carrasquillo in the search box to learn more about \"Rotator Cuff: Exercises. \" Current as of: March 21, 2017 Content Version: 11.4 © 9622-7796 [employer]. Care instructions adapted under license by [employer] (which disclaims liability or warranty for this information). If you have questions about a medical condition or this instruction, always ask your healthcare professional. [place] [address] any warranty or liability for your use of this information. Introducing [facility]! Dear Janett: Thank you for requesting a [employer] account. Our records indicate that you already have an active [employer] account. You can access your account anytime at [email or web address] Did you know that you can access your hospital and ER discharge instructions at any time in [employer]? You can also review all of your test results from your hospital stay or ER visit. Additional Information If you have questions, please visit the Frequently Asked Questions section of the [employer] website at [email or web address] Remember, [employer] is NOT to be used for urgent needs. For medical emergencies, dial 911. Now available from your iPhone and Android! Please provide this summary of care documentation to your next provider. Your primary care clinician is listed as Missael Beverly. If you have any questions after today's visit, please call [phone].

## 2018-07-10 NOTE — PATIENT INSTRUCTIONS
Knee Arthritis: Exercises  Your Care Instructions  Here are some examples of exercises for knee arthritis. Start each exercise slowly. Ease off the exercise if you start to have pain. Your doctor or physical therapist will tell you when you can start these exercises and which ones will work best for you. How to do the exercises  Knee flexion with heel slide    1. Lie on your back with your knees bent. 2. Slide your heel back by bending your affected knee as far as you can. Then hook your other foot around your ankle to help pull your heel even farther back. 3. Hold for about 6 seconds, then rest for up to 10 seconds. 4. Repeat 8 to 12 times. 5. Switch legs and repeat steps 1 through 4, even if only one knee is sore. Quad sets    1. Sit with your affected leg straight and supported on the floor or a firm bed. Place a small, rolled-up towel under your knee. Your other leg should be bent, with that foot flat on the floor. 2. Tighten the thigh muscles of your affected leg by pressing the back of your knee down into the towel. 3. Hold for about 6 seconds, then rest for up to 10 seconds. 4. Repeat 8 to 12 times. 5. Switch legs and repeat steps 1 through 4, even if only one knee is sore. Straight-leg raises to the front    1. Lie on your back with your good knee bent so that your foot rests flat on the floor. Your affected leg should be straight. Make sure that your low back has a normal curve. You should be able to slip your hand in between the floor and the small of your back, with your palm touching the floor and your back touching the back of your hand. 2. Tighten the thigh muscles in your affected leg by pressing the back of your knee flat down to the floor. Hold your knee straight. 3. Keeping the thigh muscles tight and your leg straight, lift your affected leg up so that your heel is about 12 inches off the floor. Hold for about 6 seconds, then lower slowly.   4. Relax for up to 10 seconds between repetitions. 5. Repeat 8 to 12 times. 6. Switch legs and repeat steps 1 through 5, even if only one knee is sore. Active knee flexion    1. Lie on your stomach with your knees straight. If your kneecap is uncomfortable, roll up a washcloth and put it under your leg just above your kneecap. 2. Lift the foot of your affected leg by bending the knee so that you bring the foot up toward your buttock. If this motion hurts, try it without bending your knee quite as far. This may help you avoid any painful motion. 3. Slowly move your leg up and down. 4. Repeat 8 to 12 times. 5. Switch legs and repeat steps 1 through 4, even if only one knee is sore. Quadriceps stretch (facedown)    1. Lie flat on your stomach, and rest your face on the floor. 2. Wrap a towel or belt strap around the lower part of your affected leg. Then use the towel or belt strap to slowly pull your heel toward your buttock until you feel a stretch. 3. Hold for about 15 to 30 seconds, then relax your leg against the towel or belt strap. 4. Repeat 2 to 4 times. 5. Switch legs and repeat steps 1 through 4, even if only one knee is sore. Stationary exercise bike    1. If you do not have a stationary exercise bike at home, you can find one to ride at your local health club or community center. 2. Adjust the height of the bike seat so that your knee is slightly bent when your leg is extended downward. If your knee hurts when the pedal reaches the top, you can raise the seat so that your knee does not bend as much. 3. Start slowly. At first, try to do 5 to 10 minutes of cycling with little to no resistance. Then increase your time and the resistance bit by bit until you can do 20 to 30 minutes without pain. 4. If you start to have pain, rest your knee until your pain gets back to the level that is normal for you. Or cycle for less time or with less effort. Follow-up care is a key part of your treatment and safety.  Be sure to make and go to all appointments, and call your doctor if you are having problems. It's also a good idea to know your test results and keep a list of the medicines you take. Where can you learn more? Go to http://jenelle-shravan.info/. Enter C159 in the search box to learn more about \"Knee Arthritis: Exercises. \"  Current as of: March 21, 2017  Content Version: 11.4  © 2006-2017 [employer]. Care instructions adapted under license by [employer] (which disclaims liability or warranty for this information). If you have questions about a medical condition or this instruction, always ask your healthcare professional. [place] [address] any warranty or liability for your use of this information. Rotator Cuff: Exercises  Your Care Instructions  Here are some examples of typical rehabilitation exercises for your condition. Start each exercise slowly. Ease off the exercise if you start to have pain. Your doctor or physical therapist will tell you when you can start these exercises and which ones will work best for you. How to do the exercises  Pendulum swing    If you have pain in your back, do not do this exercise. 6. Hold on to a table or the back of a chair with your good arm. Then bend forward a little and let your sore arm hang straight down. This exercise does not use the arm muscles. Rather, use your legs and your hips to create movement that makes your arm swing freely. 7. Use the movement from your hips and legs to guide the slightly swinging arm back and forth like a pendulum (or elephant trunk). Then guide it in circles that start small (about the size of a dinner plate). Make the circles a bit larger each day, as your pain allows. 8. Do this exercise for 5 minutes, 5 to 7 times each day. 9. As you have less pain, try bending over a little farther to do this exercise. This will increase the amount of movement at your shoulder.   Posterior stretching exercise    6. Hold the elbow of your injured arm with your other hand. 7. Use your hand to pull your injured arm gently up and across your body. You will feel a gentle stretch across the back of your injured shoulder. 8. Hold for at least 15 to 30 seconds. Then slowly lower your arm. 9. Repeat 2 to 4 times. Up-the-back stretch    Your doctor or physical therapist may want you to wait to do this stretch until you have regained most of your range of motion and strength. You can do this stretch in different ways. Hold any of these stretches for at least 15 to 30 seconds. Repeat them 2 to 4 times. 7. Put your hand in your back pocket. Let it rest there to stretch your shoulder. 8. With your other hand, hold your injured arm (palm outward) behind your back by the wrist. Pull your arm up gently to stretch your shoulder. 9. Next, put a towel over your other shoulder. Put the hand of your injured arm behind your back. Now hold the back end of the towel. With the other hand, hold the front end of the towel in front of your body. Pull gently on the front end of the towel. This will bring your hand farther up your back to stretch your shoulder. Overhead stretch    6. Standing about an arm's length away, grasp onto a solid surface. You could use a countertop, a doorknob, or the back of a sturdy chair. 7. With your knees slightly bent, bend forward with your arms straight. Lower your upper body, and let your shoulders stretch. 8. As your shoulders are able to stretch farther, you may need to take a step or two backward. 9. Hold for at least 15 to 30 seconds. Then stand up and relax. If you had stepped back during your stretch, step forward so you can keep your hands on the solid surface. 10. Repeat 2 to 4 times. Shoulder flexion (lying down)    To make a wand for this exercise, use a piece of PVC pipe or a broom handle with the broom removed. Make the wand about a foot wider than your shoulders.   6. Lie on your back, holding a wand with both hands. Your palms should face down as you hold the wand. 7. Keeping your elbows straight, slowly raise your arms over your head. Raise them until you feel a stretch in your shoulders, upper back, and chest.  8. Hold for 15 to 30 seconds. 9. Repeat 2 to 4 times. Shoulder rotation (lying down)    To make a wand for this exercise, use a piece of PVC pipe or a broom handle with the broom removed. Make the wand about a foot wider than your shoulders. 5. Lie on your back. Hold a wand with both hands with your elbows bent and palms up. 6. Keep your elbows close to your body, and move the wand across your body toward the sore arm. 7. Hold for 8 to 12 seconds. 8. Repeat 2 to 4 times. Wall climbing (to the side)    Avoid any movement that is straight to your side, and be careful not to arch your back. Your arm should stay about 30 degrees to the front of your side. 1. Stand with your side to a wall so that your fingers can just touch it at an angle about 30 degrees toward the front of your body. 2. Walk the fingers of your injured arm up the wall as high as pain permits. Try not to shrug your shoulder up toward your ear as you move your arm up. 3. Hold that position for a count of at least 15 to 20.  4. Walk your fingers back down to the starting position. 5. Repeat at least 2 to 4 times. Try to reach higher each time. Wall climbing (to the front)    During this stretching exercise, be careful not to arch your back. 1. Face a wall, and stand so your fingers can just touch it. 2. Keeping your shoulder down, walk the fingers of your injured arm up the wall as high as pain permits. (Don't shrug your shoulder up toward your ear.)  3. Hold your arm in that position for at least 15 to 30 seconds. 4. Slowly walk your fingers back down to where you started. 5. Repeat at least 2 to 4 times. Try to reach higher each time. Shoulder blade squeeze    1.  Stand with your arms at your sides, and squeeze your shoulder blades together. Do not raise your shoulders up as you squeeze. 2. Hold 6 seconds. 3. Repeat 8 to 12 times. Scapular exercise: Arm reach    1. Lie flat on your back. This exercise is a very slight motion that starts with your arms raised (elbows straight, arms straight). 2. From this position, reach higher toward the hemal or ceiling. Keep your elbows straight. All motion should be from your shoulder blade only. 3. Relax your arms back to where you started. 4. Repeat 8 to 12 times. Arm raise to the side    During this strengthening exercise, your arm should stay about 30 degrees to the front of your side. 1. Slowly raise your injured arm to the side, with your thumb facing up. Raise your arm 60 degrees at the most (shoulder level is 90 degrees). 2. Hold the position for 3 to 5 seconds. Then lower your arm back to your side. If you need to, bring your \"good\" arm across your body and place it under the elbow as you lower your injured arm. Use your good arm to keep your injured arm from dropping down too fast.  3. Repeat 8 to 12 times. 4. When you first start out, don't hold any extra weight in your hand. As you get stronger, you may use a 1-pound to 2-pound dumbbell or a small can of food. Shoulder flexor and extensor exercise    These are isometric exercises. That means you contract your muscles without actually moving. 1. Push forward (flex): Stand facing a wall or doorjamb, about 6 inches or less back. Hold your injured arm against your body. Make a closed fist with your thumb on top. Then gently push your hand forward into the wall with about 25% to 50% of your strength. Don't let your body move backward as you push. Hold for about 6 seconds. Relax for a few seconds. Repeat 8 to 12 times. 2. Push backward (extend): Stand with your back flat against a wall. Your upper arm should be against the wall, with your elbow bent 90 degrees (your hand straight ahead).  Push your elbow gently back against the wall with about 25% to 50% of your strength. Don't let your body move forward as you push. Hold for about 6 seconds. Relax for a few seconds. Repeat 8 to 12 times. Scapular exercise: Wall push-ups    This exercise is best done with your fingers somewhat turned out, rather than straight up and down. 1. Stand facing a wall, about 12 inches to 18 inches away. 2. Place your hands on the wall at shoulder height. 3. Slowly bend your elbows and bring your face to the wall. Keep your back and hips straight. 4. Push back to where you started. 5. Repeat 8 to 12 times. 6. When you can do this exercise against a wall comfortably, you can try it against a counter. You can then slowly progress to the end of a couch, then to a sturdy chair, and finally to the floor. Scapular exercise: Retraction    For this exercise, you will need elastic exercise material, such as surgical tubing or Thera-Band. 1. Put the band around a solid object at about waist level. (A bedpost will work well.) Each hand should hold an end of the band. 2. With your elbows at your sides and bent to 90 degrees, pull the band back. Your shoulder blades should move toward each other. Then move your arms back where you started. 3. Repeat 8 to 12 times. 4. If you have good range of motion in your shoulders, try this exercise with your arms lifted out to the sides. Keep your elbows at a 90-degree angle. Raise the elastic band up to about shoulder level. Pull the band back to move your shoulder blades toward each other. Then move your arms back where you started. Internal rotator strengthening exercise    1. Start by tying a piece of elastic exercise material to a doorknob. You can use surgical tubing or Thera-Band. 2. Stand or sit with your shoulder relaxed and your elbow bent 90 degrees. Your upper arm should rest comfortably against your side. Squeeze a rolled towel between your elbow and your body for comfort.  This will help keep your arm at your side. 3. Hold one end of the elastic band in the hand of the painful arm. 4. Slowly rotate your forearm toward your body until it touches your belly. Slowly move it back to where you started. 5. Keep your elbow and upper arm firmly tucked against the towel roll or at your side. 6. Repeat 8 to 12 times. External rotator strengthening exercise    1. Start by tying a piece of elastic exercise material to a doorknob. You can use surgical tubing or Thera-Band. (You may also hold one end of the band in each hand.)  2. Stand or sit with your shoulder relaxed and your elbow bent 90 degrees. Your upper arm should rest comfortably against your side. Squeeze a rolled towel between your elbow and your body for comfort. This will help keep your arm at your side. 3. Hold one end of the elastic band with the hand of the painful arm. 4. Start with your forearm across your belly. Slowly rotate the forearm out away from your body. Keep your elbow and upper arm tucked against the towel roll or the side of your body until you begin to feel tightness in your shoulder. Slowly move your arm back to where you started. 5. Repeat 8 to 12 times. Follow-up care is a key part of your treatment and safety. Be sure to make and go to all appointments, and call your doctor if you are having problems. It's also a good idea to know your test results and keep a list of the medicines you take. Where can you learn more? Go to http://jenelle-shravan.info/. Enter Lizett Asp in the search box to learn more about \"Rotator Cuff: Exercises. \"  Current as of: March 21, 2017  Content Version: 11.4  © 4567-7887 [employer]. Care instructions adapted under license by [employer] (which disclaims liability or warranty for this information).  If you have questions about a medical condition or this instruction, always ask your healthcare professional. [employer] disclaims any warranty or liability for your use of this information.

## 2018-07-10 NOTE — PROGRESS NOTES
SARKIS  Dm Milton is a 65 y.o. female who presents with a complaint of bilateral lower extremity edema, left knee pain/stiffness, left hip discomfort, left shoulder pain. As it has become harder this summer she has noticed increased lower extremity edema. When she wakes up in the morning her ankles are slim and shortly after she gets up on her feet she notices that the bilateral ankles swell. This is mostly cosmetically displeasing to her. Her left knee is bothering her. I have injected this in the latter half of May. That injection gave her relief for 5-6 weeks. She has done physical therapy on this knee and has continued to do home exercises for this knee. She is limping a little bit and as a result appears to have developed trochanteric bursitis. More chronically (on the course of years) she has left shoulder pain that has been bothering her. This will tweak if she goes into certain positions. It aches at night sometimes. She has never done physical therapy for this so she did see an orthopedist for this in the past but does not remember much from that visit she has never had shoulder surgery. The pain is on the lateral aspect of the shoulder and is worse with overhead motions    PMHx:  Past Medical History:   Diagnosis Date    Arthritis     ASD (atrial septal defect)     age 7 septal defect closures MCV    Depression     Hypertension     Lymphedema of leg     Thromboembolus ([address]     hx bilateral DVT. long term use Coumadin    Thyroid disease        Meds:   Current Outpatient Prescriptions   Medication Sig Dispense Refill    levothyroxine (SYNTHROID) 50 mcg tablet TAKE ONE TABLET BY MOUTH IN THE MORNING 30 Tab 2    hydroCHLOROthiazide (HYDRODIURIL) 25 mg tablet Take 1 Tab by mouth daily. 90 Tab 3    naproxen sodium (ALEVE) 220 mg tablet Take 220 mg by mouth two (2) times daily as needed.       venlafaxine (EFFEXOR) 75 mg tablet TAKE ONE TABLET BY MOUTH TWICE DAILY 180 Tab 3    diclofenac EC (VOLTAREN) 75 mg EC tablet Take 1 Tab by mouth two (2) times daily as needed. 60 Tab 2    warfarin (COUMADIN) 7.5 mg tablet Take 1 Tab by mouth daily. Take one tablet daily (Patient taking differently: Take 7.5 mg by mouth daily. Take 1/2 tab on Tuesday and Thursday. Take 7.5 mg rest of days.) 90 Tab 3    cholecalciferol (VITAMIN D3) 1,000 unit cap Take  by mouth daily.  cyclobenzaprine (FLEXERIL) 5 mg tablet Take 1 Tab by mouth three (3) times daily as needed for Muscle Spasm(s). 60 Tab 1    IRON, FERROUS SULFATE, PO Take  by mouth.  traZODone (DESYREL) 50 mg tablet Take 1 Tab by mouth nightly. Prn sleep 30 Tab 2    LANSOPRAZOLE (PREVACID PO) Take  by mouth.  potassium 99 mg tablet Take 99 mg by mouth daily. Pt is taking 2 tabs daily         Allergies: Allergies   Allergen Reactions    Iodinated Contrast- Oral And Iv Dye Hives    Lasix [Furosemide] Hives    Sulfa (Sulfonamide Antibiotics) Hives       Smoker:  History   Smoking Status    Never Smoker   Smokeless Tobacco    Never Used       ETOH:   History   Alcohol Use No       FH:   Family History   Problem Relation Age of Onset    Heart Disease Mother     Heart Disease Father        ROS:   As listed in HPI. In addition:  Constitutional:   No headache, fever, fatigue, weight loss or weight gain      Cardiac:    No chest pain      Resp:   No cough or shortness of breath      Neuro   No loss of consciousness, dizziness, seizures      Physical Exam:  Blood pressure (!) 159/103, pulse 80, temperature 97.8 °F (36.6 °C), height 5' 10\" (1.778 m), weight 258 lb (117 kg), SpO2 94 %. GEN: No apparent distress. Alert and oriented and responds to all questions appropriately. NEUROLOGIC:  No focal neurologic deficits. Strength and sensation grossly intact. Coordination and gait grossly intact. EXT: Well perfused. No edema. SKIN: No obvious rashes. Shoulders examined bilaterally.   There is a full range of motion but increased discomfort at the extremes of internal and external rotation. This pain is on the top of the shoulder down the lateral aspect of the biceps. Biceps tendon is intact, all the rotator cuff muscles have full strength but there is pain on the evaluation of the left supraspinatus and the infraspinatus. The Funes impingement sign is positive on the left. Not evaluated on the right  Left knee examined, remains swollen although has a prepatellar bursitis that is still resolving making this part exam more difficult. There is pain in the popliteal bursa from what I believe is a Baker's cyst       Assessment and Plan     History DVT, chronic anticoagulation  INR 2.7 today  Continue Coumadin 7.5 mg daily    Rotator cuff syndrome on the left shoulder, bursitis, supraspinatus involvement. Went over stretches and exercises that she should do for this. Provided with a stretch band. Might benefit from formal physical therapy but she declines for now. Left knee arthritis, Baker's cyst, swelling. Limping causing a left trochanteric bursitis. A little early for injection but she shares with me that she might be going on a road trip next week. She is trying to get out of it but might find it interesting. If she feels like she needs to be comfortable for this road trip we can do the injection a little early. She will let me know next week what she would like to do. Lower extremity edema  Recommend compression. She has a stable weight. This appears to be venous insufficiency and is probably contributing to a little bit of achiness in the leg and probably not helping her knee arthritis    Hypertension, elevated, did not come down a recheck. This is unusual.  Monitor      ICD-10-CM ICD-9-CM    1. Rotator cuff syndrome of left shoulder M75.102 726.10    2. Hx pulmonary embolism--due to DVT Z86.711 V12.55 AMB POC PT/INR   3. Essential hypertension I10 401.9    4.  Chronic acquired lymphedema--legs--onset age ~40yo I80.0 457.1    5. Essential hypertension, benign I10 401.1        AVS given.  Pt expressed understanding of instructions

## 2018-07-10 NOTE — PROGRESS NOTES
.  Chief Complaint   Patient presents with    Follow-up    Knee Pain    Swelling     both legs     . Raz Cabrera

## 2018-08-07 ENCOUNTER — CLINICAL SUPPORT (OUTPATIENT)
Dept: FAMILY MEDICINE CLINIC | Age: 63
End: 2018-08-07

## 2018-08-07 VITALS
SYSTOLIC BLOOD PRESSURE: 149 MMHG | OXYGEN SATURATION: 97 % | RESPIRATION RATE: 16 BRPM | HEIGHT: 70 IN | DIASTOLIC BLOOD PRESSURE: 85 MMHG | BODY MASS INDEX: 37.08 KG/M2 | TEMPERATURE: 97.8 F | WEIGHT: 259 LBS | HEART RATE: 76 BPM

## 2018-08-07 DIAGNOSIS — Z51.81 ANTICOAGULATION GOAL OF INR 2 TO 3: ICD-10-CM

## 2018-08-07 DIAGNOSIS — Z79.01 ANTICOAGULATION GOAL OF INR 2 TO 3: ICD-10-CM

## 2018-08-07 DIAGNOSIS — Z87.74 H/O ATRIAL SEPTAL DEFECT REPAIR: Primary | ICD-10-CM

## 2018-08-07 LAB
INR BLD: 3.2
PT POC: 38 SECONDS
VALID INTERNAL CONTROL?: YES

## 2018-08-07 NOTE — PROGRESS NOTES
PT/INR drawn per Dr. Chela Ortega    Results for orders placed or performed in visit on 08/07/18   AMB POC PT/INR   Result Value Ref Range    VALID INTERNAL CONTROL POC Yes     Prothrombin time (POC) 38.0 seconds    INR POC 3.2      Prior to Admission medications    Medication Sig Start Date End Date Taking? Authorizing Provider   levothyroxine (SYNTHROID) 50 mcg tablet TAKE ONE TABLET BY MOUTH IN THE MORNING 7/10/18  Yes Audie Dillard MD   hydroCHLOROthiazide (HYDRODIURIL) 25 mg tablet Take 1 Tab by mouth daily. 6/14/18  Yes Jared Miller MD   naproxen sodium (ALEVE) 220 mg tablet Take 220 mg by mouth two (2) times daily as needed. Yes Historical Provider   venlafaxine (EFFEXOR) 75 mg tablet TAKE ONE TABLET BY MOUTH TWICE DAILY 1/29/18  Yes Mae Leija NP   diclofenac EC (VOLTAREN) 75 mg EC tablet Take 1 Tab by mouth two (2) times daily as needed. 1/15/18  Yes Jared Miller MD   warfarin (COUMADIN) 7.5 mg tablet Take 1 Tab by mouth daily. Take one tablet daily  Patient taking differently: Take 7.5 mg by mouth daily. Take 1/2 tab on Tuesday and Thursday. Take 7.5 mg rest of days. 11/21/17  Yes Jared Miller MD   cholecalciferol (VITAMIN D3) 1,000 unit cap Take  by mouth daily. Yes Historical Provider   cyclobenzaprine (FLEXERIL) 5 mg tablet Take 1 Tab by mouth three (3) times daily as needed for Muscle Spasm(s). 1/12/17  Yes Jared Miller MD   IRON, FERROUS SULFATE, PO Take  by mouth. Yes Historical Provider   LANSOPRAZOLE (PREVACID PO) Take  by mouth. Yes Historical Provider   potassium 99 mg tablet Take 99 mg by mouth daily. Pt is taking 2 tabs daily   Yes Historical Provider   traZODone (DESYREL) 50 mg tablet Take 1 Tab by mouth nightly. Prn sleep 9/23/15   Cecelia Posey MD     No changes to coumadin medication per Dr. Chela Ortega.  Recheck in 2 weeks

## 2018-08-07 NOTE — MR AVS SNAPSHOT
[address] [facility] 
 
 
 [address] 
[phone] Patient: Park Laboy MRN: [id number] :1955 Visit Information Date & Time Provider Department Dept. Phone Encounter #  
 2018 10:10 AM Chad Fernandes MD [address] [facility] [[email or web address]] [phone] [id number] Upcoming Health Maintenance Date Due ZOSTER VACCINE AGE 60> 2016 PAP AKA CERVICAL CYTOLOGY 10/21/2016 MEDICARE YEARLY EXAM 3/14/2018 COLONOSCOPY 2018 Influenza Age 5 to Adult 2018 BREAST CANCER SCRN MAMMOGRAM 3/16/2020 DTaP/Tdap/Td series (2 - Td) 2025 Allergies as of 2018  Review Complete On: 2018 By: Sebastián Porras Severity Noted Reaction Type Reactions Iodinated Contrast- Oral And Iv Dye  2012   Side Effect Hives Lasix [Furosemide]  2012   Side Effect Hives Sulfa (Sulfonamide Antibiotics)  2012   Side Effect Hives Current Immunizations  Reviewed on 2017 Name Date Influenza Vaccine 10/21/2013 Influenza Vaccine [place]) 2014 Influenza Vaccine (Quad) PF 2017, 2016, 10/26/2015 Tdap 2015 Not reviewed this visit You Were Diagnosed With   
  
 Codes Comments H/O atrial septal defect repair    -  Primary ICD-10-CM: Z87.74 ICD-9-CM: V15.1 Anticoagulation goal of INR 2 to 3     ICD-10-CM: Z51.81, Z79.01 
ICD-9-CM: V58.83, V58.61 Vitals BP Pulse Temp Resp Height(growth percentile) Weight(growth percentile) 149/85 (BP 1 Location: Left arm, BP Patient Position: Sitting) 76 97.8 °F (36.6 °C) (Oral) 16 5' 10\" (1.778 m) 259 lb (117.5 kg) SpO2 BMI OB Status Smoking Status 97% 37.16 kg/m2 Postmenopausal Never Smoker Vitals History BMI and BSA Data Body Mass Index Body Surface Area  
 37.16 kg/m 2 2.41 m 2 Preferred Pharmacy Pharmacy Name Phone [facility] [address] [facility], [address]les [address] 9 [facility] [phone] Your Updated Medication List  
  
   
This list is accurate as of 8/7/18 10:34 AM.  Always use your most recent med list.  
  
  
  
  
 ALEVE 220 mg tablet Generic drug:  naproxen sodium Take 220 mg by mouth two (2) times daily as needed. cyclobenzaprine 5 mg tablet Commonly known as:  FLEXERIL Take 1 Tab by mouth three (3) times daily as needed for Muscle Spasm(s). diclofenac EC 75 mg EC tablet Commonly known as:  VOLTAREN Take 1 Tab by mouth two (2) times daily as needed. hydroCHLOROthiazide 25 mg tablet Commonly known as:  HYDRODIURIL Take 1 Tab by mouth daily. IRON (FERROUS SULFATE) PO Take  by mouth.  
  
 levothyroxine 50 mcg tablet Commonly known as:  SYNTHROID  
TAKE ONE TABLET BY MOUTH IN THE MORNING  
  
 potassium 99 mg tablet Take 99 mg by mouth daily. Pt is taking 2 tabs daily PREVACID PO Take  by mouth. traZODone 50 mg tablet Commonly known as:  Mark Fray Take 1 Tab by mouth nightly. Prn sleep  
  
 venlafaxine 75 mg tablet Commonly known as:  EFFEXOR  
TAKE ONE TABLET BY MOUTH TWICE DAILY  
  
 VITAMIN D3 1,000 unit Cap Generic drug:  cholecalciferol Take  by mouth daily. warfarin 7.5 mg tablet Commonly known as:  COUMADIN Take 1 Tab by mouth daily. Take one tablet daily August 2018 Details Sun Mon Tue Wed Thu Fri Sat  
     1  
  
  
  
   2  
  
  
  
   3  
  
  
  
   4  
  
  
  
  
  5  
  
  
  
   6  
  
  
  
   7  
  
7.5 mg  
See details    8  
  
7.5 mg  
  
   9  
  
7.5 mg  
  
   10  
  
7.5 mg  
  
   11  
  
7.5 mg  
  
  
  12  
  
7.5 mg  
  
   13  
  
7.5 mg  
  
   14  
  
7.5 mg  
  
   15  
  
7.5 mg  
  
   16  
  
7.5 mg  
  
   17  
  
7.5 mg  
  
   18  
  
7.5 mg  
  
  
  19  
  
7.5 mg  
  
   20  
  
7.5 mg  
  
   21  
  
7.5 mg  
  
   22  
  
  
  
   23  
  
  
  
   24  
  
  
  
   25  
 26  
  
  
  
   27  
  
  
  
   28  
  
  
  
   29  
  
  
  
   30  
  
  
  
   31  
  
  
  
   
 Date Details 08/07 This INR check INR: 3. 2! Date of next INR:  8/21/2018 How to take your warfarin dose To take:  7.5 mg Take one of the 7.5 mg tablets. We Performed the Following AMB POC PT/INR [07526 CPT(R)] Introducing [facility] SERVICES! Dear Janett: Thank you for requesting a Spr[employer] account. Our records indicate that you already have an active SproutBox account. You can access your account anytime at [email or web address] Did you know that you can access your hospital and ER discharge instructions at any time in SproutBox? You can also review all of your test results from your hospital stay or ER visit. Additional Information If you have questions, please visit the Frequently Asked Questions section of the SproutBox website at [email or web address] Remember, SproutBox is NOT to be used for urgent needs. For medical emergencies, dial 911. Now available from your iPhone and Android! Please provide this summary of care documentation to your next provider. Your primary care clinician is listed as Saurabh Davenport. If you have any questions after today's visit, please call [phone].

## 2018-08-21 ENCOUNTER — CLINICAL SUPPORT (OUTPATIENT)
Dept: FAMILY MEDICINE CLINIC | Age: 63
End: 2018-08-21

## 2018-08-21 VITALS
DIASTOLIC BLOOD PRESSURE: 89 MMHG | TEMPERATURE: 98 F | HEART RATE: 68 BPM | SYSTOLIC BLOOD PRESSURE: 178 MMHG | RESPIRATION RATE: 12 BRPM | BODY MASS INDEX: 37.22 KG/M2 | OXYGEN SATURATION: 97 % | WEIGHT: 260 LBS | HEIGHT: 70 IN

## 2018-08-21 DIAGNOSIS — Z86.711 HX PULMONARY EMBOLISM: Primary | ICD-10-CM

## 2018-08-21 DIAGNOSIS — I82.409 RECURRENT DEEP VEIN THROMBOSIS (DVT) (HCC): ICD-10-CM

## 2018-08-21 LAB
INR BLD: 27.2
PT POC: 2.3 SECONDS
VALID INTERNAL CONTROL?: YES

## 2018-08-21 NOTE — PROGRESS NOTES
Chief Complaint   Patient presents with   [employer] Anticoagulation     Janett Man is a 65 y.o. female who presents today for Anticoagulation monitoring. Indication: DVT  INR Goal: 2-3  Current dose:  Coumadin 7.5 mg daily  Missed Coumadin Doses:  none  Medication Changes: no  Dietary Changes: no    Symptoms: taking coumadin appropriately    Latest INRs:  Lab Results   Component Value Date/Time    INR 1.4 (H) 12/28/2012 11:10 AM    INR POC 27.2 08/21/2018 11:19 AM    INR POC 3.2 08/07/2018 10:20 AM    INR POC 2.7 07/10/2018 10:27 AM    Prothrombin time 14.9 (H) 12/28/2012 11:10 AM        New Coumadin dose: no change in therapy    Next check in 4 weeks.

## 2018-08-21 NOTE — MR AVS SNAPSHOT
303 [facility] 
 
 
 [address] 
[phone] Patient: Tammie Yarbrough MRN: [id number] :1955 Visit Information Date & Time Provider Department Dept. Phone Encounter #  
 2018 10:45 AM Melita Hebert MD [address] [facility] 682 [phone] [id number] Upcoming Health Maintenance Date Due ZOSTER VACCINE AGE 60> 2016 PAP AKA CERVICAL CYTOLOGY 10/21/2016 MEDICARE YEARLY EXAM 3/14/2018 COLONOSCOPY 2018 Influenza Age 5 to Adult 2018 BREAST CANCER SCRN MAMMOGRAM 3/16/2020 DTaP/Tdap/Td series (2 - Td) 2025 Allergies as of 2018  Review Complete On: 2018 By: Charla Jones Severity Noted Reaction Type Reactions Iodinated Contrast- Oral And Iv Dye  2012   Side Effect Hives Lasix [Furosemide]  2012   Side Effect Hives Sulfa (Sulfonamide Antibiotics)  2012   Side Effect Hives Current Immunizations  Reviewed on 2017 Name Date Influenza Vaccine 10/21/2013 Influenza Vaccine [place]ne) 2014 Influenza Vaccine (Quad) PF 2017, 2016, 10/26/2015 Tdap 2015 Not reviewed this visit You Were Diagnosed With   
  
 Codes Comments Hx pulmonary embolism    -  Primary ICD-10-CM: Q52.496 ICD-9-CM: V12.55 Recurrent deep vein thrombosis (DVT) (HCC)     ICD-10-CM: I82.409 ICD-9-CM: 453.40 Vitals BP Pulse Temp Resp Height(growth percentile) Weight(growth percentile) 178/89 (BP 1 Location: Left arm, BP Patient Position: Sitting) 68 98 °F (36.7 °C) 12 5' 10\" (1.778 m) 260 lb (117.9 kg) SpO2 BMI OB Status Smoking Status 97% 37.31 kg/m2 Postmenopausal Never Smoker Vitals History BMI and BSA Data Body Mass Index Body Surface Area  
 37.31 kg/m 2 2.41 m 2 Preferred Pharmacy Pharmacy Name Phone  [address]  [place] [address] [facility] [phone] Your Updated Medication List  
  
   
This list is accurate as of 8/21/18 11:26 AM.  Always use your most recent med list.  
  
  
  
  
 ALEVE 220 mg tablet Generic drug:  naproxen sodium Take 220 mg by mouth two (2) times daily as needed. cyclobenzaprine 5 mg tablet Commonly known as:  FLEXERIL Take 1 Tab by mouth three (3) times daily as needed for Muscle Spasm(s). diclofenac EC 75 mg EC tablet Commonly known as:  VOLTAREN Take 1 Tab by mouth two (2) times daily as needed. hydroCHLOROthiazide 25 mg tablet Commonly known as:  HYDRODIURIL Take 1 Tab by mouth daily. IRON (FERROUS SULFATE) PO Take  by mouth.  
  
 levothyroxine 50 mcg tablet Commonly known as:  SYNTHROID  
TAKE ONE TABLET BY MOUTH IN THE MORNING  
  
 potassium 99 mg tablet Take 99 mg by mouth daily. Pt is taking 2 tabs daily PREVACID PO Take  by mouth. traZODone 50 mg tablet Commonly known as:  Debbe Gunner Take 1 Tab by mouth nightly. Prn sleep  
  
 venlafaxine 75 mg tablet Commonly known as:  EFFEXOR  
TAKE ONE TABLET BY MOUTH TWICE DAILY  
  
 VITAMIN D3 1,000 unit Cap Generic drug:  cholecalciferol Take  by mouth daily. warfarin 7.5 mg tablet Commonly known as:  COUMADIN Take 1 Tab by mouth daily. Take one tablet daily August 2018 Details Sun Mon Tue Wed Thu Fri Sat  
     1  
  
  
  
   2  
  
  
  
   3  
  
  
  
   4  
  
  
  
  
  5  
  
  
  
   6  
  
  
  
   7  
  
  
  
   8  
  
  
  
   9  
  
  
  
   10  
  
  
  
   11  
  
  
  
  
  12  
  
  
  
   13  
  
  
  
   14  
  
  
  
   15  
  
  
  
   16  
  
  
  
   17  
  
  
  
   18  
  
  
  
  
  19  
  
  
  
   20  
  
  
  
   21  
  
7.5 mg  
See details    22  
  
7.5 mg  
  
   23  
  
7.5 mg  
  
   24  
  
7.5 mg  
  
   25  
  
7.5 mg  
  
  
  26  
  
7.5 mg  
  
   27  
  
7.5 mg  
  
   28  
  
7.5 mg  
  
   29  
  
7.5 mg  
  
   30  
  
7.5 mg  
  
 31  
  
7.5 mg Date Details 08/21 This INR check INR: 27. 2! How to take your warfarin dose To take:  7.5 mg Take one of the 7.5 mg tablets. September 2018 Details Yaneli Prior Tue Wed Thu Fri Sat  
        1  
  
7.5 mg  
  
  
  2  
  
7.5 mg  
  
   3  
  
7.5 mg  
  
   4  
  
7.5 mg  
  
   5  
  
7.5 mg  
  
   6  
  
7.5 mg  
  
   7  
  
7.5 mg  
  
   8  
  
7.5 mg  
  
  
  9  
  
7.5 mg  
  
   10  
  
7.5 mg  
  
   11  
  
7.5 mg  
  
   12  
  
7.5 mg  
  
   13  
  
7.5 mg  
  
   14  
  
7.5 mg  
  
   15  
  
7.5 mg  
  
  
  16  
  
7.5 mg  
  
   17  
  
7.5 mg  
  
   18  
  
7.5 mg  
  
   19  
  
7.5 mg  
  
   20  
  
7.5 mg  
  
   21  
  
7.5 mg  
  
   22  
  
  
  
  
  23  
  
  
  
   24  
  
  
  
   25  
  
  
  
   26  
  
  
  
   27  
  
  
  
   28  
  
  
  
   29  
  
  
  
  
  30  
  
  
  
        
 Date Details No additional details Date of next INR:  9/21/2018 How to take your warfarin dose To take:  7.5 mg Take one of the 7.5 mg tablets. We Performed the Following AMB POC PT/INR [75547 CPT(R)] Introducing [facility] & [facility] SERVICES! Dear Janett: Thank you for requesting a SocialTagg account. Our records indicate that you already have an active SocialTagg account. You can access your account anytime at [email or web address] [employer]/Libra Entertainment Did you know that you can access your hospital and ER discharge instructions at any time in SocialTagg? You can also review all of your test results from your hospital stay or ER visit. Additional Information If you have questions, please visit the Frequently Asked Questions section of the SocialTagg website at [email or web address]/. Remember, SocialTagg is NOT to be used for urgent needs. For medical emergencies, dial 911. Now available from your iPhone and Android! Please provide this summary of care documentation to your next provider. Your primary care clinician is listed as Desiree Daniels. If you have any questions after today's visit, please call [phone].

## 2018-09-18 ENCOUNTER — CLINICAL SUPPORT (OUTPATIENT)
Dept: FAMILY MEDICINE CLINIC | Age: 63
End: 2018-09-18

## 2018-09-18 VITALS
OXYGEN SATURATION: 96 % | HEART RATE: 78 BPM | BODY MASS INDEX: 37.51 KG/M2 | DIASTOLIC BLOOD PRESSURE: 82 MMHG | SYSTOLIC BLOOD PRESSURE: 137 MMHG | RESPIRATION RATE: 18 BRPM | HEIGHT: 70 IN | TEMPERATURE: 97.9 F | WEIGHT: 262 LBS

## 2018-09-18 DIAGNOSIS — Z86.711 HX PULMONARY EMBOLISM: ICD-10-CM

## 2018-09-18 DIAGNOSIS — Z79.01 ANTICOAGULATION GOAL OF INR 2 TO 3: ICD-10-CM

## 2018-09-18 DIAGNOSIS — Z51.81 ANTICOAGULATION GOAL OF INR 2 TO 3: ICD-10-CM

## 2018-09-18 DIAGNOSIS — I82.409 RECURRENT DEEP VEIN THROMBOSIS (DVT) (HCC): Primary | ICD-10-CM

## 2018-09-18 LAB
INR BLD: 2.1
PT POC: 25.6 SECONDS
VALID INTERNAL CONTROL?: YES

## 2018-09-18 RX ORDER — GLUCOSAMINE/CHONDR SU A SOD 750-600 MG
TABLET ORAL
COMMUNITY
End: 2020-06-24 | Stop reason: ALTCHOICE

## 2018-09-18 NOTE — PROGRESS NOTES
Patient presents for a PT/INR  See med list and patient instructions along with results for verbal orders by Dr. Mary Garcia Results for orders placed or performed in visit on 09/18/18 AMB POC PT/INR Result Value Ref Range VALID INTERNAL CONTROL POC Yes Prothrombin time (POC) 25.6 seconds INR POC 2.1 Vitals:  
 09/18/18 1437 BP: 137/82 Pulse: 78 Resp: 18 Temp: 97.9 °F (36.6 °C) SpO2: 96% Weight: 262 lb (118.8 kg) Height: 5' 10\" (1.778 m) Prior to Admission medications Medication Sig Start Date End Date Taking? Authorizing Provider Biotin 2,500 mcg cap Take  by mouth. Yes Historical Provider  
levothyroxine (SYNTHROID) 50 mcg tablet TAKE ONE TABLET BY MOUTH IN THE MORNING 7/10/18  Yes Rosa Maldonado MD  
hydroCHLOROthiazide (HYDRODIURIL) 25 mg tablet Take 1 Tab by mouth daily. 6/14/18  Yes Chase Townsend MD  
naproxen sodium (ALEVE) 220 mg tablet Take 220 mg by mouth two (2) times daily as needed. Yes Historical Provider  
venlafaxine (EFFEXOR) 75 mg tablet TAKE ONE TABLET BY MOUTH TWICE DAILY 1/29/18  Yes Mae Leija NP  
diclofenac EC (VOLTAREN) 75 mg EC tablet Take 1 Tab by mouth two (2) times daily as needed. 1/15/18  Yes Chase Townsend MD  
warfarin (COUMADIN) 7.5 mg tablet Take 1 Tab by mouth daily. Take one tablet daily Patient taking differently: Take 7.5 mg by mouth daily. 11/21/17  Yes Chase Townsend MD  
cholecalciferol (VITAMIN D3) 1,000 unit cap Take  by mouth daily. Yes Historical Provider  
cyclobenzaprine (FLEXERIL) 5 mg tablet Take 1 Tab by mouth three (3) times daily as needed for Muscle Spasm(s). 1/12/17  Yes Chase Townsend MD  
IRON, FERROUS SULFATE, PO Take  by mouth. Yes Historical Provider  
traZODone (DESYREL) 50 mg tablet Take 1 Tab by mouth nightly. Prn sleep 9/23/15  Yes Dianelys Nunez MD  
LANSOPRAZOLE (PREVACID PO) Take  by mouth. Yes Historical Provider  
potassium 99 mg tablet Take 99 mg by mouth daily.  Pt is taking 2 tabs daily   Yes Historical Provider She is to continue coumadin 7.5 mg daily per Dr. Jamie Damian See anti-coag episode for instructions which are on AVS. AVS and verbal instructions given she voiced understanding of all instructions.

## 2018-10-16 ENCOUNTER — CLINICAL SUPPORT (OUTPATIENT)
Dept: FAMILY MEDICINE CLINIC | Age: 63
End: 2018-10-16

## 2018-10-16 VITALS
HEART RATE: 74 BPM | SYSTOLIC BLOOD PRESSURE: 145 MMHG | HEIGHT: 70 IN | TEMPERATURE: 97.7 F | BODY MASS INDEX: 37.22 KG/M2 | OXYGEN SATURATION: 94 % | RESPIRATION RATE: 12 BRPM | DIASTOLIC BLOOD PRESSURE: 96 MMHG | WEIGHT: 260 LBS

## 2018-10-16 DIAGNOSIS — I82.409 RECURRENT DEEP VEIN THROMBOSIS (DVT) (HCC): Primary | ICD-10-CM

## 2018-10-16 LAB
INR BLD: 2.7
PT POC: 32.4 SECONDS
VALID INTERNAL CONTROL?: YES

## 2018-10-16 NOTE — MR AVS SNAPSHOT
303 [facility] 
 
 
 [address] [place] [address] 
[phone] Patient: Bal Christianson MRN: [id number] :1955 Visit Information Date & Time Provider Department Dept. Phone Encounter #  
 10/16/2018 10:50 AM Jodi Bhat MD [address] [facility] [phone] [id number] Upcoming Health Maintenance Date Due Shingrix Vaccine Age 50> (1 of 2) 10/6/2006 PAP AKA CERVICAL CYTOLOGY 10/21/2016 MEDICARE YEARLY EXAM 3/14/2018 COLONOSCOPY 2018 Influenza Age 5 to Adult 2018 BREAST CANCER SCRN MAMMOGRAM 3/16/2020 DTaP/Tdap/Td series (2 - Td) 2025 Allergies as of 10/16/2018  Review Complete On: 2018 By: Froylan Kendall LPN Severity Noted Reaction Type Reactions Iodinated Contrast- Oral And Iv Dye  2012   Side Effect Hives Lasix [Furosemide]  2012   Side Effect Hives Sulfa (Sulfonamide Antibiotics)  2012   Side Effect Hives Current Immunizations  Reviewed on 2017 Name Date Influenza Vaccine 10/21/2013 Influenza Vaccine [place] Junes) 2014 Influenza Vaccine (Quad) PF 2017, 2016, 10/26/2015 Tdap 2015 Not reviewed this visit You Were Diagnosed With   
  
 Codes Comments Recurrent deep vein thrombosis (DVT) (HCC)    -  Primary ICD-10-CM: K19.737 ICD-9-CM: 453.40 Vitals Resp Height(growth percentile) Weight(growth percentile) SpO2 BMI OB Status 12 5' 10\" (1.778 m) 260 lb (117.9 kg) 94% 37.31 kg/m2 Postmenopausal  
 Smoking Status Never Smoker BMI and BSA Data Body Mass Index Body Surface Area  
 37.31 kg/m 2 2.41 m 2 Preferred Pharmacy Pharmacy Name Phone [facility]  [address] [facility] [phone] Your Updated Medication List  
  
   
This list is accurate as of 10/16/18 11:10 AM.  Always use your most recent med list.  
  
  
  
 ALEVE 220 mg tablet Generic drug:  naproxen sodium Take 220 mg by mouth two (2) times daily as needed. Biotin 2,500 mcg Cap Take  by mouth. cyclobenzaprine 5 mg tablet Commonly known as:  FLEXERIL Take 1 Tab by mouth three (3) times daily as needed for Muscle Spasm(s). diclofenac EC 75 mg EC tablet Commonly known as:  VOLTAREN Take 1 Tab by mouth two (2) times daily as needed. hydroCHLOROthiazide 25 mg tablet Commonly known as:  HYDRODIURIL Take 1 Tab by mouth daily. IRON (FERROUS SULFATE) PO Take  by mouth.  
  
 levothyroxine 50 mcg tablet Commonly known as:  SYNTHROID  
TAKE ONE TABLET BY MOUTH IN THE MORNING  
  
 potassium 99 mg tablet Take 99 mg by mouth daily. Pt is taking 2 tabs daily PREVACID PO Take  by mouth. traZODone 50 mg tablet Commonly known as:  Anoop Exon Take 1 Tab by mouth nightly. Prn sleep  
  
 venlafaxine 75 mg tablet Commonly known as:  EFFEXOR  
TAKE ONE TABLET BY MOUTH TWICE DAILY  
  
 VITAMIN D3 1,000 unit Cap Generic drug:  cholecalciferol Take  by mouth daily. warfarin 7.5 mg tablet Commonly known as:  COUMADIN Take 1 Tab by mouth daily. Take one tablet daily October 2018 Details Sun Mon Tue Wed Thu Fri Sat  
   1  
  
  
  
   2  
  
  
  
   3  
  
  
  
   4  
  
  
  
   5  
  
  
  
   6  
  
  
  
  
  7  
  
  
  
   8  
  
  
  
   9  
  
  
  
   10  
  
  
  
   11  
  
  
  
   12  
  
  
  
   13  
  
  
  
  
  14  
  
  
  
   15  
  
  
  
   16  
  
7.5 mg  
See details 17  
  
7.5 mg  
  
   18  
  
7.5 mg  
  
   19  
  
7.5 mg  
  
   20  
  
7.5 mg  
  
  
  21  
  
7.5 mg  
  
   22  
  
7.5 mg  
  
   23  
  
7.5 mg  
  
   24  
  
7.5 mg  
  
   25  
  
7.5 mg  
  
   26  
  
7.5 mg  
  
   27  
  
7.5 mg  
  
  
  28  
  
7.5 mg  
  
   29  
  
7.5 mg  
  
   30  
  
7.5 mg  
  
   31  
  
7.5 mg Date Details 10/16 This INR check INR: 2.7 How to take your warfarin dose To take:  7.5 mg Take one of the 7.5 mg tablets. November 2018 Details Juan Gavin Tue Wed Thu Fri Sat  
      1  
  
7.5 mg  
  
   2  
  
7.5 mg  
  
   3  
  
7.5 mg  
  
  
  4  
  
7.5 mg  
  
   5  
  
7.5 mg  
  
   6  
  
7.5 mg  
  
   7  
  
7.5 mg  
  
   8  
  
7.5 mg  
  
   9  
  
7.5 mg  
  
   10  
  
7.5 mg  
  
  
  11  
  
7.5 mg  
  
   12  
  
7.5 mg  
  
   13  
  
7.5 mg  
  
   14  
  
7.5 mg  
  
   15  
  
7.5 mg  
  
   16  
  
7.5 mg  
  
   17  
  
  
  
  
  18  
  
  
  
   19  
  
  
  
   20  
  
  
  
   21  
  
  
  
   22  
  
  
  
   23  
  
  
  
   24  
  
  
  
  
  25  
  
  
  
   26  
  
  
  
   27  
  
  
  
   28  
  
  
  
   29  
  
  
  
   30  
  
  
  
   
 Date Details No additional details Date of next INR:  11/16/2018 How to take your warfarin dose To take:  7.5 mg Take one of the 7.5 mg tablets. We Performed the Following AMB POC PT/INR [85991 CPT(R)] Introducing [facility] SERVICES! Dear Janett: Thank you for requesting a [employer] account. Our records indicate that you already have an active [employer] account. You can access your account anytime at [email or web address] Did you know that you can access your hospital and ER discharge instructions at any time in [employer]? You can also review all of your test results from your hospital stay or ER visit. Additional Information If you have questions, please visit the Frequently Asked Questions section of the [employer] website at [email or web address] Remember, [employer] is NOT to be used for urgent needs. For medical emergencies, dial 911. Now available from your iPhone and Android! Please provide this summary of care documentation to your next provider. Your primary care clinician is listed as Daryle Claw.  If you have any questions after today's visit, please call [phone].

## 2018-10-16 NOTE — PROGRESS NOTES
Chief Complaint Patient presents with  Coagulation disorder Patient is here for a recheck of her INR. Patient taking 7.5 mg of coumadin daily. Visit Vitals  BP (!) 145/96 (BP 1 Location: Left arm, BP Patient Position: Sitting)  Pulse 74  Temp 97.7 °F (36.5 °C) (Oral)  Resp 12  Ht 5' 10\" (1.778 m)  Wt 260 lb (117.9 kg)  SpO2 94%  BMI 37.31 kg/m2 Results for orders placed or performed in visit on 10/16/18 AMB POC PT/INR Result Value Ref Range VALID INTERNAL CONTROL POC Yes Prothrombin time (POC) 32.4 seconds INR POC 2.7 Patient was seen by Dr. Janusz Key.

## 2018-11-13 ENCOUNTER — CLINICAL SUPPORT (OUTPATIENT)
Dept: FAMILY MEDICINE CLINIC | Age: 63
End: 2018-11-13

## 2018-11-13 VITALS
WEIGHT: 258 LBS | RESPIRATION RATE: 18 BRPM | HEIGHT: 70 IN | SYSTOLIC BLOOD PRESSURE: 155 MMHG | DIASTOLIC BLOOD PRESSURE: 86 MMHG | OXYGEN SATURATION: 96 % | TEMPERATURE: 98.1 F | BODY MASS INDEX: 36.94 KG/M2

## 2018-11-13 DIAGNOSIS — Z23 ENCOUNTER FOR IMMUNIZATION: ICD-10-CM

## 2018-11-13 DIAGNOSIS — Z86.711 HX PULMONARY EMBOLISM: Primary | ICD-10-CM

## 2018-11-13 DIAGNOSIS — Z79.01 ANTICOAGULATION MONITORING, INR RANGE 2-3: ICD-10-CM

## 2018-11-13 LAB
INR BLD: 2.1
PT POC: 24.7 SECONDS
VALID INTERNAL CONTROL?: YES

## 2018-11-13 NOTE — PROGRESS NOTES
Patient presents for a PT/INR  See med list and patient instructions along with results for verbal orders by Dr. Venus Dasilva  Results for orders placed or performed in visit on 11/13/18   AMB POC PT/INR   Result Value Ref Range    VALID INTERNAL CONTROL POC Yes     Prothrombin time (POC) 24.7 seconds    INR POC 2.1         There were no vitals filed for this visit. Prior to Admission medications    Medication Sig Start Date End Date Taking? Authorizing Provider   levothyroxine (SYNTHROID) 50 mcg tablet TAKE ONE TABLET BY MOUTH IN THE MORNING 7/10/18  Yes Vika Gar MD   hydroCHLOROthiazide (HYDRODIURIL) 25 mg tablet Take 1 Tab by mouth daily. 6/14/18  Yes Benjamín Mcclain MD   naproxen sodium (ALEVE) 220 mg tablet Take 220 mg by mouth two (2) times daily as needed. Yes Provider, Historical   venlafaxine (EFFEXOR) 75 mg tablet TAKE ONE TABLET BY MOUTH TWICE DAILY 1/29/18  Yes Mae Leija NP   diclofenac EC (VOLTAREN) 75 mg EC tablet Take 1 Tab by mouth two (2) times daily as needed. 1/15/18  Yes Benjamín Mcclain MD   warfarin (COUMADIN) 7.5 mg tablet Take 1 Tab by mouth daily. Take one tablet daily  Patient taking differently: Take 7.5 mg by mouth daily. 11/21/17  Yes Benjamín Mcclain MD   cholecalciferol (VITAMIN D3) 1,000 unit cap Take  by mouth daily. Yes Provider, Historical   cyclobenzaprine (FLEXERIL) 5 mg tablet Take 1 Tab by mouth three (3) times daily as needed for Muscle Spasm(s). 1/12/17  Yes Benjamín Mcclain MD   IRON, FERROUS SULFATE, PO Take  by mouth. Yes Provider, Historical   traZODone (DESYREL) 50 mg tablet Take 1 Tab by mouth nightly. Prn sleep 9/23/15  Yes Carol Miranda MD   LANSOPRAZOLE (PREVACID PO) Take  by mouth. Yes Provider, Historical   potassium 99 mg tablet Take 99 mg by mouth daily. Pt is taking 2 tabs daily   Yes Provider, Historical   Biotin 2,500 mcg cap Take  by mouth.     Provider, Historical   per Dr. Davis Moreno continue coumadin 7.5 mg daily  See anti-coag episode for instructions which are on AVS.    AVS and verbal instructions given she voiced understanding of all instructions. Dr. Jose Carlos Samuels notified of BP results and he said he will want to see her in a month to follow up.    She voiced understanding and will set her apt up with him in 3-4 weeks

## 2018-11-13 NOTE — PROGRESS NOTES
Bal Christianson is a 59 y.o. female who presents for routine immunizations. She denies any symptoms , reactions or allergies that would exclude them from being immunized today. Risks and adverse reactions were discussed and the VIS was given to them. All questions were addressed. She was observed for 10 min post injection. There were no reactions observed.   Flu vaccine given IM per VOR[facility] from Dr. Shamika Marrufo LPN

## 2018-11-13 NOTE — PATIENT INSTRUCTIONS
Vaccine Information Statement    Influenza (Flu) Vaccine (Inactivated or Recombinant): What you need to know    Many Vaccine Information Statements are available in German and other languages. See www.immunize.org/vis  Hojas de Información Sobre Vacunas están disponibles en Español y en muchos otros idiomas. Visite www.immunize.org/vis    1. Why get vaccinated? Influenza (flu) is a contagious disease that spreads around the [place] every year, usually between October and May. Flu is caused by influenza viruses, and is spread mainly by coughing, sneezing, and close contact. Anyone can get flu. Flu strikes suddenly and can last several days. Symptoms vary by age, but can include:   fever/chills   sore throat   muscle aches   fatigue   cough   headache    runny or stuffy nose    Flu can also lead to pneumonia and blood infections, and cause diarrhea and seizures in children. If you have a medical condition, such as heart or lung disease, flu can make it worse. Flu is more dangerous for some people. Infants and young children, people 72years of age and older, pregnant women, and people with certain health conditions or a weakened immune system are at greatest risk. Each year thousands of people in the [facility] die from flu, and many more are hospitalized. Flu vaccine can:   keep you from getting flu,   make flu less severe if you do get it, and   keep you from spreading flu to your family and other people. 2. Inactivated and recombinant flu vaccines    A dose of flu vaccine is recommended every flu season. Children 6 months through 6years of age may need two doses during the same flu season. Everyone else needs only one dose each flu season.        Some inactivated flu vaccines contain a very small amount of a mercury-based preservative called thimerosal. Studies have not shown thimerosal in vaccines to be harmful, but flu vaccines that do not contain thimerosal are available. There is no live flu virus in flu shots. They cannot cause the flu. There are many flu viruses, and they are always changing. Each year a new flu vaccine is made to protect against three or four viruses that are likely to cause disease in the upcoming flu season. But even when the vaccine doesnt exactly match these viruses, it may still provide some protection    Flu vaccine cannot prevent:   flu that is caused by a virus not covered by the vaccine, or   illnesses that look like flu but are not. It takes about 2 weeks for protection to develop after vaccination, and protection lasts through the flu season. 3. Some people should not get this vaccine    Tell the person who is giving you the vaccine:     If you have any severe, life-threatening allergies. If you ever had a life-threatening allergic reaction after a dose of flu vaccine, or have a severe allergy to any part of this vaccine, you may be advised not to get vaccinated. Most, but not all, types of flu vaccine contain a small amount of egg protein.  If you ever had Guillain-Barré Syndrome (also called GBS). Some people with a history of GBS should not get this vaccine. This should be discussed with your doctor.  If you are not feeling well. It is usually okay to get flu vaccine when you have a mild illness, but you might be asked to come back when you feel better. 4. Risks of a vaccine reaction    With any medicine, including vaccines, there is a chance of reactions. These are usually mild and go away on their own, but serious reactions are also possible. Most people who get a flu shot do not have any problems with it.      Minor problems following a flu shot include:    soreness, redness, or swelling where the shot was given     hoarseness   sore, red or itchy eyes   cough   fever   aches   headache   itching   fatigue  If these problems occur, they usually begin soon after the shot and last 1 or 2 days. More serious problems following a flu shot can include the following:     There may be a small increased risk of Guillain-Barré Syndrome (GBS) after inactivated flu vaccine. This risk has been estimated at 1 or 2 additional cases per million people vaccinated. This is much lower than the risk of severe complications from flu, which can be prevented by flu vaccine.  Young children who get the flu shot along with pneumococcal vaccine (PCV13) and/or DTaP vaccine at the same time might be slightly more likely to have a seizure caused by fever. Ask your doctor for more information. Tell your doctor if a child who is getting flu vaccine has ever had a seizure. Problems that could happen after any injected vaccine:      People sometimes faint after a medical procedure, including vaccination. Sitting or lying down for about 15 minutes can help prevent fainting, and injuries caused by a fall. Tell your doctor if you feel dizzy, or have vision changes or ringing in the ears.  Some people get severe pain in the shoulder and have difficulty moving the arm where a shot was given. This happens very rarely.  Any medication can cause a severe allergic reaction. Such reactions from a vaccine are very rare, estimated at about 1 in a million doses, and would happen within a few minutes to a few hours after the vaccination. As with any medicine, there is a very remote chance of a vaccine causing a serious injury or death. The safety of vaccines is always being monitored. For more information, visit: www.cdc.gov/vaccinesafety/    5. What if there is a serious reaction? What should I look for?  Look for anything that concerns you, such as signs of a severe allergic reaction, very high fever, or unusual behavior.     Signs of a severe allergic reaction can include hives, swelling of the face and throat, difficulty breathing, a fast heartbeat, dizziness, and weakness - usually within a few minutes to a few hours after the vaccination. What should I do?  If you think it is a severe allergic reaction or other emergency that cant wait, call 9-1-1 and get the person to the nearest hospital. Otherwise, call your doctor.  Reactions should be reported to the Vaccine Adverse Event Reporting System (VAERS). Your doctor should file this report, or you can do it yourself through  the VAERS web site at www.vaers. [facility].gov, or by calling 4-667.705.4719. VAERS does not give medical advice. 6. The National Vaccine Injury Compensation Program    The [facility] Vaccine Injury Compensation Program (VICP) is a federal program that was created to compensate people who may have been injured by certain vaccines. Persons who believe they may have been injured by a vaccine can learn about the program and about filing a claim by calling 5-427.936.5392 or visiting the [employer] website at www.[facility].gov/vaccinecompensation. There is a time limit to file a claim for compensation. 7. How can I learn more?  Ask your healthcare provider. He or she can give you the vaccine package insert or suggest other sources of information.  Call your local or state health department.  Contact the Centers for Disease Control and Prevention (CDC):  - Call 6-606.730.2188 (1-800-CDC-INFO) or  - Visit CDCs website at www.cdc.gov/flu    Vaccine Information Statement   Inactivated Influenza Vaccine   8/7/2015  [address] [id number]    Department of Health and Human Services  Centers for Disease Control and Prevention    Office Use Only

## 2018-12-04 RX ORDER — WARFARIN 7.5 MG/1
TABLET ORAL
Qty: 90 TAB | Refills: 3 | Status: SHIPPED | OUTPATIENT
Start: 2018-12-04 | End: 2019-12-18 | Stop reason: SDUPTHER

## 2018-12-12 ENCOUNTER — OFFICE VISIT (OUTPATIENT)
Dept: FAMILY MEDICINE CLINIC | Age: 63
End: 2018-12-12

## 2018-12-12 VITALS
BODY MASS INDEX: 36.79 KG/M2 | SYSTOLIC BLOOD PRESSURE: 153 MMHG | HEIGHT: 70 IN | TEMPERATURE: 98 F | HEART RATE: 79 BPM | DIASTOLIC BLOOD PRESSURE: 89 MMHG | RESPIRATION RATE: 20 BRPM | WEIGHT: 257 LBS | OXYGEN SATURATION: 98 %

## 2018-12-12 DIAGNOSIS — E03.9 HYPOTHYROIDISM, UNSPECIFIED TYPE: ICD-10-CM

## 2018-12-12 DIAGNOSIS — M79.644 THUMB PAIN, RIGHT: ICD-10-CM

## 2018-12-12 DIAGNOSIS — I82.409 RECURRENT DEEP VEIN THROMBOSIS (DVT) (HCC): ICD-10-CM

## 2018-12-12 DIAGNOSIS — M75.101 BILATERAL ROTATOR CUFF SYNDROME: Primary | ICD-10-CM

## 2018-12-12 DIAGNOSIS — M17.12 PATELLOFEMORAL ARTHRITIS OF LEFT KNEE: ICD-10-CM

## 2018-12-12 DIAGNOSIS — M75.102 BILATERAL ROTATOR CUFF SYNDROME: Primary | ICD-10-CM

## 2018-12-12 LAB
INR BLD: 1.9
PT POC: 22.6 SECONDS
VALID INTERNAL CONTROL?: YES

## 2018-12-12 RX ORDER — DIAZEPAM 5 MG/1
TABLET ORAL
COMMUNITY
Start: 2018-12-04 | End: 2020-10-16

## 2018-12-12 RX ORDER — LEVOTHYROXINE SODIUM 50 UG/1
TABLET ORAL
Qty: 90 TAB | Refills: 3 | Status: SHIPPED | OUTPATIENT
Start: 2018-12-12 | End: 2019-11-19 | Stop reason: DRUGHIGH

## 2018-12-12 NOTE — PROGRESS NOTES
Chief Complaint   Patient presents with    Anticoagulation     FOLLOW UP    Hypertension     FOLLOW UP    Medication Evaluation     THYROID AND IRON     1. Have you been to the ER, urgent care clinic since your last visit? Hospitalized since your last visit? No    2. Have you seen or consulted any other health care providers outside of the [address] [place] [address] since your last visit? Include any pap smears or colon screening.  No     Health Maintenance Due   Topic Date Due    Shingrix Vaccine Age 50> (1 of 2) 10/06/2006    PAP AKA CERVICAL CYTOLOGY  10/21/2016    MEDICARE YEARLY EXAM  03/14/2018    COLONOSCOPY  07/06/2018

## 2018-12-12 NOTE — PROGRESS NOTES
SARKIS Singh is a 59 y.o. female who presents with left knee pain, right thumb pain, bilateral shoulder pain. All these pains have been bothering her for about the last 2 months. Has been remodeling her kitchen. Painting appears are brought on the thumb pain. Points to the radial aspect of the thumb. Occasionally will have pain shooting up her arm.  strength is slightly reduced as a result of this pain. Both of her shoulders bother her gently with twisting motion and overhead motion. Will occasionally have a problem when folding clothes or using a computer. She has been doing exercises at the gym that involves pushing down or doing lat pull downs. She says that she feels better after going to the gym but her shoulders feel worse. Her left knee is bothering her. Particularly when going up steps or deep squat. She is done physical therapy for this in the past but is not doing all of the exercises. It is gotten to the point where she can do some exercises that the gym that involve flexion of the knee but cannot do any exercises that involve extension. As a result she is no longer using the stationary bike. Points to the medial aspect of the patella when describing the pain. 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 long term use Coumadin    Thyroid disease        Meds:   Current Outpatient Medications   Medication Sig Dispense Refill    diazePAM (VALIUM) 5 mg tablet       levothyroxine (SYNTHROID) 50 mcg tablet TAKE ONE TABLET BY MOUTH IN THE MORNING 90 Tab 3    warfarin (COUMADIN) 7.5 mg tablet TAKE ONE TABLET BY MOUTH ONCE DAILY 90 Tab 3    Biotin 2,500 mcg cap Take  by mouth.  hydroCHLOROthiazide (HYDRODIURIL) 25 mg tablet Take 1 Tab by mouth daily.  90 Tab 3    naproxen sodium (ALEVE) 220 mg tablet Take 220 mg by mouth two (2) times daily as needed.  venlafaxine (EFFEXOR) 75 mg tablet TAKE ONE TABLET BY MOUTH TWICE DAILY 180 Tab 3    cholecalciferol (VITAMIN D3) 1,000 unit cap Take  by mouth daily.  cyclobenzaprine (FLEXERIL) 5 mg tablet Take 1 Tab by mouth three (3) times daily as needed for Muscle Spasm(s). 60 Tab 1    IRON, FERROUS SULFATE, PO Take  by mouth.  traZODone (DESYREL) 50 mg tablet Take 1 Tab by mouth nightly. Prn sleep 30 Tab 2    LANSOPRAZOLE (PREVACID PO) Take  by mouth.  potassium 99 mg tablet Take 99 mg by mouth daily. Pt is taking 2 tabs daily      diclofenac EC (VOLTAREN) 75 mg EC tablet Take 1 Tab by mouth two (2) times daily as needed. 60 Tab 2       Allergies: Allergies   Allergen Reactions    Iodinated Contrast- Oral And Iv Dye Hives    Lasix [Furosemide] Hives    Sulfa (Sulfonamide Antibiotics) Hives       Smoker:  Social History     Tobacco Use   Smoking Status Never Smoker   Smokeless Tobacco Never Used       ETOH:   Social History     Substance and Sexual Activity   Alcohol Use No       FH:   Family History   Problem Relation Age of Onset    Heart Disease Mother     Heart Disease Father        ROS:   As listed in HPI. In addition:  Constitutional:   No headache, fever, fatigue, weight loss or weight gain      Cardiac:    No chest pain      Resp:   No cough or shortness of breath      Neuro   No loss of consciousness, dizziness, seizures      Physical Exam:  Blood pressure 153/89, pulse 79, temperature 98 °F (36.7 °C), temperature source Oral, resp. rate 20, height 5' 10\" (1.778 m), weight 257 lb (116.6 kg), SpO2 98 %. GEN: No apparent distress. Alert and oriented and responds to all questions appropriately. NEUROLOGIC:  No focal neurologic deficits. Strength and sensation grossly intact. Coordination and gait grossly intact. EXT: Well perfused. No edema. SKIN: No obvious rashes. Thumb examined.   Reasonably good  strength in the right hand. Full range of motion of the thumb. Good resistive motion. No pain on Finkelstein    Bilateral shoulders examined. Left is worse than right. Pain to the lateral aspect of the shoulders consistent with bursitis. Full range of motion with pain at the extremes of motion particularly in external and internal rotation. Supraspinatus infraspinatus and subscapularis are all intact 5/5 strength, left side is mildly painful with all of these resisted motions. There is a impingement sign on Stearns's bilaterally but the left is worse. Left knee examined. There is a positive patella grind. There is no joint line tenderness. There is no Erendira sign       Assessment and Plan     Bilateral rotator cuff syndrome, left worse than right. Likely due to repetitive and overhead motions. She is also doing the wrong exercises for this at the gym, more likely to exacerbate the problem. Reviewed extensively the exercises she will need to do at home. Provided with a stretch band to do this. Patellar arthritis left knee  This will be harder for her to do at home. Refer to physical therapy for this. Went over the etiology of it. She declines injection but it might be appropriate if it is necessary to do these exercises. I suspect overuse from all the renovating that she is doing. Right thumb arthritis  This is mild and seems to be relieved by rest.  She uses a brace that she has left over from carpal tunnel, most likely this is reminding her not to use the thumb and thus allowing it to rest.  Continue to rest this, do not overuse this joint. INR 1.9. Usually very consistent. No changes to therapy today    INR 1 month      ICD-10-CM ICD-9-CM    1. Bilateral rotator cuff syndrome M75.101 726.10     M75.102     2. Recurrent deep vein thrombosis (DVT) ([facility]) I82.409 453.40 AMB POC PT/INR   3. Hypothyroidism, unspecified type E03.9 244.9 levothyroxine (SYNTHROID) 50 mcg tablet   4. Patellofemoral arthritis of left knee M17.12 716.96    5. Thumb pain, right M79.644 729.5        AVS given. Pt expressed understanding of instructions  This was a 40-minute visit. Greater than 50% of the time was spent counseling the patient on bilateral shoulder pain, thumb pain, knee pain.

## 2019-01-14 ENCOUNTER — CLINICAL SUPPORT (OUTPATIENT)
Dept: FAMILY MEDICINE CLINIC | Age: 64
End: 2019-01-14

## 2019-01-14 VITALS
SYSTOLIC BLOOD PRESSURE: 142 MMHG | HEIGHT: 70 IN | BODY MASS INDEX: 36.79 KG/M2 | TEMPERATURE: 98 F | OXYGEN SATURATION: 94 % | DIASTOLIC BLOOD PRESSURE: 87 MMHG | WEIGHT: 257 LBS | RESPIRATION RATE: 12 BRPM | HEART RATE: 80 BPM

## 2019-01-14 DIAGNOSIS — Z86.711 HX PULMONARY EMBOLISM: Primary | ICD-10-CM

## 2019-01-14 LAB
INR BLD: 2.4
PT POC: 28.2 SECONDS
VALID INTERNAL CONTROL?: YES

## 2019-01-14 NOTE — PROGRESS NOTES
Hypertension noted. Did eventually come down on recheck but is still borderline. She has the capability of checking her blood pressure at home and she will do so prior to her next visit. She is currently taking HCTZ 25 mg and may need another agent. Has been elevated in our system for the last year. She is due for a checkup/annual blood work in February

## 2019-01-14 NOTE — PROGRESS NOTES
Chief Complaint Patient presents with  Anticoagulation Patient is here for a recheck of her INR. Patient is taking 7.5 mg of coumadin daily. 1. Have you been to the ER, urgent care clinic since your last visit? Hospitalized since your last visit? No 
2. Have you seen or consulted any other health care providers outside of the [address] [place] [address] since your last visit? Include any pap smears or colon screening. No 
 
Results for orders placed or performed in visit on 01/14/19 AMB POC PT/INR Result Value Ref Range VALID INTERNAL CONTROL POC Yes Prothrombin time (POC) 28.2 seconds INR POC 2.4 Patient advised to monitor BP at home. Bring BP log to next appointment. Patient instructed to continue taking 7.5 mg of coumadin daily. Return in one month for recheck of INR. Patient given coumadin calendar.

## 2019-02-11 ENCOUNTER — CLINICAL SUPPORT (OUTPATIENT)
Dept: FAMILY MEDICINE CLINIC | Age: 64
End: 2019-02-11

## 2019-02-11 VITALS
TEMPERATURE: 98.2 F | HEIGHT: 70 IN | OXYGEN SATURATION: 96 % | RESPIRATION RATE: 12 BRPM | HEART RATE: 70 BPM | WEIGHT: 261 LBS | SYSTOLIC BLOOD PRESSURE: 149 MMHG | DIASTOLIC BLOOD PRESSURE: 82 MMHG | BODY MASS INDEX: 37.37 KG/M2

## 2019-02-11 DIAGNOSIS — F32.A DEPRESSION, UNSPECIFIED DEPRESSION TYPE: Primary | ICD-10-CM

## 2019-02-11 DIAGNOSIS — Z86.711 HX PULMONARY EMBOLISM: Primary | ICD-10-CM

## 2019-02-11 LAB
INR BLD: 2.5
PT POC: 30.2 SECONDS
VALID INTERNAL CONTROL?: YES

## 2019-02-11 RX ORDER — VENLAFAXINE 75 MG/1
TABLET ORAL
Qty: 180 TAB | Refills: 3 | Status: SHIPPED | OUTPATIENT
Start: 2019-02-11 | End: 2020-02-10

## 2019-02-11 NOTE — PROGRESS NOTES
Chief Complaint Patient presents with  Coagulation disorder Leora Henley is a 59 y.o. female who presents today for Anticoagulation monitoring. Indication: pulmonary embolism INR Goal: 2.0-3.0 Current dose:  Coumadin 7.5 mg daily. Missed Coumadin Doses:  None Medication Changes:  No 
Dietary Changes: No 
 
Symptoms: taking coumadin appropriately. Results for orders placed or performed in visit on 02/11/19 AMB POC PT/INR Result Value Ref Range VALID INTERNAL CONTROL POC Yes Prothrombin time (POC) 30.2 seconds INR POC 2.5 Latest INRs: 2.4 New Coumadin dose:.current treatment plan is effective, no change. Next check to be scheduled for 1 month. Patient given coumadin calendar. Visit Vitals BP (!) 173/104 (BP 1 Location: Left arm, BP Patient Position: Sitting) Pulse 70 Temp 98.2 °F (36.8 °C) (Oral) Resp 12 Ht 5' 10\" (1.778 m) Wt 261 lb (118.4 kg) SpO2 96% BMI 37.45 kg/m² Patient placed on intervals. Patient forgot to bring BP log today. Patient states it has been on average:  170/105

## 2019-02-11 NOTE — TELEPHONE ENCOUNTER
Chief Complaint   Patient presents with    Medication Refill     Patient seen 2/11/19 for PT/INR recheck.

## 2019-03-11 ENCOUNTER — CLINICAL SUPPORT (OUTPATIENT)
Dept: FAMILY MEDICINE CLINIC | Age: 64
End: 2019-03-11

## 2019-03-11 VITALS
OXYGEN SATURATION: 96 % | HEART RATE: 68 BPM | WEIGHT: 264 LBS | TEMPERATURE: 97.8 F | BODY MASS INDEX: 37.8 KG/M2 | SYSTOLIC BLOOD PRESSURE: 180 MMHG | DIASTOLIC BLOOD PRESSURE: 74 MMHG | HEIGHT: 70 IN | RESPIRATION RATE: 16 BRPM

## 2019-03-11 DIAGNOSIS — I16.0 HYPERTENSIVE URGENCY: Primary | ICD-10-CM

## 2019-03-11 DIAGNOSIS — E78.5 HYPERLIPIDEMIA, UNSPECIFIED HYPERLIPIDEMIA TYPE: ICD-10-CM

## 2019-03-11 DIAGNOSIS — M25.512 CHRONIC LEFT SHOULDER PAIN: ICD-10-CM

## 2019-03-11 DIAGNOSIS — I82.409 RECURRENT DEEP VEIN THROMBOSIS (DVT) (HCC): ICD-10-CM

## 2019-03-11 DIAGNOSIS — G89.29 CHRONIC LEFT SHOULDER PAIN: ICD-10-CM

## 2019-03-11 LAB
INR BLD: 2
PT POC: 23.6 SECONDS
VALID INTERNAL CONTROL?: YES

## 2019-03-11 RX ORDER — CLONIDINE HYDROCHLORIDE 0.1 MG/1
0.1 TABLET ORAL 2 TIMES DAILY
Qty: 60 TAB | Refills: 1 | Status: SHIPPED | OUTPATIENT
Start: 2019-03-11 | End: 2019-05-14 | Stop reason: SDUPTHER

## 2019-03-11 NOTE — PROGRESS NOTES
Chief Complaint   Patient presents with    Anticoagulation     ptinr     Pt is here for a ptinr check. BP very elevated in office today. Cyndi Tavares is a 59 y.o. female who presents today for Anticoagulation monitoring. Patient states current dose coumadin is:   Missed Coumadin Doses:  None  Medication Changes:  no  Dietary Changes:  no    Symptoms: without any bleeding. Anticoagulation Summary  As of 3/11/2019    INR goal:   2.0-3.0   TTR:   77.3 % (1.1 y)   INR used for dosin.0 (3/11/2019)   Warfarin maintenance plan:   7.5 mg (7.5 mg x 1) every day   Weekly warfarin total:   52.5 mg   Plan last modified:   Griselda Conradi, MD (2018)   Next INR check:   2019   Target end date:            Anticoagulation Episode Summary     INR check location:       Preferred lab:       Send INR reminders to:       Comments:             Description    Continue taking coumadin 7.5 mg daily. Return in one month for recheck of INR.

## 2019-03-11 NOTE — PROGRESS NOTES
SARKIS Hankins is a 59 y.o. female who presents for INR check converted over to Dr. ellis because of hypertension. She noticed that her blood pressure has been going up steadily on her home readings over the last month. For the last 3 weeks it has been up about this high (599 systolic) went on a cruise last week. For the last several days has been having a headache and a disequilibrium feeling. Has a little cough and congestion but otherwise feels okay. 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 long term use Coumadin    Thyroid disease        Meds:   Current Outpatient Medications   Medication Sig Dispense Refill    cloNIDine HCl (CATAPRES) 0.1 mg tablet Take 1 Tab by mouth two (2) times a day. 60 Tab 1    venlafaxine (EFFEXOR) 75 mg tablet TAKE ONE TABLET BY MOUTH TWICE DAILY 180 Tab 3    diazePAM (VALIUM) 5 mg tablet       levothyroxine (SYNTHROID) 50 mcg tablet TAKE ONE TABLET BY MOUTH IN THE MORNING 90 Tab 3    warfarin (COUMADIN) 7.5 mg tablet TAKE ONE TABLET BY MOUTH ONCE DAILY (Patient taking differently: Take 7.5 mg daily.) 90 Tab 3    Biotin 2,500 mcg cap Take  by mouth.  hydroCHLOROthiazide (HYDRODIURIL) 25 mg tablet Take 1 Tab by mouth daily. 90 Tab 3    naproxen sodium (ALEVE) 220 mg tablet Take 220 mg by mouth two (2) times daily as needed.  diclofenac EC (VOLTAREN) 75 mg EC tablet Take 1 Tab by mouth two (2) times daily as needed. 60 Tab 2    cholecalciferol (VITAMIN D3) 1,000 unit cap Take  by mouth daily.  cyclobenzaprine (FLEXERIL) 5 mg tablet Take 1 Tab by mouth three (3) times daily as needed for Muscle Spasm(s). 60 Tab 1    IRON, FERROUS SULFATE, PO Take  by mouth.  traZODone (DESYREL) 50 mg tablet Take 1 Tab by mouth nightly.  Prn sleep 30 Tab 2  LANSOPRAZOLE (PREVACID PO) Take  by mouth.  potassium 99 mg tablet Take 99 mg by mouth daily. Pt is taking 2 tabs daily         Allergies: Allergies   Allergen Reactions    Iodinated Contrast- Oral And Iv Dye Hives    Lasix [Furosemide] Hives    Sulfa (Sulfonamide Antibiotics) Hives       Smoker:  Social History     Tobacco Use   Smoking Status Never Smoker   Smokeless Tobacco Never Used       ETOH:   Social History     Substance and Sexual Activity   Alcohol Use No       FH:   Family History   Problem Relation Age of Onset    Heart Disease Mother     Heart Disease Father        ROS:   As listed in HPI. In addition:  Constitutional:   No headache, fever, fatigue, weight loss or weight gain      Cardiac:    No chest pain      Resp:   No cough or shortness of breath      Neuro   No loss of consciousness, dizziness, seizures      Physical Exam:  Blood pressure 180/74, pulse 68, temperature 97.8 °F (36.6 °C), temperature source Oral, resp. rate 16, height 5' 10\" (1.778 m), weight 264 lb (119.7 kg), SpO2 96 %. GEN: No apparent distress. Alert and oriented and responds to all questions appropriately. NEUROLOGIC:  No focal neurologic deficits. Strength and sensation grossly intact. Coordination and gait grossly intact. EXT: Well perfused. No edema. SKIN: No obvious rashes. Lungs clear to auscultation bilaterally  CV regular rate and rhythm no murmur       Assessment and Plan     Hypertensive urgency  Blood work, go ahead and get a full panel since it has been 1 year  Clonidine 0.1 mg added  Follow-up in 1 week for dose adjustment  Bring home blood pressure readings    Recurrent DVT  INR check, at goal.  Continue current dosing      ICD-10-CM ICD-9-CM    1. Hypertensive urgency I16.0 401.9 cloNIDine HCl (CATAPRES) 0.1 mg tablet      METABOLIC PANEL, COMPREHENSIVE      CBC WITH AUTOMATED DIFF      TSH 3RD GENERATION   2. Recurrent deep vein thrombosis (DVT) ([facility]) I82.409 453.40 AMB POC PT/INR   3. Chronic left shoulder pain M25.512 719.41 REFERRAL TO ORTHOPEDICS    G89.29 338.29    4. Hyperlipidemia, unspecified hyperlipidemia type E78.5 272.4 LIPID PANEL       AVS given.  Pt expressed understanding of instructions

## 2019-03-12 ENCOUNTER — TELEPHONE (OUTPATIENT)
Dept: FAMILY MEDICINE CLINIC | Age: 64
End: 2019-03-12

## 2019-03-12 LAB
ALBUMIN SERPL-MCNC: 4.2 G/DL (ref 3.6–4.8)
ALBUMIN/GLOB SERPL: 1.2 {RATIO} (ref 1.2–2.2)
ALP SERPL-CCNC: 94 IU/L (ref 39–117)
ALT SERPL-CCNC: 25 IU/L (ref 0–32)
AST SERPL-CCNC: 17 IU/L (ref 0–40)
BASOPHILS # BLD AUTO: 0 X10E3/UL (ref 0–0.2)
BASOPHILS NFR BLD AUTO: 0 %
BILIRUB SERPL-MCNC: 0.4 MG/DL (ref 0–1.2)
BUN SERPL-MCNC: 10 MG/DL (ref 8–27)
BUN/CREAT SERPL: 15 (ref 12–28)
CALCIUM SERPL-MCNC: 9.7 MG/DL (ref 8.7–10.3)
CHLORIDE SERPL-SCNC: 100 MMOL/L (ref 96–106)
CHOLEST SERPL-MCNC: 197 MG/DL (ref 100–199)
CO2 SERPL-SCNC: 26 MMOL/L (ref 20–29)
CREAT SERPL-MCNC: 0.65 MG/DL (ref 0.57–1)
EOSINOPHIL # BLD AUTO: 0.1 X10E3/UL (ref 0–0.4)
EOSINOPHIL NFR BLD AUTO: 2 %
ERYTHROCYTE [DISTWIDTH] IN BLOOD BY AUTOMATED COUNT: 14 % (ref 12.3–15.4)
GLOBULIN SER CALC-MCNC: 3.5 G/DL (ref 1.5–4.5)
GLUCOSE SERPL-MCNC: 87 MG/DL (ref 65–99)
HCT VFR BLD AUTO: 42.7 % (ref 34–46.6)
HDLC SERPL-MCNC: 60 MG/DL
HGB BLD-MCNC: 14.7 G/DL (ref 11.1–15.9)
IMM GRANULOCYTES # BLD AUTO: 0 X10E3/UL (ref 0–0.1)
IMM GRANULOCYTES NFR BLD AUTO: 0 %
INTERPRETATION, 910389: NORMAL
LDLC SERPL CALC-MCNC: 110 MG/DL (ref 0–99)
LYMPHOCYTES # BLD AUTO: 2.3 X10E3/UL (ref 0.7–3.1)
LYMPHOCYTES NFR BLD AUTO: 37 %
MCH RBC QN AUTO: 29.5 PG (ref 26.6–33)
MCHC RBC AUTO-ENTMCNC: 34.4 G/DL (ref 31.5–35.7)
MCV RBC AUTO: 86 FL (ref 79–97)
MONOCYTES # BLD AUTO: 0.4 X10E3/UL (ref 0.1–0.9)
MONOCYTES NFR BLD AUTO: 6 %
NEUTROPHILS # BLD AUTO: 3.3 X10E3/UL (ref 1.4–7)
NEUTROPHILS NFR BLD AUTO: 55 %
PLATELET # BLD AUTO: 235 X10E3/UL (ref 150–379)
POTASSIUM SERPL-SCNC: 4.1 MMOL/L (ref 3.5–5.2)
PROT SERPL-MCNC: 7.7 G/DL (ref 6–8.5)
RBC # BLD AUTO: 4.99 X10E6/UL (ref 3.77–5.28)
SODIUM SERPL-SCNC: 143 MMOL/L (ref 134–144)
TRIGL SERPL-MCNC: 134 MG/DL (ref 0–149)
TSH SERPL DL<=0.005 MIU/L-ACNC: 4.6 UIU/ML (ref 0.45–4.5)
VLDLC SERPL CALC-MCNC: 27 MG/DL (ref 5–40)
WBC # BLD AUTO: 6.1 X10E3/UL (ref 3.4–10.8)

## 2019-03-13 NOTE — TELEPHONE ENCOUNTER
She's still a little dizzy but the headache is gone. She is taking her medications like she should She said she will see you in a week.

## 2019-03-18 ENCOUNTER — CLINICAL SUPPORT (OUTPATIENT)
Dept: FAMILY MEDICINE CLINIC | Age: 64
End: 2019-03-18

## 2019-03-18 VITALS
RESPIRATION RATE: 12 BRPM | BODY MASS INDEX: 37.94 KG/M2 | DIASTOLIC BLOOD PRESSURE: 92 MMHG | SYSTOLIC BLOOD PRESSURE: 163 MMHG | HEIGHT: 70 IN | TEMPERATURE: 98 F | HEART RATE: 66 BPM | WEIGHT: 265 LBS | OXYGEN SATURATION: 96 %

## 2019-03-18 DIAGNOSIS — Z86.711 HX PULMONARY EMBOLISM: ICD-10-CM

## 2019-03-18 DIAGNOSIS — Z87.74 H/O ATRIAL SEPTAL DEFECT REPAIR: ICD-10-CM

## 2019-03-18 DIAGNOSIS — I82.409 RECURRENT DEEP VEIN THROMBOSIS (DVT) (HCC): Primary | ICD-10-CM

## 2019-03-18 NOTE — PROGRESS NOTES
Chief Complaint   Patient presents with    Blood Pressure Check     Patient is here for a BP check. 1. Have you been to the ER, urgent care clinic since your last visit? Hospitalized since your last visit? No    2. Have you seen or consulted any other health care providers outside of the [address] [place] [address] since your last visit? Include any pap smears or colon screening. No  Blood pressure (!) 163/92, pulse 66, temperature 98 °F (36.7 °C), temperature source Oral, resp. rate 12, height 5' 10\" (1.778 m), weight 265 lb (120.2 kg), SpO2 96 %.       Health Maintenance Due   Topic Date Due    Shingrix Vaccine Age 50> (1 of 2) 10/06/2006    PAP AKA CERVICAL CYTOLOGY  10/21/2016    MEDICARE YEARLY EXAM  03/14/2018    COLONOSCOPY  07/06/2018

## 2019-03-18 NOTE — PROGRESS NOTES
Brought her blood pressure log in. First thing in the morning blood pressure 190s/90s consistently. As before she takes her clonidine. Please bring your cuff in 1 week for comparison to our own. Will probably need to switch medicine from the clonidine (although it appears to be working here in the office) she might be spiking as the clonidine wears off. Also says that she saw Dr. Danny De Santiago. Physical therapy unlikely to be helpful for her shoulder.   Injections versus surgery    Currently HCTZ 25 mg  Clonidine 0.1 mg twice daily    Add ACE if needed

## 2019-03-26 ENCOUNTER — CLINICAL SUPPORT (OUTPATIENT)
Dept: FAMILY MEDICINE CLINIC | Age: 64
End: 2019-03-26

## 2019-03-26 VITALS
WEIGHT: 266 LBS | TEMPERATURE: 98 F | HEART RATE: 62 BPM | DIASTOLIC BLOOD PRESSURE: 97 MMHG | RESPIRATION RATE: 16 BRPM | SYSTOLIC BLOOD PRESSURE: 172 MMHG | OXYGEN SATURATION: 96 % | HEIGHT: 70 IN | BODY MASS INDEX: 38.08 KG/M2

## 2019-03-26 DIAGNOSIS — E66.01 SEVERE OBESITY (BMI 35.0-39.9) WITH COMORBIDITY (HCC): ICD-10-CM

## 2019-03-26 DIAGNOSIS — I10 ESSENTIAL HYPERTENSION, BENIGN: Primary | ICD-10-CM

## 2019-03-26 RX ORDER — LISINOPRIL 10 MG/1
10 TABLET ORAL DAILY
Qty: 30 TAB | Refills: 3 | Status: SHIPPED | OUTPATIENT
Start: 2019-03-26 | End: 2019-07-18 | Stop reason: SDUPTHER

## 2019-03-26 NOTE — PROGRESS NOTES
SARKIS Lennon is a 59 y.o. female who presents for blood pressure check. Tolerating medications but seems to be getting sleepy on the clonidine    PMHx:  Past Medical History:   Diagnosis Date    Arthritis     ASD (atrial septal defect)     age 7 septal defect closures MCV    Depression     Hypertension     Lymphedema of leg     Thromboembolus ([address]     hx bilateral DVT. long term use Coumadin    Thyroid disease        Meds:   Current Outpatient Medications   Medication Sig Dispense Refill    lisinopril (PRINIVIL, ZESTRIL) 10 mg tablet Take 1 Tab by mouth daily. 30 Tab 3    cloNIDine HCl (CATAPRES) 0.1 mg tablet Take 1 Tab by mouth two (2) times a day. 60 Tab 1    venlafaxine (EFFEXOR) 75 mg tablet TAKE ONE TABLET BY MOUTH TWICE DAILY 180 Tab 3    diazePAM (VALIUM) 5 mg tablet       levothyroxine (SYNTHROID) 50 mcg tablet TAKE ONE TABLET BY MOUTH IN THE MORNING 90 Tab 3    warfarin (COUMADIN) 7.5 mg tablet TAKE ONE TABLET BY MOUTH ONCE DAILY (Patient taking differently: Take 7.5 mg daily.) 90 Tab 3    Biotin 2,500 mcg cap Take  by mouth.  hydroCHLOROthiazide (HYDRODIURIL) 25 mg tablet Take 1 Tab by mouth daily. 90 Tab 3    naproxen sodium (ALEVE) 220 mg tablet Take 220 mg by mouth two (2) times daily as needed.  diclofenac EC (VOLTAREN) 75 mg EC tablet Take 1 Tab by mouth two (2) times daily as needed. 60 Tab 2    cholecalciferol (VITAMIN D3) 1,000 unit cap Take  by mouth daily.  cyclobenzaprine (FLEXERIL) 5 mg tablet Take 1 Tab by mouth three (3) times daily as needed for Muscle Spasm(s). 60 Tab 1    IRON, FERROUS SULFATE, PO Take  by mouth.  traZODone (DESYREL) 50 mg tablet Take 1 Tab by mouth nightly. Prn sleep 30 Tab 2    LANSOPRAZOLE (PREVACID PO) Take  by mouth.  potassium 99 mg tablet Take 99 mg by mouth daily. Pt is taking 2 tabs daily         Allergies:    Allergies   Allergen Reactions    Iodinated Contrast- Oral And Iv Dye Hives    Lasix [Furosemide] Hives  Sulfa (Sulfonamide Antibiotics) Hives       Smoker:  Social History     Tobacco Use   Smoking Status Never Smoker   Smokeless Tobacco Never Used       ETOH:   Social History     Substance and Sexual Activity   Alcohol Use No       FH:   Family History   Problem Relation Age of Onset    Heart Disease Mother     Heart Disease Father        ROS:   As listed in HPI. In addition:  Constitutional:   No headache, fever, fatigue, weight loss or weight gain      Cardiac:    No chest pain      Resp:   No cough or shortness of breath      Neuro   No loss of consciousness, dizziness, seizures      Physical Exam:  Blood pressure (!) 172/97, pulse 62, temperature 98 °F (36.7 °C), resp. rate 16, height 5' 10\" (1.778 m), weight 266 lb (120.7 kg), SpO2 96 %. GEN: No apparent distress. Alert and oriented and responds to all questions appropriately. NEUROLOGIC:  No focal neurologic deficits. Strength and sensation grossly intact. Coordination and gait grossly intact. EXT: Well perfused. No edema. SKIN: No obvious rashes. Assessment and Plan       Hypertension  Home cuff is about 20 points higher than ours. She needs to get in    HCTZ 12.5 mg  Clonidine 0.1 mg twice daily  Allergies lisinopril 10 mg  Clonidine is giving her sleepy side effect. We will try to switch this out as soon as possible    Knee pain. Slated for knee surgery but needs to lose about 15 pounds    2 weeks for Coumadin check and repeat blood pressure check      ICD-10-CM ICD-9-CM    1. Essential hypertension, benign I10 401.1    2. Severe obesity (BMI 35.0-39. 9) with comorbidity ([facility] E66.01 278.01        AVS given.  Pt expressed understanding of instructions

## 2019-03-26 NOTE — PROGRESS NOTES
Chief Complaint   Patient presents with    Blood Pressure Check     Patient is here for a BP check. Patient brought home monitor to check. Patients home monitor reading much higher than office monitor. Patient states she will buy new monitor. 1. Have you been to the ER, urgent care clinic since your last visit? Hospitalized since your last visit? No    2. Have you seen or consulted any other health care providers outside of the [address] [place] [address] since your last visit? Include any pap smears or colon screening.  No

## 2019-04-02 ENCOUNTER — HOSPITAL ENCOUNTER (OUTPATIENT)
Dept: CT IMAGING | Age: 64
Discharge: HOME | End: 2019-04-02
Attending: ORTHOPAEDIC SURGERY
Payer: MEDICARE

## 2019-04-02 DIAGNOSIS — M19.012 ARTHRITIS OF LEFT SHOULDER REGION: ICD-10-CM

## 2019-04-02 PROCEDURE — 73200 CT UPPER EXTREMITY W/O DYE: CPT

## 2019-04-16 ENCOUNTER — CLINICAL SUPPORT (OUTPATIENT)
Dept: FAMILY MEDICINE CLINIC | Age: 64
End: 2019-04-16

## 2019-04-16 VITALS
HEIGHT: 70 IN | WEIGHT: 264 LBS | DIASTOLIC BLOOD PRESSURE: 87 MMHG | HEART RATE: 89 BPM | RESPIRATION RATE: 12 BRPM | TEMPERATURE: 98 F | OXYGEN SATURATION: 97 % | SYSTOLIC BLOOD PRESSURE: 144 MMHG | BODY MASS INDEX: 37.8 KG/M2

## 2019-04-16 DIAGNOSIS — I82.409 RECURRENT DEEP VEIN THROMBOSIS (DVT) (HCC): Primary | ICD-10-CM

## 2019-04-16 LAB
INR BLD: 2.3
PT POC: 27 SECONDS
VALID INTERNAL CONTROL?: YES

## 2019-04-16 NOTE — PROGRESS NOTES
Chief Complaint Patient presents with  Blood Pressure Check Patient is here for BP check. Hu Vazquez is a 59 y.o. female who presents today for Anticoagulation monitoring. Indication: DVT INR Goal: 2-3 Current dose:  Coumadin 7.5 mg daily. Missed Coumadin Doses: None Medication Changes:  None Dietary Changes: None Symptoms: taking coumadin appropriately Results for orders placed or performed in visit on 04/16/19 AMB POC PT/INR Result Value Ref Range VALID INTERNAL CONTROL POC Yes Prothrombin time (POC) 27.0 seconds INR POC 2.3 Latest INRs: 
Lab Results Component Value Date/Time INR 1.4 (H) 12/28/2012 11:10 AM  
 INR POC 2.3 04/16/2019 10:24 AM  
 INR POC 2.0 03/11/2019 01:29 PM  
 INR POC 2.5 02/11/2019 10:12 AM  
 Prothrombin time 14.9 (H) 12/28/2012 11:10 AM  
 
  
New Coumadin dose:Continue current dose Next check to be scheduled for 1 month.

## 2019-05-14 ENCOUNTER — CLINICAL SUPPORT (OUTPATIENT)
Dept: FAMILY MEDICINE CLINIC | Age: 64
End: 2019-05-14

## 2019-05-14 VITALS
RESPIRATION RATE: 16 BRPM | HEIGHT: 70 IN | HEART RATE: 67 BPM | TEMPERATURE: 97.9 F | SYSTOLIC BLOOD PRESSURE: 134 MMHG | BODY MASS INDEX: 38.08 KG/M2 | OXYGEN SATURATION: 96 % | DIASTOLIC BLOOD PRESSURE: 82 MMHG | WEIGHT: 266 LBS

## 2019-05-14 DIAGNOSIS — Z79.01 ANTICOAGULATION GOAL OF INR 2 TO 3: Primary | ICD-10-CM

## 2019-05-14 DIAGNOSIS — I16.0 HYPERTENSIVE URGENCY: ICD-10-CM

## 2019-05-14 DIAGNOSIS — Z51.81 ANTICOAGULATION GOAL OF INR 2 TO 3: Primary | ICD-10-CM

## 2019-05-14 LAB
INR BLD: 2.5
PT POC: 30.4 SECONDS
VALID INTERNAL CONTROL?: YES

## 2019-05-14 RX ORDER — CLONIDINE HYDROCHLORIDE 0.1 MG/1
0.1 TABLET ORAL 2 TIMES DAILY
Qty: 180 TAB | Refills: 3 | Status: SHIPPED | OUTPATIENT
Start: 2019-05-14 | End: 2020-06-25

## 2019-05-14 NOTE — PROGRESS NOTES
PT/INR drawn per Dilcia Turner Prior to Admission medications Medication Sig Start Date End Date Taking? Authorizing Provider  
lisinopril (PRINIVIL, ZESTRIL) 10 mg tablet Take 1 Tab by mouth daily. 3/26/19  Yes Carine García MD  
cloNIDine HCl (CATAPRES) 0.1 mg tablet Take 1 Tab by mouth two (2) times a day. 3/11/19  Yes Carine García MD  
venlafaxine [facility]) 75 mg tablet TAKE ONE TABLET BY MOUTH TWICE DAILY 2/11/19  Yes Carine García MD  
levothyroxine (SYNTHROID) 50 mcg tablet TAKE ONE TABLET BY MOUTH IN THE MORNING 12/12/18  Yes Carine García MD  
warfarin (COUMADIN) 7.5 mg tablet TAKE ONE TABLET BY MOUTH ONCE DAILY Patient taking differently: Take 7.5 mg daily. 12/4/18  Yes Carine García MD  
Biotin 2,500 mcg cap Take  by mouth. Yes Provider, Historical  
hydroCHLOROthiazide (HYDRODIURIL) 25 mg tablet Take 1 Tab by mouth daily. 6/14/18  Yes Carine García MD  
diclofenac EC (VOLTAREN) 75 mg EC tablet Take 1 Tab by mouth two (2) times daily as needed. 1/15/18  Yes Carine García MD  
cholecalciferol (VITAMIN D3) 1,000 unit cap Take  by mouth daily. Yes Provider, Historical  
traZODone (DESYREL) 50 mg tablet Take 1 Tab by mouth nightly. Prn sleep 9/23/15  Yes Eddi Lindo MD  
LANSOPRAZOLE (PREVACID PO) Take  by mouth. Yes Provider, Historical  
potassium 99 mg tablet Take 99 mg by mouth daily. Pt is taking 2 tabs daily   Yes Provider, Historical  
diazePAM (VALIUM) 5 mg tablet  12/4/18   Provider, Historical  
naproxen sodium (ALEVE) 220 mg tablet Take 220 mg by mouth two (2) times daily as needed. Provider, Historical  
cyclobenzaprine (FLEXERIL) 5 mg tablet Take 1 Tab by mouth three (3) times daily as needed for Muscle Spasm(s). 1/12/17   Carine García MD  
IRON, FERROUS SULFATE, PO Take  by mouth. Provider, Historical  
 
Results for orders placed or performed in visit on 05/14/19 AMB POC PT/INR Result Value Ref Range VALID INTERNAL CONTROL POC Yes Prothrombin time (POC) 30.4 seconds INR POC 2.5 Return in 1 month for PT/INR check per [facility]. No medication changes at this time.

## 2019-06-11 ENCOUNTER — CLINICAL SUPPORT (OUTPATIENT)
Dept: FAMILY MEDICINE CLINIC | Age: 64
End: 2019-06-11

## 2019-06-11 VITALS
DIASTOLIC BLOOD PRESSURE: 86 MMHG | SYSTOLIC BLOOD PRESSURE: 140 MMHG | WEIGHT: 264.2 LBS | BODY MASS INDEX: 37.82 KG/M2 | OXYGEN SATURATION: 96 % | HEIGHT: 70 IN | TEMPERATURE: 97.9 F | RESPIRATION RATE: 14 BRPM | HEART RATE: 75 BPM

## 2019-06-11 DIAGNOSIS — I82.409 RECURRENT DEEP VEIN THROMBOSIS (DVT) (HCC): Primary | ICD-10-CM

## 2019-06-11 LAB
INR BLD: 2.6
PT POC: 30.7 SECONDS
VALID INTERNAL CONTROL?: YES

## 2019-06-11 NOTE — PROGRESS NOTES
Chief Complaint   Patient presents with    Coagulation disorder     Janett Perez is a 59 y.o. female who presents today for Anticoagulation monitoring. Indication: DVT  INR Goal: 2.0-3.0. Current dose:  Coumadin 7.5 mg daily. Missed Coumadin Doses:  None  Medication Changes:  no  Dietary Changes:  no    Symptoms: taking coumadin appropriately without any bleeding. Latest INRs:  Lab Results   Component Value Date/Time    INR 1.4 (H) 12/28/2012 11:10 AM    INR POC 2.5 05/14/2019 10:48 AM    INR POC 2.3 04/16/2019 10:24 AM    INR POC 2.0 03/11/2019 01:29 PM    Prothrombin time 14.9 (H) 12/28/2012 11:10 AM        New Coumadin dose:.current treatment plan is effective, no change in therapy. Next check to be scheduled for  4 weeks.

## 2019-06-13 RX ORDER — HYDROCHLOROTHIAZIDE 25 MG/1
TABLET ORAL
Qty: 90 TAB | Refills: 3 | Status: SHIPPED | OUTPATIENT
Start: 2019-06-13 | End: 2020-06-12

## 2019-06-26 ENCOUNTER — OFFICE VISIT (OUTPATIENT)
Dept: FAMILY MEDICINE CLINIC | Age: 64
End: 2019-06-26

## 2019-06-26 VITALS
DIASTOLIC BLOOD PRESSURE: 82 MMHG | RESPIRATION RATE: 18 BRPM | BODY MASS INDEX: 38.08 KG/M2 | OXYGEN SATURATION: 96 % | SYSTOLIC BLOOD PRESSURE: 146 MMHG | TEMPERATURE: 97.9 F | HEIGHT: 70 IN | WEIGHT: 266 LBS | HEART RATE: 74 BPM

## 2019-06-26 DIAGNOSIS — N30.01 ACUTE CYSTITIS WITH HEMATURIA: Primary | ICD-10-CM

## 2019-06-26 LAB
BILIRUB UR QL STRIP: NEGATIVE
GLUCOSE UR-MCNC: NEGATIVE MG/DL
KETONES P FAST UR STRIP-MCNC: NEGATIVE MG/DL
PH UR STRIP: 7 [PH] (ref 4.6–8)
PROT UR QL STRIP: NEGATIVE
SP GR UR STRIP: 1.01 (ref 1–1.03)
UA UROBILINOGEN AMB POC: NORMAL (ref 0.2–1)
URINALYSIS CLARITY POC: CLEAR
URINALYSIS COLOR POC: YELLOW
URINE BLOOD POC: NORMAL
URINE LEUKOCYTES POC: NORMAL
URINE NITRITES POC: NEGATIVE

## 2019-06-26 RX ORDER — NITROFURANTOIN 25; 75 MG/1; MG/1
100 CAPSULE ORAL 2 TIMES DAILY
Qty: 14 CAP | Refills: 0 | Status: SHIPPED | OUTPATIENT
Start: 2019-06-26 | End: 2019-07-03

## 2019-06-26 NOTE — PROGRESS NOTES
Chief Complaint   Patient presents with    Bladder Infection     possible        Subjective:     Janett Baer is a 59 y.o. female who complains of frequency, urgency for 2 days. Patient denies flank pain, vomiting, fever, unusual vaginal discharge. Patient does not have a history of recurrent UTI. Patient does not have a history of pyelonephritis. Pt also reports shoulder pain which is chronic for pt and managed by ortho. Patient Active Problem List   Diagnosis Code    Lymphedema I89.0    Carpal tunnel syndrome, bilateral G56.03    Depression F32.9    Hypothyroidism E03.9    Essential hypertension, benign I10    Chronic acquired lymphedema--legs--onset age ~38yo I80.0   [facility] H/O atrial septal defect repair--age 12(3 defects)--one defect self-closure Z87.74    Hx pulmonary embolism--due to DVT Z80.36    Secondhand smoke exposure--40 YEARS() Z72.25    Family history of CAD--sister s/p stents,mother s/p CABG Z82.49    Severe obesity (BMI 35.0-39. 9) with comorbidity ([facility] E66.01    Recurrent deep vein thrombosis (DVT) ([facility]) I82.409     Current Outpatient Medications   Medication Sig Dispense Refill    nitrofurantoin, macrocrystal-monohydrate, (MACROBID) 100 mg capsule Take 1 Cap by mouth two (2) times a day for 7 days. 14 Cap 0    hydroCHLOROthiazide (HYDRODIURIL) 25 mg tablet TAKE 1 TABLET BY MOUTH ONCE DAILY 90 Tab 3    cloNIDine HCl (CATAPRES) 0.1 mg tablet Take 1 Tab by mouth two (2) times a day. 180 Tab 3    lisinopril (PRINIVIL, ZESTRIL) 10 mg tablet Take 1 Tab by mouth daily. 30 Tab 3    venlafaxine (EFFEXOR) 75 mg tablet TAKE ONE TABLET BY MOUTH TWICE DAILY 180 Tab 3    levothyroxine (SYNTHROID) 50 mcg tablet TAKE ONE TABLET BY MOUTH IN THE MORNING 90 Tab 3    warfarin (COUMADIN) 7.5 mg tablet TAKE ONE TABLET BY MOUTH ONCE DAILY (Patient taking differently: Take 7.5 mg daily.) 90 Tab 3    Biotin 2,500 mcg cap Take  by mouth.       naproxen sodium (ALEVE) 220 mg tablet Take 220 mg by mouth two (2) times daily as needed.  diclofenac EC (VOLTAREN) 75 mg EC tablet Take 1 Tab by mouth two (2) times daily as needed. 60 Tab 2    cholecalciferol (VITAMIN D3) 1,000 unit cap Take  by mouth daily.  cyclobenzaprine (FLEXERIL) 5 mg tablet Take 1 Tab by mouth three (3) times daily as needed for Muscle Spasm(s). 60 Tab 1    traZODone (DESYREL) 50 mg tablet Take 1 Tab by mouth nightly. Prn sleep 30 Tab 2    LANSOPRAZOLE (PREVACID PO) Take  by mouth.  potassium 99 mg tablet Take 99 mg by mouth daily. Pt is taking 2 tabs daily      diazePAM (VALIUM) 5 mg tablet       IRON, FERROUS SULFATE, PO Take  by mouth. Allergies   Allergen Reactions    Iodinated Contrast- Oral And Iv Dye Hives    Lasix [Furosemide] Hives    Sulfa (Sulfonamide Antibiotics) Hives        Review of Systems  Pertinent items are noted in HPI. Objective:     Visit Vitals  /82 (BP 1 Location: Left arm, BP Patient Position: Sitting)   Pulse 74   Temp 97.9 °F (36.6 °C) (Oral)   Resp 18   Ht 5' 10\" (1.778 m)   Wt 266 lb (120.7 kg)   SpO2 96%   BMI 38.17 kg/m²     General:  alert, cooperative, no distress   Abdomen: soft, nontender, nondistended, no masses or organomegaly. Back:  CVA tenderness absent   :  defer exam     Laboratory:   Urine dipstick shows positive for RBC's and positive for leukocytes. Micro exam: not done. Assessment/Plan:     Acute cystitis     1. nitrofurantoin  2. Maintain adequate hydration  3. May use OTC pyridium as desired, which will turn urine orange/red color  4. Follow up if symptoms not improving, and prn. ICD-10-CM ICD-9-CM    1. Acute cystitis with hematuria N30.01 595.0 nitrofurantoin, macrocrystal-monohydrate, (MACROBID) 100 mg capsule      AMB POC URINALYSIS DIP STICK AUTO W/O MICRO      CULTURE, URINE     Encounter Diagnoses   Name Primary?     Acute cystitis with hematuria Yes     Orders Placed This Encounter    CULTURE, URINE    AMB POC URINALYSIS DIP STICK AUTO W/O MICRO    nitrofurantoin, macrocrystal-monohydrate, (MACROBID) 100 mg capsule   .

## 2019-06-28 LAB — BACTERIA UR CULT: ABNORMAL

## 2019-07-11 ENCOUNTER — CLINICAL SUPPORT (OUTPATIENT)
Dept: FAMILY MEDICINE CLINIC | Age: 64
End: 2019-07-11

## 2019-07-11 VITALS
OXYGEN SATURATION: 95 % | BODY MASS INDEX: 38.08 KG/M2 | HEIGHT: 70 IN | RESPIRATION RATE: 20 BRPM | HEART RATE: 77 BPM | SYSTOLIC BLOOD PRESSURE: 143 MMHG | WEIGHT: 266 LBS | TEMPERATURE: 98 F | DIASTOLIC BLOOD PRESSURE: 88 MMHG

## 2019-07-11 DIAGNOSIS — Z86.711 HX PULMONARY EMBOLISM: Primary | ICD-10-CM

## 2019-07-11 DIAGNOSIS — Z79.01 CHRONIC ANTICOAGULATION: ICD-10-CM

## 2019-07-11 LAB
INR BLD: 2.7
PT POC: 32.3 SECONDS
VALID INTERNAL CONTROL?: YES

## 2019-08-12 ENCOUNTER — CLINICAL SUPPORT (OUTPATIENT)
Dept: FAMILY MEDICINE CLINIC | Age: 64
End: 2019-08-12

## 2019-08-12 VITALS
OXYGEN SATURATION: 95 % | WEIGHT: 265.6 LBS | HEIGHT: 70 IN | HEART RATE: 68 BPM | DIASTOLIC BLOOD PRESSURE: 87 MMHG | BODY MASS INDEX: 38.02 KG/M2 | SYSTOLIC BLOOD PRESSURE: 140 MMHG | RESPIRATION RATE: 16 BRPM | TEMPERATURE: 97.6 F

## 2019-08-12 DIAGNOSIS — Z51.81 ANTICOAGULATION GOAL OF INR 2 TO 3: Primary | ICD-10-CM

## 2019-08-12 DIAGNOSIS — Z79.01 ANTICOAGULATION GOAL OF INR 2 TO 3: Primary | ICD-10-CM

## 2019-08-12 LAB
INR BLD: 2.7
PT POC: 32 SECONDS
VALID INTERNAL CONTROL?: YES

## 2019-08-12 NOTE — PROGRESS NOTES
Chief Complaint   Patient presents with    Anticoagulation     ptinr     Janett Doss is a 59 y.o. female who presents today for Anticoagulation monitoring. Patient states current dose coumadin is:   Missed Coumadin Doses:  None  Medication Changes:  no  Dietary Changes:  no    Symptoms: without any bleeding. Anticoagulation Summary  As of 2019    INR goal:   2.0-3.0   TTR:   83.4 % (1.6 y)   INR used for dosin.7 (2019)   Warfarin maintenance plan:   7.5 mg (7.5 mg x 1) every day   Weekly warfarin total:   52.5 mg   Plan last modified:   Aditya Loya MD (2018)   Next INR check:   2019   Target end date:            Anticoagulation Episode Summary     INR check location:       Preferred lab:       Send INR reminders to:       Comments:             Description    Continue taking coumadin 7.5 mg daily. Return in one month for recheck of INR per Dr. Deb Dalal. Pt declined written instructions. Verbal instructions given to pt, she voiced understanding.

## 2019-09-12 ENCOUNTER — CLINICAL SUPPORT (OUTPATIENT)
Dept: FAMILY MEDICINE CLINIC | Age: 64
End: 2019-09-12

## 2019-09-12 VITALS
HEIGHT: 70 IN | OXYGEN SATURATION: 95 % | WEIGHT: 264.4 LBS | DIASTOLIC BLOOD PRESSURE: 75 MMHG | HEART RATE: 82 BPM | TEMPERATURE: 98.1 F | BODY MASS INDEX: 37.85 KG/M2 | SYSTOLIC BLOOD PRESSURE: 121 MMHG | RESPIRATION RATE: 18 BRPM

## 2019-09-12 DIAGNOSIS — Z51.81 ANTICOAGULATION GOAL OF INR 2 TO 3: Primary | ICD-10-CM

## 2019-09-12 DIAGNOSIS — Z79.01 ANTICOAGULATION GOAL OF INR 2 TO 3: Primary | ICD-10-CM

## 2019-09-12 DIAGNOSIS — Z86.711 HX PULMONARY EMBOLISM: ICD-10-CM

## 2019-09-12 LAB
INR BLD: 2.6
PT POC: 31.8 SECONDS
VALID INTERNAL CONTROL?: YES

## 2019-09-12 NOTE — PROGRESS NOTES
Rebecca Mancilla is a 59 y.o. female who presents today for Anticoagulation monitoring. Indication: DVT  INR Goal: 2.0-3.0. Current dose:  Coumadin 7.5 mg daily. Missed Coumadin Doses:  None  Medication Changes:  yes - Last dose taken on 09.09.2019  Dietary Changes:  no    Symptoms: taking coumadin appropriately without any bleeding. Latest INRs:  Lab Results   Component Value Date/Time    INR 1.4 (H) 12/28/2012 11:10 AM    INR POC 2.7 08/12/2019 11:22 AM    INR POC 2.7 07/11/2019 10:49 AM    INR POC 2.6 06/11/2019 10:50 AM    Prothrombin time 14.9 (H) 12/28/2012 11:10 AM        New Coumadin dose:.current treatment plan is effective, no change in therapy per Dr. Leyla Ambrocio. Next check to be scheduled for  4 weeks per Dr. Leyal Ambrocio.

## 2019-10-17 ENCOUNTER — CLINICAL SUPPORT (OUTPATIENT)
Dept: FAMILY MEDICINE CLINIC | Age: 64
End: 2019-10-17

## 2019-10-17 VITALS
BODY MASS INDEX: 37.94 KG/M2 | RESPIRATION RATE: 16 BRPM | HEIGHT: 70 IN | TEMPERATURE: 97.8 F | HEART RATE: 79 BPM | SYSTOLIC BLOOD PRESSURE: 144 MMHG | OXYGEN SATURATION: 95 % | DIASTOLIC BLOOD PRESSURE: 84 MMHG | WEIGHT: 265 LBS

## 2019-10-17 DIAGNOSIS — Z79.01 ANTICOAGULATION GOAL OF INR 2 TO 3: Primary | ICD-10-CM

## 2019-10-17 DIAGNOSIS — Z86.711 HX PULMONARY EMBOLISM: ICD-10-CM

## 2019-10-17 DIAGNOSIS — Z51.81 ANTICOAGULATION GOAL OF INR 2 TO 3: Primary | ICD-10-CM

## 2019-10-17 LAB
INR BLD: 3.4
PT POC: 40.2 SECONDS
VALID INTERNAL CONTROL?: YES

## 2019-10-17 NOTE — PROGRESS NOTES
Ruby Rasmussen is a 62 y.o. female who presents today for Anticoagulation monitoring. Indication: DVT  INR Goal: 2.0-3.0. Current dose:  Coumadin 7.5 mg daily. Missed Coumadin Doses:  None  Medication Changes:  no  Dietary Changes:  no    Symptoms: taking coumadin appropriately without any bleeding. Latest INRs:  Lab Results   Component Value Date/Time    INR 1.4 (H) 12/28/2012 11:10 AM    INR POC 2.6 09/12/2019 10:36 AM    INR POC 2.7 08/12/2019 11:22 AM    INR POC 2.7 07/11/2019 10:49 AM    Prothrombin time 14.9 (H) 12/28/2012 11:10 AM        New Coumadin dose:.current treatment plan is effective, no change in therapy per Dr. Mary Rosales. Next check to be scheduled for  2 weeks per Dr. Mary Rosales.

## 2019-10-31 ENCOUNTER — CLINICAL SUPPORT (OUTPATIENT)
Dept: FAMILY MEDICINE CLINIC | Age: 64
End: 2019-10-31

## 2019-10-31 VITALS
DIASTOLIC BLOOD PRESSURE: 89 MMHG | SYSTOLIC BLOOD PRESSURE: 143 MMHG | WEIGHT: 267.8 LBS | OXYGEN SATURATION: 98 % | BODY MASS INDEX: 38.34 KG/M2 | RESPIRATION RATE: 12 BRPM | TEMPERATURE: 97.8 F | HEART RATE: 74 BPM | HEIGHT: 70 IN

## 2019-10-31 DIAGNOSIS — Z51.81 ANTICOAGULATION GOAL OF INR 2 TO 3: Primary | ICD-10-CM

## 2019-10-31 DIAGNOSIS — Z86.711 HX PULMONARY EMBOLISM: ICD-10-CM

## 2019-10-31 DIAGNOSIS — Z79.01 ANTICOAGULATION GOAL OF INR 2 TO 3: Primary | ICD-10-CM

## 2019-10-31 DIAGNOSIS — Z23 ENCOUNTER FOR IMMUNIZATION: ICD-10-CM

## 2019-10-31 LAB
INR BLD: 3.3
PT POC: 39.4 SECONDS
VALID INTERNAL CONTROL?: YES

## 2019-10-31 NOTE — PATIENT INSTRUCTIONS
Vaccine Information Statement    Influenza (Flu) Vaccine (Inactivated or Recombinant): What You Need to Know    Many Vaccine Information Statements are available in [place] and other languages. See www.immunize.org/vis  Hojas de información sobre vacunas están disponibles en español y en muchos otros idiomas. Visite www.immunize.org/vis    1. Why get vaccinated? Influenza vaccine can prevent influenza (flu). Flu is a contagious disease that spreads around the [facility] every year, usually between October and May. Anyone can get the flu, but it is more dangerous for some people. Infants and young children, people 72years of age and older, pregnant women, and people with certain health conditions or a weakened immune system are at greatest risk of flu complications. Pneumonia, bronchitis, sinus infections and ear infections are examples of flu-related complications. If you have a medical condition, such as heart disease, cancer or diabetes, flu can make it worse. Flu can cause fever and chills, sore throat, muscle aches, fatigue, cough, headache, and runny or stuffy nose. Some people may have vomiting and diarrhea, though this is more common in children than adults. Each year thousands of people in the [facility] die from flu, and many more are hospitalized. Flu vaccine prevents millions of illnesses and flu-related visits to the doctor each year. 2. Influenza vaccines     CDC recommends everyone 10months of age and older get vaccinated every flu season. Children 6 months through 6years of age may need 2 doses during a single flu season. Everyone else needs only 1 dose each flu season. It takes about 2 weeks for protection to develop after vaccination. There are many flu viruses, and they are always changing. Each year a new flu vaccine is made to protect against three or four viruses that are likely to cause disease in the upcoming flu season.  Even when the vaccine doesnt exactly match these viruses, it may still provide some protection. Influenza vaccine does not cause flu. Influenza vaccine may be given at the same time as other vaccines. 3. Talk with your health care provider    Tell your vaccine provider if the person getting the vaccine:   Has had an allergic reaction after a previous dose of influenza vaccine, or has any severe, life-threatening allergies.  Has ever had Guillain-Barré Syndrome (also called GBS). In some cases, your health care provider may decide to postpone influenza vaccination to a future visit. People with minor illnesses, such as a cold, may be vaccinated. People who are moderately or severely ill should usually wait until they recover before getting influenza vaccine. Your health care provider can give you more information. 4. Risks of a reaction     Soreness, redness, and swelling where shot is given, fever, muscle aches, and headache can happen after influenza vaccine.  There may be a very small increased risk of Guillain-Barré Syndrome (GBS) after inactivated influenza vaccine (the flu shot). [place] children who get the flu shot along with pneumococcal vaccine (PCV13), and/or DTaP vaccine at the same time might be slightly more likely to have a seizure caused by fever. Tell your health care provider if a child who is getting flu vaccine has ever had a seizure. People sometimes faint after medical procedures, including vaccination. Tell your provider if you feel dizzy or have vision changes or ringing in the ears. As with any medicine, there is a very remote chance of a vaccine causing a severe allergic reaction, other serious injury, or death. 5. What if there is a serious problem? An allergic reaction could occur after the vaccinated person leaves the clinic.  If you see signs of a severe allergic reaction (hives, swelling of the face and throat, difficulty breathing, a fast heartbeat, dizziness, or weakness), call 9-1-1 and get the person to the nearest hospital.    For other signs that concern you, call your health care provider. Adverse reactions should be reported to the Vaccine Adverse Event Reporting System (VAERS). Your health care provider will usually file this report, or you can do it yourself. Visit the VAERS website at www.vaers. [facility].gov or call 5-648.658.9907. VAERS is only for reporting reactions, and VAERS staff do not give medical advice. 6. The National Vaccine Injury Compensation Program    The [facility] Vaccine Injury Compensation Program (VICP) is a federal program that was created to compensate people who may have been injured by certain vaccines. Visit the VICP website at www.[facility].gov/vaccinecompensation or call 7-597.364.7442 to learn about the program and about filing a claim. There is a time limit to file a claim for compensation. 7. How can I learn more?  Ask your health care provider.  Call your local or state health department.  Contact the Centers for Disease Control and Prevention (CDC):  - Call 4-881.104.5562 (1-800-CDC-INFO) or  - Visit CDCs influenza website at www.cdc.gov/flu    Vaccine Information Statement (Interim)  Inactivated Influenza Vaccine   8/15/2019  [address] Christopher [id number]   Department of Health and Human Services  Centers for Disease Control and Prevention    Office Use Only

## 2019-10-31 NOTE — PROGRESS NOTES
Dain Mo is a 62 y.o. female who presents today for Anticoagulation monitoring. Indication: Pulmonary Embolism  INR Goal: 2.0-3.0. Current dose:  Coumadin 7.5 mg daily. Missed Coumadin Doses:  None  Medication Changes:  no  Dietary Changes:  no    Symptoms: taking coumadin appropriately without any bleeding. Latest INRs:  Lab Results   Component Value Date/Time    INR 1.4 (H) 12/28/2012 11:10 AM    INR POC 3.4 10/17/2019 09:34 AM    INR POC 2.6 09/12/2019 10:36 AM    INR POC 2.7 08/12/2019 11:22 AM    Prothrombin time 14.9 (H) 12/28/2012 11:10 AM          10% dose reduction. Take half tab on Thursdays. 7.5 mg rest of the week.   Return in 2 weeks

## 2019-11-18 ENCOUNTER — CLINICAL SUPPORT (OUTPATIENT)
Dept: FAMILY MEDICINE CLINIC | Age: 64
End: 2019-11-18

## 2019-11-18 VITALS
HEIGHT: 70 IN | TEMPERATURE: 97.6 F | HEART RATE: 74 BPM | WEIGHT: 265.5 LBS | RESPIRATION RATE: 16 BRPM | SYSTOLIC BLOOD PRESSURE: 151 MMHG | BODY MASS INDEX: 38.01 KG/M2 | DIASTOLIC BLOOD PRESSURE: 79 MMHG | OXYGEN SATURATION: 94 %

## 2019-11-18 DIAGNOSIS — Z51.81 ANTICOAGULATION GOAL OF INR 2 TO 3: ICD-10-CM

## 2019-11-18 DIAGNOSIS — E03.9 HYPOTHYROIDISM, UNSPECIFIED TYPE: ICD-10-CM

## 2019-11-18 DIAGNOSIS — I89.0 CHRONIC ACQUIRED LYMPHEDEMA: ICD-10-CM

## 2019-11-18 DIAGNOSIS — I10 ESSENTIAL HYPERTENSION, BENIGN: Primary | ICD-10-CM

## 2019-11-18 DIAGNOSIS — Z79.01 ANTICOAGULATION GOAL OF INR 2 TO 3: ICD-10-CM

## 2019-11-18 DIAGNOSIS — E66.01 SEVERE OBESITY (BMI 35.0-39.9) WITH COMORBIDITY (HCC): ICD-10-CM

## 2019-11-18 LAB
INR BLD: 2
PT POC: 24.6 SECONDS
VALID INTERNAL CONTROL?: YES

## 2019-11-18 RX ORDER — VALSARTAN 80 MG/1
80 TABLET ORAL DAILY
Qty: 90 TAB | Refills: 3 | Status: SHIPPED | OUTPATIENT
Start: 2019-11-18 | End: 2020-01-08 | Stop reason: SDUPTHER

## 2019-11-18 NOTE — PATIENT INSTRUCTIONS
Stop lisinopril 10 mg. Start valsartan 80 mg Our goal will be to get you off of clonidine which may be causing sleepiness side effect You have significant sleep apnea. You need to be evaluated for a CPAP machine. This will help with your daytime sleepiness and your blood pressure.

## 2019-11-18 NOTE — PROGRESS NOTES
SARKIS Mora is a 62 y.o. female who presents for INR check converted to MD visit. She is complaining of sleepiness and is wondering about her B12. She has many other reasons to be sleepy. Her blood pressure has been consistently elevated and she has been lost to follow-up on this. She has a dry cough during the day that is worse at night and may be due to poorly controlled reflux but she is also taking lisinopril so might have possible side effect. She is on clonidine which was intended to be a temporary medicine. 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 long term use Coumadin    Thyroid disease        Meds:   Current Outpatient Medications   Medication Sig Dispense Refill    valsartan (DIOVAN) 80 mg tablet Take 1 Tab by mouth daily. 90 Tab 3    hydroCHLOROthiazide (HYDRODIURIL) 25 mg tablet TAKE 1 TABLET BY MOUTH ONCE DAILY 90 Tab 3    cloNIDine HCl (CATAPRES) 0.1 mg tablet Take 1 Tab by mouth two (2) times a day. 180 Tab 3    venlafaxine (EFFEXOR) 75 mg tablet TAKE ONE TABLET BY MOUTH TWICE DAILY 180 Tab 3    diazePAM (VALIUM) 5 mg tablet       levothyroxine (SYNTHROID) 50 mcg tablet TAKE ONE TABLET BY MOUTH IN THE MORNING 90 Tab 3    warfarin (COUMADIN) 7.5 mg tablet TAKE ONE TABLET BY MOUTH ONCE DAILY (Patient taking differently: Take 7.5 mg daily.) 90 Tab 3    Biotin 2,500 mcg cap Take  by mouth.  naproxen sodium (ALEVE) 220 mg tablet Take 220 mg by mouth two (2) times daily as needed.  diclofenac EC (VOLTAREN) 75 mg EC tablet Take 1 Tab by mouth two (2) times daily as needed. 60 Tab 2    cholecalciferol (VITAMIN D3) 1,000 unit cap Take  by mouth daily.       cyclobenzaprine (FLEXERIL) 5 mg tablet Take 1 Tab by mouth three (3) times daily as needed for Muscle Spasm(s). 60 Tab 1    IRON, FERROUS SULFATE, PO Take  by mouth.  traZODone (DESYREL) 50 mg tablet Take 1 Tab by mouth nightly. Prn sleep 30 Tab 2    LANSOPRAZOLE (PREVACID PO) Take  by mouth.  potassium 99 mg tablet Take 99 mg by mouth daily. Pt is taking 2 tabs daily         Allergies: Allergies   Allergen Reactions    Iodinated Contrast Media Hives    Lasix [Furosemide] Hives    Sulfa (Sulfonamide Antibiotics) Hives       Smoker:  Social History     Tobacco Use   Smoking Status Never Smoker   Smokeless Tobacco Never Used       ETOH:   Social History     Substance and Sexual Activity   Alcohol Use No       FH:   Family History   Problem Relation Age of Onset    Heart Disease Mother     Heart Disease Father        ROS:   As listed in HPI. In addition:  Constitutional:   No headache, fever, fatigue, weight loss or weight gain      Cardiac:    No chest pain      Resp:   No cough or shortness of breath      Neuro   No loss of consciousness, dizziness, seizures      Physical Exam:  Blood pressure 151/79, pulse 74, temperature 97.6 °F (36.4 °C), temperature source Oral, resp. rate 16, height 5' 10\" (1.778 m), weight 265 lb 8 oz (120.4 kg), SpO2 94 %. GEN: No apparent distress. Alert and oriented and responds to all questions appropriately. NEUROLOGIC:  No focal neurologic deficits. Strength and sensation grossly intact. Coordination and gait grossly intact. EXT: Well perfused. No edema. SKIN: No obvious rashes. Assessment and Plan     Hypertension  HCTZ 25 mg  Switch lisinopril 10 mg to valsartan 80 mg (roughly dose equivalent of lisinopril 20 mg) due to cough side effect  Clonidine 0.1 mg twice daily. Goal will be to come off this medicine    Hypothyroid  TSH on the higher range. Might consider dose increase of Synthroid to see if that helps her fatigue    VANESSA, not on CPAP  Diagnosed 2017  Needs to follow-up and be fitted for CPAP. That will help with fatigue and hypertension    Depression  She wonders if she could be more depressed with her level of fatigue. Currently taking venlafaxine 75 mg twice daily. Stay here for now    Diet weight loss etc.    Return for wellness, Pap smear. Plans to do this at the first of the year  Due for colonoscopy    2wks BP, INR      ICD-10-CM ICD-9-CM    1. Essential hypertension, benign Y28 900.2 METABOLIC PANEL, COMPREHENSIVE      CBC WITH AUTOMATED DIFF   2. Anticoagulation goal of INR 2 to 3 Z51.81 V58.83 AMB POC PT/INR    Z79.01 V58.61    3. Hypothyroidism, unspecified type E03.9 244.9 TSH 3RD GENERATION   4. Chronic acquired lymphedema--legs--onset age ~36yo I89.0 36.1    5. Severe obesity (BMI 35.0-39. 9) with comorbidity ([facility] [address] E66.01 278.01        AVS given.  Pt expressed understanding of instructions

## 2019-11-18 NOTE — PROGRESS NOTES
Chief Complaint   Patient presents with    Anticoagulation     INR      Marcelle Villalba is a 62 y.o. female who presents today for Anticoagulation monitoring. Patient states current dose coumadin is:   Missed Coumadin Doses:  None  Medication Changes:  no  Dietary Changes:  no    Symptoms: without any bleeding. Anticoagulation Summary  As of 2019    INR goal:   2.0-3.0   TTR:   37.9 % (1.9 mo)   INR used for dosin.0 (2019)   Warfarin maintenance plan:   3.75 mg (7.5 mg x 0.5) every Thu; 7.5 mg (7.5 mg x 1) all other days   Weekly warfarin total:   48.75 mg   Plan last modified:   Iwona Arreola MD (10/31/2019)   Next INR check:      Target end date:            Anticoagulation Episode Summary     INR check location:       Preferred lab:       Send INR reminders to:       Comments:             Description    Continue taking coumadin 7.5 mg daily. Return in two weeks for recheck of INR per Dr. Melisa Quinonez.

## 2019-11-19 ENCOUNTER — TELEPHONE (OUTPATIENT)
Dept: FAMILY MEDICINE CLINIC | Age: 64
End: 2019-11-19

## 2019-11-19 LAB
ALBUMIN SERPL-MCNC: 4 G/DL (ref 3.6–4.8)
ALBUMIN/GLOB SERPL: 1.2 {RATIO} (ref 1.2–2.2)
ALP SERPL-CCNC: 82 IU/L (ref 39–117)
ALT SERPL-CCNC: 20 IU/L (ref 0–32)
AST SERPL-CCNC: 16 IU/L (ref 0–40)
BASOPHILS # BLD AUTO: 0 X10E3/UL (ref 0–0.2)
BASOPHILS NFR BLD AUTO: 1 %
BILIRUB SERPL-MCNC: 0.4 MG/DL (ref 0–1.2)
BUN SERPL-MCNC: 14 MG/DL (ref 8–27)
BUN/CREAT SERPL: 20 (ref 12–28)
CALCIUM SERPL-MCNC: 9.4 MG/DL (ref 8.7–10.3)
CHLORIDE SERPL-SCNC: 101 MMOL/L (ref 96–106)
CO2 SERPL-SCNC: 26 MMOL/L (ref 20–29)
CREAT SERPL-MCNC: 0.7 MG/DL (ref 0.57–1)
EOSINOPHIL # BLD AUTO: 0.1 X10E3/UL (ref 0–0.4)
EOSINOPHIL NFR BLD AUTO: 2 %
ERYTHROCYTE [DISTWIDTH] IN BLOOD BY AUTOMATED COUNT: 13.3 % (ref 12.3–15.4)
GLOBULIN SER CALC-MCNC: 3.4 G/DL (ref 1.5–4.5)
GLUCOSE SERPL-MCNC: 118 MG/DL (ref 65–99)
HCT VFR BLD AUTO: 43.9 % (ref 34–46.6)
HGB BLD-MCNC: 14.7 G/DL (ref 11.1–15.9)
IMM GRANULOCYTES # BLD AUTO: 0 X10E3/UL (ref 0–0.1)
IMM GRANULOCYTES NFR BLD AUTO: 0 %
LYMPHOCYTES # BLD AUTO: 2 X10E3/UL (ref 0.7–3.1)
LYMPHOCYTES NFR BLD AUTO: 40 %
MCH RBC QN AUTO: 29.1 PG (ref 26.6–33)
MCHC RBC AUTO-ENTMCNC: 33.5 G/DL (ref 31.5–35.7)
MCV RBC AUTO: 87 FL (ref 79–97)
MONOCYTES # BLD AUTO: 0.4 X10E3/UL (ref 0.1–0.9)
MONOCYTES NFR BLD AUTO: 7 %
NEUTROPHILS # BLD AUTO: 2.5 X10E3/UL (ref 1.4–7)
NEUTROPHILS NFR BLD AUTO: 50 %
PLATELET # BLD AUTO: 234 X10E3/UL (ref 150–450)
POTASSIUM SERPL-SCNC: 4.5 MMOL/L (ref 3.5–5.2)
PROT SERPL-MCNC: 7.4 G/DL (ref 6–8.5)
RBC # BLD AUTO: 5.05 X10E6/UL (ref 3.77–5.28)
SODIUM SERPL-SCNC: 143 MMOL/L (ref 134–144)
TSH SERPL DL<=0.005 MIU/L-ACNC: 6.91 UIU/ML (ref 0.45–4.5)
WBC # BLD AUTO: 5 X10E3/UL (ref 3.4–10.8)

## 2019-11-19 RX ORDER — LEVOTHYROXINE SODIUM 100 UG/1
100 TABLET ORAL
Qty: 90 TAB | Refills: 3 | Status: SHIPPED | OUTPATIENT
Start: 2019-11-19 | End: 2020-11-13

## 2019-11-19 NOTE — TELEPHONE ENCOUNTER
Called pt, verified name and . Informed pt that per Dr. Stormy Laird he thyroid is off and he will increase her dose of Synthroid to 100 mcg. Pt stated understanding.

## 2019-11-19 NOTE — TELEPHONE ENCOUNTER
Labs reviewed. Thyroid function is off. Suggest we increase her dose of Synthroid to 100 mcg. Can take a few weeks for her to notice a difference in her fatigue.   Would recheck this lab in about 6 weeks to make sure dose change gets us to goal

## 2019-12-04 ENCOUNTER — CLINICAL SUPPORT (OUTPATIENT)
Dept: FAMILY MEDICINE CLINIC | Age: 64
End: 2019-12-04

## 2019-12-04 VITALS
RESPIRATION RATE: 16 BRPM | SYSTOLIC BLOOD PRESSURE: 149 MMHG | TEMPERATURE: 97.7 F | WEIGHT: 267.4 LBS | HEART RATE: 82 BPM | OXYGEN SATURATION: 97 % | DIASTOLIC BLOOD PRESSURE: 81 MMHG | HEIGHT: 70 IN | BODY MASS INDEX: 38.28 KG/M2

## 2019-12-04 DIAGNOSIS — I10 ESSENTIAL HYPERTENSION, BENIGN: Primary | ICD-10-CM

## 2019-12-04 DIAGNOSIS — Z79.01 ANTICOAGULATION GOAL OF INR 2 TO 3: ICD-10-CM

## 2019-12-04 DIAGNOSIS — Z51.81 ANTICOAGULATION GOAL OF INR 2 TO 3: ICD-10-CM

## 2019-12-04 DIAGNOSIS — E03.9 HYPOTHYROIDISM, UNSPECIFIED TYPE: ICD-10-CM

## 2019-12-04 LAB
INR BLD: 2.1
PT POC: 25 SECONDS
VALID INTERNAL CONTROL?: YES

## 2019-12-04 NOTE — PROGRESS NOTES
Chief Complaint   Patient presents with    Coagulation disorder     PT/ INR         1. Have you been to the ER, urgent care clinic since your last visit? Hospitalized since your last visit? no    2. Have you seen or consulted any other health care providers outside of the [address] [place] [address] since your last visit? Include any pap smears or colon screening.   no

## 2019-12-04 NOTE — PROGRESS NOTES
SARKIS  Ruby Rasmussen is a 62 y.o. female who presents for INR check converted to MD visit. She is complaining of sleepiness and is wondering about her B12. She has many other reasons to be sleepy. Her blood pressure has been consistently elevated and she has been lost to follow-up on this. She has a dry cough during the day that is worse at night and may be due to poorly controlled reflux but she is also taking lisinopril so might have possible side effect. She is on clonidine which was intended to be a temporary medicine. 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 long term use Coumadin    Thyroid disease        Meds:   Current Outpatient Medications   Medication Sig Dispense Refill    levothyroxine (SYNTHROID) 100 mcg tablet Take 1 Tab by mouth Daily (before breakfast). 90 Tab 3    valsartan (DIOVAN) 80 mg tablet Take 1 Tab by mouth daily. 90 Tab 3    hydroCHLOROthiazide (HYDRODIURIL) 25 mg tablet TAKE 1 TABLET BY MOUTH ONCE DAILY 90 Tab 3    cloNIDine HCl (CATAPRES) 0.1 mg tablet Take 1 Tab by mouth two (2) times a day. 180 Tab 3    venlafaxine (EFFEXOR) 75 mg tablet TAKE ONE TABLET BY MOUTH TWICE DAILY 180 Tab 3    diazePAM (VALIUM) 5 mg tablet       warfarin (COUMADIN) 7.5 mg tablet TAKE ONE TABLET BY MOUTH ONCE DAILY (Patient taking differently: Take 7.5 mg daily.) 90 Tab 3    naproxen sodium (ALEVE) 220 mg tablet Take 220 mg by mouth two (2) times daily as needed.  diclofenac EC (VOLTAREN) 75 mg EC tablet Take 1 Tab by mouth two (2) times daily as needed. 60 Tab 2    cholecalciferol (VITAMIN D3) 1,000 unit cap Take  by mouth daily.       cyclobenzaprine (FLEXERIL) 5 mg tablet Take 1 Tab by mouth three (3) times daily as needed for Muscle Spasm(s). 60 Tab 1    traZODone (DESYREL) 50 mg tablet Take 1 Tab by mouth nightly. Prn sleep 30 Tab 2    LANSOPRAZOLE (PREVACID PO) Take  by mouth.  potassium 99 mg tablet Take 99 mg by mouth daily. Pt is taking 2 tabs daily      Biotin 2,500 mcg cap Take  by mouth.  IRON, FERROUS SULFATE, PO Take  by mouth. Allergies: Allergies   Allergen Reactions    Iodinated Contrast Media Hives    Lasix [Furosemide] Hives    Sulfa (Sulfonamide Antibiotics) Hives       Smoker:  Social History     Tobacco Use   Smoking Status Never Smoker   Smokeless Tobacco Never Used       ETOH:   Social History     Substance and Sexual Activity   Alcohol Use No       FH:   Family History   Problem Relation Age of Onset    Heart Disease Mother     Heart Disease Father        ROS:   As listed in HPI. In addition:  Constitutional:   No headache, fever, fatigue, weight loss or weight gain      Cardiac:    No chest pain      Resp:   No cough or shortness of breath      Neuro   No loss of consciousness, dizziness, seizures      Physical Exam:  Blood pressure 149/81, pulse 82, temperature 97.7 °F (36.5 °C), temperature source Oral, resp. rate 16, height 5' 10\" (1.778 m), weight 267 lb 6.4 oz (121.3 kg), SpO2 97 %. GEN: No apparent distress. Alert and oriented and responds to all questions appropriately. NEUROLOGIC:  No focal neurologic deficits. Strength and sensation grossly intact. Coordination and gait grossly intact. EXT: Well perfused. No edema. SKIN: No obvious rashes. Assessment and Plan     Hypertension  HCTZ 25 mg  At last visit switched lisinopril 10 mg to valsartan 80 mg (roughly dose equivalent of lisinopril 20 mg) due to cough side effect. Blood pressure 149/81much better  stop taking clonidine 0.1 mg twice daily    Follow-up 2 weeks blood pressure, consider dose increase of valsartan    Cough is still present    Hypothyroid  TSH on the higher range.   Increased Synthroid 100 mcg.  Recheck TSH in January    VANESSA, not on CPAP  Diagnosed 2017  Needs to follow-up and be fitted for CPAP. That will help with fatigue and hypertension    Depression  She wonders if she could be more depressed with her level of fatigue. Currently taking venlafaxine 75 mg twice daily. Stay here for now    Diet weight loss etc.    Return for wellness, Pap smear. Plans to do this at the first of the year  Due for colonoscopy    2wks BP, INR      ICD-10-CM ICD-9-CM    1. Essential hypertension, benign I10 401.1    2. Anticoagulation goal of INR 2 to 3 Z51.81 V58.83 AMB POC PT/INR    Z79.01 V58.61    3. Hypothyroidism, unspecified type E03.9 244.9        AVS given.  Pt expressed understanding of instructions

## 2019-12-18 ENCOUNTER — CLINICAL SUPPORT (OUTPATIENT)
Dept: FAMILY MEDICINE CLINIC | Age: 64
End: 2019-12-18

## 2019-12-18 VITALS
BODY MASS INDEX: 38.22 KG/M2 | TEMPERATURE: 98 F | SYSTOLIC BLOOD PRESSURE: 169 MMHG | DIASTOLIC BLOOD PRESSURE: 107 MMHG | WEIGHT: 267 LBS | HEIGHT: 70 IN

## 2019-12-18 DIAGNOSIS — Z51.81 ANTICOAGULATION GOAL OF INR 2 TO 3: ICD-10-CM

## 2019-12-18 DIAGNOSIS — I10 ESSENTIAL HYPERTENSION: Primary | ICD-10-CM

## 2019-12-18 DIAGNOSIS — Z79.01 ANTICOAGULATION GOAL OF INR 2 TO 3: ICD-10-CM

## 2019-12-18 DIAGNOSIS — Z86.711 HX PULMONARY EMBOLISM: ICD-10-CM

## 2019-12-18 LAB
INR BLD: 3
PT POC: 35.8 SECONDS
VALID INTERNAL CONTROL?: YES

## 2019-12-18 RX ORDER — WARFARIN 7.5 MG/1
TABLET ORAL
Qty: 90 TAB | Refills: 3 | Status: SHIPPED | OUTPATIENT
Start: 2019-12-18 | End: 2021-01-15

## 2019-12-18 NOTE — PROGRESS NOTES
SARKIS Mathews is a 62 y.o. female who presents for INR check converted to MD visit. She is complaining of sleepiness and is wondering about her B12. She has many other reasons to be sleepy. Her blood pressure has been consistently elevated and she has been lost to follow-up on this. She has a dry cough during the day that is worse at night and may be due to poorly controlled reflux but she is also taking lisinopril so might have possible side effect. She is on clonidine which was intended to be a temporary medicine. 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 long term use Coumadin    Thyroid disease        Meds:   Current Outpatient Medications   Medication Sig Dispense Refill    levothyroxine (SYNTHROID) 100 mcg tablet Take 1 Tab by mouth Daily (before breakfast). 90 Tab 3    valsartan (DIOVAN) 80 mg tablet Take 1 Tab by mouth daily. 90 Tab 3    hydroCHLOROthiazide (HYDRODIURIL) 25 mg tablet TAKE 1 TABLET BY MOUTH ONCE DAILY 90 Tab 3    cloNIDine HCl (CATAPRES) 0.1 mg tablet Take 1 Tab by mouth two (2) times a day. 180 Tab 3    venlafaxine (EFFEXOR) 75 mg tablet TAKE ONE TABLET BY MOUTH TWICE DAILY 180 Tab 3    diazePAM (VALIUM) 5 mg tablet       warfarin (COUMADIN) 7.5 mg tablet TAKE ONE TABLET BY MOUTH ONCE DAILY (Patient taking differently: Take 7.5 mg daily.) 90 Tab 3    Biotin 2,500 mcg cap Take  by mouth.  naproxen sodium (ALEVE) 220 mg tablet Take 220 mg by mouth two (2) times daily as needed.  diclofenac EC (VOLTAREN) 75 mg EC tablet Take 1 Tab by mouth two (2) times daily as needed. 60 Tab 2    cholecalciferol (VITAMIN D3) 1,000 unit cap Take  by mouth daily.       cyclobenzaprine (FLEXERIL) 5 mg tablet Take 1 Tab by mouth three (3) times daily as needed for Muscle Spasm(s). 60 Tab 1    IRON, FERROUS SULFATE, PO Take  by mouth.  traZODone (DESYREL) 50 mg tablet Take 1 Tab by mouth nightly. Prn sleep 30 Tab 2    LANSOPRAZOLE (PREVACID PO) Take  by mouth.  potassium 99 mg tablet Take 99 mg by mouth daily. Pt is taking 2 tabs daily         Allergies: Allergies   Allergen Reactions    Iodinated Contrast Media Hives    Lasix [Furosemide] Hives    Sulfa (Sulfonamide Antibiotics) Hives       Smoker:  Social History     Tobacco Use   Smoking Status Never Smoker   Smokeless Tobacco Never Used       ETOH:   Social History     Substance and Sexual Activity   Alcohol Use No       FH:   Family History   Problem Relation Age of Onset    Heart Disease Mother     Heart Disease Father        ROS:   As listed in HPI. In addition:  Constitutional:   No headache, fever, fatigue, weight loss or weight gain      Cardiac:    No chest pain      Resp:   No cough or shortness of breath      Neuro   No loss of consciousness, dizziness, seizures      Physical Exam:  Blood pressure (!) 169/107, temperature 98 °F (36.7 °C), temperature source Oral, height 5' 10\" (1.778 m), weight 267 lb (121.1 kg). GEN: No apparent distress. Alert and oriented and responds to all questions appropriately. NEUROLOGIC:  No focal neurologic deficits. Strength and sensation grossly intact. Coordination and gait grossly intact. EXT: Well perfused. No edema. SKIN: No obvious rashes. Assessment and Plan     Hypertension  HCTZ 25 mg  Valsartan 80 mg, increase this to 160 mg  She has stopped taking the clonidine 0.1 mg twice daily with some improvement in her fatigue    Follow-up 2 weeks for blood pressure, consider dose increase on valsartan    Cough is still present    Hypothyroid  TSH on the higher range. Increased Synthroid 100 mcg. Recheck TSH in January    VANESSA, not on CPAP  Diagnosed 2017  Needs to follow-up and be fitted for CPAP. That will help with fatigue and hypertension    Depression  She wonders if she could be more depressed with her level of fatigue. Currently taking venlafaxine 75 mg twice daily. Stay here for now    Diet weight loss etc.    Return for wellness, Pap smear. Plans to do this at the first of the year  Due for colonoscopy    2wks BP, INR      ICD-10-CM ICD-9-CM    1. Essential hypertension I10 401.9    2. Anticoagulation goal of INR 2 to 3 Z51.81 V58.83 AMB POC PT/INR    Z79.01 V58.61    3. Hx pulmonary embolism--due to DVT Z86.711 V12.55        AVS given.  Pt expressed understanding of instructions

## 2019-12-18 NOTE — PROGRESS NOTES
Honey Donald is a 62 y.o. female who presents today for Anticoagulation monitoring. Patient states current dose coumadin is:   Missed Coumadin Doses:  None  Medication Changes:  no  Dietary Changes:  no    Symptoms: without any bleeding. Anticoagulation Summary  As of 12/18/2019    INR goal:   2.0-3.0   TTR:   59.2 % (2.9 mo)   INR used for dosing:   3.0 (12/18/2019)   Warfarin maintenance plan:   3.75 mg (7.5 mg x 0.5) every Thu; 7.5 mg (7.5 mg x 1) all other days   Weekly warfarin total:   48.75 mg   Plan last modified:   Carloz Wilburn MD (10/31/2019)   Next INR check:   1/16/2020   Target end date:            Anticoagulation Episode Summary     INR check location:       Preferred lab:       Send INR reminders to:       Comments:             Description    Continue taking coumadin 7.5 mg daily. Return in two weeks for recheck of INR per Dr. Andrez Belle.

## 2020-01-02 ENCOUNTER — CLINICAL SUPPORT (OUTPATIENT)
Dept: FAMILY MEDICINE CLINIC | Age: 65
End: 2020-01-02

## 2020-01-02 VITALS
HEIGHT: 70 IN | BODY MASS INDEX: 38.45 KG/M2 | DIASTOLIC BLOOD PRESSURE: 110 MMHG | SYSTOLIC BLOOD PRESSURE: 171 MMHG | TEMPERATURE: 97.6 F | WEIGHT: 268.6 LBS | RESPIRATION RATE: 18 BRPM | HEART RATE: 83 BPM | OXYGEN SATURATION: 98 %

## 2020-01-02 DIAGNOSIS — Z86.711 HX PULMONARY EMBOLISM: ICD-10-CM

## 2020-01-02 DIAGNOSIS — I10 ESSENTIAL HYPERTENSION, BENIGN: Primary | ICD-10-CM

## 2020-01-02 DIAGNOSIS — Z51.81 ANTICOAGULATION GOAL OF INR 2 TO 3: ICD-10-CM

## 2020-01-02 DIAGNOSIS — Z79.01 ANTICOAGULATION GOAL OF INR 2 TO 3: ICD-10-CM

## 2020-01-02 LAB
INR BLD: 2.1
PT POC: 25.1 SECONDS
VALID INTERNAL CONTROL?: YES

## 2020-01-02 NOTE — PROGRESS NOTES
SARKIS Abdul is a 62 y.o. female who presents for INR check converted to MD visit. Her blood pressure has been consistently elevated and she has been lost to follow-up on this. She has a dry cough during the day that is worse at night and may be due to poorly controlled reflux but she is also taking lisinopril so might have possible side effect. She is on clonidine which was intended to be a temporary medicine. 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 long term use Coumadin    Thyroid disease        Meds:   Current Outpatient Medications   Medication Sig Dispense Refill    warfarin (COUMADIN) 7.5 mg tablet TAKE ONE TABLET BY MOUTH ONCE DAILY 90 Tab 3    levothyroxine (SYNTHROID) 100 mcg tablet Take 1 Tab by mouth Daily (before breakfast). 90 Tab 3    valsartan (DIOVAN) 80 mg tablet Take 1 Tab by mouth daily. (Patient taking differently: Take 320 mg by mouth daily.) 90 Tab 3    hydroCHLOROthiazide (HYDRODIURIL) 25 mg tablet TAKE 1 TABLET BY MOUTH ONCE DAILY 90 Tab 3    venlafaxine (EFFEXOR) 75 mg tablet TAKE ONE TABLET BY MOUTH TWICE DAILY 180 Tab 3    diazePAM (VALIUM) 5 mg tablet       naproxen sodium (ALEVE) 220 mg tablet Take 220 mg by mouth two (2) times daily as needed.  diclofenac EC (VOLTAREN) 75 mg EC tablet Take 1 Tab by mouth two (2) times daily as needed. 60 Tab 2    cholecalciferol (VITAMIN D3) 1,000 unit cap Take  by mouth daily.  cyclobenzaprine (FLEXERIL) 5 mg tablet Take 1 Tab by mouth three (3) times daily as needed for Muscle Spasm(s). 60 Tab 1    traZODone (DESYREL) 50 mg tablet Take 1 Tab by mouth nightly. Prn sleep 30 Tab 2    LANSOPRAZOLE (PREVACID PO) Take  by mouth.       potassium 99 mg tablet Take 99 mg by mouth daily. Pt is taking 2 tabs daily      cloNIDine HCl (CATAPRES) 0.1 mg tablet Take 1 Tab by mouth two (2) times a day. 180 Tab 3    Biotin 2,500 mcg cap Take  by mouth.  IRON, FERROUS SULFATE, PO Take  by mouth. Allergies: Allergies   Allergen Reactions    Iodinated Contrast Media Hives    Lasix [Furosemide] Hives    Sulfa (Sulfonamide Antibiotics) Hives       Smoker:  Social History     Tobacco Use   Smoking Status Never Smoker   Smokeless Tobacco Never Used       ETOH:   Social History     Substance and Sexual Activity   Alcohol Use No       FH:   Family History   Problem Relation Age of Onset    Heart Disease Mother     Heart Disease Father        ROS:   As listed in HPI. In addition:  Constitutional:   No headache, fever, fatigue, weight loss or weight gain      Cardiac:    No chest pain      Resp:   No cough or shortness of breath      Neuro   No loss of consciousness, dizziness, seizures      Physical Exam:  Blood pressure (!) 171/110, pulse 83, temperature 97.6 °F (36.4 °C), temperature source Oral, resp. rate 18, height 5' 10\" (1.778 m), weight 268 lb 9.6 oz (121.8 kg), SpO2 98 %. GEN: No apparent distress. Alert and oriented and responds to all questions appropriately. NEUROLOGIC:  No focal neurologic deficits. Strength and sensation grossly intact. Coordination and gait grossly intact. EXT: Well perfused. No edema. SKIN: No obvious rashes. Assessment and Plan     Hypertension  HCTZ 25 mg  Valsartan 160 mg, increase this to 320 mg  She has stopped taking the clonidine 0.1 mg twice daily with some improvement in her fatigue    Follow-up 2 weeks for blood pressure, consider adding spironolactone      Cough is  Improving     Hypothyroid  TSH on the higher range. Increased Synthroid 100 mcg. Recheck TSH in January    VANESSA, not on CPAP  Diagnosed 2017  Needs to follow-up and be fitted for CPAP.   That will help with fatigue and hypertension    Depression  She wonders if she could be more depressed with her level of fatigue. Currently taking venlafaxine 75 mg twice daily. Stay here for now    Diet weight loss etc.    Return for wellness, Pap smear. Plans to do this at the first of the year  Due for colonoscopy    2wks BP, INR      ICD-10-CM ICD-9-CM    1. Essential hypertension, benign I10 401.1    2. Anticoagulation goal of INR 2 to 3 Z51.81 V58.83 AMB POC PT/INR    Z79.01 V58.61    3. Hx pulmonary embolism--due to DVT Z86.711 V12.55 AMB POC PT/INR       AVS given.  Pt expressed understanding of instructions

## 2020-01-02 NOTE — PROGRESS NOTES
Inna Rico is a 62 y.o. female who presents today for Anticoagulation monitoring. Indication: PE  INR Goal: 2.0-3.0. Current dose:  Coumadin 7.5 mg daily, except Thurs 3.75 mg. Missed Coumadin Doses:  None  Medication Changes:  no  Dietary Changes:  no    Symptoms: taking coumadin appropriately without any bleeding. Latest INRs:  Lab Results   Component Value Date/Time    INR 1.4 (H) 12/28/2012 11:10 AM    INR POC 3.0 12/18/2019 09:25 AM    INR POC 2.1 12/04/2019 09:33 AM    INR POC 2.0 11/18/2019 09:22 AM    Prothrombin time 14.9 (H) 12/28/2012 11:10 AM        New Coumadin dose:.current treatment plan is effective, no change in therapy per Dr. Karen Sanchez. Next check to be scheduled for  2 weeks.

## 2020-01-06 ENCOUNTER — TELEPHONE (OUTPATIENT)
Dept: FAMILY MEDICINE CLINIC | Age: 65
End: 2020-01-06

## 2020-01-06 DIAGNOSIS — I89.0 LYMPHEDEMA: Primary | ICD-10-CM

## 2020-01-06 NOTE — TELEPHONE ENCOUNTER
Closest lymphedema clinic I know about is [place].   It is possible [facility] offers this service in [place] but I am not sure about that    Can we check on [facility] and if not see if patient agreeable to [facility]OLDAN MURDOCK

## 2020-01-06 NOTE — TELEPHONE ENCOUNTER
There is no lymphedema clinic at [address] [place] [address] in [place]. Called pt, verified name and . Informed pt that per Dr. Zain Esquivel closest clinic is at [facility]. Pt stated understanding and would like a referral faxed to them so that she can schedule an appt asap.

## 2020-01-06 NOTE — TELEPHONE ENCOUNTER
Pt is calling wanting Dr Daryle Better to refer to a dr near [place] for her lymphadenia states legs are swelling bad

## 2020-01-07 NOTE — TELEPHONE ENCOUNTER
Unable to reach Mrs. Florentin Cuello left a voicemail. Call the lymphedema Clinic referral Line and didn't get a answer but I did leave them a voicemail with patient information and our call back number I let them know that everything is in [facility].

## 2020-01-08 RX ORDER — VALSARTAN 320 MG/1
320 TABLET ORAL DAILY
Qty: 90 TAB | Refills: 3 | Status: SHIPPED | OUTPATIENT
Start: 2020-01-08 | End: 2020-11-13 | Stop reason: SDUPTHER

## 2020-01-08 NOTE — TELEPHONE ENCOUNTER
Pt is calling needing a refill on her med she needs the new increase     Requested Prescriptions     Pending Prescriptions Disp Refills    valsartan (DIOVAN) 80 mg tablet 90 Tab 3     Sig: Take 1 Tab by mouth daily.

## 2020-01-10 NOTE — TELEPHONE ENCOUNTER
Called lymphedema clinic and they said that they had a long waiting list and they referred pt elsewhere and she was going to notify our office when she got an appt. She was trying to get in at [facility].

## 2020-01-20 ENCOUNTER — CLINICAL SUPPORT (OUTPATIENT)
Dept: FAMILY MEDICINE CLINIC | Age: 65
End: 2020-01-20

## 2020-01-20 VITALS
BODY MASS INDEX: 38.74 KG/M2 | DIASTOLIC BLOOD PRESSURE: 104 MMHG | SYSTOLIC BLOOD PRESSURE: 167 MMHG | HEIGHT: 70 IN | RESPIRATION RATE: 16 BRPM | WEIGHT: 270.6 LBS | HEART RATE: 83 BPM | OXYGEN SATURATION: 96 % | TEMPERATURE: 97.8 F

## 2020-01-20 DIAGNOSIS — Z51.81 ANTICOAGULATION GOAL OF INR 2 TO 3: Primary | ICD-10-CM

## 2020-01-20 DIAGNOSIS — Z79.01 ANTICOAGULATION GOAL OF INR 2 TO 3: Primary | ICD-10-CM

## 2020-01-20 DIAGNOSIS — Z86.711 HX PULMONARY EMBOLISM: ICD-10-CM

## 2020-01-20 LAB
INR BLD: 1.7
PT POC: 20 SECONDS
VALID INTERNAL CONTROL?: YES

## 2020-01-20 NOTE — PROGRESS NOTES
Esa Montilla is a 62 y.o. female who presents today for Anticoagulation monitoring. Indication: DVT  INR Goal: 2.0-3.0. Current dose:  Coumadin 7.5 mg daily, except Thursday 3.75 mg. Missed Coumadin Doses:  None  Medication Changes:  no  Dietary Changes:  no    Symptoms: taking coumadin appropriately without any bleeding. Latest INRs:  Lab Results   Component Value Date/Time    INR 1.4 (H) 12/28/2012 11:10 AM    INR POC 2.1 01/02/2020 09:36 AM    INR POC 3.0 12/18/2019 09:25 AM    INR POC 2.1 12/04/2019 09:33 AM    Prothrombin time 14.9 (H) 12/28/2012 11:10 AM        New Coumadin dose:.current treatment plan is effective, no change in therapy per Dr. Dang Llamas. Next check to be scheduled for  2 weeks per Dr. Ermias Klein.

## 2020-02-04 ENCOUNTER — CLINICAL SUPPORT (OUTPATIENT)
Dept: FAMILY MEDICINE CLINIC | Age: 65
End: 2020-02-04

## 2020-02-04 VITALS
RESPIRATION RATE: 16 BRPM | WEIGHT: 267 LBS | DIASTOLIC BLOOD PRESSURE: 84 MMHG | SYSTOLIC BLOOD PRESSURE: 132 MMHG | BODY MASS INDEX: 38.22 KG/M2 | HEIGHT: 70 IN | OXYGEN SATURATION: 94 % | TEMPERATURE: 97.6 F | HEART RATE: 82 BPM

## 2020-02-04 DIAGNOSIS — Z79.01 ANTICOAGULATION GOAL OF INR 2 TO 3: ICD-10-CM

## 2020-02-04 DIAGNOSIS — Z51.81 ANTICOAGULATION GOAL OF INR 2 TO 3: ICD-10-CM

## 2020-02-04 DIAGNOSIS — Z86.711 HX PULMONARY EMBOLISM: Primary | ICD-10-CM

## 2020-02-04 LAB
INR BLD: 3.3
PT POC: 40 SECONDS
VALID INTERNAL CONTROL?: YES

## 2020-02-04 NOTE — PROGRESS NOTES
Raimundo French is a 62 y.o. female who presents today for Anticoagulation monitoring. Patient states current dose coumadin is:   Missed Coumadin Doses:  None  Medication Changes:  no  Dietary Changes:  no    Symptoms: without any bleeding. Anticoagulation Summary  As of 2/4/2020    INR goal:   2.0-3.0   TTR:   59.5 % (4.5 mo)   INR used for dosing:   3.3! (2/4/2020)   Warfarin maintenance plan:   3.75 mg (7.5 mg x 0.5) every Thu; 7.5 mg (7.5 mg x 1) all other days   Weekly warfarin total:   48.75 mg   Plan last modified:   Shahnaz Bird MD (10/31/2019)   Next INR check:   2/18/2020   Target end date:            Anticoagulation Episode Summary     INR check location:       Preferred lab:       Send INR reminders to:       Comments:             Description    Continue taking coumadin 7.5 mg daily. Return in two weeks for recheck of INR per Dr. Morales Aragon.

## 2020-02-09 DIAGNOSIS — F32.A DEPRESSION, UNSPECIFIED DEPRESSION TYPE: ICD-10-CM

## 2020-02-10 RX ORDER — VENLAFAXINE 75 MG/1
TABLET ORAL
Qty: 180 TAB | Refills: 3 | Status: SHIPPED | OUTPATIENT
Start: 2020-02-10 | End: 2021-02-22

## 2020-02-18 ENCOUNTER — CLINICAL SUPPORT (OUTPATIENT)
Dept: FAMILY MEDICINE CLINIC | Age: 65
End: 2020-02-18

## 2020-02-18 VITALS
HEIGHT: 70 IN | RESPIRATION RATE: 18 BRPM | BODY MASS INDEX: 38.68 KG/M2 | HEART RATE: 76 BPM | WEIGHT: 270.2 LBS | TEMPERATURE: 97.8 F | OXYGEN SATURATION: 94 % | SYSTOLIC BLOOD PRESSURE: 155 MMHG | DIASTOLIC BLOOD PRESSURE: 86 MMHG

## 2020-02-18 DIAGNOSIS — Z79.01 ANTICOAGULATION GOAL OF INR 2 TO 3: Primary | ICD-10-CM

## 2020-02-18 DIAGNOSIS — Z51.81 ANTICOAGULATION GOAL OF INR 2 TO 3: Primary | ICD-10-CM

## 2020-02-18 LAB
INR BLD: 2.8
PT POC: 34.1 SECONDS
VALID INTERNAL CONTROL?: YES

## 2020-02-18 RX ORDER — ATENOLOL AND CHLORTHALIDONE TABLET 50; 25 MG/1; MG/1
1 TABLET ORAL
COMMUNITY
Start: 2017-05-03 | End: 2020-06-12

## 2020-02-18 NOTE — PROGRESS NOTES
Katlin Gardner is a 62 y.o. female who presents today for Anticoagulation monitoring. Indication: DVT  INR Goal: 2.0-3.0. Current dose:  Coumadin 7.5 mg daily, except Thurs 3.75 mg. Missed Coumadin Doses:  None  Medication Changes:  no  Dietary Changes:  no    Symptoms: taking coumadin appropriately without any bleeding. Latest INRs:  Lab Results   Component Value Date/Time    INR 1.4 (H) 12/28/2012 11:10 AM    INR POC 3.3 02/04/2020 07:56 AM    INR POC 1.7 01/20/2020 10:00 AM    INR POC 2.1 01/02/2020 09:36 AM    Prothrombin time 14.9 (H) 12/28/2012 11:10 AM        New Coumadin dose:.current treatment plan is effective, no change in therapy per Dr. Ciro Aquino per Dr. Ciro Aquino. Next check to be scheduled for  2 weeks per Dr. Ciro Aquino.

## 2020-03-23 ENCOUNTER — CLINICAL SUPPORT (OUTPATIENT)
Dept: FAMILY MEDICINE CLINIC | Age: 65
End: 2020-03-23

## 2020-03-23 VITALS — TEMPERATURE: 98 F

## 2020-03-23 DIAGNOSIS — Z51.81 ANTICOAGULATION GOAL OF INR 2 TO 3: Primary | ICD-10-CM

## 2020-03-23 DIAGNOSIS — Z79.01 ANTICOAGULATION GOAL OF INR 2 TO 3: Primary | ICD-10-CM

## 2020-03-23 LAB
INR BLD: 2.8 (ref 1–1.5)
PT POC: 34 SECONDS (ref 9.1–12)
VALID INTERNAL CONTROL?: YES

## 2020-04-21 ENCOUNTER — TELEPHONE (OUTPATIENT)
Dept: FAMILY MEDICINE CLINIC | Age: 65
End: 2020-04-21

## 2020-04-21 DIAGNOSIS — I82.409 RECURRENT DEEP VEIN THROMBOSIS (DVT) (HCC): Primary | ICD-10-CM

## 2020-04-21 NOTE — TELEPHONE ENCOUNTER
Pt is calling needing order for PTINR sent to [facility] in [place] let her know when done on cell

## 2020-04-22 NOTE — TELEPHONE ENCOUNTER
Lab orders faxed to [facility] and confirmation page received.  verified. Patient notified of labs faxed, phone number, and [facility] website to schedule appointments. Patient vocalized understanding.

## 2020-04-28 ENCOUNTER — TELEPHONE ANTICOAG (OUTPATIENT)
Dept: FAMILY MEDICINE CLINIC | Age: 65
End: 2020-04-28

## 2020-04-28 LAB
INR PPP: 2.5 (ref 0.8–1.2)
PROTHROMBIN TIME: 25.1 SEC (ref 9.1–12)

## 2020-04-28 NOTE — PROGRESS NOTES
Called pt, verified name and . Informed pt that per Dr. Kristin Juarez INR 2.5. At goal.  Continue current dose and check in 1 month. Pt stated understanding.

## 2020-05-28 LAB
INR PPP: 2.3 (ref 0.8–1.2)
PROTHROMBIN TIME: 23 SEC (ref 9.1–12)

## 2020-05-29 ENCOUNTER — TELEPHONE ANTICOAG (OUTPATIENT)
Dept: FAMILY MEDICINE CLINIC | Age: 65
End: 2020-05-29

## 2020-05-29 NOTE — PROGRESS NOTES
Called pt, verified name and . Informed pt that per Dr. Adria Brittle INR 2.3. At goal.  Continue current dose recheck 1 month. Pt stated understanding.

## 2020-06-12 RX ORDER — HYDROCHLOROTHIAZIDE 25 MG/1
TABLET ORAL
Qty: 90 TAB | Refills: 3 | Status: SHIPPED | OUTPATIENT
Start: 2020-06-12 | End: 2021-06-21

## 2020-06-19 ENCOUNTER — TELEPHONE (OUTPATIENT)
Dept: FAMILY MEDICINE CLINIC | Age: 65
End: 2020-06-19

## 2020-06-19 NOTE — TELEPHONE ENCOUNTER
Patient would like to try to be fit in to the sched on Tuesday for pre op. she is having surgery 6.29.2020. patient has been set for Wednesday June 24 with Dr Kenny Cochran.  Pt best number is [phone]

## 2020-06-24 ENCOUNTER — OFFICE VISIT (OUTPATIENT)
Dept: FAMILY MEDICINE CLINIC | Age: 65
End: 2020-06-24

## 2020-06-24 VITALS
BODY MASS INDEX: 37.51 KG/M2 | TEMPERATURE: 98.3 F | HEART RATE: 82 BPM | HEIGHT: 70 IN | RESPIRATION RATE: 18 BRPM | SYSTOLIC BLOOD PRESSURE: 138 MMHG | OXYGEN SATURATION: 95 % | WEIGHT: 262 LBS | DIASTOLIC BLOOD PRESSURE: 89 MMHG

## 2020-06-24 DIAGNOSIS — Z79.01 ANTICOAGULATION GOAL OF INR 2 TO 3: ICD-10-CM

## 2020-06-24 DIAGNOSIS — G47.19 EXCESSIVE DAYTIME SLEEPINESS: ICD-10-CM

## 2020-06-24 DIAGNOSIS — Z51.81 ANTICOAGULATION GOAL OF INR 2 TO 3: ICD-10-CM

## 2020-06-24 DIAGNOSIS — Z01.811 PRE-OP CHEST EXAM: Primary | ICD-10-CM

## 2020-06-24 DIAGNOSIS — G47.33 OSA (OBSTRUCTIVE SLEEP APNEA): ICD-10-CM

## 2020-06-24 LAB
INR BLD: 2.7
PT POC: 31.9 SECONDS
VALID INTERNAL CONTROL?: YES

## 2020-06-24 NOTE — PROGRESS NOTES
SARKIS Oliveira is a 62 y.o. female who presents for preop exam for cataract surgery. She has not had any surgery since she received open heart surgery for holes in her heart in 1967. She has reasonably good level cardiovascular fitness    PMHx:  Past Medical History:   Diagnosis Date    Arthritis     ASD (atrial septal defect)     age 7 septal defect closures MCV    Depression     Hypertension     Lymphedema of leg     Thromboembolus ([address]     hx bilateral DVT. long term use Coumadin    Thyroid disease        Meds:   Current Outpatient Medications   Medication Sig Dispense Refill    hydroCHLOROthiazide (HYDRODIURIL) 25 mg tablet Take 1 tablet by mouth once daily 90 Tab 3    venlafaxine (EFFEXOR) 75 mg tablet TAKE 1 TABLET BY MOUTH TWICE DAILY 180 Tab 3    valsartan (DIOVAN) 320 mg tablet Take 1 Tab by mouth daily. 90 Tab 3    warfarin (COUMADIN) 7.5 mg tablet TAKE ONE TABLET BY MOUTH ONCE DAILY 90 Tab 3    levothyroxine (SYNTHROID) 100 mcg tablet Take 1 Tab by mouth Daily (before breakfast). 90 Tab 3    cloNIDine HCl (CATAPRES) 0.1 mg tablet Take 1 Tab by mouth two (2) times a day. 180 Tab 3    diazePAM (VALIUM) 5 mg tablet       naproxen sodium (ALEVE) 220 mg tablet Take 220 mg by mouth two (2) times daily as needed.  diclofenac EC (VOLTAREN) 75 mg EC tablet Take 1 Tab by mouth two (2) times daily as needed. 60 Tab 2    cholecalciferol (VITAMIN D3) 1,000 unit cap Take  by mouth daily.  cyclobenzaprine (FLEXERIL) 5 mg tablet Take 1 Tab by mouth three (3) times daily as needed for Muscle Spasm(s). 60 Tab 1    traZODone (DESYREL) 50 mg tablet Take 1 Tab by mouth nightly. Prn sleep 30 Tab 2    LANSOPRAZOLE (PREVACID PO) Take  by mouth.  potassium 99 mg tablet Take 198 mg by mouth daily. Pt is taking 2 tabs daily         Allergies:    Allergies   Allergen Reactions    Iodinated Contrast Media Hives    Lasix [Furosemide] Hives    Sulfa (Sulfonamide Antibiotics) Hives Smoker:  Social History     Tobacco Use   Smoking Status Never Smoker   Smokeless Tobacco Never Used       ETOH:   Social History     Substance and Sexual Activity   Alcohol Use No       FH:   Family History   Problem Relation Age of Onset    Heart Disease Mother     Heart Disease Father        ROS:   As listed in HPI. In addition:  Constitutional:   No headache, fever, fatigue, weight loss or weight gain      Cardiac:    No chest pain      Resp:   No cough or shortness of breath      Neuro   No loss of consciousness, dizziness, seizures      Physical Exam:  Blood pressure 138/89, pulse 82, temperature 98.3 °F (36.8 °C), temperature source Temporal, resp. rate 18, height 5' 10\" (1.778 m), weight 262 lb (118.8 kg), SpO2 95 %. GEN: No apparent distress. Alert and oriented and responds to all questions appropriately. NEUROLOGIC:  No focal neurologic deficits. Strength and sensation grossly intact. Coordination and gait grossly intact. EXT: Well perfused. No edema. SKIN: No obvious rashes. Lungs clear to auscultation bilaterally  CV regular rhythm no murmur  HEENT good neck excursion Mallampati 3 airway       Assessment and Plan     Preop cataract surgery  Should be low risk procedure  Labs are up-to-date    INR 2.7. Continue current dose recheck in 1 month    Fatigue  Had a sleep study done 2017 showed significant sleep apnea and she did not get the follow-up evaluation for equipment. Pointed her back in that direction      ICD-10-CM ICD-9-CM    1. Pre-op chest exam Z01.811 V72.82    2. Anticoagulation goal of INR 2 to 3 Z51.81 V58.83 AMB POC PT/INR    Z79.01 V58.61    3. Excessive daytime sleepiness G47.19 780.54 SLEEP MEDICINE REFERRAL   4. VANESSA (obstructive sleep apnea) G47.33 327.23 SLEEP MEDICINE REFERRAL       AVS given.  Pt expressed understanding of instructions

## 2020-06-24 NOTE — PROGRESS NOTES
Chief Complaint   Patient presents with   [facility] reviewed     1. Have you been to the ER, urgent care clinic since your last visit? Hospitalized since your last visit? No    2. Have you seen or consulted any other health care providers outside of the [address] [place] [address] since your last visit? Include any pap smears or colon screening.   No

## 2020-06-25 ENCOUNTER — HOSPITAL ENCOUNTER (OUTPATIENT)
Dept: PREADMISSION TESTING | Age: 65
Discharge: HOME | End: 2020-06-25
Attending: OPHTHALMOLOGY
Payer: MEDICARE

## 2020-06-25 PROCEDURE — 87635 SARS-COV-2 COVID-19 AMP PRB: CPT

## 2020-06-25 NOTE — PERIOP NOTES
[facility]  Ambulatory Surgery Unit  Pre-operative Instructions    Surgery/Procedure Date  06/29            Tentative Arrival Time 0715      1. On the day of your surgery/procedure, please report to the Ambulatory Surgery Unit Registration Desk and sign in at your designated time. The Ambulatory Surgery Unit is located in [facility] on the [facility] side of the [facility] across from the [address] [place] [address]. Please have all of your health insurance cards and a photo ID. 2. You must have someone with you to drive you home, as you should not drive a car for 24 hours following anesthesia. Please make arrangements for a responsible adult friend or family member to stay with you for at least the first 24 hours after your surgery. 3. Do not have anything to eat or drink (including water, gum, mints, coffee, juice) after 11:59 PM  06/28. This may not apply to medications prescribed by your physician. (Please note below the special instructions with medications to take the morning of surgery, if applicable.)    4. We recommend you do not drink any alcoholic beverages for 24 hours before and after your surgery. 5. Contact your surgeons office for instructions on the following medications: non-steroidal anti-inflammatory drugs (i.e. Advil, Aleve), vitamins, and supplements. (Some surgeons will want you to stop these medications prior to surgery and others may allow you to take them)   **If you are currently taking Plavix, Coumadin, Aspirin and/or other blood-thinning agents, contact your surgeon for instructions. ** Your surgeon will partner with the physician prescribing these medications to determine if it is safe to stop or if you need to continue taking. Please do not stop taking these medications without instructions from your surgeon.     6. In an effort to help prevent surgical site infection, we ask that you shower with an anti-bacterial soap (i.e. Dial/Safeguard, or the soap provided to you at your preadmission testing appointment) for 3 days prior to and on the morning of surgery, using a fresh towel after each shower. (Please begin this process with fresh bed linens.) Do not apply any lotions, powders, or deodorants after the shower on the day of your procedure. If applicable, please do not shave the operative site for 48 hours prior to surgery. 7. Wear comfortable clothes. Wear glasses instead of contacts. Do not bring any jewelry or money (other than copays or fees as instructed). Do not wear make-up, particularly mascara, the morning of your surgery. Do not wear nail polish, particularly if you are having foot /hand surgery. Wear your hair loose or down, no ponytails, buns, flavio pins or clips. All body piercings must be removed. 8. You should understand that if you do not follow these instructions your surgery may be cancelled. If your physical condition changes (i.e. fever, cold or flu) please contact your surgeon as soon as possible. 9. It is important that you be on time. If a situation occurs where you may be late, or if you have any questions or problems, please call [phone].    10. Your surgery time may be subject to change. You will receive a phone call the day prior to surgery to confirm your arrival time. 11. Pediatric patients: please bring a change of clothes, diapers, bottle/sippy cup, pacifier, etc.      Special Instructions: Take all medications and inhalers, as prescribed, on the morning of surgery with a sip of water EXCEPT: n/a      Insulin Dependent Diabetic patients: Take your diabetic medications as prescribed the day before surgery. Hold all diabetic medications the day of surgery. If you are scheduled to arrive for surgery after 8:00 AM, and your AM blood sugar is >200, please call Ambulatory Surgery.     In an effort to improve the efficiency, privacy, and safety for all of our Pre-op patients visitors are not allowed in the Holding area. Once you arrive and are registered your family/visitors will be asked to remain in your vehicle. The Pre-op staff will call you when they are ready for you to enter the building and will explain to you and your family/visitors that the Pre-op phase is beginning. At this time, if your procedure is outpatient, your family member will be asked to stay in their vehicle until the surgery is complete and you are ready for discharge. If you are going to be admitted after your surgery, once you are called to come inside the building, your family will be able to leave the parking lot. At this time the staff will also ask for your designated spokesperson information in the event that the physician or staff need to provide an update or obtain any pertinent information. The designated spokesperson will be notified if the physician needs to speak to family during the pre-operative phase.and/or in the post op phase. I understand a pre-operative phone call will be made to verify my surgery time. In the event that I am not available, I give permission for a message to be left on my answering service and/or with another person?       yes         ___________________      ___________________      ________________  (Signature of Patient)          (Witness)                   (Date and Time)

## 2020-06-26 ENCOUNTER — ANESTHESIA EVENT (OUTPATIENT)
Dept: SURGERY | Age: 65
End: 2020-06-26
Payer: MEDICARE

## 2020-06-26 LAB
SARS-COV-2, COV2NT: NOT DETECTED
SOURCE, COVRS: NORMAL
SPECIMEN SOURCE, FCOV2M: NORMAL

## 2020-06-26 RX ORDER — SODIUM CHLORIDE 0.9 % (FLUSH) 0.9 %
5-40 SYRINGE (ML) INJECTION AS NEEDED
Status: CANCELLED | OUTPATIENT
Start: 2020-06-26

## 2020-06-26 RX ORDER — FENTANYL CITRATE 50 UG/ML
25 INJECTION, SOLUTION INTRAMUSCULAR; INTRAVENOUS
Status: CANCELLED | OUTPATIENT
Start: 2020-06-26

## 2020-06-26 RX ORDER — DIPHENHYDRAMINE HYDROCHLORIDE 50 MG/ML
12.5 INJECTION, SOLUTION INTRAMUSCULAR; INTRAVENOUS AS NEEDED
Status: CANCELLED | OUTPATIENT
Start: 2020-06-26 | End: 2020-06-26

## 2020-06-26 RX ORDER — SODIUM CHLORIDE 0.9 % (FLUSH) 0.9 %
5-40 SYRINGE (ML) INJECTION EVERY 8 HOURS
Status: CANCELLED | OUTPATIENT
Start: 2020-06-26

## 2020-06-26 RX ORDER — ONDANSETRON 2 MG/ML
4 INJECTION INTRAMUSCULAR; INTRAVENOUS AS NEEDED
Status: CANCELLED | OUTPATIENT
Start: 2020-06-26

## 2020-06-26 RX ORDER — SODIUM CHLORIDE, SODIUM LACTATE, POTASSIUM CHLORIDE, CALCIUM CHLORIDE 600; 310; 30; 20 MG/100ML; MG/100ML; MG/100ML; MG/100ML
25 INJECTION, SOLUTION INTRAVENOUS CONTINUOUS
Status: CANCELLED | OUTPATIENT
Start: 2020-06-26

## 2020-06-29 ENCOUNTER — ANESTHESIA (OUTPATIENT)
Dept: SURGERY | Age: 65
End: 2020-06-29
Payer: MEDICARE

## 2020-06-29 ENCOUNTER — HOSPITAL ENCOUNTER (OUTPATIENT)
Age: 65
Setting detail: OUTPATIENT SURGERY
Discharge: HOME | End: 2020-06-29
Attending: OPHTHALMOLOGY | Admitting: OPHTHALMOLOGY
Payer: MEDICARE

## 2020-06-29 VITALS
HEIGHT: 71 IN | TEMPERATURE: 98.3 F | RESPIRATION RATE: 22 BRPM | HEART RATE: 78 BPM | OXYGEN SATURATION: 98 % | BODY MASS INDEX: 36.54 KG/M2 | WEIGHT: 261 LBS | DIASTOLIC BLOOD PRESSURE: 95 MMHG | SYSTOLIC BLOOD PRESSURE: 186 MMHG

## 2020-06-29 LAB — INR BLD: 2.2 (ref 0.9–1.2)

## 2020-06-29 PROCEDURE — 74011250636 HC RX REV CODE- 250/636: Performed by: OPHTHALMOLOGY

## 2020-06-29 PROCEDURE — 74011000250 HC RX REV CODE- 250: Performed by: OPHTHALMOLOGY

## 2020-06-29 PROCEDURE — 77030018846 HC SOL IRR STRL H20 ICUM -A: Performed by: OPHTHALMOLOGY

## 2020-06-29 PROCEDURE — 76210000046 HC AMBSU PH II REC FIRST 0.5 HR: Performed by: OPHTHALMOLOGY

## 2020-06-29 PROCEDURE — 77030021352 HC CBL LD SYS DISP COVD -B: Performed by: OPHTHALMOLOGY

## 2020-06-29 PROCEDURE — 85610 PROTHROMBIN TIME: CPT

## 2020-06-29 PROCEDURE — 74011250636 HC RX REV CODE- 250/636: Performed by: NURSE ANESTHETIST, CERTIFIED REGISTERED

## 2020-06-29 PROCEDURE — 76060000061 HC AMB SURG ANES 0.5 TO 1 HR: Performed by: OPHTHALMOLOGY

## 2020-06-29 PROCEDURE — 76030000000 HC AMB SURG OR TIME 0.5 TO 1: Performed by: OPHTHALMOLOGY

## 2020-06-29 PROCEDURE — V2632 POST CHMBR INTRAOCULAR LENS: HCPCS | Performed by: OPHTHALMOLOGY

## 2020-06-29 DEVICE — LENS IOL POST 1-PC 6X13 18.5 -- ACRYSOF: Type: IMPLANTABLE DEVICE | Site: EYE | Status: FUNCTIONAL

## 2020-06-29 RX ORDER — SODIUM CHLORIDE 9 MG/ML
25 INJECTION, SOLUTION INTRAVENOUS CONTINUOUS
Status: DISCONTINUED | OUTPATIENT
Start: 2020-06-29 | End: 2020-06-29 | Stop reason: HOSPADM

## 2020-06-29 RX ORDER — TROPICAMIDE 10 MG/ML
1 SOLUTION/ DROPS OPHTHALMIC
Status: COMPLETED | OUTPATIENT
Start: 2020-06-29 | End: 2020-06-29

## 2020-06-29 RX ORDER — SODIUM CHLORIDE 9 MG/ML
INJECTION, SOLUTION INTRAVENOUS
Status: DISCONTINUED | OUTPATIENT
Start: 2020-06-29 | End: 2020-06-29 | Stop reason: HOSPADM

## 2020-06-29 RX ORDER — TETRACAINE HYDROCHLORIDE 5 MG/ML
1 SOLUTION OPHTHALMIC
Status: ACTIVE | OUTPATIENT
Start: 2020-06-29 | End: 2020-06-29

## 2020-06-29 RX ORDER — SODIUM CHLORIDE, SODIUM LACTATE, POTASSIUM CHLORIDE, CALCIUM CHLORIDE 600; 310; 30; 20 MG/100ML; MG/100ML; MG/100ML; MG/100ML
25 INJECTION, SOLUTION INTRAVENOUS CONTINUOUS
Status: DISCONTINUED | OUTPATIENT
Start: 2020-06-29 | End: 2020-06-29 | Stop reason: HOSPADM

## 2020-06-29 RX ORDER — TIMOLOL MALEATE 5 MG/ML
1 SOLUTION/ DROPS OPHTHALMIC 2 TIMES DAILY
Status: DISCONTINUED | OUTPATIENT
Start: 2020-06-29 | End: 2020-06-29 | Stop reason: HOSPADM

## 2020-06-29 RX ORDER — LIDOCAINE HYDROCHLORIDE 10 MG/ML
0.1 INJECTION, SOLUTION EPIDURAL; INFILTRATION; INTRACAUDAL; PERINEURAL AS NEEDED
Status: DISCONTINUED | OUTPATIENT
Start: 2020-06-29 | End: 2020-06-29 | Stop reason: HOSPADM

## 2020-06-29 RX ORDER — SODIUM CHLORIDE 0.9 % (FLUSH) 0.9 %
5-40 SYRINGE (ML) INJECTION AS NEEDED
Status: DISCONTINUED | OUTPATIENT
Start: 2020-06-29 | End: 2020-06-29 | Stop reason: HOSPADM

## 2020-06-29 RX ORDER — LIDOCAINE HYDROCHLORIDE 40 MG/ML
1 INJECTION, SOLUTION RETROBULBAR; TOPICAL ONCE
Status: COMPLETED | OUTPATIENT
Start: 2020-06-29 | End: 2020-06-29

## 2020-06-29 RX ORDER — TETRACAINE HYDROCHLORIDE 5 MG/ML
SOLUTION OPHTHALMIC AS NEEDED
Status: DISCONTINUED | OUTPATIENT
Start: 2020-06-29 | End: 2020-06-29 | Stop reason: HOSPADM

## 2020-06-29 RX ORDER — MIDAZOLAM HYDROCHLORIDE 1 MG/ML
INJECTION, SOLUTION INTRAMUSCULAR; INTRAVENOUS AS NEEDED
Status: DISCONTINUED | OUTPATIENT
Start: 2020-06-29 | End: 2020-06-29 | Stop reason: HOSPADM

## 2020-06-29 RX ORDER — TROPICAMIDE 10 MG/ML
SOLUTION/ DROPS OPHTHALMIC
Status: DISPENSED
Start: 2020-06-29 | End: 2020-06-29

## 2020-06-29 RX ORDER — CIPROFLOXACIN HYDROCHLORIDE 3.5 MG/ML
1 SOLUTION/ DROPS TOPICAL
Status: DISCONTINUED | OUTPATIENT
Start: 2020-06-29 | End: 2020-06-29 | Stop reason: HOSPADM

## 2020-06-29 RX ORDER — CIPROFLOXACIN HYDROCHLORIDE 3.5 MG/ML
SOLUTION/ DROPS TOPICAL
Status: DISPENSED
Start: 2020-06-29 | End: 2020-06-29

## 2020-06-29 RX ORDER — SODIUM CHLORIDE 0.9 % (FLUSH) 0.9 %
5-40 SYRINGE (ML) INJECTION EVERY 8 HOURS
Status: DISCONTINUED | OUTPATIENT
Start: 2020-06-29 | End: 2020-06-29 | Stop reason: HOSPADM

## 2020-06-29 RX ORDER — NEOMYCIN SULFATE, POLYMYXIN B SULFATE, AND DEXAMETHASONE 3.5; 10000; 1 MG/G; [USP'U]/G; MG/G
OINTMENT OPHTHALMIC ONCE
Status: COMPLETED | OUTPATIENT
Start: 2020-06-29 | End: 2020-06-29

## 2020-06-29 RX ADMIN — CIPROFLOXACIN 1 DROP: 3 SOLUTION OPHTHALMIC at 07:46

## 2020-06-29 RX ADMIN — TROPICAMIDE 1 DROP: 10 SOLUTION/ DROPS OPHTHALMIC at 07:42

## 2020-06-29 RX ADMIN — TROPICAMIDE 1 DROP: 10 SOLUTION/ DROPS OPHTHALMIC at 07:46

## 2020-06-29 RX ADMIN — SODIUM CHLORIDE: 9 INJECTION, SOLUTION INTRAVENOUS at 08:44

## 2020-06-29 RX ADMIN — SODIUM CHLORIDE 25 ML/HR: 900 INJECTION, SOLUTION INTRAVENOUS at 07:43

## 2020-06-29 RX ADMIN — MIDAZOLAM HYDROCHLORIDE 1 MG: 1 INJECTION, SOLUTION INTRAMUSCULAR; INTRAVENOUS at 08:45

## 2020-06-29 RX ADMIN — MIDAZOLAM HYDROCHLORIDE 1 MG: 1 INJECTION, SOLUTION INTRAMUSCULAR; INTRAVENOUS at 08:44

## 2020-06-29 RX ADMIN — TROPICAMIDE 1 DROP: 10 SOLUTION/ DROPS OPHTHALMIC at 07:49

## 2020-06-29 RX ADMIN — CIPROFLOXACIN 1 DROP: 3 SOLUTION OPHTHALMIC at 07:49

## 2020-06-29 RX ADMIN — CIPROFLOXACIN 1 DROP: 3 SOLUTION OPHTHALMIC at 07:43

## 2020-06-29 NOTE — ANESTHESIA PREPROCEDURE EVALUATION
Anesthetic History   No history of anesthetic complications            Review of Systems / Medical History  Patient summary reviewed, nursing notes reviewed and pertinent labs reviewed    Pulmonary  Within defined limits                 Neuro/Psych         Psychiatric history (Depression)     Cardiovascular    Hypertension              Exercise tolerance: >4 METS  Comments: History of ASD repair    H/o Thromboembolus; on chronic [facility]     11/17 Stress test: negative   GI/Hepatic/Renal     GERD: well controlled           Endo/Other      Hypothyroidism: well controlled  Obesity and arthritis     Other Findings   Comments: Lymphedema bilateral legs         Physical Exam    Airway  Mallampati: II  TM Distance: 4 - 6 cm  Neck ROM: normal range of motion   Mouth opening: Normal     Cardiovascular  Regular rate and rhythm,  S1 and S2 normal,  no murmur, click, rub, or gallop  Rhythm: regular  Rate: normal         Dental  No notable dental hx       Pulmonary  Breath sounds clear to auscultation               Abdominal  GI exam deferred       Other Findings            Anesthetic Plan    ASA: 2  Anesthesia type: MAC          Induction: Intravenous  Anesthetic plan and risks discussed with: Patient

## 2020-06-29 NOTE — BRIEF OP NOTE
Brief Postoperative Note    Patient: Berenda Door  YOB: 1955  MRN: [id number]    Date of Procedure: 6/29/2020     Pre-Op Diagnosis: Nuclear Sclerotic cataract right eye H25.11    Post-Op Diagnosis: Nuclear Sclerotic cataract right eye H25.11    Procedure(s):  RIGHT EYE CATARACT EXTRACTION WITH INTRA OCULAR LENS IMPLANT    Surgeon(s):  Jamilah Cho MD    Surgical Assistant: None    Anesthesia: MAC     Estimated Blood Loss (mL): none    Complications: none    Specimens: * No specimens in log *     Implants:   Implant Name Type Inv.  Item Serial No.  Lot No. LRB No. Used Action   IOL ASPHERIC 6.0 IRA +18.5 - [id number] Other IOL ASPHERIC 6.0 IRA +18.5 [id number] [employer] N/A Right 1 Implanted       Drains: * No LDAs found *    Findings: visually significant cataract right eye    Electronically Signed by Jhonathan Tucker MD on 6/29/2020 at 9:27 AM

## 2020-06-29 NOTE — DISCHARGE INSTRUCTIONS
Deandre Teague MD  [facility]  [facility] [address]  [place], [facility] [facility]  Phone: [phone]       Fax: [phone]  If you are unable to keep appointment, kindly give 24 hours notice please. REMOVE PATCH  START DROPS WHEN YOU GET HOME  PUT PATCH BACK ON AT BEDTIME    1. DO NOT RUB the eye that was operated on. 2. Do not strain excessively. It is all right to bend as long as you do not strain. 3. It is safe to take a shower, wash your face, and wash your hair. Just keep the eye closed. 4. Do not swim for 1 week after surgery. 5. If you have any problems or questions, do not hesitate to call. There is always a physician on call at [phone] ext. [phone].   6. Follow instructions on eye drops from office. 7. You may take Tylenol or Advil for discomfort. If it pressure not relieved by Tylenol or Advil, please call Dr. John Carrasco office. TO PREVENT AN INFECTION      1. [address] YOUR HANDS     To prevent infection, good handwashing is the most important thing you or your caregiver can do.  Wash your hands with soap and water or use the hand  we gave you before you touch any wounds. 2.  USE CLEAN SHEETS     Use freshly cleaned sheets on your bed after surgery.  To keep the surgery site clean, do not allow pets to sleep with you while your wound is still healing. 3. STOP SMOKING    Stop smoking, or at least cut back on smoking     Smoking slows your healing. 4.  CONTROL YOUR BLOOD SUGAR     High blood sugars slow wound healing.  If you are diabetic, control your blood sugar levels before and after your surgery. If you were given prescriptions, please review the written information on the prescribed medications. DO NOT DRIVE WHILE TAKING NARCOTIC PAIN MEDICATIONS.     DISCHARGE SUMMARY from Nurse    The following personal items collected during your admission are returned to you:   Dental Appliance: Dental Appliances: None  Vision: Visual Aid: (in pacu)  Hearing Aid:    Jewelry: Jewelry: Ring(wedding bands on the left hand)  Clothing: Clothing: None  Other Valuables: Other Valuables: Eyeglasses(glasses placed in pacu)  Valuables sent to safe:      PATIENT INSTRUCTIONS:    After general anesthesia or intravenous sedation, for 24 hours or while taking prescription Narcotics:  · Someone should be with you for the next 24 hours. · For your own safety, a responsible adult must drive you home. · Limit your activities  · Recommended activity: Rest today, Do not climb stairs or shower unattended for the next 24 hours. · Do not drive and operate hazardous machinery  · Do not make important personal or business decisions  · Do  not drink alcoholic beverages  · If you have not urinated within 8 hours after discharge, please contact your surgeon on call. Report the following to your surgeon:  · Excessive pain, swelling, redness or odor of or around the surgical area  · Temperature over 100.5  · Nausea and vomiting lasting longer than 4 hours or if unable to take medications    · You will receive a Post Operative Call from one of the Recovery Room Nurses on the day after your surgery to check on you. It is very important for us to know how you are recovering after your surgery. · You may receive an e-mail or letter in the mail from [facility] regarding your experience with us in the Ambulatory Surgery Unit. Your feedback is valuable to us and we appreciate your participation in the survey. · We wish youre a speedy recovery ? What to do at Home:      *  Please give a list of your current medications to your Primary Care Provider. *  Please update this list whenever your medications are discontinued, doses are      changed, or new medications (including over-the-counter products) are added. *  Please carry medication information at all times in case of emergency situations.           These are general instructions for a healthy lifestyle:    No smoking/ No tobacco products/ Avoid exposure to second hand smoke    Surgeon General's Warning:  Quitting smoking now greatly reduces serious risk to your health. Obesity, smoking, and sedentary lifestyle greatly increases your risk for illness    A healthy diet, regular physical exercise & weight monitoring are important for maintaining a healthy lifestyle    You may be retaining fluid if you have a history of heart failure or if you experience any of the following symptoms:  Weight gain of 3 pounds or more overnight or 5 pounds in a week, increased swelling in our hands or feet or shortness of breath while lying flat in bed. Please call your doctor as soon as you notice any of these symptoms; do not wait until your next office visit. Recognize signs and symptoms of STROKE:    B - Balance  E - Eyes    F-face looks uneven    A-arms unable to move or move even    S-speech slurred or non-existent    T-time-call 911 as soon as signs and symptoms begin-DO NOT go       Back to bed or wait to see if you get better-TIME IS BRAIN. If you have not received your influenza and/or pneumococcal vaccine, please follow up with your primary care physician. The discharge information has been reviewed with the patient and caregiver. The patient and caregiver verbalized understanding. Patient Education        Learning About Coronavirus [phone])  Coronavirus [phone]): Overview  What is coronavirus (XGILO-22)? The coronavirus disease (COVID-19) is caused by a virus. It is an illness that was first found in [place], [place], in December 2019. It has since spread worldwide. The virus can cause fever, cough, and trouble breathing. In severe cases, it can cause pneumonia and make it hard to breathe without help. It can cause death. Coronaviruses are a large group of viruses. They cause the common cold.  They also cause more serious illnesses like Middle East respiratory syndrome (MERS) and severe acute respiratory syndrome (SARS). COVID-19 is caused by a novel coronavirus. That means it's a new type that has not been seen in people before. This virus spreads person-to-person through droplets from coughing and sneezing. It can also spread when you are close to someone who is infected. And it can spread when you touch something that has the virus on it, such as a doorknob or a tabletop. What can you do to protect yourself from coronavirus (COVID-19)? The best way to protect yourself from getting sick is to:  · Avoid areas where there is an outbreak. · Avoid contact with people who may be infected. · Wash your hands often with soap or alcohol-based hand sanitizers. · Avoid crowds and try to stay at least 6 feet away from other people. · Wash your hands often, especially after you cough or sneeze. Use soap and water, and scrub for at least 20 seconds. If soap and water aren't available, use an alcohol-based hand . · Avoid touching your mouth, nose, and eyes. What can you do to avoid spreading the virus to others? To help avoid spreading the virus to others:  · Cover your mouth with a tissue when you cough or sneeze. Then throw the tissue in the trash. · Use a disinfectant to clean things that you touch often. · Wear a cloth face cover if you have to go to public areas. · Stay home if you are sick or have been exposed to the virus. Don't go to school, work, or public areas. And don't use public transportation, ride-shares, or taxis unless you have no choice. · If you are sick:  ? Leave your home only if you need to get medical care. But call the doctor's office first so they know you're coming. And wear a face cover. ? Wear the face cover whenever you're around other people. It can help stop the spread of the virus when you cough or sneeze. ? Clean and disinfect your home every day. Use household  and disinfectant wipes or sprays.  Take special care to clean things that you grab with your hands. These include doorknobs, remote controls, phones, and handles on your refrigerator and microwave. And don't forget countertops, tabletops, bathrooms, and computer keyboards. When to call for help  [id number] anytime you think you may need emergency care. For example, call if:  · You have severe trouble breathing. (You can't talk at all.)  · You have constant chest pain or pressure. · You are severely dizzy or lightheaded. · You are confused or can't think clearly. · Your face and lips have a blue color. · You pass out (lose consciousness) or are very hard to wake up. Call your doctor now if you develop symptoms such as:  · Shortness of breath. · Fever. · Cough. If you need to get care, call ahead to the doctor's office for instructions before you go. Make sure you wear a face cover to prevent exposing other people to the virus. Where can you get the latest information? The following health organizations are tracking and studying this virus. Their websites contain the most up-to-date information. [place] also learn what to do if you think you may have been exposed to the virus. · U.S. Centers for Disease Control and Prevention (CDC): The CDC provides updated news about the disease and travel advice. The website also tells you how to prevent the spread of infection. www.cdc.gov  · World Health Organization [facility]): WHO offers information about the virus outbreaks. WHO also has travel advice. www.who.int  Current as of: May 8, 2020               Content Version: 12.5  © 2006-2020 Healthwise, Incorporated. Care instructions adapted under license by [employer] (which disclaims liability or warranty for this information). If you have questions about a medical condition or this instruction, always ask your healthcare professional. [address] any warranty or liability for your use of this information.

## 2020-06-29 NOTE — PERIOP NOTES
Permission received to review discharge instructions and discuss private health information with friend Dez Torres [phone]

## 2020-06-29 NOTE — ANESTHESIA POSTPROCEDURE EVALUATION
Procedure(s):  RIGHT EYE CATARACT EXTRACTION WITH INTRA OCULAR LENS IMPLANT. MAC    Anesthesia Post Evaluation      Multimodal analgesia: multimodal analgesia not used between 6 hours prior to anesthesia start to PACU discharge  Patient location during evaluation: PACU  Patient participation: complete - patient participated  Level of consciousness: awake and alert  Pain score: 0  Airway patency: patent  Anesthetic complications: no  Cardiovascular status: acceptable  Respiratory status: acceptable  Hydration status: acceptable  Comments: BP running high pre & postop. Did not take BP meds this am; will take them at home  Post anesthesia nausea and vomiting:  none  Final Post Anesthesia Temperature Assessment:  Normothermia (36.0-37.5 degrees C)      INITIAL Post-op Vital signs: No vitals data found for the desired time range.

## 2020-06-29 NOTE — PERIOP NOTES
Janett Day  1955  [id number]    Situation:  Verbal report given from: Mary James CRNA  Procedure: Procedure(s):  RIGHT EYE CATARACT EXTRACTION WITH INTRA OCULAR LENS IMPLANT    Background:    Preoperative diagnosis: RIGHT EYE CATARACT    Postoperative diagnosis: RIGHT EYE CATARACT    :  Dr. Lenonx Pacheco    Assistant(s): Circ-1: Tania Yanez RN  Scrub Tech-1: RT Court    Specimens: * No specimens in log *    Assessment:  Intra-procedure medications         Anesthesia gave intra-procedure sedation and medications, see anesthesia flow sheet     Intravenous fluids: LR@ KVO     Vital signs stable       Recommendation:    Permission to share finding with friend : yes

## 2020-06-29 NOTE — OP NOTES
Date of Procedure: 6/29/2020  Preoperative Diagnosis: Nuclear Sclerotic cataract right eye H25.11  Postoperative Diagnosis: Nuclear Sclerotic cataract right eye H25.11  Procedure: Extracapsular cataract extraction with lens implant right eye  Surgeon:  NUPUR Kent MD  Assistants: None  Anesthesia: MAC with local  Estimated Blood Loss: None  Findings: Cataract right eye  Complications: None  Specimens: None  Prosthetic Devices: Intraocular lens implant     The patient's right eye was dilated with mydriacyl 1% and ciprofloxacin 0.3% for 3 doses preoperatively. The patient was taken to the operating room and was given sedation. Tetracaine was given topically to the right eye, and the eye was prepped and draped in the usual manner for sterile eye surgery, including Betadine solution being dropped onto the conjunctiva at the beginning of the prep. The eyelashes were isolated with a plastic drape. A lid speculum was placed.     A #15 blade was used to make a paracentesis at the 10:00 location. The eye was flushed with a lidocaine / epinephrine mixture (\"Shugarcaine\"). The eye was filled with Viscoat (Duovisc), and a crescent blade was used to make a 2.5 mm incision at the limbus temporally. This was dissected 2 mm into clear cornea, and the eye was entered with a 2.4 mm keratome. A 0.12 forceps was used for fixation during the procedure. A capsulorhexis flap was started with a cystotome, and this was completed 360 degrees with Utrata forceps. The capsular piece was removed. [place] dissection was performed with the \"Shugarcaine\" mixture on a cannula.     The lens nucleus was removed using phacoemulsification with a total phaco time of 1:24 minutes at 16.8%.     The lens was cracked and manipulated with a Sinsky hook. Residual cortex was removed using irrigation / aspiration.   The capsule remained intact.        The capsule was refilled with Provisc (Duovisc), and an Linwood Intraocular lens model [id number] power 18.50 was placed in a lens folding cartridge with Provisc.     The lens was unfolded into the capsular bag. The lens centered well. Residual Provisc and Viscoat were removed using I / A. No suture was required to close the incision. The eye was flushed with BSS through the paracentesis. Betadine solution was placed on the conjunctival surface at the end of the case. The eye was left soft and formed at the end of the case. The incision site was water tight. The speculum was removed, and a drop of timolol 0.5% and neopolydex ointment was placed on the eye followed by a shield. The patient tolerated the procedure well and is to follow-up in one day.

## 2020-06-29 NOTE — PERIOP NOTES
Patient: Kirsten Zhang MRN: [id number]  SSN: [id number]   YOB: 1955  Age: 62 y.o. Sex: female     Patient is status post Procedure(s):  RIGHT EYE CATARACT EXTRACTION WITH INTRA OCULAR LENS IMPLANT. Surgeon(s) and Role:     * Dilia Garcia MD - Primary    Local/Dose/Irrigation:  [facility]                Peripheral IV 06/29/20 Left Forearm (Active)   Site Assessment Clean, dry, & intact 6/29/2020  7:39 AM   Phlebitis Assessment 0 6/29/2020  7:39 AM   Infiltration Assessment 0 6/29/2020  7:39 AM   Dressing Status Clean, dry, & intact 6/29/2020  7:39 AM   Dressing Type Transparent;Tape 6/29/2020  7:39 AM   Hub Color/Line Status Blue; Infusing 6/29/2020  7:39 AM                           Dressing/Packing:  Wound Eye Right-Dressing Type: Eye shield; Other (Comment)(SECURED WITH TAPE BY MD. ) (06/29/20 0700)

## 2020-07-02 ENCOUNTER — ANESTHESIA EVENT (OUTPATIENT)
Dept: SURGERY | Age: 65
End: 2020-07-02
Payer: MEDICARE

## 2020-07-02 ENCOUNTER — HOSPITAL ENCOUNTER (OUTPATIENT)
Dept: PREADMISSION TESTING | Age: 65
Discharge: HOME | End: 2020-07-02
Attending: OPHTHALMOLOGY
Payer: MEDICARE

## 2020-07-02 PROCEDURE — 87635 SARS-COV-2 COVID-19 AMP PRB: CPT

## 2020-07-02 NOTE — PERIOP NOTES
[facility]  Ambulatory Surgery Unit  Pre-operative Instructions    Surgery/Procedure Date  7/6            Tentative Arrival Time 06:30am      1. On the day of your surgery/procedure, please report to the Ambulatory Surgery Unit Registration Desk and sign in at your designated time. The Ambulatory Surgery Unit is located in [facility] on the [facility] side of the \[facility]\"" across from the [address] [place] [address]. Please have all of your health insurance cards and a photo ID. 2. You must have someone with you to drive you home, as you should not drive a car for 24 hours following anesthesia. Please make arrangements for a responsible adult friend or family member to stay with you for at least the first 24 hours after your surgery. 3. Do not have anything to eat or drink (including water, gum, mints, coffee, juice) after 11:59 PM  7/5. This may not apply to medications prescribed by your physician. (Please note below the special instructions with medications to take the morning of surgery, if applicable.)    4. We recommend you do not drink any alcoholic beverages for 24 hours before and after your surgery. 5. Contact your surgeons office for instructions on the following medications: non-steroidal anti-inflammatory drugs (i.e. Advil, Aleve), vitamins, and supplements. (Some surgeons will want you to stop these medications prior to surgery and others may allow you to take them)   **If you are currently taking Plavix, Coumadin, Aspirin and/or other blood-thinning agents, contact your surgeon for instructions. ** Your surgeon will partner with the physician prescribing these medications to determine if it is safe to stop or if you need to continue taking. Please do not stop taking these medications without instructions from your surgeon.     6. In an effort to help prevent surgical site infection, we ask that you shower with an anti-bacterial soap (i.e. Dial/Safeguard, or the soap provided to you at your preadmission testing appointment) the morning of surgery, using a fresh towel after each shower. (Please begin this process with fresh bed linens.) Do not apply any lotions, powders, or deodorants after the shower on the day of your procedure. If applicable, please do not shave the operative site for 48 hours prior to surgery. 7. Wear comfortable clothes. Wear glasses instead of contacts. Do not bring any jewelry or money (other than copays or fees as instructed). Do not wear make-up, particularly mascara, the morning of your surgery. Do not wear nail polish, particularly if you are having foot /hand surgery. Wear your hair loose or down, no ponytails, buns, flavio pins or clips. All body piercings must be removed. 8. You should understand that if you do not follow these instructions your surgery may be cancelled. If your physical condition changes (i.e. fever, cold or flu) please contact your surgeon as soon as possible. 9. It is important that you be on time. If a situation occurs where you may be late, or if you have any questions or problems, please call [phone].    10. Your surgery time may be subject to change. You will receive a phone call the day prior to surgery to confirm your arrival time. Special Instructions: Take all medications and inhalers, as prescribed, on the morning of surgery with a sip of water EXCEPT: none      Insulin Dependent Diabetic patients: Take your diabetic medications as prescribed the day before surgery. Hold all diabetic medications the day of surgery. If you are scheduled to arrive for surgery after 8:00 AM, and your AM blood sugar is >200, please call Ambulatory Surgery. In an effort to improve the efficiency, privacy, and safety for all of our Pre-op patients visitors are not allowed in the Holding area. Once you arrive and are registered your family/visitors will be asked to remain in your vehicle.  The Pre-op staff will call you when they are ready for you to enter the building and will explain to you and your family/visitors that the Pre-op phase is beginning. At this time, if your procedure is outpatient, your family member will be asked to stay in their vehicle until the surgery is complete and you are ready for discharge. If you are going to be admitted after your surgery, once you are called to come inside the building, your family will be able to leave the parking lot. At this time the staff will also ask for your designated spokesperson information in the event that the physician or staff need to provide an update or obtain any pertinent information. The designated spokesperson will be notified if the physician needs to speak to family during the pre-operative phase.and/or in the post op phase. I understand a pre-operative phone call will be made to verify my surgery time. In the event that I am not available, I give permission for a message to be left on my answering service and/or with another person?       yes    Reviewed by phone with pt, verbalized understanding     ___________________      ___________________      ________________  (Signature of Patient)          (Witness)                   (Date and Time)

## 2020-07-03 LAB
SARS-COV-2, COV2NT: NOT DETECTED
SOURCE, COVRS: NORMAL
SPECIMEN SOURCE, FCOV2M: NORMAL

## 2020-07-06 ENCOUNTER — HOSPITAL ENCOUNTER (OUTPATIENT)
Age: 65
Setting detail: OUTPATIENT SURGERY
Discharge: HOME | End: 2020-07-06
Attending: OPHTHALMOLOGY | Admitting: OPHTHALMOLOGY
Payer: MEDICARE

## 2020-07-06 ENCOUNTER — ANESTHESIA (OUTPATIENT)
Dept: SURGERY | Age: 65
End: 2020-07-06
Payer: MEDICARE

## 2020-07-06 VITALS
HEIGHT: 71 IN | HEART RATE: 70 BPM | OXYGEN SATURATION: 95 % | SYSTOLIC BLOOD PRESSURE: 176 MMHG | TEMPERATURE: 98 F | DIASTOLIC BLOOD PRESSURE: 84 MMHG | WEIGHT: 262 LBS | BODY MASS INDEX: 36.68 KG/M2 | RESPIRATION RATE: 12 BRPM

## 2020-07-06 PROCEDURE — 77030018846 HC SOL IRR STRL H20 ICUM -A: Performed by: OPHTHALMOLOGY

## 2020-07-06 PROCEDURE — V2632 POST CHMBR INTRAOCULAR LENS: HCPCS | Performed by: OPHTHALMOLOGY

## 2020-07-06 PROCEDURE — 77030003029 HC SUT VCRL J&J -B: Performed by: OPHTHALMOLOGY

## 2020-07-06 PROCEDURE — 74011000250 HC RX REV CODE- 250: Performed by: OPHTHALMOLOGY

## 2020-07-06 PROCEDURE — 76210000046 HC AMBSU PH II REC FIRST 0.5 HR: Performed by: OPHTHALMOLOGY

## 2020-07-06 PROCEDURE — 74011250636 HC RX REV CODE- 250/636

## 2020-07-06 PROCEDURE — 74011000250 HC RX REV CODE- 250

## 2020-07-06 PROCEDURE — 76060000061 HC AMB SURG ANES 0.5 TO 1 HR: Performed by: OPHTHALMOLOGY

## 2020-07-06 PROCEDURE — 77030021352 HC CBL LD SYS DISP COVD -B: Performed by: OPHTHALMOLOGY

## 2020-07-06 PROCEDURE — 76210000040 HC AMBSU PH I REC FIRST 0.5 HR: Performed by: OPHTHALMOLOGY

## 2020-07-06 PROCEDURE — 74011250636 HC RX REV CODE- 250/636: Performed by: OPHTHALMOLOGY

## 2020-07-06 PROCEDURE — 74011250637 HC RX REV CODE- 250/637: Performed by: OPHTHALMOLOGY

## 2020-07-06 PROCEDURE — 76030000000 HC AMB SURG OR TIME 0.5 TO 1: Performed by: OPHTHALMOLOGY

## 2020-07-06 DEVICE — ACRYSOF(R) IQ ASPHERIC NATURAL IOL, SINGLE-PIECE ACRYLIC FOLDABLE PCL, UV WITH BLUE LIGHTFILTER, 13.0MM LENGTH, 6.0MM ANTERIORASYMMETRIC BICONVEX OPTIC, PLANAR HAPTICS.
Type: IMPLANTABLE DEVICE | Site: EYE | Status: FUNCTIONAL
Brand: ACRYSOF®

## 2020-07-06 RX ORDER — SODIUM CHLORIDE 0.9 % (FLUSH) 0.9 %
5-40 SYRINGE (ML) INJECTION AS NEEDED
Status: DISCONTINUED | OUTPATIENT
Start: 2020-07-06 | End: 2020-07-06 | Stop reason: HOSPADM

## 2020-07-06 RX ORDER — SODIUM CHLORIDE, SODIUM LACTATE, POTASSIUM CHLORIDE, CALCIUM CHLORIDE 600; 310; 30; 20 MG/100ML; MG/100ML; MG/100ML; MG/100ML
25 INJECTION, SOLUTION INTRAVENOUS CONTINUOUS
Status: DISCONTINUED | OUTPATIENT
Start: 2020-07-06 | End: 2020-07-06 | Stop reason: HOSPADM

## 2020-07-06 RX ORDER — CIPROFLOXACIN HYDROCHLORIDE 3.5 MG/ML
SOLUTION/ DROPS TOPICAL
Status: COMPLETED
Start: 2020-07-06 | End: 2020-07-06

## 2020-07-06 RX ORDER — TETRACAINE HYDROCHLORIDE 5 MG/ML
SOLUTION OPHTHALMIC AS NEEDED
Status: DISCONTINUED | OUTPATIENT
Start: 2020-07-06 | End: 2020-07-06 | Stop reason: HOSPADM

## 2020-07-06 RX ORDER — MIDAZOLAM HYDROCHLORIDE 1 MG/ML
INJECTION, SOLUTION INTRAMUSCULAR; INTRAVENOUS AS NEEDED
Status: DISCONTINUED | OUTPATIENT
Start: 2020-07-06 | End: 2020-07-06 | Stop reason: HOSPADM

## 2020-07-06 RX ORDER — SODIUM CHLORIDE 0.9 % (FLUSH) 0.9 %
5-40 SYRINGE (ML) INJECTION EVERY 8 HOURS
Status: DISCONTINUED | OUTPATIENT
Start: 2020-07-06 | End: 2020-07-06 | Stop reason: HOSPADM

## 2020-07-06 RX ORDER — LIDOCAINE HYDROCHLORIDE 10 MG/ML
0.1 INJECTION, SOLUTION EPIDURAL; INFILTRATION; INTRACAUDAL; PERINEURAL AS NEEDED
Status: DISCONTINUED | OUTPATIENT
Start: 2020-07-06 | End: 2020-07-06 | Stop reason: HOSPADM

## 2020-07-06 RX ORDER — FENTANYL CITRATE 50 UG/ML
25 INJECTION, SOLUTION INTRAMUSCULAR; INTRAVENOUS
Status: DISCONTINUED | OUTPATIENT
Start: 2020-07-06 | End: 2020-07-06 | Stop reason: HOSPADM

## 2020-07-06 RX ORDER — ONDANSETRON 2 MG/ML
4 INJECTION INTRAMUSCULAR; INTRAVENOUS AS NEEDED
Status: DISCONTINUED | OUTPATIENT
Start: 2020-07-06 | End: 2020-07-06 | Stop reason: HOSPADM

## 2020-07-06 RX ORDER — TROPICAMIDE 10 MG/ML
SOLUTION/ DROPS OPHTHALMIC
Status: COMPLETED
Start: 2020-07-06 | End: 2020-07-06

## 2020-07-06 RX ORDER — FENTANYL CITRATE 50 UG/ML
INJECTION, SOLUTION INTRAMUSCULAR; INTRAVENOUS AS NEEDED
Status: DISCONTINUED | OUTPATIENT
Start: 2020-07-06 | End: 2020-07-06 | Stop reason: HOSPADM

## 2020-07-06 RX ORDER — SODIUM CHLORIDE 9 MG/ML
25 INJECTION, SOLUTION INTRAVENOUS CONTINUOUS
Status: DISCONTINUED | OUTPATIENT
Start: 2020-07-06 | End: 2020-07-06 | Stop reason: HOSPADM

## 2020-07-06 RX ORDER — TROPICAMIDE 10 MG/ML
1 SOLUTION/ DROPS OPHTHALMIC
Status: COMPLETED | OUTPATIENT
Start: 2020-07-06 | End: 2020-07-06

## 2020-07-06 RX ORDER — DIPHENHYDRAMINE HYDROCHLORIDE 50 MG/ML
12.5 INJECTION, SOLUTION INTRAMUSCULAR; INTRAVENOUS AS NEEDED
Status: DISCONTINUED | OUTPATIENT
Start: 2020-07-06 | End: 2020-07-06 | Stop reason: HOSPADM

## 2020-07-06 RX ORDER — CIPROFLOXACIN HYDROCHLORIDE 3.5 MG/ML
1 SOLUTION/ DROPS TOPICAL
Status: COMPLETED | OUTPATIENT
Start: 2020-07-06 | End: 2020-07-06

## 2020-07-06 RX ORDER — ONDANSETRON 2 MG/ML
INJECTION INTRAMUSCULAR; INTRAVENOUS AS NEEDED
Status: DISCONTINUED | OUTPATIENT
Start: 2020-07-06 | End: 2020-07-06 | Stop reason: HOSPADM

## 2020-07-06 RX ADMIN — CIPROFLOXACIN HYDROCHLORIDE 1 DROP: 3.5 SOLUTION/ DROPS TOPICAL at 07:11

## 2020-07-06 RX ADMIN — SODIUM CHLORIDE 25 ML/HR: 900 INJECTION, SOLUTION INTRAVENOUS at 07:17

## 2020-07-06 RX ADMIN — TROPICAMIDE 1 DROP: 10 SOLUTION/ DROPS OPHTHALMIC at 07:15

## 2020-07-06 RX ADMIN — FENTANYL CITRATE 25 MCG: 50 INJECTION, SOLUTION INTRAMUSCULAR; INTRAVENOUS at 07:55

## 2020-07-06 RX ADMIN — ONDANSETRON HYDROCHLORIDE 4 MG: 2 INJECTION, SOLUTION INTRAMUSCULAR; INTRAVENOUS at 07:54

## 2020-07-06 RX ADMIN — CIPROFLOXACIN HYDROCHLORIDE 1 DROP: 3.5 SOLUTION/ DROPS TOPICAL at 07:19

## 2020-07-06 RX ADMIN — MIDAZOLAM HYDROCHLORIDE 1 MG: 1 INJECTION, SOLUTION INTRAMUSCULAR; INTRAVENOUS at 07:54

## 2020-07-06 RX ADMIN — CIPROFLOXACIN HYDROCHLORIDE 1 DROP: 3.5 SOLUTION/ DROPS TOPICAL at 07:15

## 2020-07-06 RX ADMIN — TROPICAMIDE 1 DROP: 10 SOLUTION/ DROPS OPHTHALMIC at 07:11

## 2020-07-06 RX ADMIN — Medication 3 AMPULE: at 07:16

## 2020-07-06 RX ADMIN — CIPROFLOXACIN 1 DROP: 3 SOLUTION OPHTHALMIC at 07:11

## 2020-07-06 RX ADMIN — MIDAZOLAM HYDROCHLORIDE 1 MG: 1 INJECTION, SOLUTION INTRAMUSCULAR; INTRAVENOUS at 07:58

## 2020-07-06 RX ADMIN — TROPICAMIDE 1 DROP: 10 SOLUTION/ DROPS OPHTHALMIC at 07:19

## 2020-07-06 NOTE — OP NOTES
Date of Procedure: 7/6/2020  Preoperative Diagnosis: Nuclear Sclerotic cataract left eye H25.12  Postoperative Diagnosis: Nuclear Sclerotic cataract left eye H25.12  Procedure: Extracapsular cataract extraction with lens implant left eye  Surgeon:  NUPUR Oneill MD  Assistants: None  Anesthesia: MAC with local  Estimated Blood Loss: None  Findings: Cataract left eye  Complications: None  Specimens: None  Prosthetic Devices: Intraocular lens implant     The patient's left eye was dilated with mydriacyl 1% and ciprofloxacin 0.3% for 3 doses preoperatively. The patient was taken to the operating room and was given sedation. Tetracaine was given topically to the left eye, and the eye was prepped and draped in the usual manner for sterile eye surgery, including Betadine solution being dropped onto the conjunctiva at the beginning of the prep. The eyelashes were isolated with a plastic drape. A lid speculum was placed.     A #15 blade was used to make a paracentesis at the 5:00 location. The eye was flushed with a lidocaine / epinephrine mixture (\"Shugarcaine\"). The eye was filled with Viscoat (Duovisc), and a crescent blade was used to make a 2.5 mm incision at the limbus temporally. This was dissected 2 mm into clear cornea, and the eye was entered with a 2.4 mm keratome. A 0.12 forceps was used for fixation during the procedure. A capsulorhexis flap was started with a cystotome, and this was completed 360 degrees with Utrata forceps. The capsular piece was removed. [place] dissection was performed with the \"Shugarcaine\" mixture on a cannula.     The lens nucleus was removed using phacoemulsification with a total phaco time of 0:35 minutes at 5.2%.     The lens was cracked and manipulated with a Sinsky hook. Residual cortex was removed using irrigation / aspiration.   The capsule remained intact.     The capsule was refilled with Provisc, and an Linwood Intraocular lens model SN60WF power 17.50 was placed in a lens folding cartridge with Provisc.     The lens was unfolded into the capsular bag. The lens centered well. Residual Provisc and Viscoat were removed using I / A. One 10-0 vicryl suture was used to close the incision. The knot was tied, cut and rotated under the cornea. The eye was flushed with BSS through the paracentesis. Betadine solution was placed on the conjunctival surface at the end of the case. The eye was left soft and formed at the end of the case. The incision site was water tight. The speculum was removed, and a drop of timolol 0.5% and neomycin/polymixin/dexamethasone ointment was placed on the eye followed by a shield. The patient tolerated the procedure well and is to follow-up in one day.

## 2020-07-06 NOTE — ANESTHESIA PREPROCEDURE EVALUATION
Anesthetic History   No history of anesthetic complications            Review of Systems / Medical History  Patient summary reviewed, nursing notes reviewed and pertinent labs reviewed    Pulmonary  Within defined limits                 Neuro/Psych         Psychiatric history (Depression)     Cardiovascular    Hypertension              Exercise tolerance: >4 METS  Comments: History of ASD repair    H/o Thromboembolus; on chronic [facility]     11/17 Stress test: negative   GI/Hepatic/Renal     GERD: well controlled           Endo/Other      Hypothyroidism: well controlled  Obesity and arthritis     Other Findings   Comments: Lymphedema bilateral legs           Physical Exam    Airway  Mallampati: II  TM Distance: 4 - 6 cm  Neck ROM: normal range of motion   Mouth opening: Normal     Cardiovascular  Regular rate and rhythm,  S1 and S2 normal,  no murmur, click, rub, or gallop  Rhythm: regular  Rate: normal         Dental  No notable dental hx       Pulmonary  Breath sounds clear to auscultation               Abdominal  GI exam deferred       Other Findings            Anesthetic Plan    ASA: 2  Anesthesia type: MAC          Induction: Intravenous  Anesthetic plan and risks discussed with: Patient

## 2020-07-06 NOTE — PERIOP NOTES
Patient states that friendTierra will be with them for at least 24 hours following today's procedure. Permission received to review discharge instructions and discuss private health information with friendMargie. Mistral-Air warming blanket applied at this time. Set to appropriate setting that is comfortable to patient. Will continue to monitor.

## 2020-07-06 NOTE — PERIOP NOTES
Awake/alert. Hob elevated, sipping ginger ale. 2692  to bedside to speak with patient  [phone] D/C instructions reviewed with friend Wilbur Mis. 1442 Discharged to home via/wc,accompanied to car per RN. Skin warm and dry, awake and alert. Respirations even, unlabored. Pt and family members questions and concerns addressed prior to discharge. All belongings (glasses, paperwork) with pt.

## 2020-07-06 NOTE — BRIEF OP NOTE
Brief Postoperative Note    Patient: Sandrine Medrano  YOB: 1955  MRN: [id number]    Date of Procedure: 7/6/2020     Pre-Op Diagnosis: Nuclear Sclerotic cataract left eye H25.12    Post-Op Diagnosis: Nuclear Sclerotic cataract left eye H25.12     Procedure(s):  LEFT EYE CATARACT EXTRACTION WITH INTRA OCULAR LENS IMPLANT    Surgeon(s):  Darnell Biggs MD    Surgical Assistant: None    Anesthesia: MAC     Estimated Blood Loss (mL): none    Complications: none    Specimens: * No specimens in log *     Implants:   Implant Name Type Inv.  Item Serial No.  Lot No. LRB No. Used Action   IOL ASPHERIC 6.0 IRA +17.5 - [id number]  IOL ASPHERIC 6.0 IRA +17.5 [id number] [employer]  Left 1 Implanted       Drains: * No LDAs found *    Findings: cataract left eye    Electronically Signed by Samuel Morton MD on 7/6/2020 at 8:54 AM

## 2020-07-06 NOTE — ANESTHESIA POSTPROCEDURE EVALUATION
Procedure(s):  LEFT EYE CATARACT EXTRACTION WITH INTRA OCULAR LENS IMPLANT. MAC    Anesthesia Post Evaluation      Multimodal analgesia: multimodal analgesia not used between 6 hours prior to anesthesia start to PACU discharge  Patient location during evaluation: PACU  Patient participation: complete - patient participated  Level of consciousness: awake and alert  Pain score: 0  Airway patency: patent  Anesthetic complications: no  Cardiovascular status: acceptable  Respiratory status: acceptable  Hydration status: acceptable  Post anesthesia nausea and vomiting:  none  Final Post Anesthesia Temperature Assessment:  Normothermia (36.0-37.5 degrees C)      INITIAL Post-op Vital signs:   Vitals Value Taken Time   /89 7/6/2020  8:48 AM   Temp 36.7 °C (98 °F) 7/6/2020  8:45 AM   Pulse 75 7/6/2020  8:49 AM   Resp 10 7/6/2020  8:49 AM   SpO2 99 % 7/6/2020  8:49 AM   Vitals shown include unvalidated device data.

## 2020-07-06 NOTE — DISCHARGE INSTRUCTIONS
Itz Jorgensen MD  [address], [facility]  Phone: [phone]       Fax: [phone]  If you are unable to keep appointment, kindly give 24 hours notice please. REMOVE PATCH  START DROPS WHEN YOU GET HOME  PUT PATCH BACK ON AT BEDTIME    1. DO NOT RUB the eye that was operated on. 2. Do not strain excessively. It is all right to bend as long as you do not strain. 3. It is safe to take a shower, wash your face, and wash your hair. Just keep the eye closed. 4. Do not swim for 1 week after surgery. 5. If you have any problems or questions, do not hesitate to call. There is always a physician on call at [phone] ext. [phone].   6. Follow instructions on eye drops from office. 7. You may take Tylenol or Advil for discomfort. If it pressure not relieved by Tylenol or Advil, please call Dr. Al Mediate office. TO PREVENT AN INFECTION      1. [address] YOUR HANDS     To prevent infection, good handwashing is the most important thing you or your caregiver can do.  Wash your hands with soap and water or use the hand  we gave you before you touch any wounds. 2.  USE CLEAN SHEETS     Use freshly cleaned sheets on your bed after surgery.  To keep the surgery site clean, do not allow pets to sleep with you while your wound is still healing. 3. STOP SMOKING    Stop smoking, or at least cut back on smoking     Smoking slows your healing. 4.  CONTROL YOUR BLOOD SUGAR     High blood sugars slow wound healing.  If you are diabetic, control your blood sugar levels before and after your surgery. If you were given prescriptions, please review the written information on the prescribed medications. DO NOT DRIVE WHILE TAKING NARCOTIC PAIN MEDICATIONS.     DISCHARGE SUMMARY from Nurse    The following personal items collected during your admission are returned to you:   Dental Appliance: Dental Appliances: None  Vision: Visual Aid: Glasses(in PACU)  Hearing Aid:    Jewelry: Jewelry: Ring, With patient  Clothing: Clothing: With patient  Other Valuables: Other Valuables: Purse(Given to friend, Ryan Kent)  Valuables sent to safe:      PATIENT INSTRUCTIONS:    After general anesthesia or intravenous sedation, for 24 hours or while taking prescription Narcotics:  · Someone should be with you for the next 24 hours. · For your own safety, a responsible adult must drive you home. · Limit your activities  · Recommended activity: Rest today, Do not climb stairs or shower unattended for the next 24 hours. · Do not drive and operate hazardous machinery  · Do not make important personal or business decisions  · Do  not drink alcoholic beverages  · If you have not urinated within 8 hours after discharge, please contact your surgeon on call. Report the following to your surgeon:  · Excessive pain, swelling, redness or odor of or around the surgical area  · Temperature over 100.5  · Nausea and vomiting lasting longer than 4 hours or if unable to take medications    · You will receive a Post Operative Call from one of the Recovery Room Nurses on the day after your surgery to check on you. It is very important for us to know how you are recovering after your surgery. · You may receive an e-mail or letter in the mail from [employer] regarding your experience with us in the Ambulatory Surgery Unit. Your feedback is valuable to us and we appreciate your participation in the survey. · We wish youre a speedy recovery ? What to do at Home:      *  Please give a list of your current medications to your Primary Care Provider. *  Please update this list whenever your medications are discontinued, doses are      changed, or new medications (including over-the-counter products) are added. *  Please carry medication information at all times in case of emergency situations.           These are general instructions for a healthy lifestyle:    No smoking/ No tobacco products/ Avoid exposure to second hand smoke    Surgeon General's Warning:  Quitting smoking now greatly reduces serious risk to your health. Obesity, smoking, and sedentary lifestyle greatly increases your risk for illness    A healthy diet, regular physical exercise & weight monitoring are important for maintaining a healthy lifestyle    You may be retaining fluid if you have a history of heart failure or if you experience any of the following symptoms:  Weight gain of 3 pounds or more overnight or 5 pounds in a week, increased swelling in our hands or feet or shortness of breath while lying flat in bed. Please call your doctor as soon as you notice any of these symptoms; do not wait until your next office visit. Recognize signs and symptoms of STROKE:    B - Balance  E - Eyes    F-face looks uneven    A-arms unable to move or move even    S-speech slurred or non-existent    T-time-call 911 as soon as signs and symptoms begin-DO NOT go       Back to bed or wait to see if you get better-TIME IS BRAIN. If you have not received your influenza and/or pneumococcal vaccine, please follow up with your primary care physician. The discharge information has been reviewed with the patient and caregiver. The patient and caregiver verbalized understanding. Patient Education        Learning About Coronavirus [phone])  Coronavirus [phone]): Overview  What is coronavirus (QNDNS-19)? The coronavirus disease (COVID-19) is caused by a virus. It is an illness that was first found in [facility], in December 2019. It has since spread worldwide. The virus can cause fever, cough, and trouble breathing. In severe cases, it can cause pneumonia and make it hard to breathe without help. It can cause death. Coronaviruses are a large group of viruses. They cause the common cold.  They also cause more serious illnesses like Middle East respiratory syndrome (MERS) and severe acute respiratory syndrome (SARS). COVID-19 is caused by a novel coronavirus. That means it's a new type that has not been seen in people before. This virus spreads person-to-person through droplets from coughing and sneezing. It can also spread when you are close to someone who is infected. And it can spread when you touch something that has the virus on it, such as a doorknob or a tabletop. What can you do to protect yourself from coronavirus (COVID-19)? The best way to protect yourself from getting sick is to:  · Avoid areas where there is an outbreak. · Avoid contact with people who may be infected. · Wash your hands often with soap or alcohol-based hand sanitizers. · Avoid crowds and try to stay at least 6 feet away from other people. · Wash your hands often, especially after you cough or sneeze. Use soap and water, and scrub for at least 20 seconds. If soap and water aren't available, use an alcohol-based hand . · Avoid touching your mouth, nose, and eyes. What can you do to avoid spreading the virus to others? To help avoid spreading the virus to others:  · Cover your mouth with a tissue when you cough or sneeze. Then throw the tissue in the trash. · Use a disinfectant to clean things that you touch often. · Wear a cloth face cover if you have to go to public areas. · Stay home if you are sick or have been exposed to the virus. Don't go to school, work, or public areas. And don't use public transportation, ride-shares, or taxis unless you have no choice. · If you are sick:  ? Leave your home only if you need to get medical care. But call the doctor's office first so they know you're coming. And wear a face cover. ? Wear the face cover whenever you're around other people. It can help stop the spread of the virus when you cough or sneeze. ? Clean and disinfect your home every day. Use household  and disinfectant wipes or sprays. Take special care to clean things that you grab with your hands. These include doorknobs, remote controls, phones, and handles on your refrigerator and microwave. And don't forget countertops, tabletops, bathrooms, and computer keyboards. When to call for help  [id number] anytime you think you may need emergency care. For example, call if:  · You have severe trouble breathing. (You can't talk at all.)  · You have constant chest pain or pressure. · You are severely dizzy or lightheaded. · You are confused or can't think clearly. · Your face and lips have a blue color. · You pass out (lose consciousness) or are very hard to wake up. Call your doctor now if you develop symptoms such as:  · Shortness of breath. · Fever. · Cough. If you need to get care, call ahead to the doctor's office for instructions before you go. Make sure you wear a face cover to prevent exposing other people to the virus. Where can you get the latest information? The following health organizations are tracking and studying this virus. Their websites contain the most up-to-date information. Radha Dodson also learn what to do if you think you may have been exposed to the virus. · U.S. Centers for Disease Control and Prevention (CDC): The CDC provides updated news about the disease and travel advice. The website also tells you how to prevent the spread of infection. www.cdc.gov  · World Health Organization [facility]): WHO offers information about the virus outbreaks. WHO also has travel advice. www.who.int  Current as of: May 8, 2020               Content Version: 12.5  © 2006-2020 Healthwise, Incorporated. Care instructions adapted under license by [employer] (which disclaims liability or warranty for this information). If you have questions about a medical condition or this instruction, always ask your healthcare professional. [address] any warranty or liability for your use of this information.

## 2020-07-20 ENCOUNTER — TELEPHONE (OUTPATIENT)
Dept: FAMILY MEDICINE CLINIC | Age: 65
End: 2020-07-20

## 2020-07-20 NOTE — TELEPHONE ENCOUNTER
----- Message from Emili Kenney sent at 7/20/2020 12:44 PM EDT -----  Regarding: DR PIRES /  Abel Patrick Message/Vendor Calls    Pt is requesting to speak with nurse in regard to lab appt and order for lab work.                    Callback required     Best contact number(s):      [phone]          Emili Kenney

## 2020-07-20 NOTE — TELEPHONE ENCOUNTER
Called pt and verified name and . Advised that we are still sending pts to [facility] for INR checks, pt voiced understanding. [facility] lab appointment on 2020.

## 2020-08-12 LAB
INR PPP: 2.5 (ref 0.8–1.2)
PROTHROMBIN TIME: 26 SEC (ref 9.1–12)

## 2020-08-14 ENCOUNTER — TELEPHONE ANTICOAG (OUTPATIENT)
Dept: FAMILY MEDICINE CLINIC | Age: 65
End: 2020-08-14

## 2020-08-14 NOTE — PROGRESS NOTES
verified. Patient notified of results and recommendations per Dr. Layla Prieto. Understanding vocalized.

## 2020-09-28 ENCOUNTER — CLINICAL SUPPORT (OUTPATIENT)
Dept: FAMILY MEDICINE CLINIC | Age: 65
End: 2020-09-28
Payer: MEDICARE

## 2020-09-28 VITALS — TEMPERATURE: 97.9 F | WEIGHT: 265 LBS | HEIGHT: 71 IN | BODY MASS INDEX: 37.1 KG/M2

## 2020-09-28 DIAGNOSIS — Z79.01 ANTICOAGULATION GOAL OF INR 2 TO 3: Primary | ICD-10-CM

## 2020-09-28 DIAGNOSIS — Z51.81 ANTICOAGULATION GOAL OF INR 2 TO 3: Primary | ICD-10-CM

## 2020-09-28 DIAGNOSIS — Z23 ENCOUNTER FOR IMMUNIZATION: ICD-10-CM

## 2020-09-28 LAB
INR BLD: 2.5
PT POC: 30.2 SECONDS
VALID INTERNAL CONTROL?: YES

## 2020-09-28 PROCEDURE — G0008 ADMIN INFLUENZA VIRUS VAC: HCPCS

## 2020-09-28 PROCEDURE — 90686 IIV4 VACC NO PRSV 0.5 ML IM: CPT

## 2020-09-28 PROCEDURE — 85610 PROTHROMBIN TIME: CPT

## 2020-09-28 NOTE — PATIENT INSTRUCTIONS
Vaccine Information Statement Influenza (Flu) Vaccine (Inactivated or Recombinant): What You Need to Know Many Vaccine Information Statements are available in Romanian and other languages. See www.immunize.org/vis Hojas de información sobre vacunas están disponibles en español y en muchos otros idiomas. Visite www.immunize.org/vis 1. Why get vaccinated? Influenza vaccine can prevent influenza (flu). Flu is a contagious disease that spreads around the [facility] every year, usually between October and May. Anyone can get the flu, but it is more dangerous for some people. Infants and young children, people 72years of age and older, pregnant women, and people with certain health conditions or a weakened immune system are at greatest risk of flu complications. Pneumonia, bronchitis, sinus infections and ear infections are examples of flu-related complications. If you have a medical condition, such as heart disease, cancer or diabetes, flu can make it worse. Flu can cause fever and chills, sore throat, muscle aches, fatigue, cough, headache, and runny or stuffy nose. Some people may have vomiting and diarrhea, though this is more common in children than adults. Each year thousands of people in the [facility] die from flu, and many more are hospitalized. Flu vaccine prevents millions of illnesses and flu-related visits to the doctor each year. 2. Influenza vaccines CDC recommends everyone 10months of age and older get vaccinated every flu season. Children 6 months through 6years of age may need 2 doses during a single flu season. Everyone else needs only 1 dose each flu season. It takes about 2 weeks for protection to develop after vaccination. There are many flu viruses, and they are always changing. Each year a new flu vaccine is made to protect against three or four viruses that are likely to cause disease in the upcoming flu season.  Even when the vaccine doesnt exactly match these viruses, it may still provide some protection. Influenza vaccine does not cause flu. Influenza vaccine may be given at the same time as other vaccines. 3. Talk with your health care provider Tell your vaccine provider if the person getting the vaccine: 
 Has had an allergic reaction after a previous dose of influenza vaccine, or has any severe, life-threatening allergies.  Has ever had Guillain-Barré Syndrome (also called GBS). In some cases, your health care provider may decide to postpone influenza vaccination to a future visit. People with minor illnesses, such as a cold, may be vaccinated. People who are moderately or severely ill should usually wait until they recover before getting influenza vaccine. Your health care provider can give you more information. 4. Risks of a reaction  Soreness, redness, and swelling where shot is given, fever, muscle aches, and headache can happen after influenza vaccine.  There may be a very small increased risk of Guillain-Barré Syndrome (GBS) after inactivated influenza vaccine (the flu shot). [facility] children who get the flu shot along with pneumococcal vaccine (PCV13), and/or DTaP vaccine at the same time might be slightly more likely to have a seizure caused by fever. Tell your health care provider if a child who is getting flu vaccine has ever had a seizure. People sometimes faint after medical procedures, including vaccination. Tell your provider if you feel dizzy or have vision changes or ringing in the ears. As with any medicine, there is a very remote chance of a vaccine causing a severe allergic reaction, other serious injury, or death. 5. What if there is a serious problem? An allergic reaction could occur after the vaccinated person leaves the clinic.  If you see signs of a severe allergic reaction (hives, swelling of the face and throat, difficulty breathing, a fast heartbeat, dizziness, or weakness), call 9-1-1 and get the person to the nearest hospital. 
 
For other signs that concern you, call your health care provider. Adverse reactions should be reported to the Vaccine Adverse Event Reporting System (VAERS). Your health care provider will usually file this report, or you can do it yourself. Visit the VAERS website at www.vaers. hhs.gov or call 8-695.899.9298. VAERS is only for reporting reactions, and VAERS staff do not give medical advice. 6. The National Vaccine Injury Compensation Program 
 
The [facility] Vaccine Injury Compensation Program (VICP) is a federal program that was created to compensate people who may have been injured by certain vaccines. Visit the VICP website at www.[facility].gov/vaccinecompensation or call 1-857.854.8925 to learn about the program and about filing a claim. There is a time limit to file a claim for compensation. 7. How can I learn more?  Ask your health care provider.  Call your local or state health department.  Contact the Centers for Disease Control and Prevention (CDC): 
- Call 7-840.401.3172 (1-800-CDC-INFO) or 
- Visit CDCs influenza website at www.cdc.gov/flu Vaccine Information Statement (Interim) Inactivated Influenza Vaccine 8/15/2019 
[address] [id number] Department of Health and [employer] Centers for Disease Control and Prevention Office Use Only

## 2020-09-28 NOTE — PROGRESS NOTES
Toño Vasquez is a 62 y.o. female who presents for routine immunizations. She denies any symptoms , reactions or allergies that would exclude them from being immunized today. Risks and adverse reactions were discussed and the VIS was given to them. All questions were addressed. She was observed for 15 min post injection. There were no reactions observed. IVY Perdue Elizabeth Driscoll is a 62 y.o. female who presents today for Anticoagulation monitoring. Indication: DVT  INR Goal: 2.0-3.0. Current dose:  Coumadin 7.5 mg daily, except Thurs 3.75 mg. Missed Coumadin Doses:  None  Medication Changes:  no  Dietary Changes:  no    Symptoms: taking coumadin appropriately without any bleeding. Latest INRs:  Lab Results   Component Value Date/Time    INR 2.5 (H) 08/11/2020 10:52 AM    INR 2.3 (H) 05/27/2020 10:30 AM    INR 2.5 (H) 04/27/2020 09:31 AM    INR (POC) 2.2 (H) 06/29/2020 07:41 AM    INR POC 2.7 06/24/2020 02:56 PM    Prothrombin time 26.0 (H) 08/11/2020 10:52 AM    Prothrombin time 23.0 (H) 05/27/2020 10:30 AM    Prothrombin time 25.1 (H) 04/27/2020 09:31 AM        New Coumadin dose:.current treatment plan is effective, no change in therapy. Next check to be scheduled for  4 weeks.

## 2020-10-05 ENCOUNTER — TELEPHONE (OUTPATIENT)
Dept: FAMILY MEDICINE CLINIC | Age: 65
End: 2020-10-05

## 2020-10-05 NOTE — TELEPHONE ENCOUNTER
Pt is calling stating she is coughing and has a sore throat she is suppose to be going out of town wanting to know if she should be tested for covid 19

## 2020-10-05 NOTE — TELEPHONE ENCOUNTER
Would not be unreasonable to get tested. Can direct her to [facility] express urgent care at [facility] for drive up testing.   If she wanted to get tested at our office would need a virtual visit first    Test results come back in 2-3 days

## 2020-10-05 NOTE — TELEPHONE ENCOUNTER
Called pt and verified name and . Gave pt information on [facility], she voiced understanding.

## 2020-10-06 ENCOUNTER — OFFICE VISIT (OUTPATIENT)
Dept: URGENT CARE | Age: 65
End: 2020-10-06

## 2020-10-06 VITALS — TEMPERATURE: 97.8 F | HEART RATE: 71 BPM | RESPIRATION RATE: 16 BRPM | OXYGEN SATURATION: 97 %

## 2020-10-06 DIAGNOSIS — Z20.822 COVID-19 RULED OUT: Primary | ICD-10-CM

## 2020-10-06 NOTE — PROGRESS NOTES
10/6/2020    Janett Farah (:  1955) is a 60 y.o.  female, here requesting COVID-19 testing    History of Present Illness  chills, myalgia sore throat since thursday     Visit Vitals  Pulse 71   Temp 97.8 °F (36.6 °C)   Resp 16   SpO2 97%       ASSESSMENT  Screening for COVID-19/ Viral disease    PLAN  POCT influenza testing - not tested   COVID-19 sample collected and submitted. Patient given detailed CDC instructions contained within After Visit Summary. An  electronic signature was used to authenticate this note.     --Dean Bowman MD

## 2020-10-08 LAB — SARS-COV-2, NAA: NOT DETECTED

## 2020-10-14 ENCOUNTER — TELEPHONE (OUTPATIENT)
Dept: FAMILY MEDICINE CLINIC | Age: 65
End: 2020-10-14

## 2020-10-16 ENCOUNTER — OFFICE VISIT (OUTPATIENT)
Dept: FAMILY MEDICINE CLINIC | Age: 65
End: 2020-10-16
Payer: MEDICARE

## 2020-10-16 VITALS
SYSTOLIC BLOOD PRESSURE: 129 MMHG | HEIGHT: 71 IN | OXYGEN SATURATION: 95 % | WEIGHT: 263.4 LBS | HEART RATE: 88 BPM | RESPIRATION RATE: 18 BRPM | BODY MASS INDEX: 36.88 KG/M2 | DIASTOLIC BLOOD PRESSURE: 74 MMHG
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
## 2020-10-16 DIAGNOSIS — I89.0 LYMPHEDEMA: ICD-10-CM

## 2020-10-16 DIAGNOSIS — I10 ESSENTIAL HYPERTENSION, BENIGN: ICD-10-CM

## 2020-10-16 DIAGNOSIS — S06.360S TRAUMATIC HEMORRHAGE OF CEREBRUM WITHOUT LOSS OF CONSCIOUSNESS, UNSPECIFIED LATERALITY, SEQUELA (HCC): ICD-10-CM

## 2020-10-16 DIAGNOSIS — M79.671 RIGHT FOOT PAIN: Primary | ICD-10-CM

## 2020-10-16 DIAGNOSIS — M54.2 NECK PAIN: ICD-10-CM

## 2020-10-16 PROCEDURE — 3017F COLORECTAL CA SCREEN DOC REV: CPT | Performed by: FAMILY MEDICINE

## 2020-10-16 PROCEDURE — G8417 CALC BMI ABV UP PARAM F/U: HCPCS | Performed by: FAMILY MEDICINE

## 2020-10-16 PROCEDURE — 99214 OFFICE O/P EST MOD 30 MIN: CPT | Performed by: FAMILY MEDICINE

## 2020-10-16 PROCEDURE — G8427 DOCREV CUR MEDS BY ELIG CLIN: HCPCS | Performed by: FAMILY MEDICINE

## 2020-10-16 PROCEDURE — G9717 DOC PT DX DEP/BP F/U NT REQ: HCPCS | Performed by: FAMILY MEDICINE

## 2020-10-16 PROCEDURE — G8752 SYS BP LESS 140: HCPCS | Performed by: FAMILY MEDICINE

## 2020-10-16 PROCEDURE — G8754 DIAS BP LESS 90: HCPCS | Performed by: FAMILY MEDICINE

## 2020-10-16 RX ORDER — ACETAMINOPHEN 325 MG/1
650 TABLET ORAL
COMMUNITY
Start: 2020-10-10

## 2020-10-16 RX ORDER — AMLODIPINE BESYLATE 10 MG/1
10 TABLET ORAL
COMMUNITY
Start: 2020-10-11 | End: 2020-11-10
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
## 2020-10-16 NOTE — PATIENT INSTRUCTIONS
Low Back Pain: Exercises Introduction Here are some examples of exercises for you to try. The exercises may be suggested for a condition or for rehabilitation. Start each exercise slowly. Ease off the exercises if you start to have pain. You will be told when to start these exercises and which ones will work best for you. How to do the exercises Press-up 1. Lie on your stomach, supporting your body with your forearms. 2. Press your elbows down into the floor to raise your upper back. As you do this, relax your stomach muscles and allow your back to arch without using your back muscles. As your press up, do not let your hips or pelvis come off the floor. 3. Hold for 15 to 30 seconds, then relax. 4. Repeat 2 to 4 times. Alternate arm and leg (bird dog) exercise Do this exercise slowly. Try to keep your body straight at all times, and do not let one hip drop lower than the other. 1. Start on the floor, on your hands and knees. 2. Tighten your belly muscles. 3. Raise one leg off the floor, and hold it straight out behind you. Be careful not to let your hip drop down, because that will twist your trunk. 4. Hold for about 6 seconds, then lower your leg and switch to the other leg. 5. Repeat 8 to 12 times on each leg. 6. Over time, work up to holding for 10 to 30 seconds each time. 7. If you feel stable and secure with your leg raised, try raising the opposite arm straight out in front of you at the same time. Knee-to-chest exercise 1. Lie on your back with your knees bent and your feet flat on the floor. 2. Bring one knee to your chest, keeping the other foot flat on the floor (or keeping the other leg straight, whichever feels better on your lower back). 3. Keep your lower back pressed to the floor. Hold for at least 15 to 30 seconds. 4. Relax, and lower the knee to the starting position. 5. Repeat with the other leg. Repeat 2 to 4 times with each leg. 6. To get more stretch, put your other leg flat on the floor while pulling your knee to your chest.   
Curl-ups 1. Lie on the floor on your back with your knees bent at a 90-degree angle. Your feet should be flat on the floor, about 12 inches from your buttocks. 2. Cross your arms over your chest. If this bothers your neck, try putting your hands behind your neck (not your head), with your elbows spread apart. 3. Slowly tighten your belly muscles and raise your shoulder blades off the floor. 4. Keep your head in line with your body, and do not press your chin to your chest. 
5. Hold this position for 1 or 2 seconds, then slowly lower yourself back down to the floor. 6. Repeat 8 to 12 times. Pelvic tilt exercise 1. Lie on your back with your knees bent. 2. \"Brace\" your stomach. This means to tighten your muscles by pulling in and imagining your belly button moving toward your spine. You should feel like your back is pressing to the floor and your hips and pelvis are rocking back. 3. Hold for about 6 seconds while you breathe smoothly. 4. Repeat 8 to 12 times. Heel dig bridging 1. Lie on your back with both knees bent and your ankles bent so that only your heels are digging into the floor. Your knees should be bent about 90 degrees. 2. Then push your heels into the floor, squeeze your buttocks, and lift your hips off the floor until your shoulders, hips, and knees are all in a straight line. 3. Hold for about 6 seconds as you continue to breathe normally, and then slowly lower your hips back down to the floor and rest for up to 10 seconds. 4. Do 8 to 12 repetitions. Hamstring stretch in doorway 1. Lie on your back in a doorway, with one leg through the open door. 2. Slide your leg up the wall to straighten your knee. You should feel a gentle stretch down the back of your leg. 3. Hold the stretch for at least 15 to 30 seconds.  Do not arch your back, point your toes, or bend either knee. Keep one heel touching the floor and the other heel touching the wall. 4. Repeat with your other leg. 5. Do 2 to 4 times for each leg. Hip flexor stretch 1. Kneel on the floor with one knee bent and one leg behind you. Place your forward knee over your foot. Keep your other knee touching the floor. 2. Slowly push your hips forward until you feel a stretch in the upper thigh of your rear leg. 3. Hold the stretch for at least 15 to 30 seconds. Repeat with your other leg. 4. Do 2 to 4 times on each side. Wall sit 1. Stand with your back 10 to 12 inches away from a wall. 2. Lean into the wall until your back is flat against it. 3. Slowly slide down until your knees are slightly bent, pressing your lower back into the wall. 4. Hold for about 6 seconds, then slide back up the wall. 5. Repeat 8 to 12 times. Follow-up care is a key part of your treatment and safety. Be sure to make and go to all appointments, and call your doctor if you are having problems. It's also a good idea to know your test results and keep a list of the medicines you take. Where can you learn more? Go to http://www.gray.com/ Enter W012 in the search box to learn more about \"Low Back Pain: Exercises. \" Current as of: March 2, 2020               Content Version: 12.6 © 5500-6816 [employer]. Care instructions adapted under license by [employer] (which disclaims liability or warranty for this information). If you have questions about a medical condition or this instruction, always ask your healthcare professional. [address] any warranty or liability for your use of this information. Neck Strain or Sprain: Rehab Exercises Introduction Here are some examples of exercises for you to try. The exercises may be suggested for a condition or for rehabilitation.  Start each exercise slowly. Ease off the exercises if you start to have pain. You will be told when to start these exercises and which ones will work best for you. How to do the exercises Neck rotation 1. Sit in a firm chair, or stand up straight. 2. Keeping your chin level, turn your head to the right, and hold for 15 to 30 seconds. 3. Turn your head to the left and hold for 15 to 30 seconds. 4. Repeat 2 to 4 times to each side. Neck stretches 1. Look straight ahead, and tip your right ear to your right shoulder. Do not let your left shoulder rise up as you tip your head to the right. 2. Hold for 15 to 30 seconds. 3. Tilt your head to the left. Do not let your right shoulder rise up as you tip your head to the left. 4. Hold for 15 to 30 seconds. 5. Repeat 2 to 4 times to each side. Forward neck flexion 1. Sit in a firm chair, or stand up straight. 2. Bend your head forward. 3. Hold for 15 to 30 seconds. 4. Repeat 2 to 4 times. Lateral (side) bend strengthening 1. With your right hand, place your first two fingers on your right temple. 2. Start to bend your head to the side while using gentle pressure from your fingers to keep your head from bending. 3. Hold for about 6 seconds. 4. Repeat 8 to 12 times. 5. Switch hands and repeat the same exercise on your left side. Forward bend strengthening 1. Place your first two fingers of either hand on your forehead. 2. Start to bend your head forward while using gentle pressure from your fingers to keep your head from bending. 3. Hold for about 6 seconds. 4. Repeat 8 to 12 times. Neutral position strengthening 1. Using one hand, place your fingertips on the back of your head at the top of your neck. 2. Start to bend your head backward while using gentle pressure from your fingers to keep your head from bending. 3. Hold for about 6 seconds. 4. Repeat 8 to 12 times. Chin tuck 1. Lie on the floor with a rolled-up towel under your neck. Your head should be touching the floor. 2. Slowly bring your chin toward your chest. 
3. Hold for a count of 6, and then relax for up to 10 seconds. 4. Repeat 8 to 12 times. Follow-up care is a key part of your treatment and safety. Be sure to make and go to all appointments, and call your doctor if you are having problems. It's also a good idea to know your test results and keep a list of the medicines you take. Where can you learn more? Go to http://www.gray.com/ Enter M679 in the search box to learn more about \"Neck Strain or Sprain: Rehab Exercises. \" Current as of: March 2, 2020               Content Version: 12.6 © 9175-0453 [employer]. Care instructions adapted under license by [employer] (which disclaims liability or warranty for this information). If you have questions about a medical condition or this instruction, always ask your healthcare professional. [address] any warranty or liability for your use of this information.

## 2020-10-27 ENCOUNTER — CLINICAL SUPPORT (OUTPATIENT)
Dept: FAMILY MEDICINE CLINIC | Age: 65
End: 2020-10-27
Payer: MEDICARE

## 2020-10-27 VITALS — WEIGHT: 264.8 LBS | BODY MASS INDEX: 37.46 KG/M2

## 2020-10-27 DIAGNOSIS — Z51.81 ANTICOAGULATION GOAL OF INR 2 TO 3: Primary | ICD-10-CM

## 2020-10-27 DIAGNOSIS — I82.409 RECURRENT DEEP VEIN THROMBOSIS (DVT) (HCC): ICD-10-CM

## 2020-10-27 DIAGNOSIS — Z79.01 ANTICOAGULATION GOAL OF INR 2 TO 3: Primary | ICD-10-CM

## 2020-10-27 LAB
INR BLD: 1.9
PT POC: 22.9 SECONDS
VALID INTERNAL CONTROL?: YES

## 2020-10-27 PROCEDURE — 85610 PROTHROMBIN TIME: CPT | Performed by: FAMILY MEDICINE

## 2020-10-27 NOTE — PROGRESS NOTES
Rafael Dockery is a 60 y.o. female who presents today for Anticoagulation monitoring. Indication: DVT  INR Goal: 2.0-3.0. Current dose:  Coumadin 7.5 mg daily, except 3.75 mg Thurs. Missed Coumadin Doses:  None  Medication Changes:  no  Dietary Changes:  no    Symptoms: taking coumadin appropriately without any bleeding. Latest INRs:  Lab Results   Component Value Date/Time    INR 2.5 (H) 08/11/2020 10:52 AM    INR 2.3 (H) 05/27/2020 10:30 AM    INR 2.5 (H) 04/27/2020 09:31 AM    INR (POC) 2.2 (H) 06/29/2020 07:41 AM    INR POC 2.5 09/28/2020 04:40 PM    INR POC 2.7 06/24/2020 02:56 PM    Prothrombin time 26.0 (H) 08/11/2020 10:52 AM    Prothrombin time 23.0 (H) 05/27/2020 10:30 AM    Prothrombin time 25.1 (H) 04/27/2020 09:31 AM        New Coumadin dose:.the following changes are made - 7.5 mg daily. Next check to be scheduled for  4 weeks.

## 2020-11-13 RX ORDER — LEVOTHYROXINE SODIUM 100 UG/1
TABLET ORAL
Qty: 90 TAB | Refills: 0 | Status: SHIPPED | OUTPATIENT
Start: 2020-11-13 | End: 2021-02-11

## 2020-11-13 RX ORDER — VALSARTAN 320 MG/1
320 TABLET ORAL DAILY
Qty: 90 TAB | Refills: 3 | Status: SHIPPED | OUTPATIENT
Start: 2020-11-13 | End: 2021-11-10

## 2020-11-13 NOTE — TELEPHONE ENCOUNTER
Tello Jarrett is requesting a refill on med    Requested Prescriptions     Pending Prescriptions Disp Refills    valsartan (DIOVAN) 320 mg tablet 90 Tab 3     Sig: Take 1 Tab by mouth daily.

## 2020-11-30 ENCOUNTER — CLINICAL SUPPORT (OUTPATIENT)
Dept: FAMILY MEDICINE CLINIC | Age: 65
End: 2020-11-30
Payer: MEDICARE

## 2020-11-30 VITALS
OXYGEN SATURATION: 94 % | SYSTOLIC BLOOD PRESSURE: 130 MMHG | HEART RATE: 88 BPM | DIASTOLIC BLOOD PRESSURE: 84 MMHG | HEIGHT: 71 IN | WEIGHT: 267.6 LBS | RESPIRATION RATE: 17 BRPM | TEMPERATURE: 97.8 F | BODY MASS INDEX: 37.46 KG/M2

## 2020-11-30 DIAGNOSIS — I82.409 RECURRENT DEEP VEIN THROMBOSIS (DVT) (HCC): ICD-10-CM

## 2020-11-30 DIAGNOSIS — Z79.01 ANTICOAGULATION GOAL OF INR 2 TO 3: ICD-10-CM

## 2020-11-30 DIAGNOSIS — Z51.81 ANTICOAGULATION GOAL OF INR 2 TO 3: ICD-10-CM

## 2020-11-30 DIAGNOSIS — R42 VERTIGO: Primary | ICD-10-CM

## 2020-11-30 LAB
INR BLD: 1.9
PT POC: 23 SECONDS
VALID INTERNAL CONTROL?: YES

## 2020-11-30 PROCEDURE — 85610 PROTHROMBIN TIME: CPT | Performed by: FAMILY MEDICINE

## 2020-11-30 PROCEDURE — 99213 OFFICE O/P EST LOW 20 MIN: CPT | Performed by: FAMILY MEDICINE
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
## 2020-11-30 NOTE — PROGRESS NOTES
Ade Oquendo is a 60 y.o. female who presents today for Anticoagulation monitoring. Indication: DVT  INR Goal: 2.0-3.0. Current dose:  Coumadin 7.5 mg daily. Missed Coumadin Doses: This week - one  Medication Changes:  no  Dietary Changes:  no    Symptoms: taking coumadin appropriately without any bleeding. Latest INRs:  Lab Results   Component Value Date/Time    INR 2.5 (H) 08/11/2020 10:52 AM    INR 2.3 (H) 05/27/2020 10:30 AM    INR 2.5 (H) 04/27/2020 09:31 AM    INR (POC) 2.2 (H) 06/29/2020 07:41 AM    INR POC 1.9 10/27/2020 08:40 AM    INR POC 2.5 09/28/2020 04:40 PM    INR POC 2.7 06/24/2020 02:56 PM    Prothrombin time 26.0 (H) 08/11/2020 10:52 AM    Prothrombin time 23.0 (H) 05/27/2020 10:30 AM    Prothrombin time 25.1 (H) 04/27/2020 09:31 AM        New Coumadin dose:.current treatment plan is effective, no change in therapy. Next check to be scheduled for  2 weeks.

## 2020-12-01 ENCOUNTER — TELEPHONE (OUTPATIENT)
Dept: FAMILY MEDICINE CLINIC | Age: 65
End: 2020-12-01

## 2020-12-01 ENCOUNTER — TRANSCRIBE ORDER (OUTPATIENT)
Dept: INTERNAL MEDICINE CLINIC | Age: 65
End: 2020-12-01
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
## 2020-12-02 ENCOUNTER — TELEPHONE (OUTPATIENT)
Dept: FAMILY MEDICINE CLINIC | Age: 65
End: 2020-12-02
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
## 2020-12-02 NOTE — TELEPHONE ENCOUNTER
Pt is calling wanting to speak with Dr Keri Bruce in ref to still being dizzy and little nauseous and shaky  Wanting to see if you can give her something

## 2020-12-14 ENCOUNTER — CLINICAL SUPPORT (OUTPATIENT)
Dept: FAMILY MEDICINE CLINIC | Age: 65
End: 2020-12-14
Payer: MEDICARE

## 2020-12-14 VITALS
SYSTOLIC BLOOD PRESSURE: 174 MMHG | HEIGHT: 71 IN | BODY MASS INDEX: 38.25 KG/M2 | HEART RATE: 70 BPM | DIASTOLIC BLOOD PRESSURE: 94 MMHG | WEIGHT: 273.2 LBS

## 2020-12-14 DIAGNOSIS — Z51.81 ANTICOAGULATION GOAL OF INR 2 TO 3: ICD-10-CM

## 2020-12-14 DIAGNOSIS — I82.409 RECURRENT DEEP VEIN THROMBOSIS (DVT) (HCC): ICD-10-CM

## 2020-12-14 DIAGNOSIS — Z79.01 ANTICOAGULATION GOAL OF INR 2 TO 3: ICD-10-CM

## 2020-12-14 DIAGNOSIS — I10 ESSENTIAL HYPERTENSION, BENIGN: Primary | ICD-10-CM

## 2020-12-14 LAB
INR BLD: 2.6
PT POC: 31.4 SECONDS
VALID INTERNAL CONTROL?: YES

## 2020-12-14 PROCEDURE — 99214 OFFICE O/P EST MOD 30 MIN: CPT | Performed by: FAMILY MEDICINE

## 2020-12-14 PROCEDURE — 85610 PROTHROMBIN TIME: CPT | Performed by: FAMILY MEDICINE

## 2020-12-14 NOTE — PROGRESS NOTES
Mary Em is a 60 y.o. female who presents today for Anticoagulation monitoring. Indication: DVT  INR Goal: 2.0-3.0. Current dose:  Coumadin 7.5 mg daily. Missed Coumadin Doses:  None  Medication Changes:  no  Dietary Changes:  no    Symptoms: taking coumadin appropriately without any bleeding. Latest INRs:  Lab Results   Component Value Date/Time    INR 2.5 (H) 08/11/2020 10:52 AM    INR 2.3 (H) 05/27/2020 10:30 AM    INR 2.5 (H) 04/27/2020 09:31 AM    INR (POC) 2.2 (H) 06/29/2020 07:41 AM    INR POC 1.9 11/30/2020 10:18 AM    INR POC 1.9 10/27/2020 08:40 AM    INR POC 2.5 09/28/2020 04:40 PM    Prothrombin time 26.0 (H) 08/11/2020 10:52 AM    Prothrombin time 23.0 (H) 05/27/2020 10:30 AM    Prothrombin time 25.1 (H) 04/27/2020 09:31 AM        New Coumadin dose:.current treatment plan is effective, no change in therapy. Next check to be scheduled for  2 weeks.
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
## 2020-12-28 ENCOUNTER — TELEPHONE (OUTPATIENT)
Dept: FAMILY MEDICINE CLINIC | Age: 65
End: 2020-12-28

## 2020-12-28 DIAGNOSIS — I16.0 HYPERTENSIVE URGENCY: ICD-10-CM

## 2020-12-28 DIAGNOSIS — R42 DIZZY: Primary | ICD-10-CM

## 2020-12-28 DIAGNOSIS — R06.09 DOE (DYSPNEA ON EXERTION): ICD-10-CM

## 2020-12-28 RX ORDER — SPIRONOLACTONE 25 MG/1
25 TABLET ORAL DAILY
Qty: 90 TAB | Refills: 3 | Status: SHIPPED | OUTPATIENT
Start: 2020-12-28 | End: 2021-12-27

## 2020-12-28 NOTE — TELEPHONE ENCOUNTER
Patient called to notify Dr. Pelon Zendejas she is still having blood pressure issues. Her blood pressure this morning ran 197/108 (reading were by arm cuff at 8:28 AM). Patient is scheduled for a virtual visit tomorrow but wants Dr. Pelon Zendejas to call her before her virtual visit.
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
## 2020-12-29 NOTE — TELEPHONE ENCOUNTER
Dr. Fredy Goldstein has spoken with pt. She will be coming in Jan. 4th for her 1 wk f/u and will be scheduling CT Scan this morning at facility.

## 2021-01-04 ENCOUNTER — CLINICAL SUPPORT (OUTPATIENT)
Dept: FAMILY MEDICINE CLINIC | Age: 66
End: 2021-01-04
Payer: MEDICARE

## 2021-01-04 VITALS
OXYGEN SATURATION: 96 % | HEART RATE: 87 BPM | DIASTOLIC BLOOD PRESSURE: 83 MMHG | BODY MASS INDEX: 37.15 KG/M2 | SYSTOLIC BLOOD PRESSURE: 135 MMHG | WEIGHT: 265.4 LBS | HEIGHT: 71 IN | RESPIRATION RATE: 17 BRPM | TEMPERATURE: 98.1 F

## 2021-01-04 DIAGNOSIS — I82.409 RECURRENT DEEP VEIN THROMBOSIS (DVT) (HCC): ICD-10-CM

## 2021-01-04 DIAGNOSIS — Z79.01 ANTICOAGULATION GOAL OF INR 2 TO 3: Primary | ICD-10-CM

## 2021-01-04 DIAGNOSIS — Z51.81 ANTICOAGULATION GOAL OF INR 2 TO 3: Primary | ICD-10-CM

## 2021-01-04 LAB
INR BLD: 3
PT POC: 35.8 SECONDS
VALID INTERNAL CONTROL?: YES

## 2021-01-04 PROCEDURE — 85610 PROTHROMBIN TIME: CPT | Performed by: FAMILY MEDICINE

## 2021-01-06 ENCOUNTER — HOSPITAL ENCOUNTER (OUTPATIENT)
Dept: CT IMAGING | Age: 66
Discharge: HOME | End: 2021-01-06
Attending: FAMILY MEDICINE
Payer: MEDICARE

## 2021-01-06 DIAGNOSIS — R42 DIZZY: ICD-10-CM

## 2021-01-06 DIAGNOSIS — I16.0 HYPERTENSIVE URGENCY: ICD-10-CM

## 2021-01-06 PROCEDURE — 70450 CT HEAD/BRAIN W/O DYE: CPT

## 2021-01-07 ENCOUNTER — TELEPHONE (OUTPATIENT)
Dept: FAMILY MEDICINE CLINIC | Age: 66
End: 2021-01-07

## 2021-01-15 RX ORDER — WARFARIN 7.5 MG/1
TABLET ORAL
Qty: 90 TAB | Refills: 3 | Status: SHIPPED | OUTPATIENT
Start: 2021-01-15 | End: 2022-01-31

## 2021-02-01 ENCOUNTER — CLINICAL SUPPORT (OUTPATIENT)
Dept: FAMILY MEDICINE CLINIC | Age: 66
End: 2021-02-01
Payer: MEDICARE

## 2021-02-01 VITALS
TEMPERATURE: 98.1 F | DIASTOLIC BLOOD PRESSURE: 85 MMHG | HEIGHT: 71 IN | OXYGEN SATURATION: 96 % | BODY MASS INDEX: 37.27 KG/M2 | SYSTOLIC BLOOD PRESSURE: 129 MMHG | HEART RATE: 84 BPM | WEIGHT: 266.2 LBS | RESPIRATION RATE: 16 BRPM

## 2021-02-01 DIAGNOSIS — Z79.01 ANTICOAGULATION GOAL OF INR 2 TO 3: Primary | ICD-10-CM

## 2021-02-01 DIAGNOSIS — Z51.81 ANTICOAGULATION GOAL OF INR 2 TO 3: Primary | ICD-10-CM

## 2021-02-01 DIAGNOSIS — I82.409 RECURRENT DEEP VEIN THROMBOSIS (DVT) (HCC): ICD-10-CM

## 2021-02-01 LAB
INR BLD: 2.9
PT POC: 34.2 SECONDS
VALID INTERNAL CONTROL?: YES

## 2021-02-01 PROCEDURE — 85610 PROTHROMBIN TIME: CPT | Performed by: FAMILY MEDICINE

## 2021-02-01 NOTE — PROGRESS NOTES
Janett Leiva is a 60 y.o. female who presents today for Anticoagulation monitoring. Indication: DVT  INR Goal: 2.0-3.0. Current dose:  Coumadin 7.5 mg daily. Missed Coumadin Doses:  None  Medication Changes:  no  Dietary Changes:  no    Symptoms: taking coumadin appropriately without any bleeding. Latest INRs:  Lab Results   Component Value Date/Time    INR 2.5 (H) 08/11/2020 10:52 AM    INR 2.3 (H) 05/27/2020 10:30 AM    INR 2.5 (H) 04/27/2020 09:31 AM    INR (POC) 2.2 (H) 06/29/2020 07:41 AM    INR POC 3.0 01/04/2021 10:25 AM    INR POC 2.6 12/14/2020 10:20 AM    INR POC 1.9 11/30/2020 10:18 AM    Prothrombin time 26.0 (H) 08/11/2020 10:52 AM    Prothrombin time 23.0 (H) 05/27/2020 10:30 AM    Prothrombin time 25.1 (H) 04/27/2020 09:31 AM        New Coumadin dose:.current treatment plan is effective, no change in therapy. Next check to be scheduled for  4 weeks.

## 2021-02-11 RX ORDER — LEVOTHYROXINE SODIUM 100 UG/1
TABLET ORAL
Qty: 90 TAB | Refills: 3 | Status: SHIPPED | OUTPATIENT
Start: 2021-02-11 | End: 2022-01-31

## 2021-02-19 DIAGNOSIS — F32.A DEPRESSION, UNSPECIFIED DEPRESSION TYPE: ICD-10-CM

## 2021-02-22 RX ORDER — VENLAFAXINE 75 MG/1
TABLET ORAL
Qty: 180 TAB | Refills: 3 | Status: SHIPPED | OUTPATIENT
Start: 2021-02-22 | End: 2022-02-14

## 2021-03-01 ENCOUNTER — CLINICAL SUPPORT (OUTPATIENT)
Dept: FAMILY MEDICINE CLINIC | Age: 66
End: 2021-03-01
Payer: MEDICARE

## 2021-03-01 VITALS — WEIGHT: 271.8 LBS | BODY MASS INDEX: 38.45 KG/M2

## 2021-03-01 DIAGNOSIS — Z79.01 ANTICOAGULATION GOAL OF INR 2 TO 3: Primary | ICD-10-CM

## 2021-03-01 DIAGNOSIS — Z51.81 ANTICOAGULATION GOAL OF INR 2 TO 3: Primary | ICD-10-CM

## 2021-03-01 LAB
INR BLD: 3.7
PT POC: 44.5 SECONDS
VALID INTERNAL CONTROL?: YES

## 2021-03-01 PROCEDURE — 85610 PROTHROMBIN TIME: CPT | Performed by: FAMILY MEDICINE

## 2021-04-02 ENCOUNTER — CLINICAL SUPPORT (OUTPATIENT)
Dept: FAMILY MEDICINE CLINIC | Age: 66
End: 2021-04-02
Payer: MEDICARE

## 2021-04-02 VITALS
DIASTOLIC BLOOD PRESSURE: 82 MMHG | TEMPERATURE: 97.3 F | BODY MASS INDEX: 37.74 KG/M2 | WEIGHT: 269.6 LBS | SYSTOLIC BLOOD PRESSURE: 136 MMHG | OXYGEN SATURATION: 98 % | HEART RATE: 83 BPM | RESPIRATION RATE: 16 BRPM | HEIGHT: 71 IN

## 2021-04-02 DIAGNOSIS — Z79.01 ANTICOAGULATION GOAL OF INR 2 TO 3: Primary | ICD-10-CM

## 2021-04-02 DIAGNOSIS — Z51.81 ANTICOAGULATION GOAL OF INR 2 TO 3: Primary | ICD-10-CM

## 2021-04-02 DIAGNOSIS — I82.409 RECURRENT DEEP VEIN THROMBOSIS (DVT) (HCC): ICD-10-CM

## 2021-04-02 LAB
INR BLD: 2.8
PT POC: 33.5 SECONDS
VALID INTERNAL CONTROL?: YES

## 2021-04-02 PROCEDURE — 85610 PROTHROMBIN TIME: CPT | Performed by: FAMILY MEDICINE

## 2021-04-02 NOTE — PROGRESS NOTES
Janett Murray is a 60 y.o. female who presents today for Anticoagulation monitoring. Indication: DVT  INR Goal: 2.0-3.0. Current dose:  Coumadin 7.5 mg daily, except 3.75 mg Wed. Missed Coumadin Doses:  None  Medication Changes:  no  Dietary Changes:  no    Symptoms: taking coumadin appropriately without any bleeding. Latest INRs:  Lab Results   Component Value Date/Time    INR 2.5 (H) 08/11/2020 10:52 AM    INR 2.3 (H) 05/27/2020 10:30 AM    INR 2.5 (H) 04/27/2020 09:31 AM    INR (POC) 2.2 (H) 06/29/2020 07:41 AM    INR POC 3.7 03/01/2021 10:47 AM    INR POC 2.9 02/01/2021 10:54 AM    INR POC 3.0 01/04/2021 10:25 AM    Prothrombin time 26.0 (H) 08/11/2020 10:52 AM    Prothrombin time 23.0 (H) 05/27/2020 10:30 AM    Prothrombin time 25.1 (H) 04/27/2020 09:31 AM        New Coumadin dose:.current treatment plan is effective, no change in therapy. Next check to be scheduled for  4 weeks.

## 2021-05-04 ENCOUNTER — CLINICAL SUPPORT (OUTPATIENT)
Dept: FAMILY MEDICINE CLINIC | Age: 66
End: 2021-05-04
Payer: MEDICARE

## 2021-05-04 VITALS
WEIGHT: 273 LBS | DIASTOLIC BLOOD PRESSURE: 83 MMHG | TEMPERATURE: 98 F | OXYGEN SATURATION: 95 % | HEART RATE: 80 BPM | RESPIRATION RATE: 16 BRPM | SYSTOLIC BLOOD PRESSURE: 127 MMHG | HEIGHT: 71 IN | BODY MASS INDEX: 38.22 KG/M2

## 2021-05-04 DIAGNOSIS — Z51.81 ANTICOAGULATION GOAL OF INR 2 TO 3: Primary | ICD-10-CM

## 2021-05-04 DIAGNOSIS — Z79.01 ANTICOAGULATION GOAL OF INR 2 TO 3: Primary | ICD-10-CM

## 2021-05-04 DIAGNOSIS — I82.409 RECURRENT DEEP VEIN THROMBOSIS (DVT) (HCC): ICD-10-CM

## 2021-05-04 LAB
INR BLD: 3.1
PT POC: 37.5 SECONDS
VALID INTERNAL CONTROL?: YES

## 2021-05-04 PROCEDURE — 85610 PROTHROMBIN TIME: CPT | Performed by: FAMILY MEDICINE

## 2021-05-04 NOTE — PROGRESS NOTES
Janett Mehta is a 60 y.o. female who presents today for Anticoagulation monitoring. Indication: DVT  INR Goal: 2.0-3.0. Current dose:  Coumadin 7.5 mg daily, except 3.75 mg Wed. Missed Coumadin Doses:  None  Medication Changes:  no  Dietary Changes:  no    Symptoms: taking coumadin appropriately without any bleeding. Latest INRs:  Lab Results   Component Value Date/Time    INR 2.5 (H) 08/11/2020 10:52 AM    INR 2.3 (H) 05/27/2020 10:30 AM    INR 2.5 (H) 04/27/2020 09:31 AM    INR (POC) 2.2 (H) 06/29/2020 07:41 AM    INR POC 2.8 04/02/2021 10:09 AM    INR POC 3.7 03/01/2021 10:47 AM    INR POC 2.9 02/01/2021 10:54 AM    Prothrombin time 26.0 (H) 08/11/2020 10:52 AM    Prothrombin time 23.0 (H) 05/27/2020 10:30 AM    Prothrombin time 25.1 (H) 04/27/2020 09:31 AM        New Coumadin dose:.current treatment plan is effective, no change in therapy. Next check to be scheduled for  2 weeks.

## 2021-05-07 ENCOUNTER — OFFICE VISIT (OUTPATIENT)
Dept: FAMILY MEDICINE CLINIC | Age: 66
End: 2021-05-07
Payer: MEDICARE

## 2021-05-07 VITALS
HEART RATE: 77 BPM | OXYGEN SATURATION: 97 % | TEMPERATURE: 97.6 F | WEIGHT: 270.6 LBS | DIASTOLIC BLOOD PRESSURE: 81 MMHG | RESPIRATION RATE: 16 BRPM | HEIGHT: 70 IN | SYSTOLIC BLOOD PRESSURE: 135 MMHG | BODY MASS INDEX: 38.74 KG/M2

## 2021-05-07 DIAGNOSIS — I10 ESSENTIAL HYPERTENSION, BENIGN: ICD-10-CM

## 2021-05-07 DIAGNOSIS — E66.01 SEVERE OBESITY (BMI 35.0-39.9) WITH COMORBIDITY (HCC): ICD-10-CM

## 2021-05-07 DIAGNOSIS — I82.409 RECURRENT DEEP VEIN THROMBOSIS (DVT) (HCC): ICD-10-CM

## 2021-05-07 DIAGNOSIS — M75.100 ROTATOR CUFF SYNDROME, UNSPECIFIED LATERALITY: ICD-10-CM

## 2021-05-07 DIAGNOSIS — E03.9 HYPOTHYROIDISM, UNSPECIFIED TYPE: ICD-10-CM

## 2021-05-07 DIAGNOSIS — M25.562 CHRONIC PAIN OF LEFT KNEE: ICD-10-CM

## 2021-05-07 DIAGNOSIS — I89.0 LYMPHEDEMA: ICD-10-CM

## 2021-05-07 DIAGNOSIS — G89.29 CHRONIC PAIN OF LEFT KNEE: ICD-10-CM

## 2021-05-07 DIAGNOSIS — R73.02 IGT (IMPAIRED GLUCOSE TOLERANCE): ICD-10-CM

## 2021-05-07 DIAGNOSIS — Z00.00 ROUTINE GENERAL MEDICAL EXAMINATION AT A HEALTH CARE FACILITY: Primary | ICD-10-CM

## 2021-05-07 PROCEDURE — G8417 CALC BMI ABV UP PARAM F/U: HCPCS | Performed by: FAMILY MEDICINE

## 2021-05-07 PROCEDURE — G9717 DOC PT DX DEP/BP F/U NT REQ: HCPCS | Performed by: FAMILY MEDICINE

## 2021-05-07 PROCEDURE — 3017F COLORECTAL CA SCREEN DOC REV: CPT | Performed by: FAMILY MEDICINE

## 2021-05-07 PROCEDURE — G8752 SYS BP LESS 140: HCPCS | Performed by: FAMILY MEDICINE

## 2021-05-07 PROCEDURE — G8754 DIAS BP LESS 90: HCPCS | Performed by: FAMILY MEDICINE

## 2021-05-07 PROCEDURE — G8427 DOCREV CUR MEDS BY ELIG CLIN: HCPCS | Performed by: FAMILY MEDICINE

## 2021-05-07 PROCEDURE — G0439 PPPS, SUBSEQ VISIT: HCPCS | Performed by: FAMILY MEDICINE

## 2021-05-07 PROCEDURE — 99214 OFFICE O/P EST MOD 30 MIN: CPT | Performed by: FAMILY MEDICINE
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
## 2021-05-07 NOTE — PROGRESS NOTES
1. Have you been to the ER, urgent care clinic since your last visit? Hospitalized since your last visit? No    2. Have you seen or consulted any other health care providers outside of the [address] [place] [address] since your last visit? Include any pap smears or colon screening.  No    Health Maintenance Due   Topic Date Due    COVID-19 Vaccine (1) Never done    Shingrix Vaccine Age 50> (1 of 2) Never done    PAP AKA CERVICAL CYTOLOGY  10/21/2016    Medicare Yearly Exam  03/14/2018    Colorectal Cancer Screening Combo  07/06/2018    Breast Cancer Screen Mammogram  03/16/2019     Chief Complaint   Patient presents with    Follow-up

## 2021-05-07 NOTE — PATIENT INSTRUCTIONS
Rotator Cuff: Exercises  Introduction  Here are some examples of exercises for you to try. The exercises may be suggested for a condition or for rehabilitation. Start each exercise slowly. Ease off the exercises if you start to have pain. You will be told when to start these exercises and which ones will work best for you. How to do the exercises  Pendulum swing   If you have pain in your back, do not do this exercise. 1. Hold on to a table or the back of a chair with your good arm. Then bend forward a little and let your sore arm hang straight down. This exercise does not use the arm muscles. Rather, use your legs and your hips to create movement that makes your arm swing freely. 2. Use the movement from your hips and legs to guide the slightly swinging arm back and forth like a pendulum (or elephant trunk). Then guide it in circles that start small (about the size of a dinner plate). Make the circles a bit larger each day, as your pain allows. 3. Do this exercise for 5 minutes, 5 to 7 times each day. 4. As you have less pain, try bending over a little farther to do this exercise. This will increase the amount of movement at your shoulder. Posterior stretching exercise   1. Hold the elbow of your injured arm with your other hand. 2. Use your hand to pull your injured arm gently up and across your body. You will feel a gentle stretch across the back of your injured shoulder. 3. Hold for at least 15 to 30 seconds. Then slowly lower your arm. 4. Repeat 2 to 4 times. Up-the-back stretch   Your doctor or physical therapist may want you to wait to do this stretch until you have regained most of your range of motion and strength. You can do this stretch in different ways. Hold any of these stretches for at least 15 to 30 seconds. Repeat them 2 to 4 times. 1. Light stretch: Put your hand in your back pocket. Let it rest there to stretch your shoulder. 2. Moderate stretch:  With your other hand, hold your injured arm (palm outward) behind your back by the wrist. Pull your arm up gently to stretch your shoulder. 3. Advanced stretch: Put a towel over your other shoulder. Put the hand of your injured arm behind your back. Now hold the back end of the towel. With the other hand, hold the front end of the towel in front of your body. Pull gently on the front end of the towel. This will bring your hand farther up your back to stretch your shoulder. Overhead stretch   1. Standing about an arm's length away, grasp onto a solid surface. You could use a countertop, a doorknob, or the back of a sturdy chair. 2. With your knees slightly bent, bend forward with your arms straight. Lower your upper body, and let your shoulders stretch. 3. As your shoulders are able to stretch farther, you may need to take a step or two backward. 4. Hold for at least 15 to 30 seconds. Then stand up and relax. If you had stepped back during your stretch, step forward so you can keep your hands on the solid surface. 5. Repeat 2 to 4 times. Shoulder flexion (lying down)   To make a wand for this exercise, use a piece of PVC pipe or a broom handle with the broom removed. Make the wand about a foot wider than your shoulders. 1. Lie on your back, holding a wand with both hands. Your palms should face down as you hold the wand. 2. Keeping your elbows straight, slowly raise your arms over your head. Raise them until you feel a stretch in your shoulders, upper back, and chest.  3. Hold for 15 to 30 seconds. 4. Repeat 2 to 4 times. Shoulder rotation (lying down)   To make a wand for this exercise, use a piece of PVC pipe or a broom handle with the broom removed. Make the wand about a foot wider than your shoulders. 1. Lie on your back. Hold a wand with both hands with your elbows bent and palms up. 2. Keep your elbows close to your body, and move the wand across your body toward the sore arm. 3. Hold for 8 to 12 seconds.   4. Repeat 2 to 4 times. Wall climbing (to the side)   Avoid any movement that is straight to your side, and be careful not to arch your back. Your arm should stay about 30 degrees to the front of your side. 1. Stand with your side to a wall so that your fingers can just touch it at an angle about 30 degrees toward the front of your body. 2. Walk the fingers of your injured arm up the wall as high as pain permits. Try not to shrug your shoulder up toward your ear as you move your arm up. 3. Hold that position for a count of at least 15 to 20.  4. Walk your fingers back down to the starting position. 5. Repeat at least 2 to 4 times. Try to reach higher each time. Wall climbing (to the front)   During this stretching exercise, be careful not to arch your back. 1. Face a wall, and stand so your fingers can just touch it. 2. Keeping your shoulder down, walk the fingers of your injured arm up the wall as high as pain permits. (Don't shrug your shoulder up toward your ear.)  3. Hold your arm in that position for at least 15 to 30 seconds. 4. Slowly walk your fingers back down to where you started. 5. Repeat at least 2 to 4 times. Try to reach higher each time. Shoulder blade squeeze   1. Stand with your arms at your sides, and squeeze your shoulder blades together. Do not raise your shoulders up as you squeeze. 2. Hold 6 seconds. 3. Repeat 8 to 12 times. Scapular exercise: Arm reach   1. Lie flat on your back. This exercise is a very slight motion that starts with your arms raised (elbows straight, arms straight). 2. From this position, reach higher toward the hemal or ceiling. Keep your elbows straight. All motion should be from your shoulder blade only. 3. Relax your arms back to where you started. 4. Repeat 8 to 12 times. Arm raise to the side   During this strengthening exercise, your arm should stay about 30 degrees to the front of your side.   1. Slowly raise your injured arm to the side, with your thumb facing up. Raise your arm 60 degrees at the most (shoulder level is 90 degrees). 2. Hold the position for 3 to 5 seconds. Then lower your arm back to your side. If you need to, bring your \"good\" arm across your body and place it under the elbow as you lower your injured arm. Use your good arm to keep your injured arm from dropping down too fast.  3. Repeat 8 to 12 times. 4. When you first start out, don't hold any extra weight in your hand. As you get stronger, you may use a 1-pound to 2-pound dumbbell or a small can of food. Shoulder flexor and extensor exercise   These are isometric exercises. That means you contract your muscles without actually moving. 1. Push forward (flex): Stand facing a wall or doorjamb, about 6 inches or less back. Hold your injured arm against your body. Make a closed fist with your thumb on top. Then gently push your hand forward into the wall with about 25% to 50% of your strength. Don't let your body move backward as you push. Hold for about 6 seconds. Relax for a few seconds. Repeat 8 to 12 times. 2. Push backward (extend): Stand with your back flat against a wall. Your upper arm should be against the wall, with your elbow bent 90 degrees (your hand straight ahead). Push your elbow gently back against the wall with about 25% to 50% of your strength. Don't let your body move forward as you push. Hold for about 6 seconds. Relax for a few seconds. Repeat 8 to 12 times. Scapular exercise: Wall push-ups   This exercise is best done with your fingers somewhat turned out, rather than straight up and down. 1. Stand facing a wall, about 12 inches to 18 inches away. 2. Place your hands on the wall at shoulder height. 3. Slowly bend your elbows and bring your face to the wall. Keep your back and hips straight. 4. Push back to where you started. 5. Repeat 8 to 12 times. 6. When you can do this exercise against a wall comfortably, you can try it against a counter.  You can then slowly progress to the end of a couch, then to a sturdy chair, and finally to the floor. Scapular exercise: Retraction   For this exercise, you will need elastic exercise material, such as surgical tubing or Thera-Band. 1. Put the band around a solid object at about waist level. (A bedpost will work well.) Each hand should hold an end of the band. 2. With your elbows at your sides and bent to 90 degrees, pull the band back. Your shoulder blades should move toward each other. Then move your arms back where you started. 3. Repeat 8 to 12 times. 4. If you have good range of motion in your shoulders, try this exercise with your arms lifted out to the sides. Keep your elbows at a 90-degree angle. Raise the elastic band up to about shoulder level. Pull the band back to move your shoulder blades toward each other. Then move your arms back where you started. Internal rotator strengthening exercise   1. Start by tying a piece of elastic exercise material to a doorknob. You can use surgical tubing or Thera-Band. 2. Stand or sit with your shoulder relaxed and your elbow bent 90 degrees. Your upper arm should rest comfortably against your side. Squeeze a rolled towel between your elbow and your body for comfort. This will help keep your arm at your side. 3. Hold one end of the elastic band in the hand of the painful arm. 4. Slowly rotate your forearm toward your body until it touches your belly. Slowly move it back to where you started. 5. Keep your elbow and upper arm firmly tucked against the towel roll or at your side. 6. Repeat 8 to 12 times. External rotator strengthening exercise   1. Start by tying a piece of elastic exercise material to a doorknob. You can use surgical tubing or Thera-Band. (You may also hold one end of the band in each hand.)  2. Stand or sit with your shoulder relaxed and your elbow bent 90 degrees. Your upper arm should rest comfortably against your side.  Squeeze a rolled towel between your elbow and your body for comfort. This will help keep your arm at your side. 3. Hold one end of the elastic band with the hand of the painful arm. 4. Start with your forearm across your belly. Slowly rotate the forearm out away from your body. Keep your elbow and upper arm tucked against the towel roll or the side of your body until you begin to feel tightness in your shoulder. Slowly move your arm back to where you started. 5. Repeat 8 to 12 times. Follow-up care is a key part of your treatment and safety. Be sure to make and go to all appointments, and call your doctor if you are having problems. It's also a good idea to know your test results and keep a list of the medicines you take. Where can you learn more? Go to http://www.gray.com/  Enter J005 in the search box to learn more about \"Rotator Cuff: Exercises. \"  Current as of: November 16, 2020               Content Version: 12.8  © 2006-2021 Healthwise, Incorporated. Care instructions adapted under license by [employer] (which disclaims liability or warranty for this information). If you have questions about a medical condition or this instruction, always ask your healthcare professional. [place] [address] any warranty or liability for your use of this information.

## 2021-05-24 ENCOUNTER — CLINICAL SUPPORT (OUTPATIENT)
Dept: FAMILY MEDICINE CLINIC | Age: 66
End: 2021-05-24
Payer: MEDICARE

## 2021-05-24 DIAGNOSIS — Z86.711 HX PULMONARY EMBOLISM: Primary | ICD-10-CM

## 2021-05-24 DIAGNOSIS — I10 ESSENTIAL HYPERTENSION, BENIGN: ICD-10-CM

## 2021-05-24 DIAGNOSIS — R73.02 IGT (IMPAIRED GLUCOSE TOLERANCE): ICD-10-CM

## 2021-05-24 LAB
ALBUMIN SERPL-MCNC: 3.6 G/DL (ref 3.5–5)
ALBUMIN/GLOB SERPL: 0.9 {RATIO} (ref 1.1–2.2)
ALP SERPL-CCNC: 90 U/L (ref 45–117)
ALT SERPL-CCNC: 26 U/L (ref 12–78)
ANION GAP SERPL CALC-SCNC: 6 MMOL/L (ref 5–15)
AST SERPL-CCNC: 16 U/L (ref 15–37)
BASOPHILS # BLD: 0 K/UL (ref 0–0.1)
BASOPHILS NFR BLD: 1 % (ref 0–1)
BILIRUB SERPL-MCNC: 0.4 MG/DL (ref 0.2–1)
BUN SERPL-MCNC: 13 MG/DL (ref 6–20)
BUN/CREAT SERPL: 14 (ref 12–20)
CALCIUM SERPL-MCNC: 9.1 MG/DL (ref 8.5–10.1)
CHLORIDE SERPL-SCNC: 103 MMOL/L (ref 97–108)
CHOLEST SERPL-MCNC: 180 MG/DL
CO2 SERPL-SCNC: 30 MMOL/L (ref 21–32)
CREAT SERPL-MCNC: 0.9 MG/DL (ref 0.55–1.02)
DIFFERENTIAL METHOD BLD: NORMAL
EOSINOPHIL # BLD: 0.1 K/UL (ref 0–0.4)
EOSINOPHIL NFR BLD: 2 % (ref 0–7)
ERYTHROCYTE [DISTWIDTH] IN BLOOD BY AUTOMATED COUNT: 13.2 % (ref 11.5–14.5)
EST. AVERAGE GLUCOSE BLD GHB EST-MCNC: 126 MG/DL
GLOBULIN SER CALC-MCNC: 4.1 G/DL (ref 2–4)
GLUCOSE SERPL-MCNC: 169 MG/DL (ref 65–100)
HBA1C MFR BLD: 6 % (ref 4–5.6)
HCT VFR BLD AUTO: 43.1 % (ref 35–47)
HDLC SERPL-MCNC: 58 MG/DL
HDLC SERPL: 3.1 {RATIO} (ref 0–5)
HGB BLD-MCNC: 13.7 G/DL (ref 11.5–16)
IMM GRANULOCYTES # BLD AUTO: 0 K/UL (ref 0–0.04)
IMM GRANULOCYTES NFR BLD AUTO: 0 % (ref 0–0.5)
INR BLD: 2.5 (ref 1–1.5)
LDLC SERPL CALC-MCNC: 98.8 MG/DL (ref 0–100)
LYMPHOCYTES # BLD: 2 K/UL (ref 0.8–3.5)
LYMPHOCYTES NFR BLD: 36 % (ref 12–49)
MCH RBC QN AUTO: 29 PG (ref 26–34)
MCHC RBC AUTO-ENTMCNC: 31.8 G/DL (ref 30–36.5)
MCV RBC AUTO: 91.1 FL (ref 80–99)
MONOCYTES # BLD: 0.3 K/UL (ref 0–1)
MONOCYTES NFR BLD: 6 % (ref 5–13)
NEUTS SEG # BLD: 3 K/UL (ref 1.8–8)
NEUTS SEG NFR BLD: 55 % (ref 32–75)
NRBC # BLD: 0 K/UL (ref 0–0.01)
NRBC BLD-RTO: 0 PER 100 WBC
PLATELET # BLD AUTO: 236 K/UL (ref 150–400)
PMV BLD AUTO: 9.9 FL (ref 8.9–12.9)
POTASSIUM SERPL-SCNC: 4.2 MMOL/L (ref 3.5–5.1)
PROT SERPL-MCNC: 7.7 G/DL (ref 6.4–8.2)
PT POC: 30.4 SECONDS (ref 9.1–12)
RBC # BLD AUTO: 4.73 M/UL (ref 3.8–5.2)
SODIUM SERPL-SCNC: 139 MMOL/L (ref 136–145)
TRIGL SERPL-MCNC: 116 MG/DL (ref ?–150)
TSH SERPL DL<=0.05 MIU/L-ACNC: 3.06 UIU/ML (ref 0.36–3.74)
VALID INTERNAL CONTROL?: YES
VLDLC SERPL CALC-MCNC: 23.2 MG/DL
WBC # BLD AUTO: 5.5 K/UL (ref 3.6–11)

## 2021-05-24 PROCEDURE — 85610 PROTHROMBIN TIME: CPT | Performed by: FAMILY MEDICINE

## 2021-05-28 ENCOUNTER — TELEPHONE (OUTPATIENT)
Dept: FAMILY MEDICINE CLINIC | Age: 66
End: 2021-05-28

## 2021-05-28 NOTE — TELEPHONE ENCOUNTER
verified. Results/ recommendations reviewed. Understanding vocalized. Letter mailed to address verified by pt.

## 2021-06-21 RX ORDER — HYDROCHLOROTHIAZIDE 25 MG/1
TABLET ORAL
Qty: 90 TABLET | Refills: 3 | Status: SHIPPED | OUTPATIENT
Start: 2021-06-21 | End: 2022-06-20

## 2021-06-23 ENCOUNTER — CLINICAL SUPPORT (OUTPATIENT)
Dept: FAMILY MEDICINE CLINIC | Age: 66
End: 2021-06-23
Payer: MEDICARE

## 2021-06-23 VITALS
WEIGHT: 275 LBS | RESPIRATION RATE: 14 BRPM | HEART RATE: 82 BPM | OXYGEN SATURATION: 96 % | HEIGHT: 70 IN | TEMPERATURE: 98 F | SYSTOLIC BLOOD PRESSURE: 154 MMHG | BODY MASS INDEX: 39.37 KG/M2 | DIASTOLIC BLOOD PRESSURE: 77 MMHG

## 2021-06-23 DIAGNOSIS — Z86.711 HX PULMONARY EMBOLISM: Primary | ICD-10-CM

## 2021-06-23 DIAGNOSIS — Z12.31 ENCOUNTER FOR SCREENING MAMMOGRAM FOR MALIGNANT NEOPLASM OF BREAST: ICD-10-CM

## 2021-06-23 LAB
INR BLD: 2.6
PT POC: 31.5 SECONDS
VALID INTERNAL CONTROL?: YES

## 2021-06-23 PROCEDURE — 85610 PROTHROMBIN TIME: CPT | Performed by: FAMILY MEDICINE

## 2021-06-23 NOTE — PROGRESS NOTES
Janett Richardson Cap is a 60 y.o. female  Chief Complaint   Patient presents with    Coagulation disorder     Health Maintenance Due   Topic Date Due    Shingrix Vaccine Age 49> (1 of 2) Never done    PAP AKA CERVICAL CYTOLOGY  10/21/2016    Colorectal Cancer Screening Combo  07/06/2018    Breast Cancer Screen Mammogram  03/16/2019     Visit Vitals  Ht 5' 10\" (1.778 m)   Wt 275 lb (124.7 kg)   BMI 39.46 kg/m²     1. Have you been to the ER, urgent care clinic since your last visit? Hospitalized since your last visit? No    2. Have you seen or consulted any other health care providers outside of the [address] [place] [address] since your last visit? Include any pap smears or colon screening.  No   .Yumiko Bertrand

## 2021-06-28 ENCOUNTER — OFFICE VISIT (OUTPATIENT)
Dept: FAMILY MEDICINE CLINIC | Age: 66
End: 2021-06-28
Payer: MEDICARE

## 2021-06-28 VITALS
TEMPERATURE: 97.8 F | WEIGHT: 274 LBS | OXYGEN SATURATION: 97 % | DIASTOLIC BLOOD PRESSURE: 82 MMHG | BODY MASS INDEX: 39.22 KG/M2 | HEART RATE: 75 BPM | HEIGHT: 70 IN | RESPIRATION RATE: 17 BRPM | SYSTOLIC BLOOD PRESSURE: 137 MMHG

## 2021-06-28 DIAGNOSIS — M25.562 ACUTE PAIN OF LEFT KNEE: Primary | ICD-10-CM

## 2021-06-28 PROCEDURE — G9899 SCRN MAM PERF RSLTS DOC: HCPCS | Performed by: FAMILY MEDICINE

## 2021-06-28 PROCEDURE — G8752 SYS BP LESS 140: HCPCS | Performed by: FAMILY MEDICINE

## 2021-06-28 PROCEDURE — G8427 DOCREV CUR MEDS BY ELIG CLIN: HCPCS | Performed by: FAMILY MEDICINE

## 2021-06-28 PROCEDURE — G8417 CALC BMI ABV UP PARAM F/U: HCPCS | Performed by: FAMILY MEDICINE

## 2021-06-28 PROCEDURE — 99214 OFFICE O/P EST MOD 30 MIN: CPT | Performed by: FAMILY MEDICINE

## 2021-06-28 PROCEDURE — G9717 DOC PT DX DEP/BP F/U NT REQ: HCPCS | Performed by: FAMILY MEDICINE

## 2021-06-28 PROCEDURE — 3017F COLORECTAL CA SCREEN DOC REV: CPT | Performed by: FAMILY MEDICINE

## 2021-06-28 PROCEDURE — G8754 DIAS BP LESS 90: HCPCS | Performed by: FAMILY MEDICINE

## 2021-06-28 RX ORDER — DICLOFENAC SODIUM 75 MG/1
75 TABLET, DELAYED RELEASE ORAL
Qty: 60 TABLET | Refills: 2 | Status: SHIPPED | OUTPATIENT
Start: 2021-06-28 | End: 2022-09-15

## 2021-06-28 NOTE — PROGRESS NOTES
1. Have you been to the ER, urgent care clinic since your last visit? Hospitalized since your last visit? No    2. Have you seen or consulted any other health care providers outside of the [address] [place] [address] since your last visit? Include any pap smears or colon screening.  No    Health Maintenance Due   Topic Date Due    Shingrix Vaccine Age 49> (1 of 2) Never done    PAP AKA CERVICAL CYTOLOGY  10/21/2016    Colorectal Cancer Screening Combo  07/06/2018    Breast Cancer Screen Mammogram  03/16/2019     Chief Complaint   Patient presents with    Knee Pain     (L) knee, pt states severe pain since Saturday

## 2021-06-28 NOTE — PROGRESS NOTES
HPI  June Mike Aguilar is a 60 y.o. female who presents for acute on chronic knee pain that has been bothering her since Saturday. Had her usual amount of knee pain when she went to [employer] sales on Friday. Woke on Saturday morning with more intense pain. A sense of swelling in the knee. Stiffness with prolonged sitting. Worsening with prolonged movement. Has taken Aleve with some relief. Aspirin with no relief    PMHx:  Past Medical History:   Diagnosis Date    Arthritis     ASD (atrial septal defect)     age 7 septal defect closures MCV    Depression     Hypertension     Lymphedema of leg     bilat    Thromboembolus ([address]     hx bilateral DVT. long term use Coumadin    Thyroid disease        Meds:   Current Outpatient Medications   Medication Sig Dispense Refill    diclofenac EC (VOLTAREN) 75 mg EC tablet Take 1 Tablet by mouth two (2) times daily as needed for Pain. 60 Tablet 2    hydroCHLOROthiazide (HYDRODIURIL) 25 mg tablet Take 1 tablet by mouth once daily 90 Tablet 3    venlafaxine (EFFEXOR) 75 mg tablet Take 1 tablet by mouth twice daily 180 Tab 3    Euthyrox 100 mcg tablet TAKE 1 TABLET BY MOUTH ONCE DAILY BEFORE BREAKFAST 90 Tab 3    warfarin (COUMADIN) 7.5 mg tablet Take 1 tablet by mouth once daily 90 Tab 3    spironolactone (ALDACTONE) 25 mg tablet Take 1 Tab by mouth daily. 90 Tab 3    valsartan (DIOVAN) 320 mg tablet Take 1 Tab by mouth daily. 90 Tab 3    acetaminophen (TYLENOL) 325 mg tablet Take 650 mg by mouth every six (6) hours as needed.  cholecalciferol (VITAMIN D3) 1,000 unit cap Take  by mouth daily.  LANSOPRAZOLE (PREVACID PO) Take  by mouth.  potassium 99 mg tablet Take 198 mg by mouth daily. Pt is taking 2 tabs daily         Allergies:    Allergies   Allergen Reactions    Copper Hives    Iodinated Contrast Media Hives    Lasix [Furosemide] Hives    Sulfa (Sulfonamide Antibiotics) Hives       Smoker:  Social History     Tobacco Use   Smoking Status Never Smoker   Smokeless Tobacco Never Used       ETOH:   Social History     Substance and Sexual Activity   Alcohol Use No       FH:   Family History   Problem Relation Age of Onset    Heart Disease Mother     Heart Disease Father        ROS:   As listed in HPI. In addition:  Constitutional:   No headache, fever, fatigue, weight loss or weight gain      Cardiac:    No chest pain      Resp:   No cough or shortness of breath      Neuro   No loss of consciousness, dizziness, seizures      Physical Exam:  Blood pressure 137/82, pulse 75, temperature 97.8 °F (36.6 °C), temperature source Oral, resp. rate 17, height 5' 10\" (1.778 m), weight 274 lb (124.3 kg), SpO2 97 %. GEN: No apparent distress. Alert and oriented and responds to all questions appropriately. NEUROLOGIC:  No focal neurologic deficits. Strength and sensation grossly intact. Coordination and gait grossly intact. EXT: Well perfused. No edema. SKIN: No obvious rashes. exam.  Limited by body habitus. Has a prominent and puffy prepatellar bursa that is noninflamed. There is a moderate effusion and tenderness throughout the joint capsule. Medial joint line is more tender than lateral joint line. Tenderness in the popliteal space       Assessment and Plan     Knee pain  Internal joint problem. Probably arthritis  Diclofenac  Physical therapy  Offered injections explained role of injection and helping her to do physical therapy. She thinks that she can do it without injection but will return if necessary      ICD-10-CM ICD-9-CM    1. Acute pain of left knee  M25.562 719.46        AVS given.  Pt expressed understanding of instructions

## 2021-06-28 NOTE — PATIENT INSTRUCTIONS
Meniscus Tear: Exercises  Introduction  Here are some examples of exercises for you to try. The exercises may be suggested for a condition or for rehabilitation. Start each exercise slowly. Ease off the exercises if you start to have pain. You will be told when to start these exercises and which ones will work best for you. How to do the exercises  Calf wall stretch   1. Stand facing a wall with your hands on the wall at about eye level. Put your affected leg about a step behind your other leg. 2. Keeping your back leg straight and your back heel on the floor, bend your front knee and gently bring your hip and chest toward the wall until you feel a stretch in the calf of your back leg. 3. Hold the stretch for at least 15 to 30 seconds. 4. Repeat 2 to 4 times. 5. Repeat steps 1 through 4, but this time keep your back knee bent. Hamstring wall stretch   1. Lie on your back in a doorway, with your good leg through the open door. 2. Slide your affected leg up the wall to straighten your knee. You should feel a gentle stretch down the back of your leg. 3. Hold the stretch for at least 1 minute. Then over time, try to lengthen the time you hold the stretch to as long as 6 minutes. 4. Repeat 2 to 4 times. 5. If you do not have a place to do this exercise in a doorway, there is another way to do it:  6. Lie on your back, and bend your affected leg. 7. Loop a towel under the ball and toes of that foot, and hold the ends of the towel in your hands. 8. Straighten your knee, and slowly pull back on the towel. You should feel a gentle stretch down the back of your leg. 9. Hold the stretch for at least 15 to 30 seconds. Or even better, hold the stretch for 1 minute if you can. 10. Repeat 2 to 4 times. 1. Do not arch your back. 2. Do not bend either knee. 3. Keep one heel touching the floor and the other heel touching the wall. Do not point your toes. Quad sets   1.  Sit with your leg straight and supported on the floor or a firm bed. (If you feel discomfort in the front or back of your knee, place a small towel roll under your knee.)  2. Tighten the muscles on top of your thigh by pressing the back of your knee flat down to the floor. (If you feel discomfort under your kneecap, place a small towel roll under your knee.)  3. Hold for about 6 seconds, then rest up to 10 seconds. 4. Do 8 to 12 repetitions several times a day. Straight-leg raises to the front   1. Lie on your back with your good knee bent so that your foot rests flat on the floor. Your injured leg should be straight. Make sure that your low back has a normal curve. You should be able to slip your flat hand in between the floor and the small of your back, with your palm touching the floor and your back touching the back of your hand. 2. Tighten the thigh muscles in the injured leg by pressing the back of your knee flat down to the floor. Hold your knee straight. 3. Keeping the thigh muscles tight, lift your injured leg up so that your heel is about 12 inches off the floor. Hold for 5 seconds and then lower slowly. 4. Do 8 to 12 repetitions. Straight-leg raises to the back   1. Lie on your stomach, and lift your leg straight up behind you (toward the ceiling). 2. Lift your toes about 6 inches off the floor, hold for about 6 seconds, then lower slowly. 3. Do 8 to 12 repetitions. Hamstring curls   1. Lie on your stomach with your knees straight. If your kneecap is uncomfortable, roll up a washcloth and put it under your leg just above your kneecap. 2. Lift the foot of your injured leg by bending the knee so that you bring the foot up toward your buttock. If this motion hurts, try it without bending your knee quite as far. This may help you avoid any painful motion. 3. Slowly lower your leg back to the floor. 4. Do 8 to 12 repetitions.   5. With permission from your doctor or physical therapist, you may also want to add a cuff weight to your ankle (not more than 5 pounds). With weight, you do not have to lift your leg more than 12 inches to get a hamstring workout. Wall slide with ball squeeze   1. Stand with your back against a wall and with your feet about shoulder-width apart. Your feet should be about 12 inches away from the wall. 2. Put a ball about the size of a soccer ball between your knees. Then slowly slide down the wall until your knees are bent about 20 to 30 degrees. 3. Tighten your thigh muscles by squeezing the ball between your knees. Hold that position for about 10 seconds, then stop squeezing. Rest for up to 10 seconds between repetitions. 4. Repeat 8 to 12 times. Heel raises   1. Stand with your feet a few inches apart, with your hands lightly resting on a counter or chair in front of you. 2. Slowly raise your heels off the floor while keeping your knees straight. 3. Hold for about 6 seconds, then slowly lower your heels to the floor. 4. Do 8 to 12 repetitions several times during the day. Heel dig bridging   Stop doing this exercise if it causes pain. 1. Lie on your back with both knees bent and your ankles bent so that only your heels are digging into the floor. Your knees should be bent about 90 degrees. 2. Then push your heels into the floor, squeeze your buttocks, and lift your hips off the floor until your shoulders, hips, and knees are all in a straight line. 3. Hold for about 6 seconds as you continue to breathe normally, and then slowly lower your hips back down to the floor and rest for up to 10 seconds. 4. Do 8 to 12 repetitions. Shallow standing knee bends   Do this exercise only if you have very little pain; if you have no clicking, locking, or giving way in the injured knee; and if it does not hurt while you are doing 8 to 12 repetitions. 1. Stand with your hands lightly resting on a counter or chair in front of you. Put your feet shoulder-width apart.   2. Slowly bend your knees so that you squat down like you are going to sit in a chair. Make sure your knees do not go in front of your toes. 3. Lower yourself about 6 inches. Your heels should remain on the floor at all times. 4. Rise slowly to a standing position. Follow-up care is a key part of your treatment and safety. Be sure to make and go to all appointments, and call your doctor if you are having problems. It's also a good idea to know your test results and keep a list of the medicines you take. Where can you learn more? Go to http://www.gray.com/  Enter C183 in the search box to learn more about \"Meniscus Tear: Exercises. \"  Current as of: November 16, 2020               Content Version: 12.8  © 2006-2021 Healthwise, Incorporated. Care instructions adapted under license by [employer] (which disclaims liability or warranty for this information). If you have questions about a medical condition or this instruction, always ask your healthcare professional. [address] any warranty or liability for your use of this information.

## 2021-08-03 ENCOUNTER — CLINICAL SUPPORT (OUTPATIENT)
Dept: FAMILY MEDICINE CLINIC | Age: 66
End: 2021-08-03
Payer: MEDICARE

## 2021-08-03 DIAGNOSIS — Z86.711 HX PULMONARY EMBOLISM: Primary | ICD-10-CM

## 2021-08-03 LAB
INR BLD: 2.6
PT POC: 31.5 SECONDS
VALID INTERNAL CONTROL?: YES

## 2021-08-03 PROCEDURE — 85610 PROTHROMBIN TIME: CPT | Performed by: FAMILY MEDICINE

## 2021-08-03 NOTE — PROGRESS NOTES
Janett Vernon is a 60 y.o. female who presents today for Anticoagulation monitoring. Indication: PE  INR Goal: 2.0-3.0. Current dose:  Coumadin 7.5 mg daily, except 3.75 Wednesdays. Missed Coumadin Doses:  None  Medication Changes:  no  Dietary Changes:  no    Symptoms: taking coumadin appropriately without any bleeding. Latest INRs:  Lab Results   Component Value Date/Time    INR 2.5 (H) 08/11/2020 10:52 AM    INR 2.3 (H) 05/27/2020 10:30 AM    INR 2.5 (H) 04/27/2020 09:31 AM    INR (POC) 2.2 (H) 06/29/2020 07:41 AM    INR POC 2.6 06/23/2021 09:05 AM    INR POC 2.5 (A) 05/24/2021 08:51 AM    INR POC 3.1 05/04/2021 07:59 AM    Prothrombin time 26.0 (H) 08/11/2020 10:52 AM    Prothrombin time 23.0 (H) 05/27/2020 10:30 AM    Prothrombin time 25.1 (H) 04/27/2020 09:31 AM        New Coumadin dose:.current treatment plan is effective, no change in therapy. Next check to be scheduled for  4 weeks.

## 2021-08-23 ENCOUNTER — HOSPITAL ENCOUNTER (OUTPATIENT)
Dept: MAMMOGRAPHY | Age: 66
Discharge: HOME | End: 2021-08-23
Attending: FAMILY MEDICINE
Payer: MEDICARE

## 2021-08-23 DIAGNOSIS — Z12.31 ENCOUNTER FOR SCREENING MAMMOGRAM FOR MALIGNANT NEOPLASM OF BREAST: ICD-10-CM

## 2021-08-23 PROCEDURE — 77067 SCR MAMMO BI INCL CAD: CPT

## 2021-09-03 ENCOUNTER — CLINICAL SUPPORT (OUTPATIENT)
Dept: FAMILY MEDICINE CLINIC | Age: 66
End: 2021-09-03
Payer: MEDICARE

## 2021-09-03 DIAGNOSIS — Z79.01 ANTICOAGULATION GOAL OF INR 2 TO 3: Primary | ICD-10-CM

## 2021-09-03 DIAGNOSIS — Z51.81 ANTICOAGULATION GOAL OF INR 2 TO 3: Primary | ICD-10-CM

## 2021-09-03 LAB
INR BLD: 2 (ref 1–1.5)
PT POC: 23.9 SECONDS (ref 9.1–12)
VALID INTERNAL CONTROL?: YES

## 2021-09-03 PROCEDURE — 85610 PROTHROMBIN TIME: CPT | Performed by: FAMILY MEDICINE

## 2021-10-12 ENCOUNTER — CLINICAL SUPPORT (OUTPATIENT)
Dept: FAMILY MEDICINE CLINIC | Age: 66
End: 2021-10-12
Payer: MEDICARE

## 2021-10-12 DIAGNOSIS — Z23 ENCOUNTER FOR IMMUNIZATION: ICD-10-CM

## 2021-10-12 DIAGNOSIS — Z86.711 HX PULMONARY EMBOLISM: Primary | ICD-10-CM

## 2021-10-12 LAB
INR BLD: 2.7
PT POC: 31.8 SECONDS
VALID INTERNAL CONTROL?: YES

## 2021-10-12 PROCEDURE — G0008 ADMIN INFLUENZA VIRUS VAC: HCPCS | Performed by: FAMILY MEDICINE

## 2021-10-12 PROCEDURE — 90694 VACC AIIV4 NO PRSRV 0.5ML IM: CPT | Performed by: FAMILY MEDICINE

## 2021-10-12 PROCEDURE — 85610 PROTHROMBIN TIME: CPT | Performed by: FAMILY MEDICINE

## 2021-10-12 NOTE — PATIENT INSTRUCTIONS
Vaccine Information Statement    Influenza (Flu) Vaccine (Inactivated or Recombinant): What You Need to Know    Many vaccine information statements are available in Yoruba and other languages. See www.immunize.org/vis. Hojas de información sobre vacunas están disponibles en español y en muchos otros idiomas. Visite www.immunize.org/vis. 1. Why get vaccinated? Influenza vaccine can prevent influenza (flu). Flu is a contagious disease that spreads around the [facility] every year, usually between October and May. Anyone can get the flu, but it is more dangerous for some people. Infants and young children, people 72 years and older, pregnant people, and people with certain health conditions or a weakened immune system are at greatest risk of flu complications. Pneumonia, bronchitis, sinus infections, and ear infections are examples of flu-related complications. If you have a medical condition, such as heart disease, cancer, or diabetes, flu can make it worse. Flu can cause fever and chills, sore throat, muscle aches, fatigue, cough, headache, and runny or stuffy nose. Some people may have vomiting and diarrhea, though this is more common in children than adults. In an average year, thousands of people in the [facility] die from flu, and many more are hospitalized. Flu vaccine prevents millions of illnesses and flu-related visits to the doctor each year. 2. Influenza vaccines     CDC recommends everyone 6 months and older get vaccinated every flu season. Children 6 months through 6years of age may need 2 doses during a single flu season. Everyone else needs only 1 dose each flu season. It takes about 2 weeks for protection to develop after vaccination. There are many flu viruses, and they are always changing. Each year a new flu vaccine is made to protect against the influenza viruses believed to be likely to cause disease in the upcoming flu season.  Even when the vaccine doesnt exactly match these viruses, it may still provide some protection. Influenza vaccine does not cause flu. Influenza vaccine may be given at the same time as other vaccines. 3. Talk with your health care provider    Tell your vaccination provider if the person getting the vaccine:   Has had an allergic reaction after a previous dose of influenza vaccine, or has any severe, life-threatening allergies    Has ever had Guillain-Barré Syndrome (also called GBS)    In some cases, your health care provider may decide to postpone influenza vaccination until a future visit. Influenza vaccine can be administered at any time during pregnancy. People who are or will be pregnant during influenza season should receive inactivated influenza vaccine. People with minor illnesses, such as a cold, may be vaccinated. People who are moderately or severely ill should usually wait until they recover before getting influenza vaccine. Your health care provider can give you more information. 4. Risks of a vaccine reaction     Soreness, redness, and swelling where the shot is given, fever, muscle aches, and headache can happen after influenza vaccination.  There may be a very small increased risk of Guillain-Barré Syndrome (GBS) after inactivated influenza vaccine (the flu shot). Claudia Carrasquillo children who get the flu shot along with pneumococcal vaccine (PCV13) and/or DTaP vaccine at the same time might be slightly more likely to have a seizure caused by fever. Tell your health care provider if a child who is getting flu vaccine has ever had a seizure. People sometimes faint after medical procedures, including vaccination. Tell your provider if you feel dizzy or have vision changes or ringing in the ears. As with any medicine, there is a very remote chance of a vaccine causing a severe allergic reaction, other serious injury, or death. 5. What if there is a serious problem?     An allergic reaction could occur after the vaccinated person leaves the clinic. If you see signs of a severe allergic reaction (hives, swelling of the face and throat, difficulty breathing, a fast heartbeat, dizziness, or weakness), call 9-1-1 and get the person to the nearest hospital.    For other signs that concern you, call your health care provider. Adverse reactions should be reported to the Vaccine Adverse Event Reporting System (VAERS). Your health care provider will usually file this report, or you can do it yourself. Visit the VAERS website at www.vaers. [facility].gov or call 7-637.564.3688. VAERS is only for reporting reactions, and VAERS staff members do not give medical advice. 6. The National Vaccine Injury Compensation Program    The [facility] Vaccine Injury Compensation Program (VICP) is a federal program that was created to compensate people who may have been injured by certain vaccines. Claims regarding alleged injury or death due to vaccination have a time limit for filing, which may be as short as two years. Visit the VICP website at www.[facility].gov/vaccinecompensation or call 0-664.276.2584 to learn about the program and about filing a claim. 7. How can I learn more?  Ask your health care provider.  Call your local or state health department.  Visit the website of the Food and Drug Administration (FDA) for vaccine package inserts and additional information at www.fda.gov/vaccines-blood-biologics/vaccines.  Contact the Centers for Disease Control and Prevention (CDC):  - Call 4-506.136.6277 (1-800-CDC-INFO) or  - Visit CDCs influenza website at www.cdc.gov/flu. Vaccine Information Statement   Inactivated Influenza Vaccine   8/6/2021  42 VICTOR HUGO Mabry [id number]   Department of Health and Human Services  Centers for Disease Control and Prevention    Office Use Only

## 2021-10-12 NOTE — PROGRESS NOTES
Janett Bird is a 73 y.o. female who presents today for Anticoagulation monitoring. Indication: PE  INR Goal: 2.0-3.0. Current dose:  Coumadin 7.5 mg daily, except 3.75 mg Wed. Missed Coumadin Doses:  None  Medication Changes:  no  Dietary Changes:  no    Symptoms: taking coumadin appropriately without any bleeding. Latest INRs:  Lab Results   Component Value Date/Time    INR 2.5 (H) 08/11/2020 10:52 AM    INR 2.3 (H) 05/27/2020 10:30 AM    INR 2.5 (H) 04/27/2020 09:31 AM    INR (POC) 2.2 (H) 06/29/2020 07:41 AM    INR POC 2.7 10/12/2021 09:39 AM    INR POC 2.0 (A) 09/03/2021 09:23 AM    INR POC 2.6 08/03/2021 09:26 AM    Prothrombin time 26.0 (H) 08/11/2020 10:52 AM    Prothrombin time 23.0 (H) 05/27/2020 10:30 AM    Prothrombin time 25.1 (H) 04/27/2020 09:31 AM        New Coumadin dose:.current treatment plan is effective, no change in therapy. Next check to be scheduled for  4  weeks. Janett Bird is a 73 y.o. female who presents for routine immunizations. She denies any symptoms , reactions or allergies that would exclude them from being immunized today. Risks and adverse reactions were discussed and the VIS was given to them. All questions were addressed. She was observed for 10 min post injection. There were no reactions observed.     Jeffrey Heimlich, LPN

## 2021-10-21 NOTE — PROGRESS NOTES
This is an Initial Medicare Annual Wellness Exam (AWV) (Performed 12 months after IPPE or effective date of Medicare Part B enrollment, Once in a lifetime)    I have reviewed the patient's medical history in detail and updated the computerized patient record. History     Past Medical History   Diagnosis Date    Arthritis     ASD (atrial septal defect)      age 7 septal defect closures MCV    Depression     Hypertension     Lymphedema of leg     Thromboembolus ([address]      hx bilateral DVT. long term use Coumadin    Thyroid disease       Past Surgical History   Procedure Laterality Date    Pr cardiac surg procedure unlist      Hx gyn       btl    Hx orthopaedic       carpal tunnel x3    Colonoscopy,remv lesn,snare  7/16/2015           Current Outpatient Prescriptions   Medication Sig Dispense Refill    venlafaxine (EFFEXOR) 75 mg tablet Take 1 Tab by mouth two (2) times a day. 180 Tab 3    diclofenac EC (VOLTAREN) 50 mg EC tablet Take 1 Tab by mouth two (2) times a day. 30 Tab 1    cyclobenzaprine (FLEXERIL) 5 mg tablet Take 1 Tab by mouth three (3) times daily as needed for Muscle Spasm(s). 60 Tab 1    warfarin (COUMADIN) 7.5 mg tablet TAKE ONE TABLET BY MOUTH ONCE DAILY OR  AS  DIRECTED 45 Tab 11    atenolol-chlorthalidone (TENORETIC 50) 50-25 mg per tablet Take 1 Tab by mouth daily. 30 Tab 5    levothyroxine (SYNTHROID) 25 mcg tablet TAKE ONE TABLET BY MOUTH IN THE MORNING 30 Tab 11    IRON, FERROUS SULFATE, PO Take  by mouth.  hydrochlorothiazide (MICROZIDE) 12.5 mg capsule TAKE ONE CAPSULE BY MOUTH ONCE DAILY 30 Cap 5    traZODone (DESYREL) 50 mg tablet Take 1 Tab by mouth nightly. Prn sleep 30 Tab 2    LANSOPRAZOLE (PREVACID PO) Take  by mouth.  potassium 99 mg tablet Take 99 mg by mouth daily.  Pt is taking 2 tabs daily      traMADol (ULTRAM) 50 mg tablet TAKE ONE TABLET BY MOUTH EVERY 6 HOURS AS NEEDED FOR PAIN 60 Tab 0     Allergies   Allergen Reactions    Iodinated Contrast Media - Oral And Iv Dye Hives    Lasix [Furosemide] Hives    Sulfa (Sulfonamide Antibiotics) Hives     Family History   Problem Relation Age of Onset    Heart Disease Mother     Heart Disease Father      Social History   Substance Use Topics    Smoking status: Never Smoker    Smokeless tobacco: Never Used    Alcohol use No     Patient Active Problem List   Diagnosis Code    Lymphedema I89.0    History of DVT (deep vein thrombosis) Z86.718    Carpal tunnel syndrome, bilateral G56.03    Depression F32.9    Hypothyroidism E03.9    Essential hypertension, benign I10    Chronic acquired lymphedema--legs--onset age ~38yo I80.0    Thromboembolus ([address] I77.6    H/O atrial septal defect repair--age 12(3 defects)--one defect self-closure Z98.890, Z87.74    Hx pulmonary embolism--due to DVT Z80.36    Secondhand smoke exposure--40 YEARS() Z77.22    Family history of CAD--sister s/p stents,mother s/p CABG Z82.49         Depression Risk Factor Screening:   No flowsheet data found. Alcohol Risk Factor Screening: On any occasion during the past 3 months, have you had more than 3 drinks containing alcohol? No    Do you average more than 7 drinks per week? No    Functional Ability and Level of Safety:     Hearing Loss   none    Activities of Daily Living   Self-care. Requires assistance with: no ADLs    Fall Risk   No flowsheet data found. Abuse Screen   Patient is not abused    Review of Systems   A comprehensive review of systems was negative except for that written in the HPI. Physical Examination     No exam data present    Evaluation of Cognitive Function:  Mood/affect:  happy  Appearance: age appropriate  Family member/caregiver input: NA    Physical Exam:  Blood pressure 143/89, pulse 60, temperature 97.6 °F (36.4 °C), resp. rate 18, height 5' 10\" (1.778 m), weight 245 lb (111.1 kg), SpO2 91 %. GEN: No apparent distress.  Alert and oriented and responds to all questions appropriately. EYES:  Conjunctiva clear; pupils round and reactive to light; extraocular movements are intact. EAR: External ears are normal.  Tympanic membranes are clear and without effusion. NOSE: Turbinates are congested. No drainage  OROPHYARYNX: No oral lesions or exudates. NECK:  LAD, Supple; no masses; thyroid normal           LUNGS: Respirations unlabored; clear to auscultation bilaterally  CARDIOVASCULAR: Regular, rate, and rhythm without murmurs   NEUROLOGIC:  No focal neurologic deficits. Strength and sensation grossly intact. Coordination and gait grossly intact. EXT: Well perfused. Chronic lymphedema. Knee crepitus bilaterally. The left is worse  SKIN: No obvious rashes. Patient Care Team:  Shala Bergman MD as PCP - [facility])    Advice/Referrals/Counseling   Education and counseling provided:  Recommend vitamin D3 daily    Colonoscopy 2 years ago recommended return in 3 years. Will be due in 2018, health maintenance updated    Pap smear 2013, every 5 years. 2018 do    Mammogram tomorrow    Up-to-date on vaccines    Advance care planning, referred to honoring choices. She recently lost her  and really wants to get this stuff set up. Enthusiastic    Assessment/Plan     We also talked about her left arm pain. Shoulder pain especially when sleeping. Has been told that it is \"bone-on-bone\" she is doing exercises but not rotator cuff exercises. Educated on rotator cuff exercises and provided handout. She is having some knee arthritis that is worse getting up from a seated position. Suggested that she consider physical therapy for this but in lieu of that stationary bike and/or elliptical would work well. Labs today  TSH has been mildly elevated in the past  Lipid panel years ago was excellent, check again. She has a strong family history of heart disease (stress test 2015 normal)     Was pretty upset about her 's death last time I met her. Doing a lot better today. Sleeping on her side of the bed and her back and neck are feeling better. Has not done physical therapy yet      ICD-10-CM ICD-9-CM    1. Routine general medical examination at a health care facility Z00.00 V70.0 CBC WITH AUTOMATED DIFF   2. Thromboembolus (HCC) I74.9 444.9 AMB POC PT/INR      CBC WITH AUTOMATED DIFF   3. Screening for alcoholism Z13.89 V79.1    4. Essential hypertension, benign I04 544.6 METABOLIC PANEL, COMPREHENSIVE      LIPID PANEL   5. Family history of CAD--sister s/p stents,mother s/p CABG Z82.49 V17.3 LIPID PANEL   6. BMI 35.0-35.9,adult Z68.35 V85.35 TSH 3RD GENERATION   7. Need for hepatitis C screening test Z11.59 V73.89 HCV AB W/RFLX TO MERCED   8.  Hypothyroidism, unspecified type E03.9 244.9 TSH 3RD GENERATION English

## 2021-11-10 RX ORDER — VALSARTAN 320 MG/1
TABLET ORAL
Qty: 90 TABLET | Refills: 3 | Status: SHIPPED | OUTPATIENT
Start: 2021-11-10

## 2021-11-12 ENCOUNTER — CLINICAL SUPPORT (OUTPATIENT)
Dept: FAMILY MEDICINE CLINIC | Age: 66
End: 2021-11-12
Payer: MEDICARE

## 2021-11-12 VITALS
OXYGEN SATURATION: 98 % | TEMPERATURE: 98 F | BODY MASS INDEX: 39.22 KG/M2 | RESPIRATION RATE: 18 BRPM | WEIGHT: 274 LBS | HEIGHT: 70 IN | HEART RATE: 74 BPM

## 2021-11-12 DIAGNOSIS — Z51.81 ANTICOAGULATION GOAL OF INR 2 TO 3: Primary | ICD-10-CM

## 2021-11-12 DIAGNOSIS — Z79.01 ANTICOAGULATION GOAL OF INR 2 TO 3: Primary | ICD-10-CM

## 2021-11-12 LAB
INR BLD: 2.5
PT POC: 29.9 SECONDS
VALID INTERNAL CONTROL?: YES

## 2021-11-12 PROCEDURE — 85610 PROTHROMBIN TIME: CPT | Performed by: FAMILY MEDICINE

## 2021-11-12 NOTE — PATIENT INSTRUCTIONS
Continue with plan of care from PCP, INR levels are good. If you have any questions, please call us.

## 2021-11-12 NOTE — PROGRESS NOTES
Identified pt with two pt identifiers(name and ). Reviewed record in preparation for visit and have obtained necessary documentation. Chief Complaint   Patient presents with    Testing     INR Test for coagulation       See INR test       Vitals:    21 0935   Pulse: 74   Resp: 18   Temp: 98 °F (36.7 °C)   TempSrc: Oral   SpO2: 98%   Weight: 274 lb (124.3 kg)   Height: 5' 10\" (1.778 m)   PainSc:   0 - No pain       Health Maintenance Due   Topic    Shingrix Vaccine Age 50> (1 of 2)    Cervical cancer screen     Colorectal Cancer Screening Combo     Bone Densitometry (Dexa) Screening     Pneumococcal 65+ years (1 of 1 - PPSV23)       Coordination of Care Questionnaire:  :   1) Have you been to an emergency room, urgent care, or hospitalized since your last visit? If yes, where when, and reason for visit? no       2. Have seen or consulted any other health care provider since your last visit? If yes, where when, and reason for visit? NO      Patient is accompanied by self I have received verbal consent from Janett Loco to discuss any/all medical information while they are present in the room.

## 2021-12-21 ENCOUNTER — CLINICAL SUPPORT (OUTPATIENT)
Dept: FAMILY MEDICINE CLINIC | Age: 66
End: 2021-12-21
Payer: MEDICARE

## 2021-12-21 DIAGNOSIS — Z86.711 HX PULMONARY EMBOLISM: Primary | ICD-10-CM

## 2021-12-21 LAB
INR BLD: 1.5
PT POC: 18 SECONDS
VALID INTERNAL CONTROL?: YES

## 2021-12-21 PROCEDURE — 85610 PROTHROMBIN TIME: CPT | Performed by: FAMILY MEDICINE

## 2021-12-27 RX ORDER — SPIRONOLACTONE 25 MG/1
TABLET ORAL
Qty: 90 TABLET | Refills: 3 | Status: SHIPPED | OUTPATIENT
Start: 2021-12-27

## 2021-12-28 ENCOUNTER — CLINICAL SUPPORT (OUTPATIENT)
Dept: FAMILY MEDICINE CLINIC | Age: 66
End: 2021-12-28
Payer: MEDICARE

## 2021-12-28 DIAGNOSIS — Z79.01 ANTICOAGULATION GOAL OF INR 2 TO 3: Primary | ICD-10-CM

## 2021-12-28 DIAGNOSIS — Z51.81 ANTICOAGULATION GOAL OF INR 2 TO 3: Primary | ICD-10-CM

## 2021-12-28 LAB
INR BLD: 2.1 (ref 1–1.5)
PT POC: 22.7 SECONDS (ref 9.1–12)
VALID INTERNAL CONTROL?: YES

## 2021-12-28 PROCEDURE — 85610 PROTHROMBIN TIME: CPT | Performed by: FAMILY MEDICINE

## 2021-12-28 NOTE — PROGRESS NOTES
Anticoagulation  Patient here for followup of chronic anticoagulation. Indication: abnormal coags  Bleeding Signs/Symptoms: none  Thromboembolic Signs/Symptoms:  none  INR's are drawn regularly and have been therapeutic.   Lab Results   Component Value Date/Time    INR 2.5 (H) 08/11/2020 10:52 AM    INR POC 2.1 (A) 12/28/2021 09:56 AM   per Dr Radha Steiner continue same dose recheck in 2 weeks

## 2022-01-11 ENCOUNTER — CLINICAL SUPPORT (OUTPATIENT)
Dept: FAMILY MEDICINE CLINIC | Age: 67
End: 2022-01-11
Payer: MEDICARE

## 2022-01-11 DIAGNOSIS — Z86.711 HX PULMONARY EMBOLISM: Primary | ICD-10-CM

## 2022-01-11 LAB
INR BLD: 3
PT POC: 35.7 SECONDS
VALID INTERNAL CONTROL?: YES

## 2022-01-11 PROCEDURE — 85610 PROTHROMBIN TIME: CPT | Performed by: FAMILY MEDICINE

## 2022-01-11 NOTE — PROGRESS NOTES
Janett Bradford is a 73 y.o. female who presents today for Anticoagulation monitoring. Indication: PE  INR Goal: 2.0-3.0. Current dose:  Coumadin 7.5 mg daily, except 3.75 Wednesdays. Missed Coumadin Doses:  None  Medication Changes:  no  Dietary Changes:  no    Symptoms: taking coumadin appropriately without any bleeding. Latest INRs:  Lab Results   Component Value Date/Time    INR 2.5 (H) 08/11/2020 10:52 AM    INR 2.3 (H) 05/27/2020 10:30 AM    INR 2.5 (H) 04/27/2020 09:31 AM    INR (POC) 2.2 (H) 06/29/2020 07:41 AM    INR POC 2.1 (A) 12/28/2021 09:56 AM    INR POC 1.5 12/21/2021 09:20 AM    INR POC 2.5 11/12/2021 09:08 AM    Prothrombin time 26.0 (H) 08/11/2020 10:52 AM    Prothrombin time 23.0 (H) 05/27/2020 10:30 AM    Prothrombin time 25.1 (H) 04/27/2020 09:31 AM        New Coumadin dose:.current treatment plan is effective, no change in therapy. Next check to be scheduled for  4 weeks. Pt notified and understanding vocalized.

## 2022-01-26 ENCOUNTER — OFFICE VISIT (OUTPATIENT)
Dept: FAMILY MEDICINE CLINIC | Age: 67
End: 2022-01-26
Payer: MEDICARE

## 2022-01-26 VITALS
TEMPERATURE: 97.2 F | SYSTOLIC BLOOD PRESSURE: 127 MMHG | HEART RATE: 79 BPM | HEIGHT: 70 IN | WEIGHT: 268.4 LBS | OXYGEN SATURATION: 98 % | BODY MASS INDEX: 38.42 KG/M2 | RESPIRATION RATE: 18 BRPM | DIASTOLIC BLOOD PRESSURE: 85 MMHG

## 2022-01-26 DIAGNOSIS — M25.562 ACUTE PAIN OF LEFT KNEE: Primary | ICD-10-CM

## 2022-01-26 DIAGNOSIS — E66.01 SEVERE OBESITY (BMI 35.0-35.9 WITH COMORBIDITY) (HCC): ICD-10-CM

## 2022-01-26 PROCEDURE — G8536 NO DOC ELDER MAL SCRN: HCPCS | Performed by: FAMILY MEDICINE

## 2022-01-26 PROCEDURE — G8754 DIAS BP LESS 90: HCPCS | Performed by: FAMILY MEDICINE

## 2022-01-26 PROCEDURE — 1101F PT FALLS ASSESS-DOCD LE1/YR: CPT | Performed by: FAMILY MEDICINE

## 2022-01-26 PROCEDURE — 99213 OFFICE O/P EST LOW 20 MIN: CPT | Performed by: FAMILY MEDICINE

## 2022-01-26 PROCEDURE — 1090F PRES/ABSN URINE INCON ASSESS: CPT | Performed by: FAMILY MEDICINE

## 2022-01-26 PROCEDURE — G9899 SCRN MAM PERF RSLTS DOC: HCPCS | Performed by: FAMILY MEDICINE

## 2022-01-26 PROCEDURE — G8752 SYS BP LESS 140: HCPCS | Performed by: FAMILY MEDICINE

## 2022-01-26 PROCEDURE — G9717 DOC PT DX DEP/BP F/U NT REQ: HCPCS | Performed by: FAMILY MEDICINE

## 2022-01-26 PROCEDURE — G8400 PT W/DXA NO RESULTS DOC: HCPCS | Performed by: FAMILY MEDICINE

## 2022-01-26 PROCEDURE — G8427 DOCREV CUR MEDS BY ELIG CLIN: HCPCS | Performed by: FAMILY MEDICINE

## 2022-01-26 PROCEDURE — G8417 CALC BMI ABV UP PARAM F/U: HCPCS | Performed by: FAMILY MEDICINE

## 2022-01-26 PROCEDURE — 3017F COLORECTAL CA SCREEN DOC REV: CPT | Performed by: FAMILY MEDICINE

## 2022-01-26 RX ORDER — DICLOFENAC SODIUM 10 MG/G
2 GEL TOPICAL 4 TIMES DAILY
Qty: 100 G | Refills: 3 | Status: SHIPPED | OUTPATIENT
Start: 2022-01-26

## 2022-01-26 NOTE — PATIENT INSTRUCTIONS
Meniscus Tear: Exercises  Introduction  Here are some examples of exercises for you to try. The exercises may be suggested for a condition or for rehabilitation. Start each exercise slowly. Ease off the exercises if you start to have pain. You will be told when to start these exercises and which ones will work best for you. How to do the exercises  Calf wall stretch    1. Stand facing a wall with your hands on the wall at about eye level. Put your affected leg about a step behind your other leg. 2. Keeping your back leg straight and your back heel on the floor, bend your front knee and gently bring your hip and chest toward the wall until you feel a stretch in the calf of your back leg. 3. Hold the stretch for at least 15 to 30 seconds. 4. Repeat 2 to 4 times. 5. Repeat steps 1 through 4, but this time keep your back knee bent. Hamstring wall stretch    1. Lie on your back in a doorway, with your good leg through the open door. 2. Slide your affected leg up the wall to straighten your knee. You should feel a gentle stretch down the back of your leg. 3. Hold the stretch for at least 1 minute. Then over time, try to lengthen the time you hold the stretch to as long as 6 minutes. 4. Repeat 2 to 4 times. 5. If you do not have a place to do this exercise in a doorway, there is another way to do it:  6. Lie on your back, and bend your affected leg. 7. Loop a towel under the ball and toes of that foot, and hold the ends of the towel in your hands. 8. Straighten your knee, and slowly pull back on the towel. You should feel a gentle stretch down the back of your leg. 9. Hold the stretch for at least 15 to 30 seconds. Or even better, hold the stretch for 1 minute if you can. 10. Repeat 2 to 4 times. 1. Do not arch your back. 2. Do not bend either knee. 3. Keep one heel touching the floor and the other heel touching the wall. Do not point your toes. Quad sets    1.  Sit with your leg straight and supported on the floor or a firm bed. (If you feel discomfort in the front or back of your knee, place a small towel roll under your knee.)  2. Tighten the muscles on top of your thigh by pressing the back of your knee flat down to the floor. (If you feel discomfort under your kneecap, place a small towel roll under your knee.)  3. Hold for about 6 seconds, then rest up to 10 seconds. 4. Do 8 to 12 repetitions several times a day. Straight-leg raises to the front    1. Lie on your back with your good knee bent so that your foot rests flat on the floor. Your injured leg should be straight. Make sure that your low back has a normal curve. You should be able to slip your flat hand in between the floor and the small of your back, with your palm touching the floor and your back touching the back of your hand. 2. Tighten the thigh muscles in the injured leg by pressing the back of your knee flat down to the floor. Hold your knee straight. 3. Keeping the thigh muscles tight, lift your injured leg up so that your heel is about 12 inches off the floor. Hold for 5 seconds and then lower slowly. 4. Do 8 to 12 repetitions. Straight-leg raises to the back    1. Lie on your stomach, and lift your leg straight up behind you (toward the ceiling). 2. Lift your toes about 6 inches off the floor, hold for about 6 seconds, then lower slowly. 3. Do 8 to 12 repetitions. Hamstring curls    1. Lie on your stomach with your knees straight. If your kneecap is uncomfortable, roll up a washcloth and put it under your leg just above your kneecap. 2. Lift the foot of your injured leg by bending the knee so that you bring the foot up toward your buttock. If this motion hurts, try it without bending your knee quite as far. This may help you avoid any painful motion. 3. Slowly lower your leg back to the floor. 4. Do 8 to 12 repetitions.   5. With permission from your doctor or physical therapist, you may also want to add a cuff weight to your ankle (not more than 5 pounds). With weight, you do not have to lift your leg more than 12 inches to get a hamstring workout. Wall slide with ball squeeze    1. Stand with your back against a wall and with your feet about shoulder-width apart. Your feet should be about 12 inches away from the wall. 2. Put a ball about the size of a soccer ball between your knees. Then slowly slide down the wall until your knees are bent about 20 to 30 degrees. 3. Tighten your thigh muscles by squeezing the ball between your knees. Hold that position for about 10 seconds, then stop squeezing. Rest for up to 10 seconds between repetitions. 4. Repeat 8 to 12 times. Heel raises    1. Stand with your feet a few inches apart, with your hands lightly resting on a counter or chair in front of you. 2. Slowly raise your heels off the floor while keeping your knees straight. 3. Hold for about 6 seconds, then slowly lower your heels to the floor. 4. Do 8 to 12 repetitions several times during the day. Heel dig bridging    Stop doing this exercise if it causes pain. 1. Lie on your back with both knees bent and your ankles bent so that only your heels are digging into the floor. Your knees should be bent about 90 degrees. 2. Then push your heels into the floor, squeeze your buttocks, and lift your hips off the floor until your shoulders, hips, and knees are all in a straight line. 3. Hold for about 6 seconds as you continue to breathe normally, and then slowly lower your hips back down to the floor and rest for up to 10 seconds. 4. Do 8 to 12 repetitions. Shallow standing knee bends    Do this exercise only if you have very little pain; if you have no clicking, locking, or giving way in the injured knee; and if it does not hurt while you are doing 8 to 12 repetitions. 1. Stand with your hands lightly resting on a counter or chair in front of you. Put your feet shoulder-width apart.   2. Slowly bend your knees so that you squat down like you are going to sit in a chair. Make sure your knees do not go in front of your toes. 3. Lower yourself about 6 inches. Your heels should remain on the floor at all times. 4. Rise slowly to a standing position. Follow-up care is a key part of your treatment and safety. Be sure to make and go to all appointments, and call your doctor if you are having problems. It's also a good idea to know your test results and keep a list of the medicines you take. Where can you learn more? Go to http://www.gray.com/  Enter C183 in the search box to learn more about \"Meniscus Tear: Exercises. \"  Current as of: July 1, 2021               Content Version: 13.0  © 2006-2021 [employer]. Care instructions adapted under license by [employer] (which disclaims liability or warranty for this information). If you have questions about a medical condition or this instruction, always ask your healthcare professional. [address] any warranty or liability for your use of this information.

## 2022-01-26 NOTE — PROGRESS NOTES
1. Have you been to the ER, urgent care clinic since your last visit? Hospitalized since your last visit? No    2. Have you seen or consulted any other health care providers outside of the [address] [place] [address] since your last visit? Include any pap smears or colon screening.  No     Health Maintenance Due   Topic Date Due    Shingrix Vaccine Age 49> (1 of 2) Never done    Cervical cancer screen  10/21/2016    Colorectal Cancer Screening Combo  07/06/2018    Bone Densitometry (Dexa) Screening  Never done    Pneumococcal 65+ years (1 of 1 - PPSV23) Never done

## 2022-01-26 NOTE — PROGRESS NOTES
HPI  Janett Aguirre is a 73 y.o. female who presents for acute left knee pain. She slipped in the bathtub 6 weeks ago when the bath mat slipped on her. This caused some worsening left knee discomfort. She was limping on it and then over the next few days developed left lateral leg pain left buttock and left lower back pain. She has a sense of instability on it when she is going up and down stairs. It stiff when she sits for a while and takes a few steps before it loosens up. PMHx:  Past Medical History:   Diagnosis Date    Arthritis     ASD (atrial septal defect)     age 7 septal defect closures MCV    Depression     Hypertension     Lymphedema of leg     bilat    Thromboembolus ([address]     hx bilateral DVT. long term use Coumadin    Thyroid disease        Meds:   Current Outpatient Medications   Medication Sig Dispense Refill    naproxen sodium (ALEVE PO) Take  by mouth as needed for Pain.  diclofenac (VOLTAREN) 1 % gel Apply 2 g to affected area four (4) times daily. 100 g 3    spironolactone (ALDACTONE) 25 mg tablet Take 1 tablet by mouth once daily 90 Tablet 3    valsartan (DIOVAN) 320 mg tablet Take 1 tablet by mouth once daily 90 Tablet 3    diclofenac EC (VOLTAREN) 75 mg EC tablet Take 1 Tablet by mouth two (2) times daily as needed for Pain. 60 Tablet 2    hydroCHLOROthiazide (HYDRODIURIL) 25 mg tablet Take 1 tablet by mouth once daily 90 Tablet 3    venlafaxine (EFFEXOR) 75 mg tablet Take 1 tablet by mouth twice daily 180 Tab 3    Euthyrox 100 mcg tablet TAKE 1 TABLET BY MOUTH ONCE DAILY BEFORE BREAKFAST 90 Tab 3    warfarin (COUMADIN) 7.5 mg tablet Take 1 tablet by mouth once daily 90 Tab 3    acetaminophen (TYLENOL) 325 mg tablet Take 650 mg by mouth every six (6) hours as needed.  cholecalciferol (VITAMIN D3) 1,000 unit cap Take  by mouth daily.  LANSOPRAZOLE (PREVACID PO) Take  by mouth daily.  potassium 99 mg tablet Take 198 mg by mouth daily.  Pt is taking 2 tabs daily         Allergies: Allergies   Allergen Reactions    Copper Hives    Iodinated Contrast Media Hives    Lasix [Furosemide] Hives    Sulfa (Sulfonamide Antibiotics) Hives       Smoker:  Social History     Tobacco Use   Smoking Status Never Smoker   Smokeless Tobacco Never Used       ETOH:   Social History     Substance and Sexual Activity   Alcohol Use No       FH:   Family History   Problem Relation Age of Onset    Heart Disease Mother     Heart Disease Father        ROS:   As listed in HPI. In addition:  Constitutional:   No headache, fever, fatigue, weight loss or weight gain      Cardiac:    No chest pain      Resp:   No cough or shortness of breath      Neuro   No loss of consciousness, dizziness, seizures      Physical Exam:  Blood pressure 127/85, pulse 79, temperature 97.2 °F (36.2 °C), temperature source Temporal, resp. rate 18, height 5' 10\" (1.778 m), weight 268 lb 6.4 oz (121.7 kg), SpO2 98 %. GEN: No apparent distress. Alert and oriented and responds to all questions appropriately. NEUROLOGIC:  No focal neurologic deficits. Strength and sensation grossly intact. Coordination and gait grossly intact. EXT: Well perfused. No edema. SKIN: No obvious rashes. Knee examined. Stable joint. Moderate effusion. Medial and lateral joint line tenderness. Discomfort in the popliteal space over the joint capsule. There is tenderness up the tensor fascia anca with no trochanteric bursitis evident       Assessment and Plan     Acute left knee pain with tight and tender tensor fascia anca and low back pain likely related to the limp  Knee compression, knee exercises both self-guided and physical therapy  Anti-inflammatories which she will use topical diclofenac  Offered a cortisone shot which will help with pain relief but I emphasized this is primarily a tool to help her do the exercises.   At the moment she thinks she can do the exercises without the help of the cortisone shot but will return if she is having trouble participating with physical therapy. ICD-10-CM ICD-9-CM    1. Acute pain of left knee  M25.562 719.46    2. Severe obesity (BMI 35.0-35.9 with comorbidity) ([facility])  E66.01 278.01     Z68.35 V85.35        AVS given.  Pt expressed understanding of instructions

## 2022-01-31 RX ORDER — LEVOTHYROXINE SODIUM 100 UG/1
TABLET ORAL
Qty: 90 TABLET | Refills: 0 | Status: SHIPPED | OUTPATIENT
Start: 2022-01-31 | End: 2022-04-29

## 2022-01-31 RX ORDER — WARFARIN 7.5 MG/1
TABLET ORAL
Qty: 90 TABLET | Refills: 0 | Status: SHIPPED | OUTPATIENT
Start: 2022-01-31 | End: 2022-05-16

## 2022-02-13 DIAGNOSIS — F32.A DEPRESSION, UNSPECIFIED DEPRESSION TYPE: ICD-10-CM

## 2022-02-14 ENCOUNTER — CLINICAL SUPPORT (OUTPATIENT)
Dept: FAMILY MEDICINE CLINIC | Age: 67
End: 2022-02-14
Payer: MEDICARE

## 2022-02-14 DIAGNOSIS — Z86.711 HX PULMONARY EMBOLISM: Primary | ICD-10-CM

## 2022-02-14 LAB
INR BLD: 2.5
PT POC: 30.1 SECONDS
VALID INTERNAL CONTROL?: YES

## 2022-02-14 PROCEDURE — 85610 PROTHROMBIN TIME: CPT | Performed by: FAMILY MEDICINE

## 2022-02-14 RX ORDER — VENLAFAXINE 75 MG/1
TABLET ORAL
Qty: 180 TABLET | Refills: 3 | Status: SHIPPED | OUTPATIENT
Start: 2022-02-14

## 2022-02-14 NOTE — PROGRESS NOTES
Janett Arana is a 73 y.o. female who presents today for Anticoagulation monitoring. Indication: Atrial Fibrillation  INR Goal: 2.0-3.0. Current dose:  Coumadin 7.5 mg daily, except 3.75 mg Wed. Missed Coumadin Doses:  None  Medication Changes:  no  Dietary Changes:  no    Symptoms: taking coumadin appropriately without any bleeding. Latest INRs:  Lab Results   Component Value Date/Time    INR 2.5 (H) 08/11/2020 10:52 AM    INR 2.3 (H) 05/27/2020 10:30 AM    INR 2.5 (H) 04/27/2020 09:31 AM    INR (POC) 2.2 (H) 06/29/2020 07:41 AM    INR POC 3.0 01/11/2022 11:10 AM    INR POC 2.1 (A) 12/28/2021 09:56 AM    INR POC 1.5 12/21/2021 09:20 AM    Prothrombin time 26.0 (H) 08/11/2020 10:52 AM    Prothrombin time 23.0 (H) 05/27/2020 10:30 AM    Prothrombin time 25.1 (H) 04/27/2020 09:31 AM        New Coumadin dose:.current treatment plan is effective, no change in therapy. Next check to be scheduled for  4 weeks.

## 2022-03-14 ENCOUNTER — CLINICAL SUPPORT (OUTPATIENT)
Dept: FAMILY MEDICINE CLINIC | Age: 67
End: 2022-03-14
Payer: MEDICARE

## 2022-03-14 DIAGNOSIS — Z86.711 HX PULMONARY EMBOLISM: Primary | ICD-10-CM

## 2022-03-14 LAB
INR BLD: 2.8
PT POC: 33.9 SECONDS
VALID INTERNAL CONTROL?: YES

## 2022-03-14 PROCEDURE — 85610 PROTHROMBIN TIME: CPT | Performed by: FAMILY MEDICINE

## 2022-03-14 NOTE — PROGRESS NOTES
Janett Bhat is a 73 y.o. female who presents today for Anticoagulation monitoring. Indication: PE  INR Goal: 2.0-3.0. Current dose:  Coumadin 7.5 mg daily, except 3.75 mg Wed. Missed Coumadin Doses:  None  Medication Changes:  no  Dietary Changes:  no    Symptoms: taking coumadin appropriately without any bleeding. Latest INRs:  Lab Results   Component Value Date/Time    INR 2.5 (H) 08/11/2020 10:52 AM    INR 2.3 (H) 05/27/2020 10:30 AM    INR 2.5 (H) 04/27/2020 09:31 AM    INR (POC) 2.2 (H) 06/29/2020 07:41 AM    INR POC 2.8 03/14/2022 11:10 AM    INR POC 2.5 02/14/2022 11:02 AM    INR POC 3.0 01/11/2022 11:10 AM    Prothrombin time 26.0 (H) 08/11/2020 10:52 AM    Prothrombin time 23.0 (H) 05/27/2020 10:30 AM    Prothrombin time 25.1 (H) 04/27/2020 09:31 AM        New Coumadin dose:.current treatment plan is effective, no change in therapy. Next check to be scheduled for  4 weeks.

## 2022-03-19 PROBLEM — I82.409 RECURRENT DEEP VEIN THROMBOSIS (DVT) (HCC): Status: ACTIVE | Noted: 2018-04-18

## 2022-03-20 PROBLEM — E66.01 SEVERE OBESITY (BMI 35.0-39.9) WITH COMORBIDITY (HCC): Status: ACTIVE | Noted: 2018-04-18

## 2022-04-13 ENCOUNTER — CLINICAL SUPPORT (OUTPATIENT)
Dept: FAMILY MEDICINE CLINIC | Age: 67
End: 2022-04-13
Payer: MEDICARE

## 2022-04-13 DIAGNOSIS — Z86.711 HX PULMONARY EMBOLISM: ICD-10-CM

## 2022-04-13 DIAGNOSIS — Z79.01 ANTICOAGULATION GOAL OF INR 2 TO 3: Primary | ICD-10-CM

## 2022-04-13 DIAGNOSIS — Z51.81 ANTICOAGULATION GOAL OF INR 2 TO 3: Primary | ICD-10-CM

## 2022-04-13 LAB
INR BLD: 3.3 (ref 1–1.5)
PT POC: 39.9 SECONDS (ref 9.1–12)
VALID INTERNAL CONTROL?: YES

## 2022-04-13 PROCEDURE — 85610 PROTHROMBIN TIME: CPT | Performed by: FAMILY MEDICINE

## 2022-04-27 ENCOUNTER — CLINICAL SUPPORT (OUTPATIENT)
Dept: FAMILY MEDICINE CLINIC | Age: 67
End: 2022-04-27

## 2022-04-27 DIAGNOSIS — Z51.81 ANTICOAGULATION GOAL OF INR 2 TO 3: Primary | ICD-10-CM

## 2022-04-27 DIAGNOSIS — Z79.01 ANTICOAGULATION GOAL OF INR 2 TO 3: Primary | ICD-10-CM

## 2022-04-27 DIAGNOSIS — Z86.711 HX PULMONARY EMBOLISM: ICD-10-CM

## 2022-04-27 LAB
INR BLD: 3.3 (ref 1–1.5)
PT POC: 40 SECONDS (ref 9.1–12)
VALID INTERNAL CONTROL?: YES

## 2022-04-27 PROCEDURE — 85610 PROTHROMBIN TIME: CPT | Performed by: FAMILY MEDICINE

## 2022-04-29 RX ORDER — LEVOTHYROXINE SODIUM 100 UG/1
TABLET ORAL
Qty: 90 TABLET | Refills: 3 | Status: SHIPPED | OUTPATIENT
Start: 2022-04-29

## 2022-05-16 RX ORDER — WARFARIN 7.5 MG/1
TABLET ORAL
Qty: 90 TABLET | Refills: 3 | Status: SHIPPED | OUTPATIENT
Start: 2022-05-16

## 2022-05-23 ENCOUNTER — CLINICAL SUPPORT (OUTPATIENT)
Dept: FAMILY MEDICINE CLINIC | Age: 67
End: 2022-05-23
Payer: MEDICARE

## 2022-05-23 DIAGNOSIS — Z86.711 HX PULMONARY EMBOLISM: Primary | ICD-10-CM

## 2022-05-23 LAB
INR BLD: 2.3
PT POC: 27.8 SECONDS
VALID INTERNAL CONTROL?: YES

## 2022-05-23 PROCEDURE — 85610 PROTHROMBIN TIME: CPT | Performed by: NURSE PRACTITIONER

## 2022-05-23 NOTE — PROGRESS NOTES
Janett Bradley is a 73 y.o. female who presents today for Anticoagulation monitoring. Indication: PE  INR Goal: 2.0-3.0. Current dose:  Coumadin 7.5 mg daily, except 3.75 mg Tues/Thurs. Missed Coumadin Doses:  None  Medication Changes:  no  Dietary Changes:  no    Symptoms: taking coumadin appropriately without any bleeding. Latest INRs:  Lab Results   Component Value Date/Time    INR 2.5 (H) 08/11/2020 10:52 AM    INR 2.3 (H) 05/27/2020 10:30 AM    INR 2.5 (H) 04/27/2020 09:31 AM    INR (POC) 2.2 (H) 06/29/2020 07:41 AM    INR POC 2.3 05/23/2022 01:53 PM    INR POC 3.3 (A) 04/27/2022 02:47 PM    INR POC 3.3 (A) 04/13/2022 10:07 AM    Prothrombin time 26.0 (H) 08/11/2020 10:52 AM    Prothrombin time 23.0 (H) 05/27/2020 10:30 AM    Prothrombin time 25.1 (H) 04/27/2020 09:31 AM        New Coumadin dose:.current treatment plan is effective, no change in therapy. Next check to be scheduled for  4 weeks.

## 2022-06-20 RX ORDER — HYDROCHLOROTHIAZIDE 25 MG/1
TABLET ORAL
Qty: 90 TABLET | Refills: 3 | Status: SHIPPED | OUTPATIENT
Start: 2022-06-20

## 2022-06-24 ENCOUNTER — OFFICE VISIT (OUTPATIENT)
Dept: FAMILY MEDICINE CLINIC | Age: 67
End: 2022-06-24
Payer: MEDICARE

## 2022-06-24 VITALS
RESPIRATION RATE: 17 BRPM | SYSTOLIC BLOOD PRESSURE: 122 MMHG | HEART RATE: 84 BPM | WEIGHT: 263.6 LBS | DIASTOLIC BLOOD PRESSURE: 78 MMHG | HEIGHT: 70 IN | OXYGEN SATURATION: 98 % | BODY MASS INDEX: 37.74 KG/M2 | TEMPERATURE: 97.8 F

## 2022-06-24 DIAGNOSIS — Z86.711 HX PULMONARY EMBOLISM: ICD-10-CM

## 2022-06-24 DIAGNOSIS — F32.A DEPRESSION, UNSPECIFIED DEPRESSION TYPE: ICD-10-CM

## 2022-06-24 DIAGNOSIS — Z82.49 FAMILY HISTORY OF PREMATURE CAD: ICD-10-CM

## 2022-06-24 DIAGNOSIS — R53.83 FATIGUE, UNSPECIFIED TYPE: ICD-10-CM

## 2022-06-24 DIAGNOSIS — G47.19 EXCESSIVE DAYTIME SLEEPINESS: ICD-10-CM

## 2022-06-24 DIAGNOSIS — Z12.11 COLON CANCER SCREENING: ICD-10-CM

## 2022-06-24 DIAGNOSIS — E66.01 SEVERE OBESITY (BMI 35.0-35.9 WITH COMORBIDITY) (HCC): ICD-10-CM

## 2022-06-24 DIAGNOSIS — Z78.0 POST-MENOPAUSAL: ICD-10-CM

## 2022-06-24 DIAGNOSIS — I10 ESSENTIAL HYPERTENSION, BENIGN: ICD-10-CM

## 2022-06-24 DIAGNOSIS — E53.8 B12 DEFICIENCY: ICD-10-CM

## 2022-06-24 DIAGNOSIS — Z00.00 ROUTINE GENERAL MEDICAL EXAMINATION AT A HEALTH CARE FACILITY: Primary | ICD-10-CM

## 2022-06-24 DIAGNOSIS — Z12.31 ENCOUNTER FOR SCREENING MAMMOGRAM FOR MALIGNANT NEOPLASM OF BREAST: ICD-10-CM

## 2022-06-24 DIAGNOSIS — E61.1 IRON DEFICIENCY: ICD-10-CM

## 2022-06-24 DIAGNOSIS — R73.02 IGT (IMPAIRED GLUCOSE TOLERANCE): ICD-10-CM

## 2022-06-24 DIAGNOSIS — E03.9 HYPOTHYROIDISM, UNSPECIFIED TYPE: ICD-10-CM

## 2022-06-24 LAB
INR BLD: 2.1
PT POC: 24.8 SECONDS
VALID INTERNAL CONTROL?: YES

## 2022-06-24 PROCEDURE — 1090F PRES/ABSN URINE INCON ASSESS: CPT | Performed by: FAMILY MEDICINE

## 2022-06-24 PROCEDURE — G8400 PT W/DXA NO RESULTS DOC: HCPCS | Performed by: FAMILY MEDICINE

## 2022-06-24 PROCEDURE — 1101F PT FALLS ASSESS-DOCD LE1/YR: CPT | Performed by: FAMILY MEDICINE

## 2022-06-24 PROCEDURE — 3017F COLORECTAL CA SCREEN DOC REV: CPT | Performed by: FAMILY MEDICINE

## 2022-06-24 PROCEDURE — 85610 PROTHROMBIN TIME: CPT | Performed by: FAMILY MEDICINE

## 2022-06-24 PROCEDURE — G8754 DIAS BP LESS 90: HCPCS | Performed by: FAMILY MEDICINE

## 2022-06-24 PROCEDURE — G8427 DOCREV CUR MEDS BY ELIG CLIN: HCPCS | Performed by: FAMILY MEDICINE

## 2022-06-24 PROCEDURE — G8752 SYS BP LESS 140: HCPCS | Performed by: FAMILY MEDICINE

## 2022-06-24 PROCEDURE — 1123F ACP DISCUSS/DSCN MKR DOCD: CPT | Performed by: FAMILY MEDICINE

## 2022-06-24 PROCEDURE — G8536 NO DOC ELDER MAL SCRN: HCPCS | Performed by: FAMILY MEDICINE

## 2022-06-24 PROCEDURE — G9899 SCRN MAM PERF RSLTS DOC: HCPCS | Performed by: FAMILY MEDICINE

## 2022-06-24 PROCEDURE — G0439 PPPS, SUBSEQ VISIT: HCPCS | Performed by: FAMILY MEDICINE

## 2022-06-24 PROCEDURE — 99214 OFFICE O/P EST MOD 30 MIN: CPT | Performed by: FAMILY MEDICINE

## 2022-06-24 PROCEDURE — G9717 DOC PT DX DEP/BP F/U NT REQ: HCPCS | Performed by: FAMILY MEDICINE

## 2022-06-24 PROCEDURE — G8417 CALC BMI ABV UP PARAM F/U: HCPCS | Performed by: FAMILY MEDICINE

## 2022-06-24 NOTE — PROGRESS NOTES
Health Maintenance Due   Topic Date Due    Shingrix Vaccine Age 49> (1 of 2) Never done    Cervical cancer screen  10/21/2016    Colorectal Cancer Screening Combo  07/06/2018    Bone Densitometry (Dexa) Screening  Never done    Pneumococcal 65+ years (1 - PCV) Never done    Depression Monitoring  01/04/2022    Medicare Yearly Exam  05/08/2022     1. \"Have you been to the ER, urgent care clinic since your last visit? Hospitalized since your last visit? \" No    2. \"Have you seen or consulted any other health care providers outside of the [address] [place] [address] since your last visit? \" No     3. For patients aged 39-70: Has the patient had a colonoscopy / FIT/ Cologuard? No      If the patient is female:    4. For patients aged 41-77: Has the patient had a mammogram within the past 2 years? Yes - Care Gap present. Most recent result on file      5. For patients aged 21-65: Has the patient had a pap smear? NA - based on age or sex    Do you have an [address] in place in the event that you have a healthcare crisis that could impact your decision making as it pertains to your health? NO    Would you like information about Advance Care Planning? NO    Information given. NO    Janett Hagen is a 73 y.o. female who presents today for Anticoagulation monitoring. Indication: PE  INR Goal: 2.0-3.0. Current dose:  Coumadin 7.5 mg daily, except 3.75 mg Tues/Thurs. Missed Coumadin Doses:  None  Medication Changes:  no  Dietary Changes:  no    Symptoms: taking coumadin appropriately without any bleeding.     Latest INRs:  Lab Results   Component Value Date/Time    INR 2.5 (H) 08/11/2020 10:52 AM    INR 2.3 (H) 05/27/2020 10:30 AM    INR 2.5 (H) 04/27/2020 09:31 AM    INR (POC) 2.2 (H) 06/29/2020 07:41 AM    INR POC 2.3 05/23/2022 01:53 PM    INR POC 3.3 (A) 04/27/2022 02:47 PM    INR POC 3.3 (A) 04/13/2022 10:07 AM    Prothrombin time 26.0 (H) 08/11/2020 10:52 AM    Prothrombin time 23.0 (H) 05/27/2020 10:30 AM Prothrombin time 25.1 (H) 04/27/2020 09:31 AM        New Coumadin dose:.current treatment plan is effective, no change in therapy. Next check to be scheduled for  4 weeks.

## 2022-06-24 NOTE — PROGRESS NOTES
Medicare Annual Wellness Visit    I have reviewed the patient's medical history in detail and updated the computerized patient record. History   Janett Copeland is a 73 y.o. female who presents to follow-up on chronic medical issues. She has been feeling more fatigued than she thinks she should for the last 3 months. Few things have occurred over that time. She is feeling surrounded in her home. Her boyfriend's sister passed away and all of her stuff has been moved into her house. It is conceivable that this is nila and causing a nocturnal cough. She feels the urge to clean it up but cannot muster the energy and instead goes and takes a nap. She finds that she is crying more and feeling more down similar to previous bouts of depression. She has noticed some excessive daytime sleepiness. It falling asleep with a drop of a hat. Told by her friends that she snores loudly. She notices that when she gets out and walks around she is limited because her upper thighs will start to cramp and she will need a 10-minute break. Also notices if she sits at a restaurant for 30 minutes she will be stiff when standing. She did do physical therapy and found great benefit to her knees and shoulders thinks that she could do more of her home exercises    Past Medical History:   Diagnosis Date    Arthritis     ASD (atrial septal defect)     age 7 septal defect closures MCV    Depression     Hypertension     Lymphedema of leg     bilat    Thromboembolus ([address]     hx bilateral DVT.  long term use Coumadin    Thyroid disease       Past Surgical History:   Procedure Laterality Date    COLONOSCOPY,VANESSA SOLOMON  7/16/2015         HX ATRIAL SEPTAL DEFECT REPAIR  1967    HX ORTHOPAEDIC      carpal tunnel x3    HX TUBAL LIGATION       Current Outpatient Medications   Medication Sig Dispense Refill    hydroCHLOROthiazide (HYDRODIURIL) 25 mg tablet Take 1 tablet by mouth once daily 90 Tablet 3    warfarin (COUMADIN) 7.5 mg tablet Take 1 tablet by mouth once daily 90 Tablet 3    Euthyrox 100 mcg tablet TAKE 1 TABLET BY MOUTH ONCE DAILY BEFORE BREAKFAST 90 Tablet 3    venlafaxine (EFFEXOR) 75 mg tablet Take 1 tablet by mouth twice daily 180 Tablet 3    naproxen sodium (ALEVE PO) Take  by mouth as needed for Pain.  diclofenac (VOLTAREN) 1 % gel Apply 2 g to affected area four (4) times daily. 100 g 3    spironolactone (ALDACTONE) 25 mg tablet Take 1 tablet by mouth once daily 90 Tablet 3    valsartan (DIOVAN) 320 mg tablet Take 1 tablet by mouth once daily 90 Tablet 3    diclofenac EC (VOLTAREN) 75 mg EC tablet Take 1 Tablet by mouth two (2) times daily as needed for Pain. 60 Tablet 2    acetaminophen (TYLENOL) 325 mg tablet Take 650 mg by mouth every six (6) hours as needed.  cholecalciferol (VITAMIN D3) 1,000 unit cap Take  by mouth daily.  LANSOPRAZOLE (PREVACID PO) Take  by mouth daily.  potassium 99 mg tablet Take 198 mg by mouth daily. Pt is taking 2 tabs daily       Allergies   Allergen Reactions    Copper Hives    Iodinated Contrast Media Hives    Lasix [Furosemide] Hives    Sulfa (Sulfonamide Antibiotics) Hives     Family History   Problem Relation Age of Onset    Heart Disease Mother     Heart Disease Father      Social History     Tobacco Use    Smoking status: Never Smoker    Smokeless tobacco: Never Used   Substance Use Topics    Alcohol use: No     Patient Active Problem List   Diagnosis Code    Lymphedema I89.0    Carpal tunnel syndrome, bilateral G56.03    Depression F32. A    Hypothyroidism E03.9    Essential hypertension, benign I10    Chronic acquired lymphedema--legs--onset age ~36yo I80.0    H/O atrial septal defect repair--age 12(3 defects)--one defect self-closure Z87.74    Hx pulmonary embolism--due to DVT Z80.36    Secondhand smoke exposure--40 YEARS() Z72.25    Family history of CAD--sister s/p stents,mother s/p CABG Z82.49    Severe obesity (BMI 35.0-39. 9) with comorbidity ([facility] E66.01    Recurrent deep vein thrombosis (DVT) ([facility] I82.409       Depression Risk Factor Screening:     3 most recent PHQ Screens 6/24/2022   PHQ Not Done -   Little interest or pleasure in doing things Not at all   Feeling down, depressed, irritable, or hopeless More than half the days   Total Score PHQ 2 2     Alcohol Risk Factor Screening: On any occasion during the past 3 months, have you had more than 3 drinks containing alcohol? No    Do you average more than 7 drinks per week? No      Functional Ability and Level of Safety:     Hearing Loss   none    Activities of Daily Living   Self-care. Requires assistance with: no ADLs    Fall Risk     Fall Risk Assessment, last 12 mths 1/26/2022   Able to walk? Yes   Fall in past 12 months? 1   Do you feel unsteady? 1   Number of falls in past 12 months 1   Fall with injury? 1     Abuse Screen   Patient is not abused    Review of Systems   ROS:  As listed in HPI. In addition:  Constitutional:   No headache, fever, fatigue, weight loss or weight gain      Eyes:   No redness, pruritis, pain, visual changes, swelling, or discharge      Ears:    No pain, loss or changes in hearing     Cardiac:    No chest pain      Resp:   No cough or shortness of breath      Neuro   No loss of consciousness, dizziness, seizure    Physical Examination     Evaluation of Cognitive Function:  Mood/affect:  happy  Appearance: age appropriate  Family member/caregiver input:     Physical Exam:  Blood pressure 122/78, pulse 84, temperature 97.8 °F (36.6 °C), temperature source Temporal, resp. rate 17, height 5' 10\" (1.778 m), weight 263 lb 9.6 oz (119.6 kg), SpO2 98 %. GEN: No apparent distress. Alert and oriented and responds to all questions appropriately.    NECK:  Supple; no masses; thyroid normal           LUNGS: Respirations unlabored; clear to auscultation bilaterally  CARDIOVASCULAR: Regular, rate, and rhythm without murmurs   ABDOMEN: Soft; nontender; nondistended; normoactive bowel sounds; no masses or organomegaly  NEUROLOGIC:  No focal neurologic deficits. Strength and sensation grossly intact. Coordination and gait grossly intact. EXT: Well perfused. No edema. SKIN: No obvious rashes. Patient Care Team:  Cheli Patterson MD as PCP - General (Family Medicine)  Cheli Patterson MD as PCP - [address] [place] [address] Dr Baron Provider    Advice/Referrals/Counseling   Education and counseling provided:    She would prefer to do a fit kit. Does not think that she will be doing a colonoscopy anytime soon. Would be willing to do a colonoscopy for a positive fit kit. Last Pap smear might of been 10 years ago. Plans to follow-up with her gynecologist for this. We discussed bone density screening. She would like to get this done. Discussed pneumonia vaccine recommendations beginning at age 72. She would like to put this off and might get it done at one of her INR checks. Assessment/Plan     Depression  Is taking venlafaxine 75 mg twice daily and felt like it was working well up until recently. What is changed is she is feeling over whelmed by tasks around the house. Probably does not have a safe space to relax in the house. I discussed this and offered a medication adjustment. She would like to try nonmedication strategies first.    Excessive daytime sleepiness  Concerning for VANESSA. Refer. Exercise intolerance. Check fatigue labs-iron, B12, TSH  Likely out of shape. Counseled on checking in with her home exercises she has learned with physical therapy. Hypertension  Well-controlled. Hypothyroid  TSH    History of pulmonary embolism, chronic anticoagulation  INR 2.1. Continue current dose recheck 1 month. ICD-10-CM ICD-9-CM    1. Routine general medical examination at a health care facility  Z00.00 V70.0    2. Hx pulmonary embolism  Z86.711 V12.55 AMB POC PT/INR   3. Depression, unspecified depression type  F32. A 311    4.  Severe obesity (BMI 35.0-35.9 with comorbidity) (HCC)  E66.01 278.01     Z68.35 V85.35    5. IGT (impaired glucose tolerance)  R73.02 790.22 HEMOGLOBIN A1C WITH EAG   6. Essential hypertension, benign  Z32 209.9 METABOLIC PANEL, COMPREHENSIVE      LIPID PANEL      CBC WITH AUTOMATED DIFF   7. Hypothyroidism, unspecified type  E03.9 244.9 TSH 3RD GENERATION   8. Family history of CAD--sister s/p stents,mother s/p CABG  Z82.49 V17.3    9. Fatigue, unspecified type  R53.83 780.79 SLEEP MEDICINE REFERRAL   10. Excessive daytime sleepiness  G47.19 780.54 SLEEP MEDICINE REFERRAL   11. Encounter for screening mammogram for malignant neoplasm of breast  Z12.31 V76.12 JONATHON MAMMO BI SCREENING INCL CAD   12. Colon cancer screening  Z12.11 V76.51 OCCULT BLOOD IMMUNOASSAY,DIAGNOSTIC      OCCULT BLOOD IMMUNOASSAY,DIAGNOSTIC   13. Post-menopausal  Z78.0 V49.81 DEXA BONE DENSITY STUDY AXIAL   14. B12 deficiency  E53.8 266.2 VITAMIN B12 & FOLATE   15.  Iron deficiency  E61.1 280.9 IRON PROFILE      FERRITIN

## 2022-06-25 LAB
ALBUMIN SERPL-MCNC: 3.8 G/DL (ref 3.5–5)
ALBUMIN/GLOB SERPL: 0.9 {RATIO} (ref 1.1–2.2)
ALP SERPL-CCNC: 93 U/L (ref 45–117)
ALT SERPL-CCNC: 24 U/L (ref 12–78)
ANION GAP SERPL CALC-SCNC: 3 MMOL/L (ref 5–15)
AST SERPL-CCNC: 14 U/L (ref 15–37)
BASOPHILS # BLD: 0 K/UL (ref 0–0.1)
BASOPHILS NFR BLD: 1 % (ref 0–1)
BILIRUB SERPL-MCNC: 0.4 MG/DL (ref 0.2–1)
BUN SERPL-MCNC: 19 MG/DL (ref 6–20)
BUN/CREAT SERPL: 19 (ref 12–20)
CALCIUM SERPL-MCNC: 9.7 MG/DL (ref 8.5–10.1)
CHLORIDE SERPL-SCNC: 103 MMOL/L (ref 97–108)
CHOLEST SERPL-MCNC: 178 MG/DL
CO2 SERPL-SCNC: 31 MMOL/L (ref 21–32)
COMMENT, HOLDF: NORMAL
CREAT SERPL-MCNC: 0.99 MG/DL (ref 0.55–1.02)
DIFFERENTIAL METHOD BLD: NORMAL
EOSINOPHIL # BLD: 0.1 K/UL (ref 0–0.4)
EOSINOPHIL NFR BLD: 2 % (ref 0–7)
ERYTHROCYTE [DISTWIDTH] IN BLOOD BY AUTOMATED COUNT: 13.3 % (ref 11.5–14.5)
EST. AVERAGE GLUCOSE BLD GHB EST-MCNC: 128 MG/DL
FERRITIN SERPL-MCNC: 129 NG/ML (ref 26–388)
FOLATE SERPL-MCNC: 3.4 NG/ML (ref 5–21)
GLOBULIN SER CALC-MCNC: 4.3 G/DL (ref 2–4)
GLUCOSE SERPL-MCNC: 133 MG/DL (ref 65–100)
HBA1C MFR BLD: 6.1 % (ref 4–5.6)
HCT VFR BLD AUTO: 46 % (ref 35–47)
HDLC SERPL-MCNC: 51 MG/DL
HDLC SERPL: 3.5 {RATIO} (ref 0–5)
HGB BLD-MCNC: 14.6 G/DL (ref 11.5–16)
IMM GRANULOCYTES # BLD AUTO: 0 K/UL (ref 0–0.04)
IMM GRANULOCYTES NFR BLD AUTO: 0 % (ref 0–0.5)
IRON SATN MFR SERPL: 23 % (ref 20–50)
IRON SERPL-MCNC: 95 UG/DL (ref 35–150)
LDLC SERPL CALC-MCNC: 104.4 MG/DL (ref 0–100)
LYMPHOCYTES # BLD: 2 K/UL (ref 0.8–3.5)
LYMPHOCYTES NFR BLD: 34 % (ref 12–49)
MCH RBC QN AUTO: 28.8 PG (ref 26–34)
MCHC RBC AUTO-ENTMCNC: 31.7 G/DL (ref 30–36.5)
MCV RBC AUTO: 90.7 FL (ref 80–99)
MONOCYTES # BLD: 0.4 K/UL (ref 0–1)
MONOCYTES NFR BLD: 6 % (ref 5–13)
NEUTS SEG # BLD: 3.4 K/UL (ref 1.8–8)
NEUTS SEG NFR BLD: 57 % (ref 32–75)
NRBC # BLD: 0 K/UL (ref 0–0.01)
NRBC BLD-RTO: 0 PER 100 WBC
PLATELET # BLD AUTO: 304 K/UL (ref 150–400)
PMV BLD AUTO: 9.7 FL (ref 8.9–12.9)
POTASSIUM SERPL-SCNC: 4.5 MMOL/L (ref 3.5–5.1)
PROT SERPL-MCNC: 8.1 G/DL (ref 6.4–8.2)
RBC # BLD AUTO: 5.07 M/UL (ref 3.8–5.2)
SAMPLES BEING HELD,HOLD: NORMAL
SODIUM SERPL-SCNC: 137 MMOL/L (ref 136–145)
TIBC SERPL-MCNC: 414 UG/DL (ref 250–450)
TRIGL SERPL-MCNC: 113 MG/DL (ref ?–150)
TSH SERPL DL<=0.05 MIU/L-ACNC: 2.38 UIU/ML (ref 0.36–3.74)
VIT B12 SERPL-MCNC: 417 PG/ML (ref 193–986)
VLDLC SERPL CALC-MCNC: 22.6 MG/DL
WBC # BLD AUTO: 5.9 K/UL (ref 3.6–11)

## 2022-07-25 ENCOUNTER — TELEPHONE (OUTPATIENT)
Dept: FAMILY MEDICINE CLINIC | Age: 67
End: 2022-07-25

## 2022-07-25 NOTE — TELEPHONE ENCOUNTER
Pt has started taking B12 vitamin as was told. She wants to know how many she should take?  Pt is taking 500mg once daily

## 2022-07-26 ENCOUNTER — CLINICAL SUPPORT (OUTPATIENT)
Dept: FAMILY MEDICINE CLINIC | Age: 67
End: 2022-07-26
Payer: MEDICARE

## 2022-07-26 DIAGNOSIS — Z86.711 HX PULMONARY EMBOLISM: Primary | ICD-10-CM

## 2022-07-26 LAB
INR BLD: 2.8
PT POC: 33.8 SECONDS
VALID INTERNAL CONTROL?: YES

## 2022-07-26 PROCEDURE — 85610 PROTHROMBIN TIME: CPT | Performed by: FAMILY MEDICINE

## 2022-08-07 LAB — HEMOCCULT STL QL IA: NEGATIVE

## 2022-08-08 NOTE — TELEPHONE ENCOUNTER
MsGabriel Amado Fitzgerald notified Dr. Tiffani Painting said Patient's atenolol-chlorthalidone is on back order. Pharmacy is requesting an alternative medication. Will send in hydrochlorothiazide 25 mg. This is a similar medication and she has been on this in the past.  Please double check with patient to make sure she is not currently taking this medication. (I am pretty sure she is no longer taking it although it is still on her medication list, last prescribed in 2015)     This will be a slight dose change. Need to check her blood pressure about 2 weeks after starting medicine. Can be done as part of Coumadin check. She said she is no longer on hydrochlorothiazide.  But will pick it up [employer] lid wipes bid OU.

## 2022-08-25 ENCOUNTER — HOSPITAL ENCOUNTER (OUTPATIENT)
Dept: MAMMOGRAPHY | Age: 67
Discharge: HOME | End: 2022-08-25
Attending: FAMILY MEDICINE
Payer: MEDICARE

## 2022-08-25 DIAGNOSIS — Z78.0 POST-MENOPAUSAL: ICD-10-CM

## 2022-08-25 DIAGNOSIS — Z12.31 ENCOUNTER FOR SCREENING MAMMOGRAM FOR MALIGNANT NEOPLASM OF BREAST: ICD-10-CM

## 2022-08-25 PROCEDURE — 77067 SCR MAMMO BI INCL CAD: CPT

## 2022-08-25 PROCEDURE — 77080 DXA BONE DENSITY AXIAL: CPT

## 2022-08-26 ENCOUNTER — TELEPHONE (OUTPATIENT)
Dept: FAMILY MEDICINE CLINIC | Age: 67
End: 2022-08-26

## 2022-08-26 ENCOUNTER — CLINICAL SUPPORT (OUTPATIENT)
Dept: FAMILY MEDICINE CLINIC | Age: 67
End: 2022-08-26
Payer: MEDICARE

## 2022-08-26 DIAGNOSIS — Z86.711 HX PULMONARY EMBOLISM: Primary | ICD-10-CM

## 2022-08-26 DIAGNOSIS — M85.832 OSTEOPENIA OF BOTH FOREARMS: ICD-10-CM

## 2022-08-26 DIAGNOSIS — M85.831 OSTEOPENIA OF BOTH FOREARMS: ICD-10-CM

## 2022-08-26 LAB
INR BLD: 2.3
PT POC: 27.8 SECONDS
VALID INTERNAL CONTROL?: YES

## 2022-08-26 PROCEDURE — 85610 PROTHROMBIN TIME: CPT | Performed by: FAMILY MEDICINE

## 2022-08-26 NOTE — PROGRESS NOTES
Health Maintenance Due   Topic Date Due    Shingrix Vaccine Age 49> (1 of 2) Never done    Cervical cancer screen  10/21/2016    Pneumococcal 65+ years (1 - PCV) Never done    COVID-19 Vaccine (4 - Booster for Pfizer series) 04/27/2022    Colorectal Cancer Screening Combo  08/03/2022     Janett Esposito is a 73 y.o. female who presents today for Anticoagulation monitoring. Indication: PE  INR Goal: 2.0-3.0. Current dose:  Coumadin 7.5 mg daily and 3.75 mg Tues/Thurs. Missed Coumadin Doses:  None  Medication Changes:  no  Dietary Changes:  no    Symptoms: taking coumadin appropriately without any bleeding. Latest INRs:  Lab Results   Component Value Date/Time    INR 2.5 (H) 08/11/2020 10:52 AM    INR 2.3 (H) 05/27/2020 10:30 AM    INR 2.5 (H) 04/27/2020 09:31 AM    INR (POC) 2.2 (H) 06/29/2020 07:41 AM    INR POC 2.8 07/26/2022 09:38 AM    INR POC 2.1 06/24/2022 08:56 AM    INR POC 2.3 05/23/2022 01:53 PM    Prothrombin time 26.0 (H) 08/11/2020 10:52 AM    Prothrombin time 23.0 (H) 05/27/2020 10:30 AM    Prothrombin time 25.1 (H) 04/27/2020 09:31 AM        New Coumadin dose:.current treatment plan is effective, no change in therapy. Next check to be scheduled for  4 weeks.

## 2022-08-26 NOTE — TELEPHONE ENCOUNTER
DEXA reviewed. Osteopenia (bones are thin but not brittle). No need for prescription medication. Vitamin D3 1000 international units daily. Adequate source of dietary calcium.     Repeat scan in 2 years (2024)

## 2022-09-15 ENCOUNTER — OFFICE VISIT (OUTPATIENT)
Dept: SLEEP MEDICINE | Age: 67
End: 2022-09-15
Payer: MEDICARE

## 2022-09-15 VITALS
HEART RATE: 78 BPM | OXYGEN SATURATION: 97 % | SYSTOLIC BLOOD PRESSURE: 139 MMHG | DIASTOLIC BLOOD PRESSURE: 83 MMHG | HEIGHT: 70 IN | WEIGHT: 266.4 LBS | TEMPERATURE: 97.4 F | BODY MASS INDEX: 38.14 KG/M2 | RESPIRATION RATE: 20 BRPM

## 2022-09-15 DIAGNOSIS — I10 ESSENTIAL HYPERTENSION, BENIGN: ICD-10-CM

## 2022-09-15 DIAGNOSIS — G47.33 OBSTRUCTIVE SLEEP APNEA (ADULT) (PEDIATRIC): Primary | ICD-10-CM

## 2022-09-15 DIAGNOSIS — E66.01 SEVERE OBESITY (BMI 35.0-39.9) WITH COMORBIDITY (HCC): ICD-10-CM

## 2022-09-15 PROCEDURE — 1101F PT FALLS ASSESS-DOCD LE1/YR: CPT | Performed by: INTERNAL MEDICINE

## 2022-09-15 PROCEDURE — G8427 DOCREV CUR MEDS BY ELIG CLIN: HCPCS | Performed by: INTERNAL MEDICINE

## 2022-09-15 PROCEDURE — G8399 PT W/DXA RESULTS DOCUMENT: HCPCS | Performed by: INTERNAL MEDICINE

## 2022-09-15 PROCEDURE — 1123F ACP DISCUSS/DSCN MKR DOCD: CPT | Performed by: INTERNAL MEDICINE

## 2022-09-15 PROCEDURE — G8752 SYS BP LESS 140: HCPCS | Performed by: INTERNAL MEDICINE

## 2022-09-15 PROCEDURE — 1090F PRES/ABSN URINE INCON ASSESS: CPT | Performed by: INTERNAL MEDICINE

## 2022-09-15 PROCEDURE — G9899 SCRN MAM PERF RSLTS DOC: HCPCS | Performed by: INTERNAL MEDICINE

## 2022-09-15 PROCEDURE — G9717 DOC PT DX DEP/BP F/U NT REQ: HCPCS | Performed by: INTERNAL MEDICINE

## 2022-09-15 PROCEDURE — G8417 CALC BMI ABV UP PARAM F/U: HCPCS | Performed by: INTERNAL MEDICINE

## 2022-09-15 PROCEDURE — G8536 NO DOC ELDER MAL SCRN: HCPCS | Performed by: INTERNAL MEDICINE

## 2022-09-15 PROCEDURE — 99204 OFFICE O/P NEW MOD 45 MIN: CPT | Performed by: INTERNAL MEDICINE

## 2022-09-15 PROCEDURE — G8754 DIAS BP LESS 90: HCPCS | Performed by: INTERNAL MEDICINE

## 2022-09-15 PROCEDURE — 3017F COLORECTAL CA SCREEN DOC REV: CPT | Performed by: INTERNAL MEDICINE

## 2022-09-15 NOTE — PROGRESS NOTES
.                   [address] [place] [place], [address]  Tel.  [phone]  Fax. 100 [facility] 60  1001 [facility] Ne, [address]  Tel.  [phone]  Fax. [phone] 9250 [facility] DandreJoselin palomo   Tel.  [phone]  Fax. [phone]         Subjective:      Janett Kaur is an 73 y.o. female self-referred for evaluation for a sleep disorder. She complains of snoring, snorting associated with frequent nighttime awakings. Symptoms began several years ago, gradually worsening since that time. She ws seen by me in 2017 and was diagnosed with sleep apnea with AHI of 26/hour. She did not proceed with the PAP titration because she wanted to try losing weight, but has not managed to do so. She is concerned that she may not tolerate a PAP/mask. She feels tired at times She usually can fall asleep in 20 minutes. Family or house members note snoring, periods of not breathing. She denies falling asleep while driving, during conversation. Janett Farah does wake up frequently at night. She is not bothered by waking up too early and left unable to get back to sleep. She actually sleeps about 6 hours at night and wakes up about 5 times during the night. She does not work shifts:  .   Janett indicates she does get too little sleep at night. Her bedtime is 2130. She awakens at 0730. She does take naps. She takes 6 naps a week lasting 3. She has the following observed behaviors: Loud snoring, Sleep talking;  (waking with a gasp or snort).   Other remarks:      [place] Sleepiness Score: 7        Allergies   Allergen Reactions    Copper Hives    Iodinated Contrast Media Hives    Lasix [Furosemide] Hives    Sulfa (Sulfonamide Antibiotics) Hives         Current Outpatient Medications:     hydroCHLOROthiazide (HYDRODIURIL) 25 mg tablet, Take 1 tablet by mouth once daily, Disp: 90 Tablet, Rfl: 3    warfarin (COUMADIN) 7.5 mg tablet, Take 1 tablet by mouth once daily, Disp: 90 Tablet, Rfl: 3 Euthyrox 100 mcg tablet, TAKE 1 TABLET BY MOUTH ONCE DAILY BEFORE BREAKFAST, Disp: 90 Tablet, Rfl: 3    venlafaxine (EFFEXOR) 75 mg tablet, Take 1 tablet by mouth twice daily, Disp: 180 Tablet, Rfl: 3    naproxen sodium (ALEVE PO), Take  by mouth as needed for Pain., Disp: , Rfl:     diclofenac (VOLTAREN) 1 % gel, Apply 2 g to affected area four (4) times daily. , Disp: 100 g, Rfl: 3    spironolactone (ALDACTONE) 25 mg tablet, Take 1 tablet by mouth once daily, Disp: 90 Tablet, Rfl: 3    valsartan (DIOVAN) 320 mg tablet, Take 1 tablet by mouth once daily, Disp: 90 Tablet, Rfl: 3    acetaminophen (TYLENOL) 325 mg tablet, Take 650 mg by mouth every six (6) hours as needed. , Disp: , Rfl:     cholecalciferol (VITAMIN D3) 25 mcg (1,000 unit) cap, Take  by mouth daily. , Disp: , Rfl:     LANSOPRAZOLE (PREVACID PO), Take  by mouth daily. , Disp: , Rfl:     potassium 99 mg tablet, Take 198 mg by mouth daily. Pt is taking 2 tabs daily, Disp: , Rfl:      She  has a past medical history of Arthritis, ASD (atrial septal defect), Depression, Hypertension, Lymphedema of leg, Thromboembolus ([address] and Thyroid disease. She  has a past surgical history that includes hx orthopaedic; colonoscopy,kamron ahumada,snare (7/16/2015); hx atrial septal defect repair (1967); and hx tubal ligation. She family history includes Heart Disease in her father and mother. She  reports that she has never smoked. She has never used smokeless tobacco. She reports that she does not drink alcohol and does not use drugs. Review of Systems:  Constitutional:  +weight gain. Eyes:  No blurred vision.   CVS:  No significant chest pain  Pulm:  No significant shortness of breath  GI:  No significant nausea or vomiting, heartburn controlled  :  +significant nocturia  Musculoskeletal:  some shoulder joint pain at night  Skin:  No significant rashes  Neuro:  No significant dizziness   Psych:  treated for anxiety, symptoms fairly well controlled    Sleep Review of Systems: notable for no difficulty falling asleep; +frequent awakenings at night;  regular dreaming noted; no nightmares ; no early morning headaches; no memory problems; no concentration issues; no history of any automobile or occupational accidents due to daytime drowsiness. Objective:   Visit Vitals  /83 (BP 1 Location: Right arm, BP Patient Position: Sitting, BP Cuff Size: Large adult)   Pulse 78   Temp 97.4 °F (36.3 °C) (Temporal)   Resp 20   Ht 5' 10\" (1.778 m)   Wt 266 lb 6.4 oz (120.8 kg)   LMP  (LMP Unknown)   SpO2 97%   BMI 38.22 kg/m²         General:   Not in acute distress   Eyes:  Anicteric sclerae, no obvious strabismus   Nose:  No obvious nasal septum deviation    Oropharynx:   Class 3 oropharyngeal outlet, thick tongue base,  , low-lying soft palate, narrow tonsilo-pharyngeal pilars   Tonsils:   tonsils are present and normal   Neck:     17 inches; midline trachea   Chest/Lungs:  Equal lung expansion, clear on auscultation    CVS:  Normal rate, regular rhythm; no JVD   Skin:  Warm to touch; no obvious rashes   Neuro:  No focal deficits ; no obvious tremor    Psych:  Normal affect,  normal countenance;          Assessment:       ICD-10-CM ICD-9-CM    1. Obstructive sleep apnea (adult) (pediatric)  G47.33 327.23 SLEEP STUDY UNATTENDED, 4 CHANNEL      2. Essential hypertension, benign  I10 401.1       3. Severe obesity (BMI 35.0-39. 9) with comorbidity ([facility]  E66.01 278.01             Plan:     * The patient currently has a High Risk for having sleep apnea. STOP-BANG score 7.  * HSAT was ordered for initial evaluation. We will assess severity of apnea. Treatment options for sleep apnea were reviewed. she would like to proceed with a trial of APAP if found to have significant apnea.   If she has difficulty tolerating APAP, she will contact us and we can proceed with PAP titraton  * She was provided information on sleep apnea including coresponding risk factors and the importance of proper treatment. * Counseling was provided regarding proper sleep hygiene and safe driving. 2.Hypertension - borderline control on current regimen. she will continue her current regimen. she will continue to monitor at home and with her PMD for further adjustments as needed. I have reviewed the relationship between hypertension as it relates to sleep-disordered breathing. 3. Obesity - weight has been gradually increasing. I have discussed the relationship of weight to obstructive sleep apnea. I have advised her to start a weight loss plan. she understands that weight loss can reduce severity of sleep apnea and snoring. The treatment plan was reviewed with the patient in detail . she understands that the lead technologist will be calling her  with the results or notifying of results via [address] [place] [address] and assisting with the next step in the treatment plan as outlined today during the consultation with me. All of her questions were addressed.      Electronically signed by    Meka Tobias MD  Diplomate in Sleep Medicine  [facility]  9/15/2022

## 2022-09-15 NOTE — PATIENT INSTRUCTIONS
217 [facility] [place]. [place], [address]  Tel.  [phone]  Fax. 100 [facility] 60  [place], 200 [address] [facility]  Tel.  [phone]  Fax. [phone] [address]  Tel.  [phone]  Fax. [phone]     Learning About CPAP for Sleep Apnea  What is CPAP? CPAP is a small machine that you use at home every night while you sleep. It increases air pressure in your throat to keep your airway open. When you have sleep apnea, this can help you sleep better so you feel much better. CPAP stands for \"continuous positive airway pressure. \"  The CPAP machine will have one of the following:  A mask that covers your nose and mouth  Prongs that fit into your nose  A mask that covers your nose only, the most common type. This type is called NCPAP. The N stands for \"nasal.\"  Why is it done? CPAP is usually the best treatment for obstructive sleep apnea. It is the first treatment choice and the most widely used. Your doctor may suggest CPAP if you have: Moderate to severe sleep apnea. Sleep apnea and coronary artery disease (CAD) or heart failure. How does it help? CPAP can help you have more normal sleep, so you feel less sleepy and more alert during the daytime. CPAP may help keep heart failure or other heart problems from getting worse. NCPAP may help lower your blood pressure. If you use CPAP, your bed partner may also sleep better because you are not snoring or restless. What are the side effects? Some people who use CPAP have:  A dry or stuffy nose and a sore throat. Irritated skin on the face. Sore eyes. Bloating. If you have any of these problems, work with your doctor to fix them. Here are some things you can try:  Be sure the mask or nasal prongs fit well. See if your doctor can adjust the pressure of your CPAP. If your nose is dry, try a humidifier.   If your nose is runny or stuffy, try decongestant medicine or a steroid nasal spray. If these things do not help, you might try a different type of machine. Some machines have air pressure that adjusts on its own. Others have air pressures that are different when you breathe in than when you breathe out. This may reduce discomfort caused by too much pressure in your nose. Where can you learn more? Go to DealExplorer.be  Enter Kaitlin Duvall in the search box to learn more about \"Learning About CPAP for Sleep Apnea. \"   © 0754-3720 Healthwise, Incorporated. Care instructions adapted under license by [facility] (which disclaims liability or warranty for this information). This care instruction is for use with your licensed healthcare professional. If you have questions about a medical condition or this instruction, always ask your healthcare professional. [address] any warranty or liability for your use of this information. Content Version: 8.4.86070; Last Revised: January 11, 2010  PROPER SLEEP HYGIENE    What to avoid  Do not have drinks with caffeine, such as coffee or black tea, for 8 hours before bed. Do not smoke or use other types of tobacco near bedtime. Nicotine is a stimulant and can keep you awake. Avoid drinking alcohol late in the evening, because it can cause you to wake in the middle of the night. Do not eat a big meal close to bedtime. If you are hungry, eat a light snack. Do not drink a lot of water close to bedtime, because the need to urinate may wake you up during the night. Do not read or watch TV in bed. Use the bed only for sleeping and sexual activity. What to try  Go to bed at the same time every night, and wake up at the same time every morning. Do not take naps during the day. Keep your bedroom quiet, dark, and cool. Get regular exercise, but not within 3 to 4 hours of your bedtime. .  Sleep on a comfortable pillow and mattress.   If watching the clock makes you anxious, turn it facing away from you so you cannot see the time. If you worry when you lie down, start a worry book. Well before bedtime, write down your worries, and then set the book and your concerns aside. Try meditation or other relaxation techniques before you go to bed. If you cannot fall asleep, get up and go to another room until you feel sleepy. Do something relaxing. Repeat your bedtime routine before you go to bed again. Make your house quiet and calm about an hour before bedtime. Turn down the lights, turn off the TV, log off the computer, and turn down the volume on music. This can help you relax after a busy day. Drowsy Driving: The [employer] cites drowsiness as a causing factor in more than 941,124 police reported crashes annually, resulting in 76,000 injuries and 1,500 deaths. Other surveys suggest 55% of people polled have driven while drowsy in the past year, 23% had fallen asleep but not crashed, 3% crashed, and 2% had and accident due to drowsy driving. Who is at risk? Young Drivers: One study of drowsy driving accidents states that 55% of the drivers were under 25 years. Of those, 75% were male. Shift Workers and Travelers: People who work overnight or travel across time zones frequently are at higher risk of experiencing Circadian Rhythm Disorders. They are trying to work and function when their body is programed to sleep. Sleep Deprived: Lack of sleep has a serious impact on your ability to pay attention or focus on a task. Consistently getting less than the average of 8 hours your body needs creates partial or cumulative sleep deprivation. Untreated Sleep Disorders: Sleep Apnea, Narcolepsy, R.L.S., and other sleep disorders (untreated) prevent a person from getting enough restful sleep. This leads to excessive daytime sleepiness and increases the risk for drowsy driving accidents by up to 7 times.   Medications / Alcohol: Even over the counter medications can cause drowsiness. Medications that impair a drivers attention should have a warning label. Alcohol naturally makes you sleepy and on its own can cause accidents. Combined with excessive drowsiness its effects are amplified. Signs of Drowsy Driving:   * You don't remember driving the last few miles   * You may drift out of your sofia   * You are unable to focus and your thoughts wander   * You may yawn more often than normal   * You have difficulty keeping your eyes open / nodding off   * Missing traffic signs, speeding, or tailgating  Prevention-   Good sleep hygiene, lifestyle and behavioral choices have the most impact on drowsy driving. There is no substitute for sleep and the average person requires 8 hours nightly. If you find yourself driving drowsy, stop and sleep. Consider the sleep hygiene tips provided during your visit as well. Medication Refill Policy: Refills for all medications require 1 week advance notice. Please have your pharmacy fax a refill request. We are unable to fax, or call in \"controled substance\" medications and you will need to pick these prescriptions up from our office. [employer] Activation    Thank you for requesting access to [employer]. Please follow the instructions below to securely access and download your online medical record. [employer] allows you to send messages to your doctor, view your test results, renew your prescriptions, schedule appointments, and more. How Do I Sign Up? In your internet browser, go to [email or web address]. Click on the First Time User? Click Here link in the Sign In box. You will see the New Member Sign Up page. Enter your [employer] Access Code exactly as it appears below. You will not need to use this code after youve completed the sign-up process. If you do not sign up before the expiration date, you must request a new code. [employer] Access Code:  Activation code not generated  Current [employer] Status: Active (This is the date your [employer] access code will )    Enter the last four digits of your Social Security Number (xxxx) and Date of Birth (mm/dd/yyyy) as indicated and click Submit. You will be taken to the next sign-up page. Create a [employer] ID. This will be your [employer] login ID and cannot be changed, so think of one that is secure and easy to remember. Create a [employer] password. You can change your password at any time. Enter your Password Reset Question and Answer. This can be used at a later time if you forget your password. Enter your e-mail address. You will receive e-mail notification when new information is available in [address] Click Sign Up. You can now view and download portions of your medical record. Click the ScreenTag link to download a portable copy of your medical information. Additional Information    If you have questions, please call [phone]. Remember, [employer] is NOT to be used for urgent needs. For medical emergencies, dial 911.

## 2022-09-30 ENCOUNTER — OFFICE VISIT (OUTPATIENT)
Dept: FAMILY MEDICINE CLINIC | Age: 67
End: 2022-09-30
Payer: MEDICARE

## 2022-09-30 VITALS
WEIGHT: 266 LBS | RESPIRATION RATE: 16 BRPM | DIASTOLIC BLOOD PRESSURE: 86 MMHG | HEIGHT: 70 IN | OXYGEN SATURATION: 98 % | TEMPERATURE: 98.3 F | HEART RATE: 77 BPM | BODY MASS INDEX: 38.08 KG/M2 | SYSTOLIC BLOOD PRESSURE: 129 MMHG

## 2022-09-30 DIAGNOSIS — Z51.81 ANTICOAGULATION GOAL OF INR 2 TO 3: ICD-10-CM

## 2022-09-30 DIAGNOSIS — N30.90 CYSTITIS: ICD-10-CM

## 2022-09-30 DIAGNOSIS — I10 ESSENTIAL HYPERTENSION, BENIGN: Primary | ICD-10-CM

## 2022-09-30 DIAGNOSIS — R39.15 URINARY URGENCY: ICD-10-CM

## 2022-09-30 DIAGNOSIS — Z79.01 ANTICOAGULATION GOAL OF INR 2 TO 3: ICD-10-CM

## 2022-09-30 DIAGNOSIS — I87.2 VENOUS STASIS DERMATITIS OF LEFT LOWER EXTREMITY: ICD-10-CM

## 2022-09-30 LAB
BILIRUB UR QL STRIP: NEGATIVE
GLUCOSE UR-MCNC: NEGATIVE MG/DL
INR BLD: 23.7 (ref 1–1.5)
KETONES P FAST UR STRIP-MCNC: NEGATIVE MG/DL
PH UR STRIP: 5.5 [PH] (ref 4.6–8)
PROT UR QL STRIP: NORMAL
PT POC: 2 SECONDS (ref 9.1–12)
SP GR UR STRIP: 1.02 (ref 1–1.03)
UA UROBILINOGEN AMB POC: NORMAL (ref 0.2–1)
URINALYSIS CLARITY POC: NORMAL
URINALYSIS COLOR POC: YELLOW
URINE BLOOD POC: NORMAL
URINE LEUKOCYTES POC: NORMAL
URINE NITRITES POC: NEGATIVE
VALID INTERNAL CONTROL?: YES

## 2022-09-30 PROCEDURE — G8417 CALC BMI ABV UP PARAM F/U: HCPCS | Performed by: FAMILY MEDICINE

## 2022-09-30 PROCEDURE — G8427 DOCREV CUR MEDS BY ELIG CLIN: HCPCS | Performed by: FAMILY MEDICINE

## 2022-09-30 PROCEDURE — G8752 SYS BP LESS 140: HCPCS | Performed by: FAMILY MEDICINE

## 2022-09-30 PROCEDURE — 1123F ACP DISCUSS/DSCN MKR DOCD: CPT | Performed by: FAMILY MEDICINE

## 2022-09-30 PROCEDURE — 3017F COLORECTAL CA SCREEN DOC REV: CPT | Performed by: FAMILY MEDICINE

## 2022-09-30 PROCEDURE — 81003 URINALYSIS AUTO W/O SCOPE: CPT | Performed by: FAMILY MEDICINE

## 2022-09-30 PROCEDURE — G8399 PT W/DXA RESULTS DOCUMENT: HCPCS | Performed by: FAMILY MEDICINE

## 2022-09-30 PROCEDURE — G8536 NO DOC ELDER MAL SCRN: HCPCS | Performed by: FAMILY MEDICINE

## 2022-09-30 PROCEDURE — G8754 DIAS BP LESS 90: HCPCS | Performed by: FAMILY MEDICINE

## 2022-09-30 PROCEDURE — 85610 PROTHROMBIN TIME: CPT | Performed by: FAMILY MEDICINE

## 2022-09-30 PROCEDURE — G9899 SCRN MAM PERF RSLTS DOC: HCPCS | Performed by: FAMILY MEDICINE

## 2022-09-30 PROCEDURE — 99214 OFFICE O/P EST MOD 30 MIN: CPT | Performed by: FAMILY MEDICINE

## 2022-09-30 PROCEDURE — 1101F PT FALLS ASSESS-DOCD LE1/YR: CPT | Performed by: FAMILY MEDICINE

## 2022-09-30 PROCEDURE — 1090F PRES/ABSN URINE INCON ASSESS: CPT | Performed by: FAMILY MEDICINE

## 2022-09-30 PROCEDURE — G9717 DOC PT DX DEP/BP F/U NT REQ: HCPCS | Performed by: FAMILY MEDICINE

## 2022-09-30 RX ORDER — LANOLIN ALCOHOL/MO/W.PET/CERES
1000 CREAM (GRAM) TOPICAL DAILY
COMMUNITY

## 2022-09-30 RX ORDER — NITROFURANTOIN 25; 75 MG/1; MG/1
100 CAPSULE ORAL 2 TIMES DAILY
Qty: 14 CAPSULE | Refills: 0 | Status: SHIPPED | OUTPATIENT
Start: 2022-09-30 | End: 2022-10-07

## 2022-09-30 NOTE — PROGRESS NOTES
1. \"Have you been to the ER, urgent care clinic since your last visit? Hospitalized since your last visit? \" No    2. \"Have you seen or consulted any other health care providers outside of the [address] [place] [address] since your last visit? \" No     3. For patients aged 39-70: Has the patient had a colonoscopy / FIT/ Cologuard? Yes - Care Gap present. Most recent result on file Fit testing done this year. If the patient is female:    4. For patients aged 41-77: Has the patient had a mammogram within the past 2 years? Yes - Care Gap present. Most recent result on file      5. For patients aged 21-65: Has the patient had a pap smear?  No    Health Maintenance Due   Topic Date Due    Shingrix Vaccine Age 49> (1 of 2) Never done    Cervical cancer screen  10/21/2016    Pneumococcal 65+ years (1 - PCV) Never done    COVID-19 Vaccine (4 - Booster for Pfizer series) 04/27/2022    Flu Vaccine (1) 08/01/2022    Colorectal Cancer Screening Combo  08/03/2022     Chief Complaint   Patient presents with    Hypertension     Follow up    Coagulation disorder
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
## 2022-10-03 ENCOUNTER — TELEPHONE (OUTPATIENT)
Dept: FAMILY MEDICINE CLINIC | Age: 67
End: 2022-10-03

## 2022-10-03 LAB
BACTERIA SPEC CULT: ABNORMAL
CC UR VC: ABNORMAL
SERVICE CMNT-IMP: ABNORMAL

## 2022-10-03 NOTE — TELEPHONE ENCOUNTER
Urine culture resulted. Grew bacteria that should be killed by the antibiotic she is taking.   Hope she is feeling better

## 2022-10-06 ENCOUNTER — TELEPHONE (OUTPATIENT)
Dept: FAMILY MEDICINE CLINIC | Age: 67
End: 2022-10-06

## 2022-10-06 NOTE — TELEPHONE ENCOUNTER
verified. Pt returned from trip and she is not feeling any better. She believes that the Rx is causing black, runny stools.

## 2022-10-07 RX ORDER — CIPROFLOXACIN 250 MG/1
250 TABLET, FILM COATED ORAL EVERY 12 HOURS
Qty: 6 TABLET | Refills: 0 | Status: CANCELLED | OUTPATIENT
Start: 2022-10-07 | End: 2022-10-10

## 2022-10-07 NOTE — TELEPHONE ENCOUNTER
TRINO verified. Informed patient of message from provider. Patient verified understanding. Patient states she only has one more pill left for the Macrobid and will finish it today. Informed patient if it gets any worse to get further evaluated this weekend and give us a call Monday. Patient verified understanding and also has a appointment of 10/13.

## 2022-10-07 NOTE — TELEPHONE ENCOUNTER
Diarrhea is a rare side effect of Macrobid. However she did just go on a cruise, common to have a change in bowel habits.     If still having UTI symptoms can offer Cipro (pended to encounter)

## 2022-10-13 ENCOUNTER — CLINICAL SUPPORT (OUTPATIENT)
Dept: FAMILY MEDICINE CLINIC | Age: 67
End: 2022-10-13
Payer: MEDICARE

## 2022-10-13 ENCOUNTER — TELEPHONE (OUTPATIENT)
Dept: FAMILY MEDICINE CLINIC | Age: 67
End: 2022-10-13

## 2022-10-13 DIAGNOSIS — Z86.711 HX PULMONARY EMBOLISM: Primary | ICD-10-CM

## 2022-10-13 LAB
INR BLD: 2.2
PT POC: 26.1 SECONDS
VALID INTERNAL CONTROL?: YES

## 2022-10-13 PROCEDURE — 85610 PROTHROMBIN TIME: CPT | Performed by: FAMILY MEDICINE

## 2022-10-13 NOTE — PROGRESS NOTES
June Honor Osgood is a 78 y.o. female who presents today for Anticoagulation monitoring. Indication: PE  INR Goal: 2.0-3.0. Current dose:  Coumadin 7.5 mg daily, except 3.75 mg Tues/Thur. Missed Coumadin Doses:  None  Medication Changes:  no  Dietary Changes:  no    Symptoms: taking coumadin appropriately without any bleeding. Latest INRs:  Lab Results   Component Value Date/Time    INR 2.5 (H) 08/11/2020 10:52 AM    INR 2.3 (H) 05/27/2020 10:30 AM    INR 2.5 (H) 04/27/2020 09:31 AM    INR (POC) 2.2 (H) 06/29/2020 07:41 AM    INR POC 23.7 (A) 09/30/2022 09:36 AM    INR POC 2.3 08/26/2022 08:55 AM    INR POC 2.8 07/26/2022 09:38 AM    Prothrombin time 26.0 (H) 08/11/2020 10:52 AM    Prothrombin time 23.0 (H) 05/27/2020 10:30 AM    Prothrombin time 25.1 (H) 04/27/2020 09:31 AM        New Coumadin dose:.current treatment plan is effective, no change in therapy. Next check to be scheduled for  4 weeks.

## 2022-11-03 ENCOUNTER — OFFICE VISIT (OUTPATIENT)
Dept: SLEEP MEDICINE | Age: 67
End: 2022-11-03

## 2022-11-03 DIAGNOSIS — G47.33 OSA (OBSTRUCTIVE SLEEP APNEA): Primary | ICD-10-CM

## 2022-11-09 RX ORDER — VALSARTAN 320 MG/1
TABLET ORAL
Qty: 90 TABLET | Refills: 3 | Status: SHIPPED | OUTPATIENT
Start: 2022-11-09

## 2022-11-10 ENCOUNTER — TELEPHONE (OUTPATIENT)
Dept: SLEEP MEDICINE | Age: 67
End: 2022-11-10

## 2022-11-10 DIAGNOSIS — G47.33 OBSTRUCTIVE SLEEP APNEA (ADULT) (PEDIATRIC): Primary | ICD-10-CM

## 2022-11-22 ENCOUNTER — CLINICAL SUPPORT (OUTPATIENT)
Dept: FAMILY MEDICINE CLINIC | Age: 67
End: 2022-11-22
Payer: MEDICARE

## 2022-11-22 DIAGNOSIS — Z23 ENCOUNTER FOR IMMUNIZATION: ICD-10-CM

## 2022-11-22 DIAGNOSIS — Z86.711 HX PULMONARY EMBOLISM: Primary | ICD-10-CM

## 2022-11-22 LAB
INR BLD: 2.3
PT POC: 27.5 SECONDS
VALID INTERNAL CONTROL?: YES

## 2022-11-22 PROCEDURE — G0008 ADMIN INFLUENZA VIRUS VAC: HCPCS | Performed by: FAMILY MEDICINE

## 2022-11-22 PROCEDURE — 90694 VACC AIIV4 NO PRSRV 0.5ML IM: CPT | Performed by: FAMILY MEDICINE

## 2022-11-22 PROCEDURE — 85610 PROTHROMBIN TIME: CPT | Performed by: FAMILY MEDICINE

## 2022-11-22 NOTE — PROGRESS NOTES
Janett Arana is a 78 y.o. female who presents today for Anticoagulation monitoring. Indication: PE  INR Goal: 2.0-3.0. Current dose:  Coumadin 7.5 mg daily, except 3.75 mg Tues/Thurs. Missed Coumadin Doses:  None  Medication Changes:  no  Dietary Changes:  no    Symptoms: taking coumadin appropriately without any bleeding. Latest INRs:  Lab Results   Component Value Date/Time    INR 2.5 (H) 08/11/2020 10:52 AM    INR 2.3 (H) 05/27/2020 10:30 AM    INR 2.5 (H) 04/27/2020 09:31 AM    INR (POC) 2.2 (H) 06/29/2020 07:41 AM    INR POC 2.3 11/22/2022 09:06 AM    INR POC 2.2 10/13/2022 10:49 AM    INR POC 23.7 (A) 09/30/2022 09:36 AM    Prothrombin time 26.0 (H) 08/11/2020 10:52 AM    Prothrombin time 23.0 (H) 05/27/2020 10:30 AM    Prothrombin time 25.1 (H) 04/27/2020 09:31 AM        New Coumadin dose:.current treatment plan is effective, no change in therapy. Next check to be scheduled for  4 weeks. Janett Arana is a 78 y.o. female who presents for routine immunizations. She denies any symptoms , reactions or allergies that would exclude them from being immunized today. Risks and adverse reactions were discussed and the VIS was given to them. All questions were addressed. She was observed for 10 min post injection. There were no reactions observed.     Ema Rondon LPN

## 2022-11-22 NOTE — PATIENT INSTRUCTIONS
Vaccine Information Statement    Influenza (Flu) Vaccine (Inactivated or Recombinant): What You Need to Know    Many vaccine information statements are available in Persian and other languages. See www.immunize.org/vis. Hojas de información sobre vacunas están disponibles en español y en muchos otros idiomas. Visite www.immunize.org/vis. 1. Why get vaccinated? Influenza vaccine can prevent influenza (flu). Flu is a contagious disease that spreads around the [place] every year, usually between October and May. Anyone can get the flu, but it is more dangerous for some people. Infants and young children, people 72 years and older, pregnant people, and people with certain health conditions or a weakened immune system are at greatest risk of flu complications. Pneumonia, bronchitis, sinus infections, and ear infections are examples of flu-related complications. If you have a medical condition, such as heart disease, cancer, or diabetes, flu can make it worse. Flu can cause fever and chills, sore throat, muscle aches, fatigue, cough, headache, and runny or stuffy nose. Some people may have vomiting and diarrhea, though this is more common in children than adults. In an average year, thousands of people in the [facility] die from flu, and many more are hospitalized. Flu vaccine prevents millions of illnesses and flu-related visits to the doctor each year. 2. Influenza vaccines     CDC recommends everyone 6 months and older get vaccinated every flu season. Children 6 months through 6years of age may need 2 doses during a single flu season. Everyone else needs only 1 dose each flu season. It takes about 2 weeks for protection to develop after vaccination. There are many flu viruses, and they are always changing. Each year a new flu vaccine is made to protect against the influenza viruses believed to be likely to cause disease in the upcoming flu season.  Even when the vaccine doesnt exactly match these viruses, it may still provide some protection. Influenza vaccine does not cause flu. Influenza vaccine may be given at the same time as other vaccines. 3. Talk with your health care provider    Tell your vaccination provider if the person getting the vaccine:  Has had an allergic reaction after a previous dose of influenza vaccine, or has any severe, life-threatening allergies   Has ever had Guillain-Barré Syndrome (also called GBS)    In some cases, your health care provider may decide to postpone influenza vaccination until a future visit. Influenza vaccine can be administered at any time during pregnancy. People who are or will be pregnant during influenza season should receive inactivated influenza vaccine. People with minor illnesses, such as a cold, may be vaccinated. People who are moderately or severely ill should usually wait until they recover before getting influenza vaccine. Your health care provider can give you more information. 4. Risks of a vaccine reaction    Soreness, redness, and swelling where the shot is given, fever, muscle aches, and headache can happen after influenza vaccination. There may be a very small increased risk of Guillain-Barré Syndrome (GBS) after inactivated influenza vaccine (the flu shot). [facility] children who get the flu shot along with pneumococcal vaccine (PCV13) and/or DTaP vaccine at the same time might be slightly more likely to have a seizure caused by fever. Tell your health care provider if a child who is getting flu vaccine has ever had a seizure. People sometimes faint after medical procedures, including vaccination. Tell your provider if you feel dizzy or have vision changes or ringing in the ears. As with any medicine, there is a very remote chance of a vaccine causing a severe allergic reaction, other serious injury, or death. 5. What if there is a serious problem?     An allergic reaction could occur after the vaccinated person leaves the clinic. If you see signs of a severe allergic reaction (hives, swelling of the face and throat, difficulty breathing, a fast heartbeat, dizziness, or weakness), call 9-1-1 and get the person to the nearest hospital.    For other signs that concern you, call your health care provider. Adverse reactions should be reported to the Vaccine Adverse Event Reporting System (VAERS). Your health care provider will usually file this report, or you can do it yourself. Visit the VAERS website at www.vaers. [facility].gov or call 5-916.472.6870. VAERS is only for reporting reactions, and VAERS staff members do not give medical advice. 6. The National Vaccine Injury Compensation Program    The [facility] Vaccine Injury Compensation Program (VICP) is a federal program that was created to compensate people who may have been injured by certain vaccines. Claims regarding alleged injury or death due to vaccination have a time limit for filing, which may be as short as two years. Visit the VICP website at www.[facility].gov/vaccinecompensation or call 6-633.330.7772 to learn about the program and about filing a claim. 7. How can I learn more? Ask your health care provider. Call your local or state health department. Visit the website of the Food and Drug Administration (FDA) for vaccine package inserts and additional information at www.fda.gov/vaccines-blood-biologics/vaccines. Contact the Centers for Disease Control and Prevention (CDC): Call 4-848.975.1273 (1-800-CDC-INFO) or  Visit CDCs influenza website at www.cdc.gov/flu. Vaccine Information Statement   Inactivated Influenza Vaccine   8/6/2021  [address] [id number]   Department of Health and Human Services  Centers for Disease Control and Prevention    Office Use Only

## 2022-12-22 ENCOUNTER — CLINICAL SUPPORT (OUTPATIENT)
Dept: FAMILY MEDICINE CLINIC | Age: 67
End: 2022-12-22
Payer: MEDICARE

## 2022-12-22 DIAGNOSIS — Z79.01 ANTICOAGULATION GOAL OF INR 2 TO 3: Primary | ICD-10-CM

## 2022-12-22 DIAGNOSIS — Z51.81 ANTICOAGULATION GOAL OF INR 2 TO 3: Primary | ICD-10-CM

## 2022-12-22 LAB
INR BLD: 2.5 (ref 1–1.5)
PT POC: 30.5 SECONDS (ref 9.1–12)
VALID INTERNAL CONTROL?: YES

## 2022-12-22 PROCEDURE — 85610 PROTHROMBIN TIME: CPT | Performed by: FAMILY MEDICINE

## 2022-12-22 NOTE — PROGRESS NOTES
Janett West is a 78 y.o. female who presents today for Anticoagulation monitoring. Indication: DVT  INR Goal: 2.0-3.0. Current dose:  Coumadin 3.75 mg on Tues/Thurs and 7.5 all other days. Missed Coumadin Doses:  None  Medication Changes:  no  Dietary Changes:  no    Latest INRs:  Lab Results   Component Value Date/Time    INR 2.5 (H) 08/11/2020 10:52 AM    INR 2.3 (H) 05/27/2020 10:30 AM    INR 2.5 (H) 04/27/2020 09:31 AM    INR (POC) 2.2 (H) 06/29/2020 07:41 AM    INR POC 2.5 (A) 12/22/2022 09:40 AM    INR POC 2.3 11/22/2022 09:06 AM    INR POC 2.2 10/13/2022 10:49 AM    Prothrombin time 26.0 (H) 08/11/2020 10:52 AM    Prothrombin time 23.0 (H) 05/27/2020 10:30 AM    Prothrombin time 25.1 (H) 04/27/2020 09:31 AM        New Coumadin dose:.current treatment plan is effective, no change in therapy. Next check to be scheduled for  4 weeks.

## 2023-01-03 RX ORDER — SPIRONOLACTONE 25 MG/1
TABLET ORAL
Qty: 90 TABLET | Refills: 3 | Status: SHIPPED | OUTPATIENT
Start: 2023-01-03

## 2023-01-23 ENCOUNTER — CLINICAL SUPPORT (OUTPATIENT)
Dept: FAMILY MEDICINE CLINIC | Age: 68
End: 2023-01-23
Payer: MEDICARE

## 2023-01-23 DIAGNOSIS — Z86.711 HX PULMONARY EMBOLISM: Primary | ICD-10-CM

## 2023-01-23 LAB
INR BLD: 3.1
PT POC: 37.6 SECONDS
VALID INTERNAL CONTROL?: YES

## 2023-01-23 PROCEDURE — 85610 PROTHROMBIN TIME: CPT | Performed by: FAMILY MEDICINE

## 2023-01-23 NOTE — PROGRESS NOTES
Janett Aguillon is a 78 y.o. female who presents today for Anticoagulation monitoring. Indication: PE  INR Goal: 2.0-3.0. Current dose:  Coumadin 7.5 mg daily, except 3.75 Tues/Thurs. Missed Coumadin Doses:  None  Medication Changes:  no  Dietary Changes:  no    Symptoms: taking coumadin appropriately without any bleeding. Latest INRs:  Lab Results   Component Value Date/Time    INR 2.5 (H) 08/11/2020 10:52 AM    INR 2.3 (H) 05/27/2020 10:30 AM    INR 2.5 (H) 04/27/2020 09:31 AM    INR (POC) 2.2 (H) 06/29/2020 07:41 AM    INR POC 2.5 (A) 12/22/2022 09:40 AM    INR POC 2.3 11/22/2022 09:06 AM    INR POC 2.2 10/13/2022 10:49 AM    Prothrombin time 26.0 (H) 08/11/2020 10:52 AM    Prothrombin time 23.0 (H) 05/27/2020 10:30 AM    Prothrombin time 25.1 (H) 04/27/2020 09:31 AM        New Coumadin dose:.current treatment plan is effective, no change in therapy. Next check to be scheduled for  2 weeks.

## 2023-02-10 ENCOUNTER — CLINICAL SUPPORT (OUTPATIENT)
Dept: FAMILY MEDICINE CLINIC | Age: 68
End: 2023-02-10
Payer: MEDICARE

## 2023-02-10 DIAGNOSIS — Z79.01 ANTICOAGULATION GOAL OF INR 2 TO 3: Primary | ICD-10-CM

## 2023-02-10 DIAGNOSIS — Z51.81 ANTICOAGULATION GOAL OF INR 2 TO 3: Primary | ICD-10-CM

## 2023-02-10 LAB
INR BLD: 2.9 (ref 1–1.5)
PT POC: 34.3 SECONDS (ref 9.1–12)
VALID INTERNAL CONTROL?: YES

## 2023-02-10 NOTE — PROGRESS NOTES
Janett Manzano is a 78 y.o. female who presents today for Anticoagulation monitoring. Indication: DVT  INR Goal: 2.0-3.0. Current dose:  Coumadin 3.75mg Thursday and Thursday and 5mg all other days. Missed Coumadin Doses:  None  Medication Changes:  no  Dietary Changes:  no    Symptoms: taking coumadin appropriately without any bleeding. Latest INRs:  Lab Results   Component Value Date/Time    INR 2.5 (H) 08/11/2020 10:52 AM    INR 2.3 (H) 05/27/2020 10:30 AM    INR 2.5 (H) 04/27/2020 09:31 AM    INR (POC) 2.2 (H) 06/29/2020 07:41 AM    INR POC 2.9 (A) 02/10/2023 09:29 AM    INR POC 3.1 01/23/2023 09:35 AM    INR POC 2.5 (A) 12/22/2022 09:40 AM    Prothrombin time 26.0 (H) 08/11/2020 10:52 AM    Prothrombin time 23.0 (H) 05/27/2020 10:30 AM    Prothrombin time 25.1 (H) 04/27/2020 09:31 AM        New Coumadin dose:.current treatment plan is effective, no change in therapy. Next check to be scheduled for  4 weeks.

## 2023-02-18 DIAGNOSIS — F32.A DEPRESSION, UNSPECIFIED DEPRESSION TYPE: ICD-10-CM

## 2023-02-20 RX ORDER — VENLAFAXINE 75 MG/1
TABLET ORAL
Qty: 180 TABLET | Refills: 0 | Status: SHIPPED | OUTPATIENT
Start: 2023-02-20

## 2023-03-13 ENCOUNTER — NURSE TRIAGE (OUTPATIENT)
Dept: OTHER | Facility: CLINIC | Age: 68
End: 2023-03-13

## 2023-03-13 NOTE — TELEPHONE ENCOUNTER
[facility]    Received call from [address] at [facility] with Red Flag Complaint. Subjective: Caller states \"I need to have my INR checked. I have also been having issue with my shoulder. It has been like this for a long time but the tremors are getting worse. My knees are need to be addressed; I have pain in my right knee down to my ankle and up to my hip\"     *has been seen for shoulder issues*    Current Symptoms: bilateral knee pain; right knee is worse    Onset: 6 months ago; worsening    Pain Severity: 4/10; aching; constant    Temperature: denies    What has been tried: PT, Aleve, topical gel     Recommended disposition: See PCP within 3 Days    Care advice provided, patient verbalizes understanding; denies any other questions or concerns; instructed to call back for any new or worsening symptoms. Patient/Caller agrees with recommended disposition; writer provided warm transfer to [facility] at [facility] for appointment scheduling    Attention Provider: Thank you for allowing me to participate in the care of your patient. The patient was connected to triage in response to information provided to the [facility]. Please do not respond through this encounter as the response is not directed to a shared pool.       Reason for Disposition   MODERATE pain (e.g., symptoms interfere with work or school, limping) and present > 3 days    Protocols used: Knee Pain-ADULT-[place]

## 2023-03-14 ENCOUNTER — OFFICE VISIT (OUTPATIENT)
Dept: FAMILY MEDICINE CLINIC | Age: 68
End: 2023-03-14

## 2023-03-14 VITALS
RESPIRATION RATE: 16 BRPM | WEIGHT: 268 LBS | DIASTOLIC BLOOD PRESSURE: 84 MMHG | BODY MASS INDEX: 38.37 KG/M2 | HEIGHT: 70 IN | OXYGEN SATURATION: 95 % | SYSTOLIC BLOOD PRESSURE: 130 MMHG | HEART RATE: 81 BPM

## 2023-03-14 DIAGNOSIS — M25.561 CHRONIC PAIN OF BOTH KNEES: ICD-10-CM

## 2023-03-14 DIAGNOSIS — E66.01 SEVERE OBESITY (BMI 35.0-39.9) WITH COMORBIDITY (HCC): ICD-10-CM

## 2023-03-14 DIAGNOSIS — M25.562 CHRONIC PAIN OF BOTH KNEES: ICD-10-CM

## 2023-03-14 DIAGNOSIS — E61.1 IRON DEFICIENCY: ICD-10-CM

## 2023-03-14 DIAGNOSIS — R06.09 DOE (DYSPNEA ON EXERTION): ICD-10-CM

## 2023-03-14 DIAGNOSIS — E03.9 HYPOTHYROIDISM, UNSPECIFIED TYPE: ICD-10-CM

## 2023-03-14 DIAGNOSIS — F32.A DEPRESSION, UNSPECIFIED DEPRESSION TYPE: ICD-10-CM

## 2023-03-14 DIAGNOSIS — E53.8 B12 DEFICIENCY: ICD-10-CM

## 2023-03-14 DIAGNOSIS — M75.02 ADHESIVE CAPSULITIS OF LEFT SHOULDER: ICD-10-CM

## 2023-03-14 DIAGNOSIS — G89.29 CHRONIC PAIN OF BOTH KNEES: ICD-10-CM

## 2023-03-14 DIAGNOSIS — Z86.711 HX PULMONARY EMBOLISM: Primary | ICD-10-CM

## 2023-03-14 DIAGNOSIS — I10 ESSENTIAL HYPERTENSION, BENIGN: ICD-10-CM

## 2023-03-14 DIAGNOSIS — E55.9 VITAMIN D DEFICIENCY: ICD-10-CM

## 2023-03-14 LAB
INR BLD: 2.9
PT POC: 35.2 SECONDS
VALID INTERNAL CONTROL?: YES

## 2023-03-14 NOTE — PROGRESS NOTES
HPI  Janett Rosa is a 78 y.o. female who presents with knee pain, shoulder pain, concern about dyspnea on exertion. She just got back from a cruise on Sunday. Had a good time. Enjoyed the food and did not gain any weight although does perceive that she is more swollen after her trip than usual.  Had to walk past [address] on the [address] at [facility] and had to stop 6 times because she was short of breath. She also developed some knee pain from this ordeal.  She has not had to walk this distance in the recent past so cannot confidently say whether this is a acute decrease in her exercise tolerance. She did not have any perceptible MANE on the boat the day prior. Her dyspnea does concern her. She developed bilateral knee pain after the walk. The left medial and right lateral joint lines. She would also like to talk about her left shoulder which has been bothering her for 6 months. Points to the biceps and the lateral aspect. Feels like it is getting a little worse over the last month or 2    PMHx:  Past Medical History:   Diagnosis Date    Arthritis     ASD (atrial septal defect)     age 7 septal defect closures MCV    Depression     Hypertension     Lymphedema of leg     bilat    Thromboembolus ([address]     hx bilateral DVT. long term use Coumadin    Thyroid disease        Meds:   Current Outpatient Medications   Medication Sig Dispense Refill    venlafaxine (EFFEXOR) 75 mg tablet Take 1 tablet by mouth twice daily 180 Tablet 0    spironolactone (ALDACTONE) 25 mg tablet Take 1 tablet by mouth once daily 90 Tablet 3    valsartan (DIOVAN) 320 mg tablet Take 1 tablet by mouth once daily 90 Tablet 3    cyanocobalamin 1,000 mcg tablet Take 1,000 mcg by mouth daily.       hydroCHLOROthiazide (HYDRODIURIL) 25 mg tablet Take 1 tablet by mouth once daily 90 Tablet 3    warfarin (COUMADIN) 7.5 mg tablet Take 1 tablet by mouth once daily (Patient taking differently: Take 1 tablet by mouth once daily except tuesdays and thursday) 90 Tablet 3    Euthyrox 100 mcg tablet TAKE 1 TABLET BY MOUTH ONCE DAILY BEFORE BREAKFAST 90 Tablet 3    naproxen sodium (ALEVE PO) Take  by mouth as needed for Pain. diclofenac (VOLTAREN) 1 % gel Apply 2 g to affected area four (4) times daily. 100 g 3    acetaminophen (TYLENOL) 325 mg tablet Take 650 mg by mouth every six (6) hours as needed. cholecalciferol (VITAMIN D3) 25 mcg (1,000 unit) cap Take  by mouth daily. LANSOPRAZOLE (PREVACID PO) Take  by mouth daily. potassium 99 mg tablet Take 198 mg by mouth daily. Pt is taking 2 tabs daily         Allergies: Allergies   Allergen Reactions    Copper Hives    Iodinated Contrast Media Hives    Lasix [Furosemide] Hives    Sulfa (Sulfonamide Antibiotics) Hives       Smoker:  Social History     Tobacco Use   Smoking Status Never   Smokeless Tobacco Never       ETOH:   Social History     Substance and Sexual Activity   Alcohol Use No       FH:   Family History   Problem Relation Age of Onset    Heart Disease Mother     Heart Disease Father        ROS:   As listed in HPI. In addition:  Constitutional:   No headache, fever, fatigue, weight loss or weight gain      Cardiac:    No chest pain      Resp:   No cough or shortness of breath      Neuro   No loss of consciousness, dizziness, seizures      Physical Exam:  Blood pressure 130/84, pulse 81, resp. rate 16, height 5' 10\" (1.778 m), weight 268 lb (121.6 kg), SpO2 95 %. GEN: No apparent distress. Alert and oriented and responds to all questions appropriately. NEUROLOGIC:  No focal neurologic deficits. Strength and sensation grossly intact. Coordination and gait grossly intact. EXT: Well perfused. No edema. SKIN: No obvious rashes. Lungs clear to auscultation bilaterally  CV regular rate rhythm no murmur  Left shoulder examined. There is a limited range of motion external and internal rotation to 45 degrees each. Hard stop and a pain at the extremes of motion. Abduction limited to 90 degrees. There is positive impingement sign. There is 4/5 strength in the supraspinatus limited by pain on the left. 5/5 subscapularis and infraspinatus  Mild-moderate effusion bilateral knees. Medial joint line tenderness on the left, medial and lateral joint line tenderness on the right       Assessment and Plan     Bilateral knee pain  Likely arthritis due to overuse  This is an acute flareup that started 2 days ago after walking in the airport. We will provide with some gentle exercises and would expect this to improve with time. In the meantime ice/heat/wrap  She would like to see an orthopedist because this is a recurrent issue. Left shoulder pain  More chronic problem 6 months unremitting. Appears to have a frozen shoulder and a week supraspinatus. We will provide her with exercises but warned her that she will likely need to wait somewhere between 1 and 2 years for full resolution of her range of motion. MANE  She might be out of shape since she has not walked nearly this distance in the recent past but is understandably concerned so we will obtain a stress test.  This will need to be chemical stress test because of her arthritis    History of pulmonary embolism  Chronic anticoagulation  INR 2.9 continue current dose recheck 1 month      ICD-10-CM ICD-9-CM    1. Hx pulmonary embolism  Z86.711 V12.55 AMB POC PT/INR      2. Essential hypertension, benign  I10 401.1 CBC WITH AUTOMATED DIFF      LIPID PANEL      METABOLIC PANEL, COMPREHENSIVE      TSH 3RD GENERATION      3. Severe obesity (BMI 35.0-39. 9) with comorbidity ([facility] [address]  E66.01 278.01       4. Hypothyroidism, unspecified type  E03.9 244.9 CBC WITH AUTOMATED DIFF      LIPID PANEL      METABOLIC PANEL, COMPREHENSIVE      TSH 3RD GENERATION      5. MANE (dyspnea on exertion)  R06.09 786.09 NUCLEAR CARDIAC STRESS TEST      6. Depression, unspecified depression type  F32. A 311       7.  Adhesive capsulitis of left shoulder M75.02 726.0 REFERRAL TO ORTHOPEDICS      8. Chronic pain of both knees  M25.561 719.46 REFERRAL TO ORTHOPEDICS    M25.562 338.29     G89.29        9. B12 deficiency  E53.8 266.2 VITAMIN B12 & FOLATE      10. Vitamin D deficiency  E55.9 268.9 VITAMIN D, 25 HYDROXY      11. Iron deficiency  E61.1 280.9 IRON PROFILE      FERRITIN          AVS given.  Pt expressed understanding of instructions

## 2023-03-14 NOTE — PROGRESS NOTES
Chief Complaint   Patient presents with    Knee Pain    Shoulder Pain    Fatigue         Health Maintenance Due   Topic Date Due    Shingles Vaccine (1 of 2) Never done    Pneumococcal 65+ years (1 - PCV) Never done    COVID-19 Vaccine (4 - Booster for Pfizer series) 02/21/2022    Colorectal Cancer Screening Combo  08/03/2022       1. \"Have you been to the ER, urgent care clinic since your last visit? Hospitalized since your last visit? \" No    2. \"Have you seen or consulted any other health care providers outside of the [address] [place] [address] since your last visit? \" No     3. For patients aged 39-70: Has the patient had a colonoscopy / FIT/ Cologuard? Yes - no Care Gap present pt did FIT kit       If the patient is female:    4. For patients aged 41-77: Has the patient had a mammogram within the past 2 years? Yes - no Care Gap present      5. For patients aged 21-65: Has the patient had a pap smear?  NA - based on age or sex

## 2023-03-15 LAB
25(OH)D3 SERPL-MCNC: 32.8 NG/ML (ref 30–100)
ALBUMIN SERPL-MCNC: 3.6 G/DL (ref 3.5–5)
ALBUMIN/GLOB SERPL: 0.8 (ref 1.1–2.2)
ALP SERPL-CCNC: 80 U/L (ref 45–117)
ALT SERPL-CCNC: 30 U/L (ref 12–78)
ANION GAP SERPL CALC-SCNC: 6 MMOL/L (ref 5–15)
AST SERPL-CCNC: 18 U/L (ref 15–37)
BASOPHILS # BLD: 0 K/UL (ref 0–0.1)
BASOPHILS NFR BLD: 1 % (ref 0–1)
BILIRUB SERPL-MCNC: 0.4 MG/DL (ref 0.2–1)
BUN SERPL-MCNC: 20 MG/DL (ref 6–20)
BUN/CREAT SERPL: 18 (ref 12–20)
CALCIUM SERPL-MCNC: 9.4 MG/DL (ref 8.5–10.1)
CHLORIDE SERPL-SCNC: 105 MMOL/L (ref 97–108)
CHOLEST SERPL-MCNC: 172 MG/DL
CO2 SERPL-SCNC: 31 MMOL/L (ref 21–32)
COMMENT, HOLDF: NORMAL
CREAT SERPL-MCNC: 1.11 MG/DL (ref 0.55–1.02)
DIFFERENTIAL METHOD BLD: ABNORMAL
EOSINOPHIL # BLD: 0.1 K/UL (ref 0–0.4)
EOSINOPHIL NFR BLD: 3 % (ref 0–7)
ERYTHROCYTE [DISTWIDTH] IN BLOOD BY AUTOMATED COUNT: 14.7 % (ref 11.5–14.5)
FERRITIN SERPL-MCNC: 26 NG/ML (ref 8–252)
FOLATE SERPL-MCNC: 9 NG/ML (ref 5–21)
GLOBULIN SER CALC-MCNC: 4.3 G/DL (ref 2–4)
GLUCOSE SERPL-MCNC: 159 MG/DL (ref 65–100)
HCT VFR BLD AUTO: 41.1 % (ref 35–47)
HDLC SERPL-MCNC: 61 MG/DL
HDLC SERPL: 2.8 (ref 0–5)
HGB BLD-MCNC: 12.6 G/DL (ref 11.5–16)
IMM GRANULOCYTES # BLD AUTO: 0 K/UL (ref 0–0.04)
IMM GRANULOCYTES NFR BLD AUTO: 0 % (ref 0–0.5)
IRON SATN MFR SERPL: 14 % (ref 20–50)
IRON SERPL-MCNC: 64 UG/DL (ref 35–150)
LDLC SERPL CALC-MCNC: 82.8 MG/DL (ref 0–100)
LYMPHOCYTES # BLD: 2.2 K/UL (ref 0.8–3.5)
LYMPHOCYTES NFR BLD: 40 % (ref 12–49)
MCH RBC QN AUTO: 26.4 PG (ref 26–34)
MCHC RBC AUTO-ENTMCNC: 30.7 G/DL (ref 30–36.5)
MCV RBC AUTO: 86 FL (ref 80–99)
MONOCYTES # BLD: 0.3 K/UL (ref 0–1)
MONOCYTES NFR BLD: 6 % (ref 5–13)
NEUTS SEG # BLD: 2.8 K/UL (ref 1.8–8)
NEUTS SEG NFR BLD: 50 % (ref 32–75)
NRBC # BLD: 0 K/UL (ref 0–0.01)
NRBC BLD-RTO: 0 PER 100 WBC
PLATELET # BLD AUTO: 273 K/UL (ref 150–400)
PMV BLD AUTO: 10 FL (ref 8.9–12.9)
POTASSIUM SERPL-SCNC: 3.8 MMOL/L (ref 3.5–5.1)
PROT SERPL-MCNC: 7.9 G/DL (ref 6.4–8.2)
RBC # BLD AUTO: 4.78 M/UL (ref 3.8–5.2)
SAMPLES BEING HELD,HOLD: NORMAL
SODIUM SERPL-SCNC: 142 MMOL/L (ref 136–145)
TIBC SERPL-MCNC: 471 UG/DL (ref 250–450)
TRIGL SERPL-MCNC: 141 MG/DL (ref ?–150)
TSH SERPL DL<=0.05 MIU/L-ACNC: 3.21 UIU/ML (ref 0.36–3.74)
VIT B12 SERPL-MCNC: >2000 PG/ML (ref 193–986)
VLDLC SERPL CALC-MCNC: 28.2 MG/DL
WBC # BLD AUTO: 5.5 K/UL (ref 3.6–11)

## 2023-03-15 NOTE — PROGRESS NOTES
INR 1.5. Just got off a cruise 2 days ago in which she enjoyed a lot of salads.   Was pretty consistent before the cruise so we will maintain current dose and check in 1-2 weeks
Janett Teague is a 73 y.o. female who presents today for Anticoagulation monitoring. Indication: PE  INR Goal: 2.0-3.0. Current dose:  Coumadin 7.5 mg daily, except 3.75 Wed. Missed Coumadin Doses:  None  Medication Changes:  no  Dietary Changes: On a 7 day cruise eating a lot of salads    Symptoms: taking coumadin appropriately without any bleeding. Latest INRs:  Lab Results   Component Value Date/Time    INR 2.5 (H) 08/11/2020 10:52 AM    INR 2.3 (H) 05/27/2020 10:30 AM    INR 2.5 (H) 04/27/2020 09:31 AM    INR (POC) 2.2 (H) 06/29/2020 07:41 AM    INR POC 2.5 11/12/2021 09:08 AM    INR POC 2.7 10/12/2021 09:39 AM    INR POC 2.0 (A) 09/03/2021 09:23 AM    Prothrombin time 26.0 (H) 08/11/2020 10:52 AM    Prothrombin time 23.0 (H) 05/27/2020 10:30 AM    Prothrombin time 25.1 (H) 04/27/2020 09:31 AM        New Coumadin dose:.current treatment plan is effective, no change in therapy. Next check to be scheduled for  2 weeks.
Spine appears normal, movement of extremities grossly intact.

## 2023-03-20 ENCOUNTER — TELEPHONE (OUTPATIENT)
Dept: FAMILY MEDICINE CLINIC | Age: 68
End: 2023-03-20

## 2023-03-20 DIAGNOSIS — Z12.11 COLON CANCER SCREENING: Primary | ICD-10-CM

## 2023-03-20 NOTE — TELEPHONE ENCOUNTER
Labs reviewed. Low on iron. Not anemic. This might contribute to a sense of shortness of breath and easy fatigability. Can consider iron infusion versus oral iron supplement. This might help with your symptoms. The infusion would provide quicker relief    There is a question as to why you are low on iron. Most common reason is blood loss into the GI tract.   This is a potential sign of colon cancer so very strongly recommend colonoscopy

## 2023-03-21 RX ORDER — HYDROCHLOROTHIAZIDE 25 MG/1
TABLET ORAL
Qty: 90 TABLET | Refills: 3 | Status: SHIPPED | OUTPATIENT
Start: 2023-03-21

## 2023-03-21 NOTE — TELEPHONE ENCOUNTER
Pt notified and voiced understanding.  Pt states she would rather take oral iron she just needs to know which dose and directions on which iron supplement she should take

## 2023-03-21 NOTE — TELEPHONE ENCOUNTER
Iron supplement will probably say 65 mg. Take 1 pill/day. This can be constipating. If your tummy does not agree with the medicine try taking it every other day. Iron is best absorbed with vitamin C. Any vegetable will be able to provide vitamin C.   If you have a meal where you are eating veggies (like dinner for instance) then take iron with dinner

## 2023-03-21 NOTE — TELEPHONE ENCOUNTER
verified. Informed pt of message from provider regarding iron. Pt verified understanding and had no further questions.

## 2023-04-07 ENCOUNTER — TELEPHONE (OUTPATIENT)
Dept: FAMILY MEDICINE CLINIC | Age: 68
End: 2023-04-07

## 2023-04-18 ENCOUNTER — CLINICAL SUPPORT (OUTPATIENT)
Dept: FAMILY MEDICINE CLINIC | Age: 68
End: 2023-04-18
Payer: MEDICARE

## 2023-04-18 DIAGNOSIS — Z86.711 HX PULMONARY EMBOLISM: Primary | ICD-10-CM

## 2023-04-18 LAB
INR BLD: 3.4
PT POC: 40.4 SECONDS
VALID INTERNAL CONTROL?: YES

## 2023-04-18 PROCEDURE — 85610 PROTHROMBIN TIME: CPT | Performed by: FAMILY MEDICINE

## 2023-04-18 NOTE — PROGRESS NOTES
Janett Harris is a 78 y.o. female who presents today for Anticoagulation monitoring. Indication: PE  INR Goal: 2.0-3.0. Current dose:  Coumadin 3.75 mg Tues and 7.5 mg all other days. Missed Coumadin Doses:  None  Medication Changes:  no  Dietary Changes:  no    Symptoms: taking coumadin appropriately without any bleeding. Latest INRs:  Lab Results   Component Value Date/Time    INR 2.5 (H) 08/11/2020 10:52 AM    INR 2.3 (H) 05/27/2020 10:30 AM    INR 2.5 (H) 04/27/2020 09:31 AM    INR (POC) 2.2 (H) 06/29/2020 07:41 AM    INR POC 3.4 04/18/2023 09:44 AM    INR POC 2.9 03/14/2023 03:09 PM    INR POC 2.9 (A) 02/10/2023 09:29 AM    Prothrombin time 26.0 (H) 08/11/2020 10:52 AM    Prothrombin time 23.0 (H) 05/27/2020 10:30 AM    Prothrombin time 25.1 (H) 04/27/2020 09:31 AM        New Coumadin dose:. See calender     Next check to be scheduled for  2 weeks.

## 2023-05-02 ENCOUNTER — CLINICAL SUPPORT (OUTPATIENT)
Dept: FAMILY MEDICINE CLINIC | Age: 68
End: 2023-05-02
Payer: MEDICARE

## 2023-05-02 DIAGNOSIS — Z86.711 HX PULMONARY EMBOLISM: Primary | ICD-10-CM

## 2023-05-02 LAB
INR BLD: 3.1
PT POC: 37.5 SECONDS
VALID INTERNAL CONTROL?: YES

## 2023-05-02 PROCEDURE — 85610 PROTHROMBIN TIME: CPT | Performed by: FAMILY MEDICINE

## 2023-05-09 ENCOUNTER — TRANSCRIBE ORDERS (OUTPATIENT)
Facility: HOSPITAL | Age: 68
End: 2023-05-09

## 2023-05-09 DIAGNOSIS — R06.09 DOE (DYSPNEA ON EXERTION): Primary | ICD-10-CM

## 2023-05-15 RX ORDER — VENLAFAXINE 75 MG/1
TABLET ORAL
Qty: 180 TABLET | Refills: 3 | Status: SHIPPED | OUTPATIENT
Start: 2023-05-15

## 2023-05-16 ENCOUNTER — NURSE ONLY (OUTPATIENT)
Age: 68
End: 2023-05-16
Payer: MEDICARE

## 2023-05-16 DIAGNOSIS — Z86.711 HX PULMONARY EMBOLISM: ICD-10-CM

## 2023-05-16 DIAGNOSIS — I26.99 PULMONARY EMBOLISM, UNSPECIFIED CHRONICITY, UNSPECIFIED PULMONARY EMBOLISM TYPE, UNSPECIFIED WHETHER ACUTE COR PULMONALE PRESENT (HCC): Primary | ICD-10-CM

## 2023-05-16 LAB
POC INR: 1.8
PROTHROMBIN TIME, POC: 21.2

## 2023-05-16 PROCEDURE — PBSHW AMB POC PT/INR: Performed by: FAMILY MEDICINE

## 2023-05-16 PROCEDURE — 85610 PROTHROMBIN TIME: CPT | Performed by: FAMILY MEDICINE

## 2023-05-16 RX ORDER — WARFARIN SODIUM 7.5 MG/1
7.5 TABLET ORAL
COMMUNITY
Start: 2017-11-21

## 2023-05-16 NOTE — PROGRESS NOTES
Jackie Harris is a 78 y.o. female who presents today for Anticoagulation monitoring. Indication: PE  INR Goal: 2.0-3.0. Current dose:  Coumadin 3.75 mg Mon Wed Fri and 7.5mg all other days. Missed Coumadin Doses:  None  Medication Changes:  no  Dietary Changes:  no     Symptoms: taking coumadin appropriately without any bleeding. Per Dr. Julia Tracey, no change, recheck in 2 weeks.

## 2023-05-20 RX ORDER — VENLAFAXINE 75 MG/1
1 TABLET ORAL 2 TIMES DAILY
COMMUNITY
Start: 2023-02-20

## 2023-05-20 RX ORDER — ACETAMINOPHEN 325 MG/1
650 TABLET ORAL EVERY 6 HOURS PRN
COMMUNITY
Start: 2020-10-10

## 2023-05-20 RX ORDER — HYDROCHLOROTHIAZIDE 25 MG/1
1 TABLET ORAL DAILY
COMMUNITY
Start: 2023-03-21 | End: 2023-06-27 | Stop reason: SDUPTHER

## 2023-05-20 RX ORDER — LEVOTHYROXINE SODIUM 0.1 MG/1
100 TABLET ORAL
COMMUNITY
Start: 2023-04-12

## 2023-05-20 RX ORDER — VALSARTAN 320 MG/1
1 TABLET ORAL DAILY
COMMUNITY
Start: 2022-11-09

## 2023-05-20 RX ORDER — SPIRONOLACTONE 25 MG/1
1 TABLET ORAL DAILY
COMMUNITY
Start: 2023-01-03

## 2023-05-26 ENCOUNTER — TELEPHONE (OUTPATIENT)
Age: 68
End: 2023-05-26

## 2023-05-30 ENCOUNTER — NURSE ONLY (OUTPATIENT)
Age: 68
End: 2023-05-30
Payer: MEDICARE

## 2023-05-30 DIAGNOSIS — I26.99 PULMONARY EMBOLISM, UNSPECIFIED CHRONICITY, UNSPECIFIED PULMONARY EMBOLISM TYPE, UNSPECIFIED WHETHER ACUTE COR PULMONALE PRESENT (HCC): Primary | ICD-10-CM

## 2023-05-30 LAB
POC INR: 2
PROTHROMBIN TIME, POC: 24.3

## 2023-05-30 PROCEDURE — PBSHW AMB POC PT/INR: Performed by: FAMILY MEDICINE

## 2023-05-30 PROCEDURE — 85610 PROTHROMBIN TIME: CPT | Performed by: FAMILY MEDICINE

## 2023-05-30 NOTE — PROGRESS NOTES
Jackie Borden is a 78 y.o. female who presents today for Anticoagulation monitoring. Indication: PE  INR Goal: 2.0-3.0. Current dose:  Coumadin 7.5 mg daily, except 3.75 mg Mon/Wed/Fri. Missed Coumadin Doses:  None  Medication Changes:  no  Dietary Changes:  no    Symptoms: taking coumadin appropriately without any bleeding. Latest INRs:  Lab Results   Component Value Date/Time    INR 1.8 05/16/2023 09:34 AM    INR 3.1 05/02/2023 09:35 AM    INR 3.4 04/18/2023 09:44 AM    INR 2.9 03/14/2023 03:09 PM        New Coumadin dose:.current treatment plan is effective, no change in therapy. Next check to be scheduled for  2 weeks.

## 2023-06-27 ENCOUNTER — TELEPHONE (OUTPATIENT)
Age: 68
End: 2023-06-27

## 2023-06-27 ENCOUNTER — NURSE ONLY (OUTPATIENT)
Age: 68
End: 2023-06-27
Payer: MEDICARE

## 2023-06-27 DIAGNOSIS — I26.99 PULMONARY EMBOLISM, UNSPECIFIED CHRONICITY, UNSPECIFIED PULMONARY EMBOLISM TYPE, UNSPECIFIED WHETHER ACUTE COR PULMONALE PRESENT (HCC): Primary | ICD-10-CM

## 2023-06-27 LAB
POC INR: 2.6
PROTHROMBIN TIME, POC: 31.2

## 2023-06-27 PROCEDURE — PBSHW AMB POC PT/INR: Performed by: FAMILY MEDICINE

## 2023-06-27 PROCEDURE — 85610 PROTHROMBIN TIME: CPT | Performed by: FAMILY MEDICINE

## 2023-06-27 RX ORDER — HYDROCHLOROTHIAZIDE 25 MG/1
25 TABLET ORAL DAILY
Qty: 90 TABLET | Refills: 3 | Status: SHIPPED | OUTPATIENT
Start: 2023-06-27

## 2023-07-17 RX ORDER — WARFARIN SODIUM 7.5 MG/1
TABLET ORAL
Qty: 90 TABLET | Refills: 0 | Status: SHIPPED | OUTPATIENT
Start: 2023-07-17

## 2023-07-21 ENCOUNTER — NURSE ONLY (OUTPATIENT)
Age: 68
End: 2023-07-21
Payer: MEDICARE

## 2023-07-21 DIAGNOSIS — Z86.711 HX PULMONARY EMBOLISM: ICD-10-CM

## 2023-07-21 DIAGNOSIS — I26.99 PULMONARY EMBOLISM, UNSPECIFIED CHRONICITY, UNSPECIFIED PULMONARY EMBOLISM TYPE, UNSPECIFIED WHETHER ACUTE COR PULMONALE PRESENT (HCC): Primary | ICD-10-CM

## 2023-07-21 LAB
POC INR: 2.2
PROTHROMBIN TIME, POC: 25.8

## 2023-07-21 PROCEDURE — 85610 PROTHROMBIN TIME: CPT

## 2023-07-21 PROCEDURE — PBSHW AMB POC PT/INR

## 2023-07-21 SDOH — ECONOMIC STABILITY: HOUSING INSECURITY
IN THE LAST 12 MONTHS, WAS THERE A TIME WHEN YOU DID NOT HAVE A STEADY PLACE TO SLEEP OR SLEPT IN A SHELTER (INCLUDING NOW)?: NO

## 2023-07-21 SDOH — ECONOMIC STABILITY: FOOD INSECURITY: WITHIN THE PAST 12 MONTHS, YOU WORRIED THAT YOUR FOOD WOULD RUN OUT BEFORE YOU GOT MONEY TO BUY MORE.: NEVER TRUE

## 2023-07-21 SDOH — ECONOMIC STABILITY: INCOME INSECURITY: HOW HARD IS IT FOR YOU TO PAY FOR THE VERY BASICS LIKE FOOD, HOUSING, MEDICAL CARE, AND HEATING?: NOT HARD AT ALL

## 2023-07-21 SDOH — ECONOMIC STABILITY: FOOD INSECURITY: WITHIN THE PAST 12 MONTHS, THE FOOD YOU BOUGHT JUST DIDN'T LAST AND YOU DIDN'T HAVE MONEY TO GET MORE.: NEVER TRUE

## 2023-07-21 ASSESSMENT — PATIENT HEALTH QUESTIONNAIRE - PHQ9
SUM OF ALL RESPONSES TO PHQ QUESTIONS 1-9: 0
SUM OF ALL RESPONSES TO PHQ QUESTIONS 1-9: 0
8. MOVING OR SPEAKING SO SLOWLY THAT OTHER PEOPLE COULD HAVE NOTICED. OR THE OPPOSITE, BEING SO FIGETY OR RESTLESS THAT YOU HAVE BEEN MOVING AROUND A LOT MORE THAN USUAL: 0
9. THOUGHTS THAT YOU WOULD BE BETTER OFF DEAD, OR OF HURTING YOURSELF: 0
6. FEELING BAD ABOUT YOURSELF - OR THAT YOU ARE A FAILURE OR HAVE LET YOURSELF OR YOUR FAMILY DOWN: 0
4. FEELING TIRED OR HAVING LITTLE ENERGY: 0
5. POOR APPETITE OR OVEREATING: 0
SUM OF ALL RESPONSES TO PHQ QUESTIONS 1-9: 0
10. IF YOU CHECKED OFF ANY PROBLEMS, HOW DIFFICULT HAVE THESE PROBLEMS MADE IT FOR YOU TO DO YOUR WORK, TAKE CARE OF THINGS AT HOME, OR GET ALONG WITH OTHER PEOPLE: 0
7. TROUBLE CONCENTRATING ON THINGS, SUCH AS READING THE NEWSPAPER OR WATCHING TELEVISION: 0
2. FEELING DOWN, DEPRESSED OR HOPELESS: 0
SUM OF ALL RESPONSES TO PHQ9 QUESTIONS 1 & 2: 0
1. LITTLE INTEREST OR PLEASURE IN DOING THINGS: 0
3. TROUBLE FALLING OR STAYING ASLEEP: 0
SUM OF ALL RESPONSES TO PHQ QUESTIONS 1-9: 0

## 2023-07-21 NOTE — PROGRESS NOTES
Ms. Mejia Gm 78 y.o.,  who presents today for Anticoagulation monitoring. Indication: Atrial Fibrillation  INR Goal: 2.0-3.0. Today's INR:2.2  Previous INR:2.6  Current dose: 3.75 mg Mon, Wed, Fri and 7.5 mg all other days   Missed Coumadin Doses:  None  Medication Changes:  no  Dietary Changes:  no     Symptoms: taking coumadin appropriately without any bleeding. New Coumadin dose:.current treatment plan is effective, no change in therapy. Next check to be scheduled for 4 weeks.

## 2023-08-09 ENCOUNTER — CLINICAL DOCUMENTATION (OUTPATIENT)
Age: 68
End: 2023-08-09

## 2023-08-10 ENCOUNTER — HOSPITAL ENCOUNTER (OUTPATIENT)
Facility: HOSPITAL | Age: 68
End: 2023-08-10
Payer: MEDICARE

## 2023-08-10 ENCOUNTER — HOSPITAL ENCOUNTER (OUTPATIENT)
Facility: HOSPITAL | Age: 68
Discharge: HOME | End: 2023-08-12
Payer: MEDICARE

## 2023-08-10 DIAGNOSIS — R06.09 DOE (DYSPNEA ON EXERTION): ICD-10-CM

## 2023-08-10 PROCEDURE — 93017 CV STRESS TEST TRACING ONLY: CPT

## 2023-08-10 PROCEDURE — 78452 HT MUSCLE IMAGE SPECT MULT: CPT

## 2023-08-10 PROCEDURE — 3430000000 HC RX DIAGNOSTIC RADIOPHARMACEUTICAL: Performed by: FAMILY MEDICINE

## 2023-08-10 PROCEDURE — A9500 TC99M SESTAMIBI: HCPCS | Performed by: FAMILY MEDICINE

## 2023-08-10 RX ORDER — TETRAKIS(2-METHOXYISOBUTYLISOCYANIDE)COPPER(I) TETRAFLUOROBORATE 1 MG/ML
30.1 INJECTION, POWDER, LYOPHILIZED, FOR SOLUTION INTRAVENOUS ONCE
Status: COMPLETED | OUTPATIENT
Start: 2023-08-10 | End: 2023-08-10

## 2023-08-10 RX ADMIN — TETRAKIS(2-METHOXYISOBUTYLISOCYANIDE)COPPER(I) TETRAFLUOROBORATE 30.1 MILLICURIE: 1 INJECTION, POWDER, LYOPHILIZED, FOR SOLUTION INTRAVENOUS at 08:25

## 2023-08-11 ENCOUNTER — TELEPHONE (OUTPATIENT)
Age: 68
End: 2023-08-11

## 2023-08-11 ENCOUNTER — HOSPITAL ENCOUNTER (OUTPATIENT)
Facility: HOSPITAL | Age: 68
End: 2023-08-11
Payer: MEDICARE

## 2023-08-11 VITALS
SYSTOLIC BLOOD PRESSURE: 148 MMHG | DIASTOLIC BLOOD PRESSURE: 64 MMHG | BODY MASS INDEX: 37.51 KG/M2 | HEART RATE: 64 BPM | WEIGHT: 262 LBS | HEIGHT: 70 IN

## 2023-08-11 LAB
ECHO BSA: 2.42 M2
EKG DIAGNOSIS: NORMAL
STRESS BASELINE DIAS BP: 64 MMHG
STRESS BASELINE HR: 64 BPM
STRESS BASELINE ST DEPRESSION: 0 MM
STRESS BASELINE SYS BP: 148 MMHG
STRESS ESTIMATED WORKLOAD: 1 METS
STRESS PEAK DIAS BP: 66 MMHG
STRESS PEAK SYS BP: 158 MMHG
STRESS PERCENT HR ACHIEVED: 54 %
STRESS POST PEAK HR: 83 BPM
STRESS RATE PRESSURE PRODUCT: NORMAL BPM*MMHG
STRESS ST DEPRESSION: 0 MM
STRESS STAGE 1 BP: NORMAL MMHG
STRESS STAGE 1 DURATION: 1 MIN:SEC
STRESS STAGE 1 HR: 72 BPM
STRESS STAGE 2 BP: NORMAL MMHG
STRESS STAGE 2 DURATION: 1 MIN:SEC
STRESS STAGE 2 HR: 78 BPM
STRESS STAGE 3 BP: NORMAL MMHG
STRESS STAGE 3 DURATION: 1 MIN:SEC
STRESS STAGE 3 HR: 77 BPM
STRESS STAGE RECOVERY 1 BP: NORMAL MMHG
STRESS STAGE RECOVERY 1 DURATION: 1 MIN:SEC
STRESS STAGE RECOVERY 1 HR: 75 BPM
STRESS STAGE RECOVERY 2 BP: NORMAL MMHG
STRESS STAGE RECOVERY 2 DURATION: 1 MIN:SEC
STRESS STAGE RECOVERY 2 HR: 73 BPM
STRESS STAGE RECOVERY 3 BP: NORMAL MMHG
STRESS STAGE RECOVERY 3 DURATION: 1 MIN:SEC
STRESS STAGE RECOVERY 3 HR: 72 BPM
STRESS TARGET HR: 154 BPM

## 2023-08-11 PROCEDURE — A9500 TC99M SESTAMIBI: HCPCS | Performed by: FAMILY MEDICINE

## 2023-08-11 PROCEDURE — 6360000002 HC RX W HCPCS: Performed by: FAMILY MEDICINE

## 2023-08-11 PROCEDURE — 3430000000 HC RX DIAGNOSTIC RADIOPHARMACEUTICAL: Performed by: FAMILY MEDICINE

## 2023-08-11 RX ORDER — REGADENOSON 0.08 MG/ML
0.4 INJECTION, SOLUTION INTRAVENOUS
Status: COMPLETED | OUTPATIENT
Start: 2023-08-11 | End: 2023-08-11

## 2023-08-11 RX ORDER — TETRAKIS(2-METHOXYISOBUTYLISOCYANIDE)COPPER(I) TETRAFLUOROBORATE 1 MG/ML
31.2 INJECTION, POWDER, LYOPHILIZED, FOR SOLUTION INTRAVENOUS
Status: COMPLETED | OUTPATIENT
Start: 2023-08-11 | End: 2023-08-11

## 2023-08-11 RX ADMIN — TETRAKIS(2-METHOXYISOBUTYLISOCYANIDE)COPPER(I) TETRAFLUOROBORATE 31.2 MILLICURIE: 1 INJECTION, POWDER, LYOPHILIZED, FOR SOLUTION INTRAVENOUS at 09:30

## 2023-08-11 RX ADMIN — REGADENOSON 0.4 MG: 0.08 INJECTION, SOLUTION INTRAVENOUS at 09:35

## 2023-08-11 NOTE — TELEPHONE ENCOUNTER
Stress test was not entirely normal.  There was a small spot on the heart that might be getting poor blood flow or this could have possibly been a shadow from one of her other body parts. This is difficult for me to interpret.     I would suggest seeing a cardiologist and see what they think

## 2023-08-22 ENCOUNTER — NURSE ONLY (OUTPATIENT)
Age: 68
End: 2023-08-22
Payer: MEDICARE

## 2023-08-22 DIAGNOSIS — I26.99 PULMONARY EMBOLISM, UNSPECIFIED CHRONICITY, UNSPECIFIED PULMONARY EMBOLISM TYPE, UNSPECIFIED WHETHER ACUTE COR PULMONALE PRESENT (HCC): Primary | ICD-10-CM

## 2023-08-22 LAB
POC INR: 2.3
PROTHROMBIN TIME, POC: 27.1

## 2023-08-22 PROCEDURE — 85610 PROTHROMBIN TIME: CPT | Performed by: FAMILY MEDICINE

## 2023-08-22 PROCEDURE — PBSHW AMB POC PT/INR: Performed by: FAMILY MEDICINE

## 2023-08-22 NOTE — PROGRESS NOTES
Jackie Hunt is a 78 y.o. female who presents today for Anticoagulation monitoring. Indication: PE  INR Goal: 2.0-3.0. Current dose:  Coumadin 7.5 mg daily and 3.75 mg Mon/Wed/Fri. Missed Coumadin Doses:  None  Medication Changes:  no  Dietary Changes:  no    Symptoms: taking coumadin appropriately without any bleeding. Latest INRs:  Lab Results   Component Value Date/Time    INR 2.2 07/21/2023 09:55 AM    INR 2.6 06/27/2023 01:20 PM    INR 2.0 05/30/2023 09:25 AM    INR 3.1 05/02/2023 09:35 AM    INR 3.4 04/18/2023 09:44 AM    INR 2.9 03/14/2023 03:09 PM        New Coumadin dose:.current treatment plan is effective, no change in therapy. Next check to be scheduled for  4 weeks.

## 2023-09-26 ENCOUNTER — NURSE ONLY (OUTPATIENT)
Age: 68
End: 2023-09-26
Payer: MEDICARE

## 2023-09-26 DIAGNOSIS — Z86.711 HX PULMONARY EMBOLISM: ICD-10-CM

## 2023-09-26 DIAGNOSIS — I26.99 PULMONARY EMBOLISM, UNSPECIFIED CHRONICITY, UNSPECIFIED PULMONARY EMBOLISM TYPE, UNSPECIFIED WHETHER ACUTE COR PULMONALE PRESENT (HCC): Primary | ICD-10-CM

## 2023-09-26 LAB
POC INR: 2.3
PROTHROMBIN TIME, POC: 28.1

## 2023-09-26 PROCEDURE — 85610 PROTHROMBIN TIME: CPT | Performed by: FAMILY MEDICINE

## 2023-09-26 PROCEDURE — PBSHW AMB POC PT/INR: Performed by: FAMILY MEDICINE

## 2023-09-26 NOTE — PROGRESS NOTES
Jackie Welch is a 78 y.o. female who presents today for Anticoagulation monitoring. Indication: PE  INR Goal: 2.0-3.0. Current dose:  Coumadin 3.75 mg Mon/Wed/Fri and 7.5mg all other days   Missed Coumadin Doses:  None  Medication Changes:  no  Dietary Changes:  no    Symptoms: taking coumadin appropriately without any bleeding. Latest INRs:  Lab Results   Component Value Date/Time    INR 2.3 08/22/2023 10:20 AM    INR 2.2 07/21/2023 09:55 AM    INR 2.6 06/27/2023 01:20 PM    INR 3.1 05/02/2023 09:35 AM    INR 3.4 04/18/2023 09:44 AM    INR 2.9 03/14/2023 03:09 PM        New Coumadin dose:.current treatment plan is effective, no change in therapy. Next check to be scheduled for  4 weeks.

## 2023-10-17 NOTE — PROGRESS NOTES
Janett García is a 60 y.o. female who presents today for Anticoagulation monitoring. Indication: DVT  INR Goal: 2.0-3.0. Current dose:  Coumadin 7.5 mg daily. Missed Coumadin Doses:  None  Medication Changes:  no  Dietary Changes:  no    Symptoms: taking coumadin appropriately without any bleeding. Latest INRs:  Lab Results   Component Value Date/Time    INR 2.5 (H) 08/11/2020 10:52 AM    INR 2.3 (H) 05/27/2020 10:30 AM    INR 2.5 (H) 04/27/2020 09:31 AM    INR (POC) 2.2 (H) 06/29/2020 07:41 AM    INR POC 2.6 12/14/2020 10:20 AM    INR POC 1.9 11/30/2020 10:18 AM    INR POC 1.9 10/27/2020 08:40 AM    Prothrombin time 26.0 (H) 08/11/2020 10:52 AM    Prothrombin time 23.0 (H) 05/27/2020 10:30 AM    Prothrombin time 25.1 (H) 04/27/2020 09:31 AM        New Coumadin dose:.current treatment plan is effective, no change in therapy. Next check to be scheduled for  4 weeks. Name: Gisselle Olivares      : 10/4/1927      MRN: [id number]  Encounter Provider: DEANDRA Hearn  Encounter Date: 10/17/2023   Encounter department: [facility] PRIMARY CARE    Assessment & Plan     1. Acute URI  -     benzonatate (TESSALON PERLES) 100 mg capsule; Take 1 capsule (100 mg total) by mouth 3 (three) times a day as needed for cough    2. Acute cough  -     doxycycline (ADOXA) 100 MG tablet; Take 1 tablet (100 mg total) by mouth 2 (two) times a day for 7 days         Start antibiotic, this is doxycycline 100 mg twice daily for 7 days. Take antibiotic with food. If you take any vitamin supplements, try to separate dose by 4-6 hours as this may impair absorption of the antibiotic. Start cough medication, this is the Tessalon perles. One pill every 8 hours as needed for cough. Avoid decongestants- they will increase BP. Stay well hydrated. Can use over the counter mucinex as well. Please call the office if you are experiencing any worsening of symptoms or no symptom improvement. Subjective      Patient presents with:  Cough: Cough and wheezing x 1 week COVID test negative on Wednesday  No fevers. No improvement with OTC medications. Loose / productive cough. Sputum gray/yellowish. No recent antibiotics. Review of Systems   Constitutional:  Negative for chills and fever. HENT:  Positive for congestion, rhinorrhea and sore throat. Eyes:  Negative for discharge. Respiratory:  Positive for cough and wheezing. Negative for shortness of breath. Cardiovascular:  Negative for chest pain. Gastrointestinal:  Negative for constipation and diarrhea. Genitourinary:  Negative for difficulty urinating. Musculoskeletal:  Negative for joint swelling. Skin:  Negative for rash. Neurological:  Negative for headaches. Hematological:  Negative for adenopathy. Psychiatric/Behavioral:  The patient is not nervous/anxious.         Current Outpatient Medications on File Prior to Visit   Medication Sig    amLODIPine (NORVASC) 2.5 mg tablet Take 1 tablet (2.5 mg total) by mouth 2 (two) times a day    ascorbic acid (VITAMIN C) 250 mg tablet Take 500 mg by mouth daily    aspirin 81 MG tablet Take by mouth daily     Cholecalciferol (Vitamin D3) 25 MCG (1000 UT) CAPS Take 25 each by mouth in the morning    Diclofenac Sodium (VOLTAREN) 1 % Apply 2 g topically 4 (four) times a day    dorzolamide-timolol (COSOPT) 22.3-6.8 MG/ML ophthalmic solution INSTILL 1 DROP IN EACH EYE EVERY DAY    doxepin (SINEquan) 50 mg capsule Take 1 capsule (50 mg total) by mouth daily at bedtime    Elastic Bandages & Supports (CARPAL TUNNEL WRIST STABILIZER) MISC by Does not apply route daily    furosemide (LASIX) 20 mg tablet Take 1 tablet (20 mg total) by mouth if needed (Lower extremity edema) May take 3 times weekly as needed for lower extremity swelling.    gabapentin (NEURONTIN) 300 mg capsule Take 1 capsule (300 mg total) by mouth 3 (three) times a day    omeprazole (PriLOSEC) 20 mg delayed release capsule Take 1 capsule (20 mg total) by mouth daily    phenazopyridine (PYRIDIUM) 200 mg tablet Take 1 tablet (200 mg total) by mouth 3 (three) times a day with meals    telmisartan (MICARDIS) 20 MG tablet Take 1 tablet (20 mg total) by mouth daily    [DISCONTINUED] ezetimibe (ZETIA) 10 mg tablet Take 1 tablet (10 mg total) by mouth daily       Objective     /100 (BP Location: Left arm, Patient Position: Sitting, Cuff Size: Standard)   Pulse 68   Temp (!) 97.3 °F (36.3 °C)   SpO2 97%     Physical Exam  Vitals and nursing note reviewed. Constitutional:       General: She is not in acute distress. Appearance: Normal appearance. She is well-developed. She is not diaphoretic. HENT:      Head: Normocephalic and atraumatic.       Right Ear: Tympanic membrane, ear canal and external ear normal.      Left Ear: Tympanic membrane, ear canal and external ear normal.      Mouth/Throat:      Mouth: Mucous membranes are moist.      Pharynx: Oropharynx is clear. No oropharyngeal exudate or posterior oropharyngeal erythema. Eyes:      General: Lids are normal.         Right eye: No discharge. Left eye: No discharge. Conjunctiva/sclera: Conjunctivae normal.   Cardiovascular:      Rate and Rhythm: Normal rate and regular rhythm. Heart sounds: No murmur heard. Pulmonary:      Effort: Pulmonary effort is normal. No respiratory distress. Breath sounds: Rhonchi present. No wheezing. Musculoskeletal:         General: No deformity. Skin:     General: Skin is warm and dry. Neurological:      General: No focal deficit present. Mental Status: She is alert and oriented to person, place, and time. Psychiatric:         Speech: Speech normal.         Behavior: Behavior normal.         Thought Content:  Thought content normal.         Judgment: Judgment normal.       DEANDRA Loomis

## 2023-10-31 ENCOUNTER — NURSE ONLY (OUTPATIENT)
Age: 68
End: 2023-10-31
Payer: MEDICARE

## 2023-10-31 DIAGNOSIS — Z86.711 HX PULMONARY EMBOLISM: ICD-10-CM

## 2023-10-31 DIAGNOSIS — Z23 NEEDS FLU SHOT: ICD-10-CM

## 2023-10-31 DIAGNOSIS — I26.99 PULMONARY EMBOLISM, UNSPECIFIED CHRONICITY, UNSPECIFIED PULMONARY EMBOLISM TYPE, UNSPECIFIED WHETHER ACUTE COR PULMONALE PRESENT (HCC): Primary | ICD-10-CM

## 2023-10-31 LAB
POC INR: 2.6
PROTHROMBIN TIME, POC: 31.1

## 2023-10-31 PROCEDURE — 90694 VACC AIIV4 NO PRSRV 0.5ML IM: CPT | Performed by: FAMILY MEDICINE

## 2023-10-31 PROCEDURE — PBSHW INFLUENZA, FLUAD, (AGE 65 Y+), IM, PF, 0.5 ML: Performed by: FAMILY MEDICINE

## 2023-10-31 PROCEDURE — 85610 PROTHROMBIN TIME: CPT | Performed by: FAMILY MEDICINE

## 2023-10-31 PROCEDURE — PBSHW AMB POC PT/INR: Performed by: FAMILY MEDICINE

## 2023-10-31 NOTE — PROGRESS NOTES
who presents today for Anticoagulation monitoring. Indication: Atrial Fibrillation  INR Goal: 2.0-3.0. Today's INR:2.6  Previous INR:2.3 on 09/26/2023  Current dose:  3.75 mg every Mon, Wed, and Fri and 7.5 mg all other days   Missed Coumadin Doses:  None  Medication Changes:  no  Dietary Changes:  no     Symptoms: taking coumadin appropriately without any bleeding. New Coumadin dose:.current treatment plan is effective, no change in therapy. Next check to be scheduled for 4 weeks.

## 2023-11-06 ENCOUNTER — TELEPHONE (OUTPATIENT)
Age: 68
End: 2023-11-06

## 2023-11-06 NOTE — TELEPHONE ENCOUNTER
Would need to examine and discuss to determine whether she would benefit from cortisone.   I can do cortisone injections in the knees or her orthopedist

## 2023-11-06 NOTE — TELEPHONE ENCOUNTER
Pt is calling stating she has arthritis in both knees and she is wanting to know if getting a Cortizone shot would help her and should she see you in reference to this or go to the orthopedic

## 2023-11-07 RX ORDER — VALSARTAN 320 MG/1
320 TABLET ORAL DAILY
Qty: 90 TABLET | Refills: 3 | Status: SHIPPED | OUTPATIENT
Start: 2023-11-07

## 2023-11-07 RX ORDER — WARFARIN SODIUM 7.5 MG/1
3.75 TABLET ORAL
COMMUNITY

## 2023-11-07 RX ORDER — WARFARIN SODIUM 7.5 MG/1
TABLET ORAL
Qty: 90 TABLET | Refills: 3 | Status: SHIPPED | OUTPATIENT
Start: 2023-11-07

## 2023-11-07 RX ORDER — VENLAFAXINE 75 MG/1
75 TABLET ORAL 2 TIMES DAILY
COMMUNITY

## 2023-11-08 ENCOUNTER — ANESTHESIA (OUTPATIENT)
Facility: HOSPITAL | Age: 68
End: 2023-11-08
Payer: MEDICARE

## 2023-11-08 ENCOUNTER — ANESTHESIA EVENT (OUTPATIENT)
Facility: HOSPITAL | Age: 68
End: 2023-11-08
Payer: MEDICARE

## 2023-11-08 ENCOUNTER — HOSPITAL ENCOUNTER (OUTPATIENT)
Facility: HOSPITAL | Age: 68
Setting detail: OUTPATIENT SURGERY
Discharge: HOME | End: 2023-11-08
Attending: SPECIALIST | Admitting: SPECIALIST
Payer: MEDICARE

## 2023-11-08 VITALS
WEIGHT: 263.1 LBS | OXYGEN SATURATION: 98 % | HEIGHT: 70 IN | SYSTOLIC BLOOD PRESSURE: 155 MMHG | BODY MASS INDEX: 37.67 KG/M2 | TEMPERATURE: 97.9 F | DIASTOLIC BLOOD PRESSURE: 69 MMHG | RESPIRATION RATE: 22 BRPM | HEART RATE: 72 BPM

## 2023-11-08 PROCEDURE — 6360000002 HC RX W HCPCS: Performed by: NURSE ANESTHETIST, CERTIFIED REGISTERED

## 2023-11-08 PROCEDURE — 2720000010 HC SURG SUPPLY STERILE: Performed by: SPECIALIST

## 2023-11-08 PROCEDURE — 3600007512: Performed by: SPECIALIST

## 2023-11-08 PROCEDURE — 7100000010 HC PHASE II RECOVERY - FIRST 15 MIN: Performed by: SPECIALIST

## 2023-11-08 PROCEDURE — 3600007502: Performed by: SPECIALIST

## 2023-11-08 PROCEDURE — 3700000000 HC ANESTHESIA ATTENDED CARE: Performed by: SPECIALIST

## 2023-11-08 PROCEDURE — 2709999900 HC NON-CHARGEABLE SUPPLY: Performed by: SPECIALIST

## 2023-11-08 PROCEDURE — 88305 TISSUE EXAM BY PATHOLOGIST: CPT

## 2023-11-08 PROCEDURE — 3700000001 HC ADD 15 MINUTES (ANESTHESIA): Performed by: SPECIALIST

## 2023-11-08 PROCEDURE — 2580000003 HC RX 258: Performed by: SPECIALIST

## 2023-11-08 PROCEDURE — 7100000011 HC PHASE II RECOVERY - ADDTL 15 MIN: Performed by: SPECIALIST

## 2023-11-08 RX ORDER — SODIUM CHLORIDE 0.9 % (FLUSH) 0.9 %
5-40 SYRINGE (ML) INJECTION EVERY 12 HOURS SCHEDULED
Status: DISCONTINUED | OUTPATIENT
Start: 2023-11-08 | End: 2023-11-08 | Stop reason: HOSPADM

## 2023-11-08 RX ORDER — SODIUM CHLORIDE 9 MG/ML
25 INJECTION, SOLUTION INTRAVENOUS PRN
Status: DISCONTINUED | OUTPATIENT
Start: 2023-11-08 | End: 2023-11-08 | Stop reason: HOSPADM

## 2023-11-08 RX ORDER — SODIUM CHLORIDE 0.9 % (FLUSH) 0.9 %
5-40 SYRINGE (ML) INJECTION PRN
Status: DISCONTINUED | OUTPATIENT
Start: 2023-11-08 | End: 2023-11-08 | Stop reason: HOSPADM

## 2023-11-08 RX ADMIN — SODIUM CHLORIDE 25 ML: 9 INJECTION, SOLUTION INTRAVENOUS at 12:59

## 2023-11-08 RX ADMIN — PROPOFOL 100 MG: 10 INJECTION, EMULSION INTRAVENOUS at 13:06

## 2023-11-08 RX ADMIN — PROPOFOL 100 MG: 10 INJECTION, EMULSION INTRAVENOUS at 13:16

## 2023-11-08 RX ADMIN — PROPOFOL 100 MG: 10 INJECTION, EMULSION INTRAVENOUS at 13:11

## 2023-11-08 RX ADMIN — PROPOFOL 100 MG: 10 INJECTION, EMULSION INTRAVENOUS at 13:19

## 2023-11-08 ASSESSMENT — PAIN - FUNCTIONAL ASSESSMENT: PAIN_FUNCTIONAL_ASSESSMENT: NONE - DENIES PAIN

## 2023-11-08 ASSESSMENT — PAIN SCALES - GENERAL: PAINLEVEL_OUTOF10: 0

## 2023-11-08 NOTE — PROGRESS NOTES
Endoscopy Case End Note:    1334:  Procedure scope was pre-cleaned, per protocol, at bedside by ADDIE Schrader3 [id number]:  Report received from anesthesia - Karla Ferrell CRNA. See anesthesia flowsheet for intra-procedure vital signs and events.

## 2023-11-08 NOTE — PROGRESS NOTES
ARRIVAL INFORMATION:  Verified patient name and date of birth, scheduled procedure, and informed consent. : Shannan Chappell, friend, contact number: [phone]  Physician and staff can share information with the . Belongings with patient include:  Clothing,Glasses, Jewelry (x1 pair earrings, x1 necklace, x2 rings all remain in place on persons body)    GI FOCUSED ASSESSMENT:  Neuro: Awake, alert, oriented x4  Respiratory: even and unlabored   GI: soft and non-distended  EKG Rhythm: normal sinus rhythm, BBB    Education:Reviewed general discharge instructions and  information.

## 2023-11-08 NOTE — OP NOTE
Colonoscopy Procedure Note    Indications:   Personal history of colon polyps (screening only)    Referring Physician: Tenna Goldberg, MD  Anesthesia/Sedation: MAC anesthesia Propofol  Endoscopist:  Dr. John Paul Fontenot    Procedure in Detail:  Informed consent was obtained for the procedure, including sedation. Risks of perforation, hemorrhage, adverse drug reaction, and aspiration were discussed. The patient was placed in the left lateral decubitus position. Based on the pre-procedure assessment, including review of the patient's medical history, medications, allergies, and review of systems, she had been deemed to be an appropriate candidate for moderate sedation; she was therefore sedated with the medications listed above. The patient was monitored continuously with ECG tracing, pulse oximetry, blood pressure monitoring, and direct observations. A rectal examination was performed. The [id number] was inserted into the rectum and advanced under direct vision to the cecum, which was identified by the ileocecal valve and appendiceal orifice. The quality of the colonic preparation was adequate. A careful inspection was made as the colonoscope was withdrawn, including a retroflexed view of the rectum; findings and interventions are described below. Appropriate photodocumentation was obtained. Findings:    Scope advanced to the cecum and terminal ileum. Multiple colon polyps removed as below:  (1) 8 mm transverse polyp s/p hot snare removal  (1) 7 mm sessile ascending colon polyp s/p hot snare removal  (4) sessile small polyps in the cecum 3-4 mm in size s/p cold snare removal  (1) sessile 4 mm polyp in splenic s/p cold snare removal    3. Small internal hemorrhoids. Therapies:  see above    Specimen: Specimens were collected as described above and sent to pathology. Complications: None were encountered during the procedure. EBL: < 10 ml.     Recommendations:     - Repeat colonoscopy

## 2023-11-08 NOTE — ANESTHESIA POSTPROCEDURE EVALUATION
Department of Anesthesiology  Postprocedure Note    Patient: Jackie Sagastume  MRN: [id number]  YOB: 1955  Date of evaluation: 11/8/2023      Procedure Summary     Date: 11/08/23 Room / Location: [facility] ENDO 01 / [facility] ENDOSCOPY    Anesthesia Start: 6860 Anesthesia Stop: 3815    Procedure: COLONOSCOPY (Lower GI Region) Diagnosis:       Personal history of colonic polyps      Long term (current) use of anticoagulants      Primary hypercoagulable state ([address])      (Personal history of colonic polyps [Z86.010])      (Long term (current) use of anticoagulants [Z79.01])      (Primary hypercoagulable state ([address]) [D68.59])    Surgeons: Gurdeep Gonzalez MD Responsible Provider: Chelsey Wallace MD    Anesthesia Type: MAC ASA Status: 3          Anesthesia Type: MAC    Johnnie Phase I: Johnnie Score: 10    Johnnie Phase II: Johnnie Score: 10      Anesthesia Post Evaluation    Patient location during evaluation: PACU  Patient participation: complete - patient participated  Level of consciousness: awake  Airway patency: patent  Nausea & Vomiting: no vomiting  Complications: no  Cardiovascular status: hemodynamically stable  Respiratory status: acceptable  Hydration status: euvolemic

## 2023-11-08 NOTE — DISCHARGE INSTRUCTIONS
[address] [facility]  [id number]  1955    COLON DISCHARGE INSTRUCTIONS  Discomfort:  Redness at IV site- apply warm compress to area; if redness or soreness persist- contact your physician  There may be a slight amount of blood passed from the rectum  Gaseous discomfort- walking, belching will help relieve any discomfort  You may not operate a vehicle for 12 hours  You may not engage in an occupation involving machinery or appliances for rest of today  You may not drink alcoholic beverages for at least 12 hours  Avoid making any critical decisions for at least 24 hour  DIET:   Regular diet. - however -  remember your colon is empty and a heavy meal will produce gas. Avoid these foods:  vegetables, fried / greasy foods, carbonated drinks for today. MEDICATIONS:        Regarding Aspirin or Nonsteroidal medications, please see below. ACTIVITY:  You may resume your normal daily activities it is recommended that you spend the remainder of the day resting -  avoid any strenuous activity. CALL M.D. ANY SIGN OF:  Increasing pain, nausea, vomiting  Abdominal distension (swelling)  New increased bleeding (oral or rectal)  Fever (chills)  Pain in chest area  Bloody discharge from nose or mouth  Shortness of breath  Tylenol as needed for pain.       Follow-up Instructions:   Call Dr. Nunes Back for results of procedure / biopsy in 4-5 days at telephone #  [phone]              Repeat Colonoscopy in 3 years              Resume warfarin tomorrow    Patient Education on Sedation / Analgesia Administered for Procedure      For 24 hours after general anesthesia or intravenous analgesia / sedation:  Have someone responsible help you with your care  Limit your activities  Do not drive and operate hazardous machinery  Do not make important personal, legal or business decisions  Do not drink alcoholic beverages  If you have not urinated within 8 hours after discharge, please contact your physician  Resume your medications

## 2023-11-08 NOTE — H&P
Gastroenterology Outpatient History and Physical    Patient: Jackie Burkett    Physician: John Raymundo MD    Vital Signs: Blood pressure (!) 160/91, pulse 70, height 1.778 m (5' 10\"), weight 119.3 kg (263 lb 1.6 oz), SpO2 94 %. Allergies: Allergies   Allergen Reactions    Copper Hives    Furosemide Hives     Palpitations, dizziness, hives, sweatiness       Iodinated Contrast Media Hives     Hives, no respiratory symptoms this was 20 years ago. Needed IV benadryl and dose of steroids. And was reportedly fine. Sulfa Antibiotics Hives       Chief Complaint: Personal h/o colon polyps    History of Present Illness: above    Justification for Procedure: above    History:  Past Medical History:   Diagnosis Date    Arthritis     ASD (atrial septal defect)     age 7 septal defect closures MCV    Depression     GERD (gastroesophageal reflux disease)     Hypertension     Lymphedema of leg     bilateral    Sleep apnea     untreated    Thromboembolus ([address])     x 3; takes coumadin    Thyroid disease       Past Surgical History:   Procedure Laterality Date    ASD REPAIR  1967    COLONOSCOPY,REMV MAX,SNARE  7/16/2015         ORTHOPEDIC SURGERY      carpal tunnel x3    TUBAL LIGATION        Social History     Socioeconomic History    Marital status:       Spouse name: None    Number of children: None    Years of education: None    Highest education level: None   Tobacco Use    Smoking status: Never    Smokeless tobacco: Never   Vaping Use    Vaping Use: Never used   Substance and Sexual Activity    Alcohol use: Yes     Comment: 1 drink every 3 months    Drug use: No     Social Determinants of Health     Financial Resource Strain: Low Risk  (7/21/2023)    Overall Financial Resource Strain (CARDIA)     Difficulty of Paying Living Expenses: Not hard at all   Food Insecurity: No Food Insecurity (7/21/2023)    Hunger Vital Sign     Worried About Running Out of Food in the Last Year: Never true     Ran Out of Food in

## 2023-12-04 ENCOUNTER — TELEPHONE (OUTPATIENT)
Age: 68
End: 2023-12-04

## 2023-12-04 ENCOUNTER — OFFICE VISIT (OUTPATIENT)
Age: 68
End: 2023-12-04
Payer: MEDICARE

## 2023-12-04 VITALS
OXYGEN SATURATION: 97 % | BODY MASS INDEX: 37.65 KG/M2 | TEMPERATURE: 98 F | WEIGHT: 263 LBS | SYSTOLIC BLOOD PRESSURE: 137 MMHG | RESPIRATION RATE: 16 BRPM | DIASTOLIC BLOOD PRESSURE: 83 MMHG | HEART RATE: 83 BPM | HEIGHT: 70 IN

## 2023-12-04 DIAGNOSIS — E61.1 IRON DEFICIENCY: ICD-10-CM

## 2023-12-04 DIAGNOSIS — F43.9 STRESS: ICD-10-CM

## 2023-12-04 DIAGNOSIS — G47.19 EXCESSIVE DAYTIME SLEEPINESS: ICD-10-CM

## 2023-12-04 DIAGNOSIS — R73.02 IGT (IMPAIRED GLUCOSE TOLERANCE): ICD-10-CM

## 2023-12-04 DIAGNOSIS — E03.9 HYPOTHYROIDISM, UNSPECIFIED TYPE: ICD-10-CM

## 2023-12-04 DIAGNOSIS — Z00.00 ROUTINE GENERAL MEDICAL EXAMINATION AT A HEALTH CARE FACILITY: Primary | ICD-10-CM

## 2023-12-04 DIAGNOSIS — E53.8 B12 DEFICIENCY: ICD-10-CM

## 2023-12-04 DIAGNOSIS — I26.99 PULMONARY EMBOLISM, UNSPECIFIED CHRONICITY, UNSPECIFIED PULMONARY EMBOLISM TYPE, UNSPECIFIED WHETHER ACUTE COR PULMONALE PRESENT (HCC): ICD-10-CM

## 2023-12-04 DIAGNOSIS — I10 ESSENTIAL (PRIMARY) HYPERTENSION: ICD-10-CM

## 2023-12-04 DIAGNOSIS — R45.89 CRYING: ICD-10-CM

## 2023-12-04 DIAGNOSIS — Z12.31 ENCOUNTER FOR SCREENING MAMMOGRAM FOR MALIGNANT NEOPLASM OF BREAST: Primary | ICD-10-CM

## 2023-12-04 DIAGNOSIS — E55.9 VITAMIN D DEFICIENCY: ICD-10-CM

## 2023-12-04 PROBLEM — I61.9 ICH (INTRACEREBRAL HEMORRHAGE) (HCC): Status: ACTIVE | Noted: 2020-10-08
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
## 2023-12-04 PROBLEM — M19.90 ARTHRITIS: Status: ACTIVE | Noted: 2023-12-04

## 2023-12-04 PROBLEM — I82.409 RECURRENT DEEP VEIN THROMBOSIS (DVT) (HCC): Status: RESOLVED | Noted: 2018-04-18 | Resolved: 2023-12-04

## 2023-12-04 LAB
BASOPHILS # BLD: 0 K/UL (ref 0–0.1)
BASOPHILS NFR BLD: 0 % (ref 0–1)
DIFFERENTIAL METHOD BLD: ABNORMAL
EOSINOPHIL # BLD: 0.1 K/UL (ref 0–0.4)
EOSINOPHIL NFR BLD: 2 % (ref 0–7)
ERYTHROCYTE [DISTWIDTH] IN BLOOD BY AUTOMATED COUNT: 12.5 % (ref 11.5–14.5)
HCT VFR BLD AUTO: 47.5 % (ref 35–47)
HGB BLD-MCNC: 15.7 G/DL (ref 11.5–16)
IMM GRANULOCYTES # BLD AUTO: 0 K/UL (ref 0–0.04)
IMM GRANULOCYTES NFR BLD AUTO: 0 % (ref 0–0.5)
LYMPHOCYTES # BLD: 2.2 K/UL (ref 0.8–3.5)
LYMPHOCYTES NFR BLD: 32 % (ref 12–49)
MCH RBC QN AUTO: 29.4 PG (ref 26–34)
MCHC RBC AUTO-ENTMCNC: 33.1 G/DL (ref 30–36.5)
MCV RBC AUTO: 89 FL (ref 80–99)
MONOCYTES # BLD: 0.4 K/UL (ref 0–1)
MONOCYTES NFR BLD: 6 % (ref 5–13)
NEUTS SEG # BLD: 4.2 K/UL (ref 1.8–8)
NEUTS SEG NFR BLD: 60 % (ref 32–75)
NRBC # BLD: 0 K/UL (ref 0–0.01)
NRBC BLD-RTO: 0 PER 100 WBC
PLATELET # BLD AUTO: 294 K/UL (ref 150–400)
PMV BLD AUTO: 9.5 FL (ref 8.9–12.9)
POC INR: 2
PROTHROMBIN TIME, POC: 24
RBC # BLD AUTO: 5.34 M/UL (ref 3.8–5.2)
WBC # BLD AUTO: 7 K/UL (ref 3.6–11)

## 2023-12-04 PROCEDURE — 1123F ACP DISCUSS/DSCN MKR DOCD: CPT | Performed by: FAMILY MEDICINE

## 2023-12-04 PROCEDURE — PBSHW AMB POC PT/INR: Performed by: FAMILY MEDICINE

## 2023-12-04 PROCEDURE — PBSHW PNEUMOCOCCAL, PCV20, PREVNAR 20, (AGE 6W+), IM, PF: Performed by: FAMILY MEDICINE

## 2023-12-04 PROCEDURE — 90677 PCV20 VACCINE IM: CPT | Performed by: FAMILY MEDICINE

## 2023-12-04 PROCEDURE — 99214 OFFICE O/P EST MOD 30 MIN: CPT | Performed by: FAMILY MEDICINE

## 2023-12-04 PROCEDURE — G8399 PT W/DXA RESULTS DOCUMENT: HCPCS | Performed by: FAMILY MEDICINE

## 2023-12-04 PROCEDURE — 85610 PROTHROMBIN TIME: CPT | Performed by: FAMILY MEDICINE

## 2023-12-04 PROCEDURE — G0439 PPPS, SUBSEQ VISIT: HCPCS | Performed by: FAMILY MEDICINE

## 2023-12-04 PROCEDURE — 1090F PRES/ABSN URINE INCON ASSESS: CPT | Performed by: FAMILY MEDICINE

## 2023-12-04 PROCEDURE — 3017F COLORECTAL CA SCREEN DOC REV: CPT | Performed by: FAMILY MEDICINE

## 2023-12-04 PROCEDURE — G8427 DOCREV CUR MEDS BY ELIG CLIN: HCPCS | Performed by: FAMILY MEDICINE

## 2023-12-04 PROCEDURE — G8417 CALC BMI ABV UP PARAM F/U: HCPCS | Performed by: FAMILY MEDICINE

## 2023-12-04 PROCEDURE — G8484 FLU IMMUNIZE NO ADMIN: HCPCS | Performed by: FAMILY MEDICINE

## 2023-12-04 PROCEDURE — 1036F TOBACCO NON-USER: CPT | Performed by: FAMILY MEDICINE

## 2023-12-04 PROCEDURE — 3075F SYST BP GE 130 - 139MM HG: CPT | Performed by: FAMILY MEDICINE

## 2023-12-04 PROCEDURE — 3079F DIAST BP 80-89 MM HG: CPT | Performed by: FAMILY MEDICINE

## 2023-12-04 NOTE — TELEPHONE ENCOUNTER
verified. Informed pt of message from provider, pt states she has a paper stating she is due and an order is in for her she just has to call. Pt does not need order for mammogram. Pt had no further questions.

## 2023-12-04 NOTE — PROGRESS NOTES
Medicare Annual Wellness Visit    I have reviewed the patient's medical history in detail and updated the computerized patient record. History   Jackie Matos is a 80 y.o. female who presents to follow-up on chronic medical issues. She is feeling fatigued which is an old complaint. Described today as a sense of inability to get things done and a frustration with this feeling. Describes living with a boyfriend who she feels the relationship is at a dead end and Carlee Rzao does not get it and being unsure what to do about this. Has family members with health issues that she would like to help out with but is overwhelmed by this and comes home. When she goes on trips she has a perfectly enjoyable time, \"but when I come home it all comes back\"    Throughout her story it is clear she is employing a few immature defense mechanisms including avoidance technique. One of her complaints when sitting at home is that she avoids getting up to do things putting them off until later. When she does get up to do stuff she feels short of breath and so stops. She is getting this worked up by cardiology. She has knee pain and recently got an injection which worked well for her.     She has excessive daytime sleepiness and has not yet gotten a sleep study    Past Medical History:   Diagnosis Date    Arthritis     ASD (atrial septal defect)     age 7 septal defect closures MCV    Depression     GERD (gastroesophageal reflux disease)     Hypertension     Lymphedema of leg     bilateral    Sleep apnea     untreated    Thromboembolus ([address])     x 3; takes coumadin    Thyroid disease       Past Surgical History:   Procedure Laterality Date    ASD REPAIR  1967    COLONOSCOPY N/A 11/8/2023    COLONOSCOPY performed by Estella Yancey MD at [facility] ENDOSCOPY    COLONOSCOPY,ADWOA WARD  7/16/2015         ORTHOPEDIC SURGERY      carpal tunnel x3    TUBAL LIGATION       Current Outpatient Medications   Medication Sig Dispense

## 2023-12-05 LAB
25(OH)D3 SERPL-MCNC: 29 NG/ML (ref 30–100)
ALBUMIN SERPL-MCNC: 3.8 G/DL (ref 3.5–5)
ALBUMIN/GLOB SERPL: 0.9 (ref 1.1–2.2)
ALP SERPL-CCNC: 88 U/L (ref 45–117)
ALT SERPL-CCNC: 33 U/L (ref 12–78)
ANION GAP SERPL CALC-SCNC: 5 MMOL/L (ref 5–15)
AST SERPL-CCNC: 15 U/L (ref 15–37)
BILIRUB SERPL-MCNC: 0.3 MG/DL (ref 0.2–1)
BUN SERPL-MCNC: 20 MG/DL (ref 6–20)
BUN/CREAT SERPL: 20 (ref 12–20)
CALCIUM SERPL-MCNC: 9.2 MG/DL (ref 8.5–10.1)
CHLORIDE SERPL-SCNC: 104 MMOL/L (ref 97–108)
CHOLEST SERPL-MCNC: 178 MG/DL
CO2 SERPL-SCNC: 31 MMOL/L (ref 21–32)
CREAT SERPL-MCNC: 0.98 MG/DL (ref 0.55–1.02)
EST. AVERAGE GLUCOSE BLD GHB EST-MCNC: 114 MG/DL
FERRITIN SERPL-MCNC: 66 NG/ML (ref 26–388)
FOLATE SERPL-MCNC: 6.6 NG/ML (ref 5–21)
GLOBULIN SER CALC-MCNC: 4.1 G/DL (ref 2–4)
GLUCOSE SERPL-MCNC: 121 MG/DL (ref 65–100)
HBA1C MFR BLD: 5.6 % (ref 4–5.6)
HDLC SERPL-MCNC: 63 MG/DL
HDLC SERPL: 2.8 (ref 0–5)
IRON SATN MFR SERPL: 24 % (ref 20–50)
IRON SERPL-MCNC: 100 UG/DL (ref 35–150)
LDLC SERPL CALC-MCNC: 97.2 MG/DL (ref 0–100)
POTASSIUM SERPL-SCNC: 4.5 MMOL/L (ref 3.5–5.1)
PROT SERPL-MCNC: 7.9 G/DL (ref 6.4–8.2)
SODIUM SERPL-SCNC: 140 MMOL/L (ref 136–145)
T4 FREE SERPL-MCNC: 1.4 NG/DL (ref 0.8–1.5)
TIBC SERPL-MCNC: 421 UG/DL (ref 250–450)
TRIGL SERPL-MCNC: 89 MG/DL
TSH SERPL DL<=0.05 MIU/L-ACNC: 3.36 UIU/ML (ref 0.36–3.74)
VIT B12 SERPL-MCNC: 1583 PG/ML (ref 193–986)
VLDLC SERPL CALC-MCNC: 17.8 MG/DL

## 2024-01-02 RX ORDER — SPIRONOLACTONE 25 MG/1
25 TABLET ORAL DAILY
Qty: 90 TABLET | Refills: 3 | Status: SHIPPED | OUTPATIENT
Start: 2024-01-02

## 2024-01-04 ENCOUNTER — NURSE ONLY (OUTPATIENT)
Age: 69
End: 2024-01-04
Payer: MEDICARE

## 2024-01-04 DIAGNOSIS — I26.99 PULMONARY EMBOLISM, UNSPECIFIED CHRONICITY, UNSPECIFIED PULMONARY EMBOLISM TYPE, UNSPECIFIED WHETHER ACUTE COR PULMONALE PRESENT (HCC): Primary | ICD-10-CM

## 2024-01-04 DIAGNOSIS — Z86.711 HX PULMONARY EMBOLISM: ICD-10-CM

## 2024-01-04 LAB
POC INR: 2
PROTHROMBIN TIME, POC: 23.4

## 2024-01-04 PROCEDURE — 85610 PROTHROMBIN TIME: CPT | Performed by: FAMILY MEDICINE

## 2024-01-04 PROCEDURE — PBSHW AMB POC PT/INR: Performed by: FAMILY MEDICINE

## 2024-01-04 NOTE — PROGRESS NOTES
Jackie Worrell is a 68 y.o. female who presents today for Anticoagulation monitoring.    Indication: PE  INR Goal: 2.0-3.0.  Current dose:  Coumadin 3.75 mg every Mon, Wed, Fri; 7.5 mg all other days   Missed Coumadin Doses:  None  Medication Changes:  no  Dietary Changes:  no    Symptoms: taking coumadin appropriately without any bleeding.    Latest INRs:  Lab Results   Component Value Date/Time    INR 2.0 12/04/2023 10:19 AM    INR 2.6 10/31/2023 10:24 AM    INR 2.3 09/26/2023 09:09 AM    INR 3.1 05/02/2023 09:35 AM    INR 3.4 04/18/2023 09:44 AM    INR 2.9 03/14/2023 03:09 PM        New Coumadin dose:.current treatment plan is effective, no change in therapy.    Next check to be scheduled for  4 weeks.

## 2024-01-11 ENCOUNTER — TELEMEDICINE (OUTPATIENT)
Age: 69
End: 2024-01-11
Payer: MEDICARE

## 2024-01-11 DIAGNOSIS — G47.33 OSA (OBSTRUCTIVE SLEEP APNEA): Primary | ICD-10-CM

## 2024-01-11 DIAGNOSIS — I10 PRIMARY HYPERTENSION: ICD-10-CM

## 2024-01-11 PROCEDURE — 99213 OFFICE O/P EST LOW 20 MIN: CPT | Performed by: NURSE PRACTITIONER

## 2024-01-11 PROCEDURE — G8427 DOCREV CUR MEDS BY ELIG CLIN: HCPCS | Performed by: NURSE PRACTITIONER

## 2024-01-11 PROCEDURE — G8417 CALC BMI ABV UP PARAM F/U: HCPCS | Performed by: NURSE PRACTITIONER

## 2024-01-11 PROCEDURE — 1090F PRES/ABSN URINE INCON ASSESS: CPT | Performed by: NURSE PRACTITIONER

## 2024-01-11 PROCEDURE — 1036F TOBACCO NON-USER: CPT | Performed by: NURSE PRACTITIONER

## 2024-01-11 PROCEDURE — 3017F COLORECTAL CA SCREEN DOC REV: CPT | Performed by: NURSE PRACTITIONER

## 2024-01-11 PROCEDURE — G8484 FLU IMMUNIZE NO ADMIN: HCPCS | Performed by: NURSE PRACTITIONER

## 2024-01-11 PROCEDURE — G8399 PT W/DXA RESULTS DOCUMENT: HCPCS | Performed by: NURSE PRACTITIONER

## 2024-01-11 PROCEDURE — 1123F ACP DISCUSS/DSCN MKR DOCD: CPT | Performed by: NURSE PRACTITIONER

## 2024-01-11 ASSESSMENT — SLEEP AND FATIGUE QUESTIONNAIRES
HOW LIKELY ARE YOU TO NOD OFF OR FALL ASLEEP WHEN YOU ARE A PASSENGER IN A CAR FOR AN HOUR WITHOUT A BREAK: 2
HOW LIKELY ARE YOU TO NOD OFF OR FALL ASLEEP WHILE SITTING AND TALKING TO SOMEONE: 0
HOW LIKELY ARE YOU TO NOD OFF OR FALL ASLEEP IN A CAR, WHILE STOPPED FOR A FEW MINUTES IN TRAFFIC: 0
ESS TOTAL SCORE: 7
HOW LIKELY ARE YOU TO NOD OFF OR FALL ASLEEP WHILE SITTING QUIETLY AFTER LUNCH WITHOUT ALCOHOL: 0
HOW LIKELY ARE YOU TO NOD OFF OR FALL ASLEEP WHILE WATCHING TV: 0
HOW LIKELY ARE YOU TO NOD OFF OR FALL ASLEEP WHILE SITTING AND READING: 2
HOW LIKELY ARE YOU TO NOD OFF OR FALL ASLEEP WHILE SITTING INACTIVE IN A PUBLIC PLACE: 0
HOW LIKELY ARE YOU TO NOD OFF OR FALL ASLEEP WHILE LYING DOWN TO REST IN THE AFTERNOON WHEN CIRCUMSTANCES PERMIT: 3

## 2024-02-13 ENCOUNTER — NURSE ONLY (OUTPATIENT)
Age: 69
End: 2024-02-13
Payer: MEDICARE

## 2024-02-13 DIAGNOSIS — Z86.711 HX PULMONARY EMBOLISM: Primary | ICD-10-CM

## 2024-02-13 LAB
POC INR: 2
PROTHROMBIN TIME, POC: 29

## 2024-02-13 PROCEDURE — 85610 PROTHROMBIN TIME: CPT | Performed by: FAMILY MEDICINE

## 2024-02-13 PROCEDURE — PBSHW AMB POC PT/INR: Performed by: FAMILY MEDICINE

## 2024-02-13 NOTE — PROGRESS NOTES
Jackie Worrell is a 68 y.o. female who presents today for Anticoagulation monitoring.    Indication: PE  INR Goal: 2.0-3.0.  Current dose:  Coumadin 3.75 mg every Mon, Wed, Fri; 7.5 mg all other days    Missed Coumadin Doses:  None  Medication Changes:  Yes, new BP medication (Pt unsure of name)   Dietary Changes:  no    Symptoms: taking coumadin appropriately without any bleeding.    Latest INRs:  Lab Results   Component Value Date/Time    INR 2.0 01/04/2024 10:14 AM    INR 2.0 12/04/2023 10:19 AM    INR 2.6 10/31/2023 10:24 AM    INR 3.1 05/02/2023 09:35 AM    INR 3.4 04/18/2023 09:44 AM    INR 2.9 03/14/2023 03:09 PM        New Coumadin dose:.current treatment plan is effective, no change in therapy.    Next check to be scheduled for  4 weeks.

## 2024-02-15 ENCOUNTER — HOSPITAL ENCOUNTER (OUTPATIENT)
Facility: HOSPITAL | Age: 69
Discharge: HOME | End: 2024-02-18
Payer: MEDICARE

## 2024-02-15 ENCOUNTER — PROCEDURE VISIT (OUTPATIENT)
Age: 69
End: 2024-02-15

## 2024-02-15 DIAGNOSIS — G47.33 OSA (OBSTRUCTIVE SLEEP APNEA): Primary | ICD-10-CM

## 2024-02-15 PROCEDURE — 95800 SLP STDY UNATTENDED: CPT | Performed by: INTERNAL MEDICINE

## 2024-02-15 NOTE — PROGRESS NOTES
S>Jackie Worrell is a 68 y.o. female seen today to receive a home sleep testing unit (WatchPAT).    Patient was educated on proper hookup and operation of the WatchPAT HST via detailed instruction sheet .  Instruction forms with after hours contact and documentation were signed.    O>    There were no vitals taken for this visit.      A>  1. CHRIS (obstructive sleep apnea)          P>  General information regarding operations and maintenance of the device was provided.  Follow-up appointment was made to return the WatchPAT HST. She will be contacted once the results have been reviewed.  She was asked to contact our office for any problems regarding her home sleep test study.

## 2024-02-20 ENCOUNTER — TELEPHONE (OUTPATIENT)
Age: 69
End: 2024-02-20

## 2024-02-21 ENCOUNTER — OFFICE VISIT (OUTPATIENT)
Age: 69
End: 2024-02-21
Payer: MEDICARE

## 2024-02-21 VITALS
TEMPERATURE: 97.8 F | HEART RATE: 78 BPM | SYSTOLIC BLOOD PRESSURE: 135 MMHG | HEIGHT: 70 IN | WEIGHT: 262 LBS | RESPIRATION RATE: 20 BRPM | OXYGEN SATURATION: 99 % | DIASTOLIC BLOOD PRESSURE: 81 MMHG | BODY MASS INDEX: 37.51 KG/M2

## 2024-02-21 DIAGNOSIS — G47.30 SLEEP APNEA, UNSPECIFIED TYPE: ICD-10-CM

## 2024-02-21 DIAGNOSIS — I89.0 LYMPHEDEMA: ICD-10-CM

## 2024-02-21 DIAGNOSIS — E66.01 MORBID OBESITY (HCC): Primary | ICD-10-CM

## 2024-02-21 DIAGNOSIS — K21.9 GASTROESOPHAGEAL REFLUX DISEASE, UNSPECIFIED WHETHER ESOPHAGITIS PRESENT: ICD-10-CM

## 2024-02-21 DIAGNOSIS — I10 ESSENTIAL HYPERTENSION, BENIGN: ICD-10-CM

## 2024-02-21 PROCEDURE — G8417 CALC BMI ABV UP PARAM F/U: HCPCS | Performed by: NURSE PRACTITIONER

## 2024-02-21 PROCEDURE — 3079F DIAST BP 80-89 MM HG: CPT | Performed by: NURSE PRACTITIONER

## 2024-02-21 PROCEDURE — 1036F TOBACCO NON-USER: CPT | Performed by: NURSE PRACTITIONER

## 2024-02-21 PROCEDURE — 1090F PRES/ABSN URINE INCON ASSESS: CPT | Performed by: NURSE PRACTITIONER

## 2024-02-21 PROCEDURE — 3075F SYST BP GE 130 - 139MM HG: CPT | Performed by: NURSE PRACTITIONER

## 2024-02-21 PROCEDURE — 3017F COLORECTAL CA SCREEN DOC REV: CPT | Performed by: NURSE PRACTITIONER

## 2024-02-21 PROCEDURE — G8484 FLU IMMUNIZE NO ADMIN: HCPCS | Performed by: NURSE PRACTITIONER

## 2024-02-21 PROCEDURE — 1123F ACP DISCUSS/DSCN MKR DOCD: CPT | Performed by: NURSE PRACTITIONER

## 2024-02-21 PROCEDURE — 99203 OFFICE O/P NEW LOW 30 MIN: CPT | Performed by: NURSE PRACTITIONER

## 2024-02-21 PROCEDURE — G8427 DOCREV CUR MEDS BY ELIG CLIN: HCPCS | Performed by: NURSE PRACTITIONER

## 2024-02-21 PROCEDURE — G8399 PT W/DXA RESULTS DOCUMENT: HCPCS | Performed by: NURSE PRACTITIONER

## 2024-02-21 RX ORDER — AMLODIPINE BESYLATE 5 MG/1
TABLET ORAL
COMMUNITY
Start: 2024-02-17

## 2024-02-21 ASSESSMENT — ENCOUNTER SYMPTOMS
EYE PAIN: 0
EYE DISCHARGE: 0
DIARRHEA: 0
ABDOMINAL PAIN: 0
BACK PAIN: 0
EYE ITCHING: 0
SORE THROAT: 0
CONSTIPATION: 0
SINUS PAIN: 0
VOMITING: 0
SHORTNESS OF BREATH: 0
WHEEZING: 0
NAUSEA: 0

## 2024-02-21 ASSESSMENT — PATIENT HEALTH QUESTIONNAIRE - PHQ9
SUM OF ALL RESPONSES TO PHQ QUESTIONS 1-9: 0
2. FEELING DOWN, DEPRESSED OR HOPELESS: 0
SUM OF ALL RESPONSES TO PHQ QUESTIONS 1-9: 0
1. LITTLE INTEREST OR PLEASURE IN DOING THINGS: 0
SUM OF ALL RESPONSES TO PHQ9 QUESTIONS 1 & 2: 0
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
## 2024-02-21 NOTE — PROGRESS NOTES
Identified patient with two patient identifiers (name and ). Reviewed chart in preparation for visit and have obtained necessary documentation.    Jackie Worrell is a 68 y.o. female  No chief complaint on file.    /81 (Site: Right Upper Arm, Position: Sitting, Cuff Size: Medium Adult)   Pulse 78   Temp 97.8 °F (36.6 °C) (Oral)   Resp 20   Ht 1.778 m (5' 10\")   Wt 118.8 kg (262 lb)   SpO2 99%   BMI 37.59 kg/m²     1. Have you been to the ER, urgent care clinic since your last visit?  Hospitalized since your last visit?no    2. Have you seen or consulted any other health care providers outside of the [facility] since your last visit?  Include any pap smears or colon screening. no

## 2024-02-23 ENCOUNTER — CLINICAL DOCUMENTATION (OUTPATIENT)
Age: 69
End: 2024-02-23

## 2024-02-23 NOTE — TELEPHONE ENCOUNTER
HSAT in r-drive.  (recently saw KARLI Miguel in clinic)    BRAULIO Londono to review results and next steps with patient  (Chart reviewed. Note said, ffup appt to review results)    [facility] HSAT positive for significant sleep apnea.       AHI 21/hour and lowest oxygen saturation was 72%.   (Desaturations occurred during REM sleep).Patient slept supine for duration of study  The AHI reported above is based on 4% oxygen desaturation      Based on these results, PAP recommended  BRAULIO Londono NP to see patient in follow up to review results    Staff to schedule follow up for results review with [facility]

## 2024-03-06 ENCOUNTER — TELEPHONE (OUTPATIENT)
Age: 69
End: 2024-03-06

## 2024-03-06 NOTE — TELEPHONE ENCOUNTER
Phoned the patient to schedule results appointment.  However, your next f2f is 5/20/24.  Would you like to accommodate her in any way?  Please advise.

## 2024-03-07 ENCOUNTER — TELEPHONE (OUTPATIENT)
Age: 69
End: 2024-03-07

## 2024-03-07 NOTE — TELEPHONE ENCOUNTER
LEONIDM to inform the patient that her vv results appointment has been scheduled for 3/27/24 at 2:50 with [facility].  Return call requested to reschedule should this date and time be inconvenient.

## 2024-03-07 NOTE — TELEPHONE ENCOUNTER
LEONIDM to inform the patient that her vv results appointment has been scheduled for 3/27/24 at 2:50 with Cecile Londono NP.  Return call requested to reschedule if this date and time is inconvenient.  
No

## 2024-03-08 ENCOUNTER — HOSPITAL ENCOUNTER (OUTPATIENT)
Facility: HOSPITAL | Age: 69
End: 2024-03-08
Payer: MEDICARE

## 2024-03-08 DIAGNOSIS — G47.30 SLEEP APNEA, UNSPECIFIED TYPE: ICD-10-CM

## 2024-03-08 DIAGNOSIS — E66.01 MORBID OBESITY (HCC): ICD-10-CM

## 2024-03-08 DIAGNOSIS — I10 ESSENTIAL HYPERTENSION, BENIGN: ICD-10-CM

## 2024-03-08 DIAGNOSIS — K21.9 GASTROESOPHAGEAL REFLUX DISEASE, UNSPECIFIED WHETHER ESOPHAGITIS PRESENT: ICD-10-CM

## 2024-03-08 PROCEDURE — 74240 X-RAY XM UPR GI TRC 1CNTRST: CPT

## 2024-03-08 PROCEDURE — 74246 X-RAY XM UPR GI TRC 2CNTRST: CPT

## 2024-03-13 ENCOUNTER — NURSE ONLY (OUTPATIENT)
Age: 69
End: 2024-03-13
Payer: MEDICARE

## 2024-03-13 DIAGNOSIS — Z86.711 HX PULMONARY EMBOLISM: ICD-10-CM

## 2024-03-13 DIAGNOSIS — I26.99 PULMONARY EMBOLISM, UNSPECIFIED CHRONICITY, UNSPECIFIED PULMONARY EMBOLISM TYPE, UNSPECIFIED WHETHER ACUTE COR PULMONALE PRESENT (HCC): Primary | ICD-10-CM

## 2024-03-13 LAB
POC INR: 2.1
PROTHROMBIN TIME, POC: 24.8

## 2024-03-13 PROCEDURE — 85610 PROTHROMBIN TIME: CPT | Performed by: FAMILY MEDICINE

## 2024-03-13 PROCEDURE — PBSHW AMB POC PT/INR: Performed by: FAMILY MEDICINE

## 2024-03-13 NOTE — PROGRESS NOTES
Jackie Worrell who presents today for Anticoagulation monitoring.    Indication: Atrial Fibrillation  INR Goal: 2.0-3.0.  Today's INR:2.1   Previous INR:2.0 on 2/13/2024  Current dose: 3.75 Mon, Wed, and Fri, 7.5 mg all other days   Missed Coumadin Doses:  None  Medication Changes:  no  Dietary Changes:  no     Symptoms: taking coumadin appropriately without any bleeding.       New Coumadin dose:.current treatment plan is effective, no change in therapy.     Next check to be scheduled for 4 weeks.

## 2024-03-15 ENCOUNTER — TELEPHONE (OUTPATIENT)
Age: 69
End: 2024-03-15

## 2024-03-15 RX ORDER — AMOXICILLIN AND CLAVULANATE POTASSIUM 875; 125 MG/1; MG/1
1 TABLET, FILM COATED ORAL 2 TIMES DAILY
Qty: 20 TABLET | Refills: 0 | Status: SHIPPED | OUTPATIENT
Start: 2024-03-15 | End: 2024-03-25

## 2024-03-15 NOTE — TELEPHONE ENCOUNTER
Pt is calling with left ear pain, hurts to chew, hurts to swallow, with no fever or vomiting    The pain started yesterday 03/14    Pt is requesting an rx for the pain    [facility] in [place]

## 2024-03-15 NOTE — TELEPHONE ENCOUNTER
This could be tricky.  She could be describing an ear infection or a muscular pain of the neck.    Will err on the side of trying antibiotic but if she does not feel like this is effective should get this checked out

## 2024-03-27 ENCOUNTER — TELEMEDICINE (OUTPATIENT)
Age: 69
End: 2024-03-27
Payer: MEDICARE

## 2024-03-27 DIAGNOSIS — G47.33 OSA (OBSTRUCTIVE SLEEP APNEA): Primary | ICD-10-CM

## 2024-03-27 DIAGNOSIS — I10 PRIMARY HYPERTENSION: ICD-10-CM

## 2024-03-27 PROCEDURE — 99213 OFFICE O/P EST LOW 20 MIN: CPT | Performed by: NURSE PRACTITIONER

## 2024-03-27 PROCEDURE — 1123F ACP DISCUSS/DSCN MKR DOCD: CPT | Performed by: NURSE PRACTITIONER

## 2024-03-27 PROCEDURE — G8427 DOCREV CUR MEDS BY ELIG CLIN: HCPCS | Performed by: NURSE PRACTITIONER

## 2024-03-27 PROCEDURE — G8399 PT W/DXA RESULTS DOCUMENT: HCPCS | Performed by: NURSE PRACTITIONER

## 2024-03-27 PROCEDURE — G8484 FLU IMMUNIZE NO ADMIN: HCPCS | Performed by: NURSE PRACTITIONER

## 2024-03-27 PROCEDURE — 3017F COLORECTAL CA SCREEN DOC REV: CPT | Performed by: NURSE PRACTITIONER

## 2024-03-27 PROCEDURE — G8417 CALC BMI ABV UP PARAM F/U: HCPCS | Performed by: NURSE PRACTITIONER

## 2024-03-27 PROCEDURE — 1036F TOBACCO NON-USER: CPT | Performed by: NURSE PRACTITIONER

## 2024-03-27 PROCEDURE — 1090F PRES/ABSN URINE INCON ASSESS: CPT | Performed by: NURSE PRACTITIONER

## 2024-03-27 ASSESSMENT — SLEEP AND FATIGUE QUESTIONNAIRES
HOW LIKELY ARE YOU TO NOD OFF OR FALL ASLEEP WHILE SITTING AND READING: SLIGHT CHANCE OF DOZING
ESS TOTAL SCORE: 5
HOW LIKELY ARE YOU TO NOD OFF OR FALL ASLEEP WHILE WATCHING TV: SLIGHT CHANCE OF DOZING
HOW LIKELY ARE YOU TO NOD OFF OR FALL ASLEEP WHILE LYING DOWN TO REST IN THE AFTERNOON WHEN CIRCUMSTANCES PERMIT: MODERATE CHANCE OF DOZING
HOW LIKELY ARE YOU TO NOD OFF OR FALL ASLEEP WHILE SITTING INACTIVE IN A PUBLIC PLACE: WOULD NEVER DOZE
HOW LIKELY ARE YOU TO NOD OFF OR FALL ASLEEP WHILE SITTING QUIETLY AFTER LUNCH WITHOUT ALCOHOL: WOULD NEVER DOZE
HOW LIKELY ARE YOU TO NOD OFF OR FALL ASLEEP WHILE SITTING AND TALKING TO SOMEONE: WOULD NEVER DOZE
HOW LIKELY ARE YOU TO NOD OFF OR FALL ASLEEP IN A CAR, WHILE STOPPED FOR A FEW MINUTES IN TRAFFIC: WOULD NEVER DOZE
HOW LIKELY ARE YOU TO NOD OFF OR FALL ASLEEP WHEN YOU ARE A PASSENGER IN A CAR FOR AN HOUR WITHOUT A BREAK: SLIGHT CHANCE OF DOZING

## 2024-03-27 NOTE — PROGRESS NOTES
[address]   [place] [address]   Tel.  [phone]   Fax. [phone]  [address]   [place] [address]   Tel.  [phone]   Fax. [phone] [address] [place] [address]   Tel.  [phone]   Fax. [phone]     Jackie Worrell (: 1955) is a 68 y.o. female, established patient, seen for positive airway pressure follow-up, she was last seen by me on 2024, previously seen by Dr. Corley on 2022, prior notes reviewed in detail. Initial evaluation in  with a sleep study showing an overall AHI of 26/hr. She had opted to not do a titration study as recommended and wanted to pursue weight loss. She was seen in  by Dr. Corley and a home sleep study was ordered. The study was deemed failed and she was to return for a repeat HSAT which was never done. Repeat HSAT  showed an overall AHI 21/hour and lowest oxygen saturation was 72%. (Desaturations occurred during REM sleep). She is seen today for follow up.     ASSESSMENT/PLAN:   Diagnosis Orders   1. CHRIS (obstructive sleep apnea)  DME Order for (Specify) as OP      2. Primary hypertension        3. BMI 37.0-37.9, adult          AHI = 21 ().      No follow-up provider specified.    Sleep Apnea -  Sleep study results reviewed with patient. She is agreeable to a trial of PAP. Should she be unable to tolerate PAP we will discuss alternatives ([employer]).     Orders Placed This Encounter   Procedures    DME Order for (Specify) as OP     Primary Encounter Diagnosis: Obstructive Sleep Apnea  (G47.33)    ResMed Device with Heated Humidifer  / .     Positive Airway Pressure Therapy: Duration of need: 99 months.     Set Pressure: 5-15 cmH2O    Diagnosis: (G47.33) CHRIS (obstructive sleep apnea)  (primary encounter diagnosis)     Replacement Supplies for Positive Airway Pressure Therapy Device:   Duration of need: 99 months.        Full Face Mask 1 every 3 months.

## 2024-04-15 ENCOUNTER — NURSE ONLY (OUTPATIENT)
Age: 69
End: 2024-04-15
Payer: MEDICARE

## 2024-04-15 DIAGNOSIS — I26.99 PULMONARY EMBOLISM, UNSPECIFIED CHRONICITY, UNSPECIFIED PULMONARY EMBOLISM TYPE, UNSPECIFIED WHETHER ACUTE COR PULMONALE PRESENT (HCC): Primary | ICD-10-CM

## 2024-04-15 DIAGNOSIS — Z86.711 HX PULMONARY EMBOLISM: ICD-10-CM

## 2024-04-15 LAB
POC INR: 2.2
PROTHROMBIN TIME, POC: 26.8

## 2024-04-15 PROCEDURE — PBSHW AMB POC PT/INR: Performed by: FAMILY MEDICINE

## 2024-04-15 PROCEDURE — 85610 PROTHROMBIN TIME: CPT | Performed by: FAMILY MEDICINE

## 2024-04-15 NOTE — PROGRESS NOTES
Jackie Worrell is a 68 y.o. female who presents today for Anticoagulation monitoring.    Indication: Atrial Fib  INR Goal: 2.0-3.0.  Current dose:  3.75 Mon, Wed, and Fri, 7.5 mg all other days   Missed Coumadin Doses:  None  Medication Changes:  no  Dietary Changes:  no    Symptoms: taking coumadin appropriately without any bleeding.    Latest INRs:  Lab Results   Component Value Date/Time    INR 2.1 03/13/2024 10:14 AM    INR 2.0 02/13/2024 10:08 AM    INR 2.0 01/04/2024 10:14 AM    INR 3.1 05/02/2023 09:35 AM    INR 3.4 04/18/2023 09:44 AM    INR 2.9 03/14/2023 03:09 PM        New Coumadin dose:.current treatment plan is effective, no change in therapy.    Next check to be scheduled for  4 weeks.

## 2024-04-22 RX ORDER — LEVOTHYROXINE SODIUM 0.1 MG/1
100 TABLET ORAL
Qty: 90 TABLET | Refills: 3 | Status: SHIPPED | OUTPATIENT
Start: 2024-04-22

## 2024-04-24 ENCOUNTER — TELEPHONE (OUTPATIENT)
Age: 69
End: 2024-04-24

## 2024-04-24 NOTE — TELEPHONE ENCOUNTER
LVM requesting a return call to reschedule her 4/30/24 VV with [facility].  As she has not been set up with PAP yet.

## 2024-05-17 ENCOUNTER — NURSE ONLY (OUTPATIENT)
Age: 69
End: 2024-05-17
Payer: MEDICARE

## 2024-05-17 DIAGNOSIS — I26.99 PULMONARY EMBOLISM, UNSPECIFIED CHRONICITY, UNSPECIFIED PULMONARY EMBOLISM TYPE, UNSPECIFIED WHETHER ACUTE COR PULMONALE PRESENT (HCC): Primary | ICD-10-CM

## 2024-05-17 DIAGNOSIS — Z86.711 HX PULMONARY EMBOLISM: ICD-10-CM

## 2024-05-17 LAB
POC INR: 2.5
PROTHROMBIN TIME, POC: 30

## 2024-05-17 PROCEDURE — 85610 PROTHROMBIN TIME: CPT | Performed by: FAMILY MEDICINE

## 2024-05-17 PROCEDURE — PBSHW AMB POC PT/INR: Performed by: FAMILY MEDICINE

## 2024-05-17 NOTE — PROGRESS NOTES
Jackie Worrell is a 68 y.o. female who presents today for Anticoagulation monitoring.    Indication: atrial fibrillation  INR Goal: 2.0-3.0.  Current dose: 3.75 Mon, Wed, and Fri, 7.5 mg all other days   Missed Coumadin Doses:  None  Medication Changes:  no  Dietary Changes:  no    Symptoms: taking coumadin appropriately without any bleeding.    Latest INRs:  Lab Results   Component Value Date/Time    INR 2.2 04/15/2024 10:04 AM    INR 2.1 03/13/2024 10:14 AM    INR 2.0 02/13/2024 10:08 AM    INR 3.1 05/02/2023 09:35 AM    INR 3.4 04/18/2023 09:44 AM    INR 2.9 03/14/2023 03:09 PM        New Coumadin dose:.current treatment plan is effective, no change in therapy.    Next check to be scheduled for  4 weeks.

## 2024-05-23 ENCOUNTER — TELEPHONE (OUTPATIENT)
Age: 69
End: 2024-05-23

## 2024-05-23 DIAGNOSIS — E61.1 IRON DEFICIENCY: ICD-10-CM

## 2024-05-23 DIAGNOSIS — E03.9 HYPOTHYROIDISM, UNSPECIFIED TYPE: Primary | ICD-10-CM

## 2024-05-23 DIAGNOSIS — I10 ESSENTIAL (PRIMARY) HYPERTENSION: ICD-10-CM

## 2024-05-23 DIAGNOSIS — E55.9 VITAMIN D DEFICIENCY: ICD-10-CM

## 2024-05-23 DIAGNOSIS — E53.8 B12 DEFICIENCY: ICD-10-CM

## 2024-05-23 DIAGNOSIS — R73.02 IGT (IMPAIRED GLUCOSE TOLERANCE): ICD-10-CM

## 2024-05-23 RX ORDER — VENLAFAXINE 75 MG/1
75 TABLET ORAL 2 TIMES DAILY
Qty: 180 TABLET | Refills: 1 | Status: SHIPPED | OUTPATIENT
Start: 2024-05-23

## 2024-05-23 NOTE — TELEPHONE ENCOUNTER
Unable to reach pt to inform labs have been ordered and to schedule lab visit. Unable to leave voicemail, not set up.    If pt calls back inform if pt would like we can draw labs when she comes in for nurse visit for INR on 6/18/24.

## 2024-05-23 NOTE — TELEPHONE ENCOUNTER
----- Message from Ellie Vann sent at 5/23/2024  9:40 AM EDT -----  Subject: Referral Request    Reason for referral request? Patient would like to have blood work done   before her Medicare AWV on 6/25 please call patient with an appointment   once order is in chart thank you   Provider patient wants to be referred to(if known):     Provider Phone Number(if known):    Additional Information for Provider?   ---------------------------------------------------------------------------  --------------  CALL BACK INFO    [phone]; OK to leave message on voicemail  ---------------------------------------------------------------------------  --------------

## 2024-06-18 ENCOUNTER — NURSE ONLY (OUTPATIENT)
Age: 69
End: 2024-06-18
Payer: MEDICARE

## 2024-06-18 DIAGNOSIS — I26.99 PULMONARY EMBOLISM, UNSPECIFIED CHRONICITY, UNSPECIFIED PULMONARY EMBOLISM TYPE, UNSPECIFIED WHETHER ACUTE COR PULMONALE PRESENT (HCC): Primary | ICD-10-CM

## 2024-06-18 LAB
POC INR: 1.9
PROTHROMBIN TIME, POC: 23.2

## 2024-06-18 PROCEDURE — PBSHW AMB POC PT/INR: Performed by: FAMILY MEDICINE

## 2024-06-18 PROCEDURE — 85610 PROTHROMBIN TIME: CPT | Performed by: FAMILY MEDICINE

## 2024-06-18 NOTE — PROGRESS NOTES
Jackie Worrell is a 68 y.o. female who presents today for Anticoagulation monitoring.    Indication: atrial fibrillation  INR Goal: 2.0-3.0.  Current dose: 3.75 Mon, Wed, and Fri, 7.5 mg all other days   Missed Coumadin Doses:  None  Medication Changes:  no  Dietary Changes:  no    Symptoms: taking coumadin appropriately without any bleeding.    Latest INRs:  Lab Results   Component Value Date/Time    INR 2.5 05/17/2024 10:33 AM    INR 2.2 04/15/2024 10:04 AM    INR 2.1 03/13/2024 10:14 AM    INR 3.1 05/02/2023 09:35 AM    INR 3.4 04/18/2023 09:44 AM    INR 2.9 03/14/2023 03:09 PM        New Coumadin dose:.current treatment plan is effective, no change in therapy.    Next check to be scheduled for  1 weeks.

## 2024-06-20 ENCOUNTER — TELEPHONE (OUTPATIENT)
Age: 69
End: 2024-06-20

## 2024-06-20 NOTE — TELEPHONE ENCOUNTER
Called patient in regards to appointment on 6/28. Appointment needs to be rescheduled do to a scheduling error. No answer, left a voicemail for a returned call.

## 2024-06-25 ENCOUNTER — OFFICE VISIT (OUTPATIENT)
Age: 69
End: 2024-06-25
Payer: MEDICARE

## 2024-06-25 VITALS
BODY MASS INDEX: 37.19 KG/M2 | OXYGEN SATURATION: 96 % | HEIGHT: 70 IN | DIASTOLIC BLOOD PRESSURE: 74 MMHG | WEIGHT: 259.8 LBS | TEMPERATURE: 98.1 F | RESPIRATION RATE: 17 BRPM | SYSTOLIC BLOOD PRESSURE: 123 MMHG | HEART RATE: 78 BPM

## 2024-06-25 DIAGNOSIS — I26.99 PULMONARY EMBOLISM, UNSPECIFIED CHRONICITY, UNSPECIFIED PULMONARY EMBOLISM TYPE, UNSPECIFIED WHETHER ACUTE COR PULMONALE PRESENT (HCC): Primary | ICD-10-CM

## 2024-06-25 DIAGNOSIS — I10 ESSENTIAL (PRIMARY) HYPERTENSION: ICD-10-CM

## 2024-06-25 DIAGNOSIS — E03.9 HYPOTHYROIDISM, UNSPECIFIED TYPE: ICD-10-CM

## 2024-06-25 DIAGNOSIS — Z86.711 HX PULMONARY EMBOLISM: ICD-10-CM

## 2024-06-25 DIAGNOSIS — R22.1 NECK MASS: ICD-10-CM

## 2024-06-25 DIAGNOSIS — Z00.00 ROUTINE GENERAL MEDICAL EXAMINATION AT A HEALTH CARE FACILITY: ICD-10-CM

## 2024-06-25 LAB
POC INR: 2.5
PROTHROMBIN TIME, POC: 29.6

## 2024-06-25 PROCEDURE — 3074F SYST BP LT 130 MM HG: CPT | Performed by: FAMILY MEDICINE

## 2024-06-25 PROCEDURE — G0439 PPPS, SUBSEQ VISIT: HCPCS | Performed by: FAMILY MEDICINE

## 2024-06-25 PROCEDURE — 1123F ACP DISCUSS/DSCN MKR DOCD: CPT | Performed by: FAMILY MEDICINE

## 2024-06-25 PROCEDURE — 85610 PROTHROMBIN TIME: CPT | Performed by: FAMILY MEDICINE

## 2024-06-25 PROCEDURE — 3078F DIAST BP <80 MM HG: CPT | Performed by: FAMILY MEDICINE

## 2024-06-25 PROCEDURE — 3017F COLORECTAL CA SCREEN DOC REV: CPT | Performed by: FAMILY MEDICINE

## 2024-06-25 PROCEDURE — PBSHW AMB POC PT/INR: Performed by: FAMILY MEDICINE

## 2024-06-25 SDOH — ECONOMIC STABILITY: INCOME INSECURITY: HOW HARD IS IT FOR YOU TO PAY FOR THE VERY BASICS LIKE FOOD, HOUSING, MEDICAL CARE, AND HEATING?: NOT HARD AT ALL

## 2024-06-25 SDOH — ECONOMIC STABILITY: FOOD INSECURITY: WITHIN THE PAST 12 MONTHS, THE FOOD YOU BOUGHT JUST DIDN'T LAST AND YOU DIDN'T HAVE MONEY TO GET MORE.: NEVER TRUE

## 2024-06-25 SDOH — ECONOMIC STABILITY: FOOD INSECURITY: WITHIN THE PAST 12 MONTHS, YOU WORRIED THAT YOUR FOOD WOULD RUN OUT BEFORE YOU GOT MONEY TO BUY MORE.: NEVER TRUE

## 2024-06-25 ASSESSMENT — PATIENT HEALTH QUESTIONNAIRE - PHQ9
SUM OF ALL RESPONSES TO PHQ QUESTIONS 1-9: 0
1. LITTLE INTEREST OR PLEASURE IN DOING THINGS: NOT AT ALL
2. FEELING DOWN, DEPRESSED OR HOPELESS: NOT AT ALL
SUM OF ALL RESPONSES TO PHQ QUESTIONS 1-9: 0
SUM OF ALL RESPONSES TO PHQ QUESTIONS 1-9: 0
SUM OF ALL RESPONSES TO PHQ9 QUESTIONS 1 & 2: 0
SUM OF ALL RESPONSES TO PHQ QUESTIONS 1-9: 0

## 2024-06-25 ASSESSMENT — LIFESTYLE VARIABLES: HOW MANY STANDARD DRINKS CONTAINING ALCOHOL DO YOU HAVE ON A TYPICAL DAY: PATIENT DOES NOT DRINK

## 2024-06-25 NOTE — PROGRESS NOTES
Chief Complaint   Patient presents with    Medicare AWV         Health Maintenance Due   Topic Date Due    Shingles vaccine (1 of 2) Never done    Respiratory Syncytial Virus (RSV) Pregnant or age 60 yrs+ (1 - 1-dose 60+ series) Never done    Breast cancer screen  08/25/2023    COVID-19 Vaccine (4 - 2023-24 season) 09/01/2023    Annual Wellness Visit (Medicare Advantage)  01/01/2024         \"Have you been to the ER, urgent care clinic since your last visit?  Hospitalized since your last visit?\"    NO    “Have you seen or consulted any other health care providers outside of [facility] since your last visit?”    NO       Have you had a mammogram?”   NO    Date of last Mammogram: 8/25/2022       Jackie Worrell is a 68 y.o. female who presents today for Anticoagulation monitoring.    Indication: PE  INR Goal: 2.0-3.0.  Current dose:  Coumadin 7.5 mg daily, except 3.75 Mon/Wed/Fri.  Missed Coumadin Doses:  None  Medication Changes:  no  Dietary Changes:  no    Symptoms: taking coumadin appropriately without any bleeding.    Latest INRs:  Lab Results   Component Value Date/Time    INR 2.5 06/25/2024 01:13 PM    INR 1.9 06/18/2024 10:25 AM    INR 2.5 05/17/2024 10:33 AM    INR 3.1 05/02/2023 09:35 AM    INR 3.4 04/18/2023 09:44 AM    INR 2.9 03/14/2023 03:09 PM        New Coumadin dose:.current treatment plan is effective, no change in therapy.    Next check to be scheduled for  4 weeks.

## 2024-06-25 NOTE — PROGRESS NOTES
Medicare Annual Wellness Visit    I have reviewed the patient's medical history in detail and updated the computerized patient record.     History   Jackie Worrell is a 68 y.o. female who presents to follow-up on chronic medical issues.    Oh by the way complaint of a lump from the right neck that has been present for about 3 months (April).  Nontender    She is feeling fatigued which is an old complaint.  Better since she started using a CPAP machine.  Uses CPAP 4 to 6 hours at night.  Sometimes naps during the day.  Tells me of recent occasion where she used CPAP during a nap and slept for several hours and felt pretty good      She has knee pain and recently got an injection which worked well for her.    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    Depression     GERD (gastroesophageal reflux disease)     Hypertension     Lymphedema of leg     bilateral    Sleep apnea     untreated    Thromboembolus (HCC)     x 3; takes coumadin, DVT's    Thyroid disease       Past Surgical History:   Procedure Laterality Date    ASD REPAIR  1967    COLONOSCOPY N/A 11/8/2023    COLONOSCOPY performed by Navid Mccall MD at [facility] ENDOSCOPY    COLONOSCOPY,ADWOA WARD  7/16/2015         ORTHOPEDIC SURGERY      carpal tunnel x3    TUBAL LIGATION       Current Outpatient Medications   Medication Sig Dispense Refill    venlafaxine (EFFEXOR) 75 MG tablet Take 1 tablet by mouth twice daily 180 tablet 1    levothyroxine (SYNTHROID) 100 MCG tablet TAKE 1 TABLET BY MOUTH ONCE DAILY BEFORE BREAKFAST 90 tablet 3    amLODIPine (NORVASC) 5 MG tablet       spironolactone (ALDACTONE) 25 MG tablet Take 1 tablet by mouth once daily 90 tablet 3    Ferrous Sulfate (IRON PO) Take 325 mg by mouth      valsartan (DIOVAN) 320 MG tablet Take 1 tablet by mouth once daily 90 tablet 3    warfarin

## 2024-07-18 ENCOUNTER — TELEPHONE (OUTPATIENT)
Age: 69
End: 2024-07-18

## 2024-07-18 NOTE — TELEPHONE ENCOUNTER
Tried to contact patient to reschedule midway appt for today.  Patient not home and unable to take call.

## 2024-07-19 ENCOUNTER — HOSPITAL ENCOUNTER (OUTPATIENT)
Facility: HOSPITAL | Age: 69
End: 2024-07-19
Payer: MEDICARE

## 2024-07-19 VITALS — BODY MASS INDEX: 37.25 KG/M2 | WEIGHT: 266.1 LBS | HEIGHT: 71 IN

## 2024-07-19 DIAGNOSIS — Z12.31 VISIT FOR SCREENING MAMMOGRAM: ICD-10-CM

## 2024-07-19 DIAGNOSIS — R22.1 NECK MASS: ICD-10-CM

## 2024-07-19 PROCEDURE — 76536 US EXAM OF HEAD AND NECK: CPT

## 2024-07-19 PROCEDURE — 77067 SCR MAMMO BI INCL CAD: CPT

## 2024-07-24 ENCOUNTER — TELEPHONE (OUTPATIENT)
Age: 69
End: 2024-07-24

## 2024-07-24 ENCOUNTER — CLINICAL DOCUMENTATION (OUTPATIENT)
Age: 69
End: 2024-07-24

## 2024-07-24 NOTE — TELEPHONE ENCOUNTER
Identified patient with two patient identifiers (name and ). Reviewed chart in preparation for encounter and have obtained necessary documentation.    Called and spoke with patient to verify PCP on file to fax over support form.

## 2024-07-24 NOTE — TELEPHONE ENCOUNTER
Patient called requesting a nutrition appt and an appt with Nichelle for a psych evaluation. I advised she will need to complete the assessment first, and sent that to her email. I have scheduled her initial nutrition appt.

## 2024-07-24 NOTE — PROGRESS NOTES
Received 2 pg fax from [facility]    Date stamped and placed in Dr Trujillo's box

## 2024-07-25 ENCOUNTER — NURSE ONLY (OUTPATIENT)
Age: 69
End: 2024-07-25
Payer: MEDICARE

## 2024-07-25 DIAGNOSIS — Z86.711 HX PULMONARY EMBOLISM: ICD-10-CM

## 2024-07-25 DIAGNOSIS — I26.99 PULMONARY EMBOLISM, UNSPECIFIED CHRONICITY, UNSPECIFIED PULMONARY EMBOLISM TYPE, UNSPECIFIED WHETHER ACUTE COR PULMONALE PRESENT (HCC): Primary | ICD-10-CM

## 2024-07-25 LAB
POC INR: 2.3
PROTHROMBIN TIME, POC: 27.1

## 2024-07-25 PROCEDURE — PBSHW AMB POC PT/INR: Performed by: FAMILY MEDICINE

## 2024-07-25 PROCEDURE — 85610 PROTHROMBIN TIME: CPT | Performed by: FAMILY MEDICINE

## 2024-07-25 NOTE — PROGRESS NOTES
Jackie Worrell is a 68 y.o. female who presents today for Anticoagulation monitoring.    Indication: atrial fibrillation  INR Goal: 2.0-3.0.  Current dose:  Coumadin 3.75 Mon, Wed, and Fri, 7.5 mg all other days   Missed Coumadin Doses:  None  Medication Changes:  no  Dietary Changes:  no    Symptoms: taking coumadin appropriately without any bleeding.    Latest INRs:  Lab Results   Component Value Date/Time    INR 2.5 06/25/2024 01:13 PM    INR 1.9 06/18/2024 10:25 AM    INR 2.5 05/17/2024 10:33 AM    INR 3.1 05/02/2023 09:35 AM    INR 3.4 04/18/2023 09:44 AM    INR 2.9 03/14/2023 03:09 PM        New Coumadin dose:.current treatment plan is effective, no change in therapy.    Next check to be scheduled for  4 weeks.

## 2024-07-26 ENCOUNTER — TELEPHONE (OUTPATIENT)
Age: 69
End: 2024-07-26

## 2024-07-30 ENCOUNTER — OFFICE VISIT (OUTPATIENT)
Age: 69
End: 2024-07-30

## 2024-07-30 ENCOUNTER — TELEPHONE (OUTPATIENT)
Age: 69
End: 2024-07-30

## 2024-07-30 DIAGNOSIS — E66.01 MORBID OBESITY (HCC): Primary | ICD-10-CM

## 2024-07-30 LAB — UREA BREATH TEST QL: NEGATIVE

## 2024-07-30 NOTE — PROGRESS NOTES
Pre-operative Bariatric Nutrition Evaluation ([employer] )     Date: 2024   Physician/Surgeon:Luis Manuel Henao M.D.   Name: Jackie Worrell  :  1955  Age:  68  Gender: Female   Type of Surgery: [x]           Gastric Bypass   []           Sleeve Gastrectomy    ASSESSMENT:    Past Medical History:lymphedema, h/o blood clots     Medications/Supplements:   Prior to Admission medications    Medication Sig Start Date End Date Taking? Authorizing Provider   venlafaxine (EFFEXOR) 75 MG tablet Take 1 tablet by mouth twice daily 24   Jonathan Trujillo MD   levothyroxine (SYNTHROID) 100 MCG tablet TAKE 1 TABLET BY MOUTH ONCE DAILY BEFORE BREAKFAST 24   Jonathan Trujillo MD   amLODIPine (NORVASC) 5 MG tablet  24   ProviderTai MD   spironolactone (ALDACTONE) 25 MG tablet Take 1 tablet by mouth once daily 24   Jonathan Trujillo MD   Ferrous Sulfate (IRON PO) Take 325 mg by mouth    Tai Sabillon MD   valsartan (DIOVAN) 320 MG tablet Take 1 tablet by mouth once daily 23   Jonathan Trujillo MD   warfarin (COUMADIN) 7.5 MG tablet Take 1 tablet by mouth once daily 23   Jonathan Trujillo MD   hydroCHLOROthiazide (HYDRODIURIL) 25 MG tablet Take 1 tablet by mouth daily 23   Jonathan Trujillo MD   acetaminophen (TYLENOL) 325 MG tablet Take 2 tablets by mouth every 6 hours as needed 10/10/20   Automatic Reconciliation, Ar   vitamin D 25 MCG (1000 UT) CAPS Take by mouth daily    Automatic Reconciliation, Ar   cyanocobalamin 1000 MCG tablet Take 1 tablet by mouth daily    Automatic Reconciliation, Ar   diclofenac sodium (VOLTAREN) 1 % GEL Apply 2 g topically 4 times daily 22   Automatic Reconciliation, Ar   potassium gluconate 550 mg tablet Take 198 mg by mouth daily    Automatic Reconciliation, Ar       Food Allergies/Intolerances:none    Anthropometrics:    Ht:5' 10\"   Recent Office Wt (24): 266#    IBW: 150#    %IBW: 177%     BMI:37    Category: obesity II     Reported wt

## 2024-07-30 NOTE — TELEPHONE ENCOUNTER
Ultrasound of neck lump reviewed.  Radiologist feels this is probably a lipoma which is a benign collection of fat.  This is unlikely to go away on its own but would only need to be surgically removed if cosmetically displeasing or painful.  I think ENT or plastic surgery would be the specialty that could take this out if she wants to pursue removal    If it changes significantly in size we would want to get another ultrasound

## 2024-07-30 NOTE — TELEPHONE ENCOUNTER
verified. Informed pt of message from provider regarding ultrasound. Pt verified understanding and had no further questions.

## 2024-08-21 ENCOUNTER — TELEPHONE (OUTPATIENT)
Age: 69
End: 2024-08-21

## 2024-08-21 NOTE — TELEPHONE ENCOUNTER
Attemped to call pt, unsuccessful pt's VM was full and unable to leave a message.    Ref: Schedule NP VV Psych Eval

## 2024-08-22 NOTE — TELEPHONE ENCOUNTER
SECOND attempt to call pt, unsuccessful pt's VM was full and unable to leave a message.     Ref: Schedule NP VV Psych Eval

## 2024-08-26 ENCOUNTER — NURSE ONLY (OUTPATIENT)
Age: 69
End: 2024-08-26
Payer: MEDICARE

## 2024-08-26 DIAGNOSIS — E61.1 IRON DEFICIENCY: ICD-10-CM

## 2024-08-26 DIAGNOSIS — I10 ESSENTIAL (PRIMARY) HYPERTENSION: ICD-10-CM

## 2024-08-26 DIAGNOSIS — E53.8 B12 DEFICIENCY: ICD-10-CM

## 2024-08-26 DIAGNOSIS — R73.02 IGT (IMPAIRED GLUCOSE TOLERANCE): ICD-10-CM

## 2024-08-26 DIAGNOSIS — E55.9 VITAMIN D DEFICIENCY: ICD-10-CM

## 2024-08-26 DIAGNOSIS — I26.99 PULMONARY EMBOLISM, UNSPECIFIED CHRONICITY, UNSPECIFIED PULMONARY EMBOLISM TYPE, UNSPECIFIED WHETHER ACUTE COR PULMONALE PRESENT (HCC): Primary | ICD-10-CM

## 2024-08-26 DIAGNOSIS — E03.9 HYPOTHYROIDISM, UNSPECIFIED TYPE: ICD-10-CM

## 2024-08-26 DIAGNOSIS — Z77.21 EXPOSURE TO POTENTIALLY HAZARDOUS BODY FLUIDS: ICD-10-CM

## 2024-08-26 DIAGNOSIS — Z86.711 HX PULMONARY EMBOLISM: ICD-10-CM

## 2024-08-26 LAB
POC INR: 2.9
PROTHROMBIN TIME, POC: 34.3

## 2024-08-26 PROCEDURE — 85610 PROTHROMBIN TIME: CPT | Performed by: FAMILY MEDICINE

## 2024-08-26 PROCEDURE — PBSHW AMB POC PT/INR: Performed by: FAMILY MEDICINE

## 2024-08-26 NOTE — PROGRESS NOTES
Jackie Worrell is a 68 y.o. female who presents today for Anticoagulation monitoring.    Indication: atrial fibrillation  INR Goal: 2.0-3.0.  Current dose:  Coumadin 3.75 Mon, Wed, and Fri, 7.5 mg all other days   Missed Coumadin Doses:  None  Medication Changes:  no  Dietary Changes:  no    Symptoms: taking coumadin appropriately without any bleeding.    Latest INRs:  Lab Results   Component Value Date/Time    INR 2.3 07/25/2024 09:39 AM    INR 2.5 06/25/2024 01:13 PM    INR 1.9 06/18/2024 10:25 AM    INR 3.1 05/02/2023 09:35 AM    INR 3.4 04/18/2023 09:44 AM    INR 2.9 03/14/2023 03:09 PM        New Coumadin dose:.current treatment plan is effective, no change in therapy.    Next check to be scheduled for  4 weeks.

## 2024-08-26 NOTE — PROGRESS NOTES
Presented for lab visit and INR check.  Getting her labs drawn expressed concern about her boyfriend who has hepatitis C and wants to add that onto her lab work

## 2024-08-27 ENCOUNTER — TELEPHONE (OUTPATIENT)
Age: 69
End: 2024-08-27

## 2024-08-27 ENCOUNTER — OFFICE VISIT (OUTPATIENT)
Age: 69
End: 2024-08-27

## 2024-08-27 DIAGNOSIS — E66.01 MORBID OBESITY (HCC): Primary | ICD-10-CM

## 2024-08-27 LAB
25(OH)D3 SERPL-MCNC: 33.1 NG/ML (ref 30–100)
ALBUMIN SERPL-MCNC: 3.6 G/DL (ref 3.5–5)
ALBUMIN/GLOB SERPL: 0.9 (ref 1.1–2.2)
ALP SERPL-CCNC: 87 U/L (ref 45–117)
ALT SERPL-CCNC: 21 U/L (ref 12–78)
ANION GAP SERPL CALC-SCNC: 5 MMOL/L (ref 5–15)
AST SERPL-CCNC: 15 U/L (ref 15–37)
BASOPHILS # BLD: 0 K/UL (ref 0–0.1)
BASOPHILS NFR BLD: 1 % (ref 0–1)
BILIRUB SERPL-MCNC: 0.4 MG/DL (ref 0.2–1)
BUN SERPL-MCNC: 16 MG/DL (ref 6–20)
BUN/CREAT SERPL: 16 (ref 12–20)
CALCIUM SERPL-MCNC: 9.4 MG/DL (ref 8.5–10.1)
CHLORIDE SERPL-SCNC: 103 MMOL/L (ref 97–108)
CHOLEST SERPL-MCNC: 173 MG/DL
CO2 SERPL-SCNC: 31 MMOL/L (ref 21–32)
CREAT SERPL-MCNC: 1 MG/DL (ref 0.55–1.02)
DIFFERENTIAL METHOD BLD: ABNORMAL
EOSINOPHIL # BLD: 0.1 K/UL (ref 0–0.4)
EOSINOPHIL NFR BLD: 2 % (ref 0–7)
ERYTHROCYTE [DISTWIDTH] IN BLOOD BY AUTOMATED COUNT: 13 % (ref 11.5–14.5)
EST. AVERAGE GLUCOSE BLD GHB EST-MCNC: 108 MG/DL
FERRITIN SERPL-MCNC: 74 NG/ML (ref 8–252)
FOLATE SERPL-MCNC: 5 NG/ML (ref 5–21)
GLOBULIN SER CALC-MCNC: 3.8 G/DL (ref 2–4)
GLUCOSE SERPL-MCNC: 147 MG/DL (ref 65–100)
HBA1C MFR BLD: 5.4 % (ref 4–5.6)
HCT VFR BLD AUTO: 41.5 % (ref 35–47)
HCV AB SER IA-ACNC: 0.08 INDEX
HCV AB SERPL QL IA: NONREACTIVE
HDLC SERPL-MCNC: 62 MG/DL
HDLC SERPL: 2.8 (ref 0–5)
HGB BLD-MCNC: 13.2 G/DL (ref 11.5–16)
IMM GRANULOCYTES # BLD AUTO: 0 K/UL (ref 0–0.04)
IMM GRANULOCYTES NFR BLD AUTO: 1 % (ref 0–0.5)
IRON SATN MFR SERPL: 17 % (ref 20–50)
IRON SERPL-MCNC: 63 UG/DL (ref 35–150)
LDLC SERPL CALC-MCNC: 92 MG/DL (ref 0–100)
LYMPHOCYTES # BLD: 1.9 K/UL (ref 0.8–3.5)
LYMPHOCYTES NFR BLD: 32 % (ref 12–49)
MCH RBC QN AUTO: 28.7 PG (ref 26–34)
MCHC RBC AUTO-ENTMCNC: 31.8 G/DL (ref 30–36.5)
MCV RBC AUTO: 90.2 FL (ref 80–99)
MONOCYTES # BLD: 0.4 K/UL (ref 0–1)
MONOCYTES NFR BLD: 6 % (ref 5–13)
NEUTS SEG # BLD: 3.4 K/UL (ref 1.8–8)
NEUTS SEG NFR BLD: 58 % (ref 32–75)
NRBC # BLD: 0 K/UL (ref 0–0.01)
NRBC BLD-RTO: 0 PER 100 WBC
PLATELET # BLD AUTO: 286 K/UL (ref 150–400)
PMV BLD AUTO: 9.8 FL (ref 8.9–12.9)
POTASSIUM SERPL-SCNC: 4.7 MMOL/L (ref 3.5–5.1)
PROT SERPL-MCNC: 7.4 G/DL (ref 6.4–8.2)
RBC # BLD AUTO: 4.6 M/UL (ref 3.8–5.2)
SODIUM SERPL-SCNC: 139 MMOL/L (ref 136–145)
T4 FREE SERPL-MCNC: 1.2 NG/DL (ref 0.8–1.5)
TIBC SERPL-MCNC: 379 UG/DL (ref 250–450)
TRIGL SERPL-MCNC: 95 MG/DL
TSH SERPL DL<=0.05 MIU/L-ACNC: 5.58 UIU/ML (ref 0.36–3.74)
VIT B12 SERPL-MCNC: 1119 PG/ML (ref 193–986)
VLDLC SERPL CALC-MCNC: 19 MG/DL
WBC # BLD AUTO: 5.8 K/UL (ref 3.6–11)

## 2024-08-27 NOTE — PROGRESS NOTES
[facility]  Supervised Weight Loss     Date:   2024    Patient's Name: Jackie Worrell  : 1955    Insurance:  [employer]                Session:   Surgery: Gastric Bypass   Surgeon:  Luis Manuel Henao M.D.     Height: 5' 10\"    Reported Weight:    264      Lbs.   BMI: 37   Pounds Lost since last month: 2#               Pounds Gained since last month: 0    Starting Weight: 266#   Previous Month’s Weight: 266#  Overall Pounds Lost: 2#  Overall Pounds Gained: 0    Other Pertinent Information: Today's appointment was completed over the phone. Today's wt was self-reported.     Smoking Status:  none  Alcohol Intake: none    I have reviewed with pt the guidelines of the supervised wt loss program.  Pt understands the expectations of some wt loss during the program and that wt gain could delay the process. I have also explained that appointments need to be consecutive and missing an appointment may result in starting over. Pt has received this information in writing.          Changes that patient has made since last month include:  trying to drink more water, reduced sugar in coffee, eating healthier/more protein, trying to reduce sweets/snacks.      Eating Habits and Behaviors  General healthy eating guidelines were also discussed. Pts were instructed that their plate should be made up 1/2 plate coming from non-starchy vegetables, 1/4 coming from lean meat, and 1/4 of their plate coming from carbohydrates, including fruits, starches, or milk.  We discussed measuring meals to 1/2 cup total per meal after surgery. Drinking only calorie-free, sugar-free and non-carbonated beverages. We discussed the importance of drinking 64 ounces of fluid per day to prevent dehydration post-operatively.                      Physical Activity/Exercise  We talked about the importance of increasing daily physical activity and beginning to develop an exercise regimen/routine. We talked

## 2024-08-28 RX ORDER — HYDROCHLOROTHIAZIDE 25 MG/1
25 TABLET ORAL DAILY
Qty: 90 TABLET | Refills: 3 | Status: SHIPPED | OUTPATIENT
Start: 2024-08-28

## 2024-08-28 NOTE — TELEPHONE ENCOUNTER
verified. Informed pt of message from provider regarding lab results. Pt verified understanding and had no further questions.

## 2024-09-24 ENCOUNTER — OFFICE VISIT (OUTPATIENT)
Age: 69
End: 2024-09-24

## 2024-09-24 DIAGNOSIS — E66.01 MORBID OBESITY: Primary | ICD-10-CM

## 2024-10-01 ENCOUNTER — NURSE ONLY (OUTPATIENT)
Age: 69
End: 2024-10-01
Payer: MEDICARE

## 2024-10-01 DIAGNOSIS — E03.9 HYPOTHYROIDISM, UNSPECIFIED TYPE: Primary | ICD-10-CM

## 2024-10-01 DIAGNOSIS — I26.99 PULMONARY EMBOLISM, UNSPECIFIED CHRONICITY, UNSPECIFIED PULMONARY EMBOLISM TYPE, UNSPECIFIED WHETHER ACUTE COR PULMONALE PRESENT (HCC): ICD-10-CM

## 2024-10-01 DIAGNOSIS — Z86.711 HX PULMONARY EMBOLISM: ICD-10-CM

## 2024-10-01 LAB
POC INR: 2.9
PROTHROMBIN TIME, POC: 34.9

## 2024-10-01 PROCEDURE — PBSHW AMB POC PT/INR: Performed by: FAMILY MEDICINE

## 2024-10-01 PROCEDURE — 85610 PROTHROMBIN TIME: CPT | Performed by: FAMILY MEDICINE

## 2024-10-01 NOTE — PROGRESS NOTES
Jackie Worrell is a 68 y.o. female who presents today for Anticoagulation monitoring.    Indication: atrial fibrillation  INR Goal: 2.0-3.0.  Current dose:  Coumadin 3.75 Mon, Wed, and Fri, 7.5 mg all other days   Missed Coumadin Doses:  None  Medication Changes:  no  Dietary Changes:  no    Symptoms: taking coumadin appropriately without any bleeding.    Latest INRs:  Lab Results   Component Value Date/Time    INR 2.9 08/26/2024 09:37 AM    INR 2.3 07/25/2024 09:39 AM    INR 2.5 06/25/2024 01:13 PM    INR 3.1 05/02/2023 09:35 AM    INR 3.4 04/18/2023 09:44 AM    INR 2.9 03/14/2023 03:09 PM        New Coumadin dose:.current treatment plan is effective, no change in therapy.    Next check to be scheduled for  4 weeks.

## 2024-10-10 ENCOUNTER — TELEPHONE (OUTPATIENT)
Age: 69
End: 2024-10-10

## 2024-10-10 NOTE — TELEPHONE ENCOUNTER
Identified patient with two patient identifiers (name and ). Reviewed chart in preparation for encounter and have obtained necessary documentation.    Called and spoke with patient to schedule midway per Catalina. Patient is scheduled for 10/23/24 at 10:20 AM. Patient understood what was discussed and thankful for the call.

## 2024-10-22 ENCOUNTER — OFFICE VISIT (OUTPATIENT)
Age: 69
End: 2024-10-22

## 2024-10-22 DIAGNOSIS — E66.01 MORBID OBESITY: Primary | ICD-10-CM

## 2024-10-22 NOTE — PROGRESS NOTES
[facility]  Supervised Weight Loss     Date:   10/22/2024    Patient's Name: Jackie Worrell  : 1955    Insurance:  [employer]                                      Session:   Surgery: Gastric Bypass                              Surgeon:  Luis Manuel Henao M.D.      Height: 5' 10\"                          Reported Weight:    258      Lbs.                              BMI: 37              Pounds Lost since last month: 6#               Pounds Gained since last month: 0     Starting Weight: 266#                       Previous Month’s Weight: 264#  Overall Pounds Lost: 8#                  Overall Pounds Gained: 0     Other Pertinent Information: Today's appointment was completed over the phone. Today's wt was self-reported.    Smoking Status:  none  Alcohol Intake: none    I have reviewed with pt the guidelines of the supervised wt loss program.  Pt understands the expectations of some wt loss during the program and that wt gain could delay the process. I have also explained that appointments need to be consecutive and missing an appointment may result in starting over. Pt has received this information in writing.          Changes that patient has made since last month include:  drinking more water and practicing 30/30 drinking rule, more veggies      Eating Habits and Behaviors  General healthy eating guidelines were also discussed. Pts were instructed that their plate should be made up 1/2 plate coming from non-starchy vegetables, 1/4 coming from lean meat, and 1/4 of their plate coming from carbohydrates, including fruits, starches, or milk. We discussed measuring meals to 1/2 cup total per meal after surgery. Drinking only calorie-free, sugar-free and non-carbonated beverages. We discussed the importance of drinking 64 ounces of fluid per day to prevent dehydration post-operatively.                       Patient's current diet habits include: Pt is eating 3 meals

## 2024-10-23 ENCOUNTER — OFFICE VISIT (OUTPATIENT)
Age: 69
End: 2024-10-23
Payer: MEDICARE

## 2024-10-23 VITALS
BODY MASS INDEX: 36.71 KG/M2 | DIASTOLIC BLOOD PRESSURE: 79 MMHG | WEIGHT: 262.2 LBS | RESPIRATION RATE: 15 BRPM | OXYGEN SATURATION: 94 % | TEMPERATURE: 97.5 F | SYSTOLIC BLOOD PRESSURE: 147 MMHG | HEIGHT: 71 IN | HEART RATE: 65 BPM

## 2024-10-23 DIAGNOSIS — E66.01 MORBID OBESITY: Primary | ICD-10-CM

## 2024-10-23 DIAGNOSIS — K21.9 GASTROESOPHAGEAL REFLUX DISEASE, UNSPECIFIED WHETHER ESOPHAGITIS PRESENT: ICD-10-CM

## 2024-10-23 DIAGNOSIS — I10 ESSENTIAL HYPERTENSION, BENIGN: ICD-10-CM

## 2024-10-23 DIAGNOSIS — G47.30 SLEEP APNEA, UNSPECIFIED TYPE: ICD-10-CM

## 2024-10-23 DIAGNOSIS — I89.0 LYMPHEDEMA: ICD-10-CM

## 2024-10-23 PROCEDURE — 3078F DIAST BP <80 MM HG: CPT | Performed by: NURSE PRACTITIONER

## 2024-10-23 PROCEDURE — 1090F PRES/ABSN URINE INCON ASSESS: CPT | Performed by: NURSE PRACTITIONER

## 2024-10-23 PROCEDURE — 1159F MED LIST DOCD IN RCRD: CPT | Performed by: NURSE PRACTITIONER

## 2024-10-23 PROCEDURE — 1126F AMNT PAIN NOTED NONE PRSNT: CPT | Performed by: NURSE PRACTITIONER

## 2024-10-23 PROCEDURE — 1160F RVW MEDS BY RX/DR IN RCRD: CPT | Performed by: NURSE PRACTITIONER

## 2024-10-23 PROCEDURE — G8399 PT W/DXA RESULTS DOCUMENT: HCPCS | Performed by: NURSE PRACTITIONER

## 2024-10-23 PROCEDURE — 1036F TOBACCO NON-USER: CPT | Performed by: NURSE PRACTITIONER

## 2024-10-23 PROCEDURE — G8417 CALC BMI ABV UP PARAM F/U: HCPCS | Performed by: NURSE PRACTITIONER

## 2024-10-23 PROCEDURE — 1123F ACP DISCUSS/DSCN MKR DOCD: CPT | Performed by: NURSE PRACTITIONER

## 2024-10-23 PROCEDURE — 99214 OFFICE O/P EST MOD 30 MIN: CPT | Performed by: NURSE PRACTITIONER

## 2024-10-23 PROCEDURE — G8484 FLU IMMUNIZE NO ADMIN: HCPCS | Performed by: NURSE PRACTITIONER

## 2024-10-23 PROCEDURE — 3017F COLORECTAL CA SCREEN DOC REV: CPT | Performed by: NURSE PRACTITIONER

## 2024-10-23 PROCEDURE — G8427 DOCREV CUR MEDS BY ELIG CLIN: HCPCS | Performed by: NURSE PRACTITIONER

## 2024-10-23 PROCEDURE — 3077F SYST BP >= 140 MM HG: CPT | Performed by: NURSE PRACTITIONER

## 2024-10-23 RX ORDER — MECOBALAMIN 5000 MCG
15 TABLET,DISINTEGRATING ORAL DAILY
COMMUNITY

## 2024-10-23 ASSESSMENT — PATIENT HEALTH QUESTIONNAIRE - PHQ9
2. FEELING DOWN, DEPRESSED OR HOPELESS: NOT AT ALL
SUM OF ALL RESPONSES TO PHQ QUESTIONS 1-9: 0
4. FEELING TIRED OR HAVING LITTLE ENERGY: SEVERAL DAYS
SUM OF ALL RESPONSES TO PHQ9 QUESTIONS 1 & 2: 0
8. MOVING OR SPEAKING SO SLOWLY THAT OTHER PEOPLE COULD HAVE NOTICED. OR THE OPPOSITE, BEING SO FIGETY OR RESTLESS THAT YOU HAVE BEEN MOVING AROUND A LOT MORE THAN USUAL: NOT AT ALL
SUM OF ALL RESPONSES TO PHQ QUESTIONS 1-9: 1
SUM OF ALL RESPONSES TO PHQ QUESTIONS 1-9: 1
SUM OF ALL RESPONSES TO PHQ QUESTIONS 1-9: 0
9. THOUGHTS THAT YOU WOULD BE BETTER OFF DEAD, OR OF HURTING YOURSELF: NOT AT ALL
2. FEELING DOWN, DEPRESSED OR HOPELESS: NOT AT ALL
SUM OF ALL RESPONSES TO PHQ QUESTIONS 1-9: 1
6. FEELING BAD ABOUT YOURSELF - OR THAT YOU ARE A FAILURE OR HAVE LET YOURSELF OR YOUR FAMILY DOWN: NOT AT ALL
SUM OF ALL RESPONSES TO PHQ QUESTIONS 1-9: 0
SUM OF ALL RESPONSES TO PHQ QUESTIONS 1-9: 1
SUM OF ALL RESPONSES TO PHQ9 QUESTIONS 1 & 2: 0
1. LITTLE INTEREST OR PLEASURE IN DOING THINGS: NOT AT ALL
1. LITTLE INTEREST OR PLEASURE IN DOING THINGS: NOT AT ALL
3. TROUBLE FALLING OR STAYING ASLEEP: NOT AT ALL
5. POOR APPETITE OR OVEREATING: NOT AT ALL
10. IF YOU CHECKED OFF ANY PROBLEMS, HOW DIFFICULT HAVE THESE PROBLEMS MADE IT FOR YOU TO DO YOUR WORK, TAKE CARE OF THINGS AT HOME, OR GET ALONG WITH OTHER PEOPLE: NOT DIFFICULT AT ALL
7. TROUBLE CONCENTRATING ON THINGS, SUCH AS READING THE NEWSPAPER OR WATCHING TELEVISION: NOT AT ALL
SUM OF ALL RESPONSES TO PHQ QUESTIONS 1-9: 0

## 2024-10-23 ASSESSMENT — ENCOUNTER SYMPTOMS
SHORTNESS OF BREATH: 0
VOMITING: 0
ABDOMINAL PAIN: 0
NAUSEA: 0
SINUS PAIN: 0

## 2024-10-23 NOTE — PROGRESS NOTES
Identified patient with two patient identifiers (name and ). Reviewed chart in preparation for visit and have obtained necessary documentation.    Jackie Worrell is a 69 y.o. female  Chief Complaint   Patient presents with    Weight Management     [place] bariatric     BP (!) 147/79   Pulse 65   Temp 97.5 °F (36.4 °C) (Oral)   Resp 15   Ht 1.8 m (5' 10.87\")   Wt 118.9 kg (262 lb 3.2 oz)   SpO2 94%   BMI 36.70 kg/m²     1. Have you been to the ER, urgent care clinic since your last visit?  Hospitalized since your last visit?no    2. Have you seen or consulted any other health care providers outside of the [facility] since your last visit?  Include any pap smears or colon screening. no     Patient and provider made aware of elevated BP x2. Patient asymptomatic. Patient reminded to monitor BP, continue to take BP medications if prescribed, and follow up with PCP/Cardiologist.  Patient expressed understanding and agreement.

## 2024-10-23 NOTE — PROGRESS NOTES
Chief Complaint   Patient presents with    Weight Management     [place] bariatric       Jackie Worrell 1955 presents today for presurgical bariatric evaluation in preparation for:     Procedure Gastric bypass  Surgeon Dr. Luis Manuel Henao    Last Weight Metrics:      10/23/2024     9:39 AM 7/19/2024     1:36 PM 7/18/2024     8:31 AM 6/25/2024    12:59 PM 2/21/2024     9:47 AM 12/4/2023    10:16 AM 11/8/2023    12:49 PM   Weight Loss Metrics   Height 5' 10.87\" 5' 10.866\" 5' 10\" 5' 10\" 5' 10\" 5' 10\" 5' 10\"   Weight - Scale 262 lbs 3 oz 266 lbs 2 oz 266 lbs 259 lbs 13 oz 262 lbs 263 lbs 263 lbs 2 oz   BMI (Calculated) 36.8 kg/m2 37.3 kg/m2 38.2 kg/m2 37.4 kg/m2 37.7 kg/m2 37.8 kg/m2 37.8 kg/m2       [x] Bariatric comorbidities present: hypertension, GERD, and obstructive sleep apnea    [x] Ambulatory status: independent    [x] The patient's reported level of exercise: going to the gym.  Exercise: encouraged to perform at least 30 mins of at least moderate-intensity physical activity on 5 or more days a week. The activity can be undertaken in one session or several lasting 10 mins or more. Use of a wearable fitness device may help meet activity goals and was recommended.     [x] Nutrient screening with iron studies, B12, folic acid, and 25-vitamin D   Iron  74  B12/Folate   1119    D   33.1  [] Sleep apnea screening  [] Sleep Medicine referral         [] GI evaluation     Upper GI results 3/8/2024  IMPRESSION:  Large hiatal hernia.    Upper endoscopy results na     H. Pylori results  negative    [] Endocrine evaluation (HgA1C <8% prior to surgery)    [facility]      HgA1C  5.4    Date   8/26/2024    TSH results    TSH, 3rd Generation (uIU/mL)   Date Value   08/26/2024 5.58 (H)     Advised to discuss with Dr. Trujillo next week.       [] Psychological Evaluation- tomorrow    [x] Nutrition Evaluation/Visits 4 of 6    [] Cardiac Evaluation     [] Pulmonary Evaluation     [x] Pregnancy counseling: it is

## 2024-10-24 ENCOUNTER — TELEMEDICINE (OUTPATIENT)
Age: 69
End: 2024-10-24

## 2024-10-24 DIAGNOSIS — F43.9 TRAUMA AND STRESSOR-RELATED DISORDER: ICD-10-CM

## 2024-10-24 DIAGNOSIS — E66.01 MORBID OBESITY: ICD-10-CM

## 2024-10-24 DIAGNOSIS — F43.81 PROLONGED GRIEF DISORDER: ICD-10-CM

## 2024-10-24 DIAGNOSIS — F33.41 MAJOR DEPRESSIVE DISORDER, RECURRENT EPISODE, IN PARTIAL REMISSION WITH ANXIOUS DISTRESS (HCC): Primary | ICD-10-CM

## 2024-10-24 NOTE — PROGRESS NOTES
Gm reported having struggled with her weight since the age of 45-50.  The patient stated that her lowest adult weight was 155 lbs. and highest adult weight was 267 lbs.  Patient endorsed the following contributors to her weight problems:  ?  Genetics    ?  Poor Eating Habits   ?  Inconsistency  ?  Sedentary Lifestyle ?  Injury or Illness   ?  Other:  stress eating (work            & moving stress), blood            clots/health issues,             passed away in 2016    Past and Current Maladaptive Eating Behaviors reported:  ?  Grazing/Frequent Snacking  ?  Night Eating  []  Binge Eating  Notes (i.e. Frequency, intensity, past history of, etc):    Mrs. Worrell states that she typically will have coffee and cheese for breakfast.  She lives with her boyfriend and he will cook breakfast for them, in the afternoon she will have a sandwich or a snack and then has a larger meal for dinner.  She reports that in the past 6 months she has been working on eating smaller portions.  She has lost 8 lbs. since being in the bariatric program and working with the dietician.  She states that she will drink about 16-32 oz. of water.  She has been drinking soda occasionally and has stopped using a straw when drinking.  Mrs. Worrell reports having struggled with eating ice cream compulsively after her   8 years ago but feels that this has improved significantly.      Previous Attempts to Lose Weight include the following:  ?  Structured Diets (e.g. Weight Watchers)     ?  At-home Diets (e.g. calorie counting, etc.)  ?  Working with dietician or physician             ?  Exercise  []  Prescription Medications for weight loss    ?  OTC Medications for weight loss  []  Starving/Purging/Over-Exercising (or other unhealthy weight loss attempts)    Patient reported being unable to keep the weight off for the following reasons:  ?  Too restrictive  ?  Frustrated with slow results []  Bored with plan  ?  Inconsistency ?

## 2024-10-28 RX ORDER — VENLAFAXINE 75 MG/1
75 TABLET ORAL 2 TIMES DAILY
Qty: 180 TABLET | Refills: 3 | Status: SHIPPED | OUTPATIENT
Start: 2024-10-28

## 2024-11-01 ENCOUNTER — NURSE ONLY (OUTPATIENT)
Age: 69
End: 2024-11-01
Payer: MEDICARE

## 2024-11-01 DIAGNOSIS — Z86.711 HX PULMONARY EMBOLISM: Primary | ICD-10-CM

## 2024-11-01 LAB
POC INR: 1.5
PROTHROMBIN TIME, POC: 17.8

## 2024-11-01 PROCEDURE — 85610 PROTHROMBIN TIME: CPT | Performed by: FAMILY MEDICINE

## 2024-11-01 PROCEDURE — PBSHW AMB POC PT/INR: Performed by: FAMILY MEDICINE

## 2024-11-01 NOTE — PROGRESS NOTES
Jackie Worrell is a 69 y.o. female who presents today for Anticoagulation monitoring.    Indication: PE  INR Goal: 2.0-3.0.  Current dose:  Coumadin 7.5 mg daily, except 3.75 mg Mon, Wed, Fri.  Missed Coumadin Doses:  None  Medication Changes:  no  Dietary Changes:  no    Symptoms: taking coumadin appropriately without any bleeding.    Latest INRs:  Lab Results   Component Value Date/Time    INR 2.9 10/01/2024 09:39 AM    INR 2.9 08/26/2024 09:37 AM    INR 2.3 07/25/2024 09:39 AM    INR 3.1 05/02/2023 09:35 AM    INR 3.4 04/18/2023 09:44 AM    INR 2.9 03/14/2023 03:09 PM        New Coumadin dose:.the following changes are made - see anti-coag calendar.    Next check to be scheduled for  2 weeks.

## 2024-11-15 ENCOUNTER — NURSE ONLY (OUTPATIENT)
Age: 69
End: 2024-11-15
Payer: MEDICARE

## 2024-11-15 DIAGNOSIS — Z86.711 HX PULMONARY EMBOLISM: Primary | ICD-10-CM

## 2024-11-15 LAB
POC INR: 1.1
PROTHROMBIN TIME, POC: 13.2

## 2024-11-15 PROCEDURE — 85610 PROTHROMBIN TIME: CPT | Performed by: FAMILY MEDICINE

## 2024-11-15 PROCEDURE — PBSHW AMB POC PT/INR: Performed by: FAMILY MEDICINE

## 2024-11-15 NOTE — PROGRESS NOTES
Jackie Worrell is a 69 y.o. female who presents today for Anticoagulation monitoring.    Indication: PE  INR Goal: 2.0-3.0.  Current dose:  Coumadin 7.5 mg all other days 3.75 mg on wed  Missed Coumadin Doses:  None  Medication Changes:  no  Dietary Changes:  no    Symptoms: taking coumadin appropriately without any bleeding.    Latest INRs:  Lab Results   Component Value Date/Time    INR 1.5 11/01/2024 09:30 AM    INR 2.9 10/01/2024 09:39 AM    INR 2.9 08/26/2024 09:37 AM    INR 3.1 05/02/2023 09:35 AM    INR 3.4 04/18/2023 09:44 AM    INR 2.9 03/14/2023 03:09 PM        New Coumadin dose:.changes are made - 7.5 mg everyday.    Next check to be scheduled for  1 weeks.

## 2024-11-19 ENCOUNTER — OFFICE VISIT (OUTPATIENT)
Age: 69
End: 2024-11-19

## 2024-11-19 ENCOUNTER — TELEMEDICINE (OUTPATIENT)
Age: 69
End: 2024-11-19

## 2024-11-19 DIAGNOSIS — F33.41 MAJOR DEPRESSIVE DISORDER, RECURRENT EPISODE, IN PARTIAL REMISSION WITH ANXIOUS DISTRESS (HCC): Primary | ICD-10-CM

## 2024-11-19 DIAGNOSIS — E66.01 MORBID OBESITY: Primary | ICD-10-CM

## 2024-11-19 DIAGNOSIS — E66.01 MORBID OBESITY: ICD-10-CM

## 2024-11-19 DIAGNOSIS — F43.9 TRAUMA AND STRESSOR-RELATED DISORDER: ICD-10-CM

## 2024-11-19 DIAGNOSIS — F43.81 PROLONGED GRIEF DISORDER: ICD-10-CM

## 2024-11-19 NOTE — PROGRESS NOTES
[facility]  Psychosocial Evaluation Part 2 - Mental Health Progress Note    This note will not be viewable in Clear[employer]â„¢ Audiot for the following reason(s). This is a Psychotherapy Note. (Behavioral Health Providers Only)    Name: Jackie Worrell  : 1955  MRN: [id number]  Date of Service: 2024  Location of Service: [place] for both provider and patient  Type of Service:  Individual Therapy (Virtual Visit)  Start Time:  3:01 PM   End Time:   3:24 PM  CPT code: 52708      Jackie Worrell was evaluated through a patient-initiated, synchronous (real-time) audio-visual encounter.  Patient (or guardian if applicable) is aware that it is a billable service, which includes applicable co-pays, with coverage as determined by her insurance carrier. This visit was conducted with the patient's (and/or legal guardian's) verbal consent.  The patient was located in a state where the provider was licensed to provide care.  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  Following the Bariatric Psychosocial Evaluation - Part 2, a formal copy of the Bariatric Psychosocial Evaluation will be scanned into Epic EHR.       Subjective (patient report):  Patient acknowledged taking medication and denied any side-effects.  Did not endorse any depression, anxiety, christi or psychotic symptoms.      Objective (factual account of what was observed, mental health status):  Mental Health Status:

## 2024-11-19 NOTE — PROGRESS NOTES
Behaviors  A nutrition lesson was presented on label reading with specific guidelines provided for limiting added sugars. This information will help increase healthy food choices, promote weight loss and prevent dumping syndrome after gastric bypass. We also reviewed the general nutrition guidelines for bariatric surgery.                        Patient's current diet habits include: Pt is eating 3 meals per day.  Dinner is protein (chicken, ground beef, steak). Pt is eating refined carbohydrate foods (bread, pasta, rice, potatoes) moderation. Pt is eating sweets/desserts in moderation. Pt is using healthy cooking methods. Pt is eating meals prepared outside of the home 1-3 times per week. Pt is drinking water. Pt reports no to emotional eating.         Physical Activity/Exercise  We talked about the importance of increasing daily physical activity and beginning to develop an exercise regimen/routine. We talked about exercise as being an important part of long term weight loss after surgery.     Comments:  During class, I discussed with patient the importance of getting into an exercise routine.  Pt is currently walking and tracking amount of time for walking (instead of steps)for activity.  Pt has been encouraged to maintain and increase as tolerated.     Behavior Modification       We talked about how to eat more mindfully. Tips and recommendations for how to make these changes were provided. Pt was encouraged to keep a food journal and record what they were taking in daily.         Overall Assessment: Pt demonstrates appropriate lifestyle changes evidenced by reported changes and reported wt loss from initial starting weight. Will continue to assess.     Patient-Set Goals:   1. Nutrition - purchase/try protein shake for tolerance  2. Exercise - maintain walking as tolerated   3. Behavior -read food labels for added sugar content (10 grams or less per serving)     Roz Mayer, COREEN  11/19/2024

## 2024-11-22 ENCOUNTER — NURSE ONLY (OUTPATIENT)
Age: 69
End: 2024-11-22
Payer: MEDICARE

## 2024-11-22 DIAGNOSIS — Z86.711 HX PULMONARY EMBOLISM: ICD-10-CM

## 2024-11-22 DIAGNOSIS — I26.99 PULMONARY EMBOLISM, UNSPECIFIED CHRONICITY, UNSPECIFIED PULMONARY EMBOLISM TYPE, UNSPECIFIED WHETHER ACUTE COR PULMONALE PRESENT (HCC): Primary | ICD-10-CM

## 2024-11-22 LAB
POC INR: 1.7
PROTHROMBIN TIME, POC: 20.7

## 2024-11-22 PROCEDURE — PBSHW AMB POC PT/INR: Performed by: FAMILY MEDICINE

## 2024-11-22 PROCEDURE — 85610 PROTHROMBIN TIME: CPT | Performed by: FAMILY MEDICINE

## 2024-11-22 NOTE — PROGRESS NOTES
Jackie Worrlel is a 69 y.o. female who presents today for Anticoagulation monitoring.    Indication: PE  INR Goal: 2.0-3.0.  Current dose:  Coumadin 7.5 mg daily.  Missed Coumadin Doses:  None  Medication Changes:  no  Dietary Changes:  no    Symptoms: taking coumadin appropriately without any bleeding.    Latest INRs:  Lab Results   Component Value Date/Time    INR 1.7 11/22/2024 09:46 AM    INR 1.1 11/15/2024 10:15 AM    INR 1.5 11/01/2024 09:30 AM    INR 3.1 05/02/2023 09:35 AM    INR 3.4 04/18/2023 09:44 AM    INR 2.9 03/14/2023 03:09 PM        New Coumadin dose:.the following changes are made - see anti-coag calendar.    Next check to be scheduled for  2 weeks.    Pt notified via phone r/t changes.

## 2024-12-04 ENCOUNTER — TELEMEDICINE (OUTPATIENT)
Age: 69
End: 2024-12-04
Payer: MEDICARE

## 2024-12-04 DIAGNOSIS — F43.9 TRAUMA AND STRESSOR-RELATED DISORDER: ICD-10-CM

## 2024-12-04 DIAGNOSIS — E66.01 MORBID OBESITY: ICD-10-CM

## 2024-12-04 DIAGNOSIS — F33.41 MAJOR DEPRESSIVE DISORDER, RECURRENT EPISODE, IN PARTIAL REMISSION WITH ANXIOUS DISTRESS (HCC): Primary | ICD-10-CM

## 2024-12-04 DIAGNOSIS — F43.81 PROLONGED GRIEF DISORDER: ICD-10-CM

## 2024-12-04 PROCEDURE — 90837 PSYTX W PT 60 MINUTES: CPT | Performed by: COUNSELOR

## 2024-12-04 PROCEDURE — 1123F ACP DISCUSS/DSCN MKR DOCD: CPT | Performed by: COUNSELOR

## 2024-12-04 NOTE — PROGRESS NOTES
relation to others.  Objective #2:  Patient will learn and implement strategies to overcome depression     Goal #3:  Work through grief over loss   Objective #1:  Validate the past, adjust to the present, and redefine what patient's future looks like    Goal #4:  Decrease symptoms of trauma   Objective #1:  Engaging client in Trauma-Focused CBT to process childhood trauma, teaching patient to recognize trauma triggers, increasing relaxation through grounding techniques, and DBT skills, and reprocessing trauma beliefs and memories.      Plans:  Continue to use Cognitive Behavioral Therapy and Dialectical Behavioral Therapy  Continue to work with patient on primary goal  Continue to see patient for counseling to address behavioral changes, if patient decides that they would like to seek supportive counseling       Patient verbalized understanding of the information presented.        Signature: Nichelle Alberto, DOROTHEA, NCC, CCTP      Date:  12/4/2024

## 2024-12-10 ENCOUNTER — NURSE ONLY (OUTPATIENT)
Age: 69
End: 2024-12-10
Payer: MEDICARE

## 2024-12-10 DIAGNOSIS — Z86.711 HX PULMONARY EMBOLISM: Primary | ICD-10-CM

## 2024-12-10 LAB
POC INR: 2.3
PROTHROMBIN TIME, POC: 27.3

## 2024-12-10 PROCEDURE — PBSHW AMB POC PT/INR: Performed by: FAMILY MEDICINE

## 2024-12-10 PROCEDURE — 85610 PROTHROMBIN TIME: CPT | Performed by: FAMILY MEDICINE

## 2024-12-10 NOTE — PROGRESS NOTES
Jackie Worrell is a 69 y.o. female who presents today for Anticoagulation monitoring.    Indication:  pulmonary embolism  INR Goal: 2.0-3.0.  Current dose:  Coumadin 7.5mg daily.  Missed Coumadin Doses:  None  Medication Changes:  no  Dietary Changes:  no    Symptoms: taking coumadin appropriately without any bleeding.    Latest INRs:  Lab Results   Component Value Date/Time    INR 2.3 12/10/2024 09:52 AM    INR 1.7 11/22/2024 09:46 AM    INR 1.1 11/15/2024 10:15 AM    INR 3.1 05/02/2023 09:35 AM    INR 3.4 04/18/2023 09:44 AM    INR 2.9 03/14/2023 03:09 PM        New Coumadin dose:.current treatment plan is effective, no change in therapy.    Next check to be scheduled for  2 weeks.

## 2024-12-17 ENCOUNTER — OFFICE VISIT (OUTPATIENT)
Age: 69
End: 2024-12-17

## 2024-12-17 DIAGNOSIS — E66.01 MORBID OBESITY: Primary | ICD-10-CM

## 2024-12-17 NOTE — PROGRESS NOTES
[facility]  Supervised Weight Loss     Date:   2024    Patient's Name: Jackie Worrell  : 1955      Jackie Worrell was evaluated through a synchronous (real-time) audio encounter. Patient identification was verified at the start of the visit.   The patient was located at Home: [address]  [facility] [address].  The provider was located at Facility (Appt Dept): [address] [place] [id number].  Confirm you are appropriately licensed, registered, or certified to deliver care in the state where the patient is located as indicated above. If you are not or unsure, please re-schedule the visit: Yes, I confirm.     Insurance:  [employer]                                      Session:   Surgery: Gastric Bypass                              Surgeon:  Luis Manuel Henao M.D.      Height: 5' 10\"                          Reported Weight:    260      Lbs.                              BMI: 37              Pounds Lost since last month: 3#               Pounds Gained since last month: 0#     Starting Weight: 266#                       Previous Month’s Weight: 263#  Overall Pounds Lost: 6#                  Overall Pounds Gained: 0     Other Pertinent Information: Today's appointment was completed over the phone. Today's wt was self-reported.    Smoking Status:  none  Alcohol Intake: none    I have reviewed with pt the guidelines of the supervised wt loss program.  Pt understands the expectations of some wt loss during the program and that wt gain could delay the process. I have also explained that classes need to be consecutive.  Missing a class may result in starting over. Pt has received this information in writing. This appointment was complimentary/non-billable as part of the bariatric surgery program.         Changes that patient has made since last month include:  increased water intake, using protein shakes, practicing 30/30 drinking rule,

## 2024-12-24 ENCOUNTER — NURSE ONLY (OUTPATIENT)
Age: 69
End: 2024-12-24
Payer: MEDICARE

## 2024-12-24 DIAGNOSIS — Z86.711 HX PULMONARY EMBOLISM: Primary | ICD-10-CM

## 2024-12-24 LAB
POC INR: 2.4
PROTHROMBIN TIME, POC: 28.9

## 2024-12-24 PROCEDURE — 85610 PROTHROMBIN TIME: CPT | Performed by: FAMILY MEDICINE

## 2024-12-24 PROCEDURE — PBSHW AMB POC PT/INR: Performed by: FAMILY MEDICINE

## 2024-12-24 NOTE — PROGRESS NOTES
Jackie Worrell is a 69 y.o. female who presents today for Anticoagulation monitoring.    Indication: Pulmonary Embolism  INR Goal: 2.0-3.0.  Current dose:  Coumadin 7.5 mg daily.  Missed Coumadin Doses:  None  Medication Changes:  no  Dietary Changes:  no    Symptoms: taking coumadin appropriately without any bleeding.    Latest INRs:  Lab Results   Component Value Date/Time    INR 2.3 12/10/2024 09:52 AM    INR 1.7 11/22/2024 09:46 AM    INR 1.1 11/15/2024 10:15 AM    INR 3.1 05/02/2023 09:35 AM    INR 3.4 04/18/2023 09:44 AM    INR 2.9 03/14/2023 03:09 PM        New Coumadin dose:..    Next check to be scheduled for  4 weeks.

## 2024-12-26 RX ORDER — SPIRONOLACTONE 25 MG/1
25 TABLET ORAL DAILY
Qty: 90 TABLET | Refills: 3 | Status: SHIPPED | OUTPATIENT
Start: 2024-12-26

## 2025-01-17 ENCOUNTER — CLINICAL DOCUMENTATION (OUTPATIENT)
Age: 70
End: 2025-01-17

## 2025-01-24 ENCOUNTER — TELEMEDICINE (OUTPATIENT)
Age: 70
End: 2025-01-24

## 2025-01-24 DIAGNOSIS — E66.01 MORBID OBESITY: ICD-10-CM

## 2025-01-24 DIAGNOSIS — F33.41 MAJOR DEPRESSIVE DISORDER, RECURRENT EPISODE, IN PARTIAL REMISSION WITH ANXIOUS DISTRESS (HCC): Primary | ICD-10-CM

## 2025-01-24 DIAGNOSIS — F43.81 PROLONGED GRIEF DISORDER: ICD-10-CM

## 2025-01-24 DIAGNOSIS — F43.9 TRAUMA AND STRESSOR-RELATED DISORDER: ICD-10-CM

## 2025-01-27 ENCOUNTER — OFFICE VISIT (OUTPATIENT)
Age: 70
End: 2025-01-27
Payer: MEDICARE

## 2025-01-27 VITALS
TEMPERATURE: 97.3 F | WEIGHT: 263 LBS | OXYGEN SATURATION: 96 % | DIASTOLIC BLOOD PRESSURE: 89 MMHG | HEIGHT: 70 IN | RESPIRATION RATE: 17 BRPM | HEART RATE: 87 BPM | SYSTOLIC BLOOD PRESSURE: 155 MMHG | BODY MASS INDEX: 37.65 KG/M2

## 2025-01-27 DIAGNOSIS — E55.9 VITAMIN D DEFICIENCY: ICD-10-CM

## 2025-01-27 DIAGNOSIS — R73.02 IGT (IMPAIRED GLUCOSE TOLERANCE): ICD-10-CM

## 2025-01-27 DIAGNOSIS — E53.8 B12 DEFICIENCY: ICD-10-CM

## 2025-01-27 DIAGNOSIS — I10 ESSENTIAL (PRIMARY) HYPERTENSION: Primary | ICD-10-CM

## 2025-01-27 DIAGNOSIS — Z86.711 HX PULMONARY EMBOLISM: ICD-10-CM

## 2025-01-27 DIAGNOSIS — G25.0 ESSENTIAL TREMOR: ICD-10-CM

## 2025-01-27 DIAGNOSIS — E03.9 HYPOTHYROIDISM, UNSPECIFIED TYPE: ICD-10-CM

## 2025-01-27 DIAGNOSIS — E61.1 IRON DEFICIENCY: ICD-10-CM

## 2025-01-27 LAB
POC INR: 2.6
PROTHROMBIN TIME, POC: 31

## 2025-01-27 PROCEDURE — G8417 CALC BMI ABV UP PARAM F/U: HCPCS | Performed by: FAMILY MEDICINE

## 2025-01-27 PROCEDURE — 99214 OFFICE O/P EST MOD 30 MIN: CPT | Performed by: FAMILY MEDICINE

## 2025-01-27 PROCEDURE — 1125F AMNT PAIN NOTED PAIN PRSNT: CPT | Performed by: FAMILY MEDICINE

## 2025-01-27 PROCEDURE — G8427 DOCREV CUR MEDS BY ELIG CLIN: HCPCS | Performed by: FAMILY MEDICINE

## 2025-01-27 PROCEDURE — G8399 PT W/DXA RESULTS DOCUMENT: HCPCS | Performed by: FAMILY MEDICINE

## 2025-01-27 PROCEDURE — 1123F ACP DISCUSS/DSCN MKR DOCD: CPT | Performed by: FAMILY MEDICINE

## 2025-01-27 PROCEDURE — PBSHW AMB POC PT/INR: Performed by: FAMILY MEDICINE

## 2025-01-27 PROCEDURE — 1159F MED LIST DOCD IN RCRD: CPT | Performed by: FAMILY MEDICINE

## 2025-01-27 PROCEDURE — 3077F SYST BP >= 140 MM HG: CPT | Performed by: FAMILY MEDICINE

## 2025-01-27 PROCEDURE — 1036F TOBACCO NON-USER: CPT | Performed by: FAMILY MEDICINE

## 2025-01-27 PROCEDURE — 3017F COLORECTAL CA SCREEN DOC REV: CPT | Performed by: FAMILY MEDICINE

## 2025-01-27 PROCEDURE — 3079F DIAST BP 80-89 MM HG: CPT | Performed by: FAMILY MEDICINE

## 2025-01-27 PROCEDURE — 1090F PRES/ABSN URINE INCON ASSESS: CPT | Performed by: FAMILY MEDICINE

## 2025-01-27 PROCEDURE — 85610 PROTHROMBIN TIME: CPT | Performed by: FAMILY MEDICINE

## 2025-01-27 RX ORDER — SPIRONOLACTONE 50 MG/1
50 TABLET, FILM COATED ORAL DAILY
Qty: 90 TABLET | Refills: 3 | Status: SHIPPED | OUTPATIENT
Start: 2025-01-27

## 2025-01-27 SDOH — ECONOMIC STABILITY: FOOD INSECURITY: WITHIN THE PAST 12 MONTHS, THE FOOD YOU BOUGHT JUST DIDN'T LAST AND YOU DIDN'T HAVE MONEY TO GET MORE.: NEVER TRUE

## 2025-01-27 SDOH — ECONOMIC STABILITY: INCOME INSECURITY: IN THE LAST 12 MONTHS, WAS THERE A TIME WHEN YOU WERE NOT ABLE TO PAY THE MORTGAGE OR RENT ON TIME?: NO

## 2025-01-27 SDOH — ECONOMIC STABILITY: FOOD INSECURITY: WITHIN THE PAST 12 MONTHS, YOU WORRIED THAT YOUR FOOD WOULD RUN OUT BEFORE YOU GOT MONEY TO BUY MORE.: NEVER TRUE

## 2025-01-27 SDOH — ECONOMIC STABILITY: TRANSPORTATION INSECURITY
IN THE PAST 12 MONTHS, HAS LACK OF TRANSPORTATION KEPT YOU FROM MEETINGS, WORK, OR FROM GETTING THINGS NEEDED FOR DAILY LIVING?: NO

## 2025-01-27 SDOH — ECONOMIC STABILITY: TRANSPORTATION INSECURITY
IN THE PAST 12 MONTHS, HAS THE LACK OF TRANSPORTATION KEPT YOU FROM MEDICAL APPOINTMENTS OR FROM GETTING MEDICATIONS?: NO

## 2025-01-27 ASSESSMENT — PATIENT HEALTH QUESTIONNAIRE - PHQ9
1. LITTLE INTEREST OR PLEASURE IN DOING THINGS: NOT AT ALL
9. THOUGHTS THAT YOU WOULD BE BETTER OFF DEAD, OR OF HURTING YOURSELF: NOT AT ALL
SUM OF ALL RESPONSES TO PHQ QUESTIONS 1-9: 0
5. POOR APPETITE OR OVEREATING: NOT AT ALL
2. FEELING DOWN, DEPRESSED OR HOPELESS: NOT AT ALL
6. FEELING BAD ABOUT YOURSELF - OR THAT YOU ARE A FAILURE OR HAVE LET YOURSELF OR YOUR FAMILY DOWN: NOT AT ALL
8. MOVING OR SPEAKING SO SLOWLY THAT OTHER PEOPLE COULD HAVE NOTICED. OR THE OPPOSITE, BEING SO FIGETY OR RESTLESS THAT YOU HAVE BEEN MOVING AROUND A LOT MORE THAN USUAL: NOT AT ALL
10. IF YOU CHECKED OFF ANY PROBLEMS, HOW DIFFICULT HAVE THESE PROBLEMS MADE IT FOR YOU TO DO YOUR WORK, TAKE CARE OF THINGS AT HOME, OR GET ALONG WITH OTHER PEOPLE: NOT DIFFICULT AT ALL
SUM OF ALL RESPONSES TO PHQ QUESTIONS 1-9: 0
SUM OF ALL RESPONSES TO PHQ QUESTIONS 1-9: 0
3. TROUBLE FALLING OR STAYING ASLEEP: NOT AT ALL
4. FEELING TIRED OR HAVING LITTLE ENERGY: NOT AT ALL
7. TROUBLE CONCENTRATING ON THINGS, SUCH AS READING THE NEWSPAPER OR WATCHING TELEVISION: NOT AT ALL
SUM OF ALL RESPONSES TO PHQ9 QUESTIONS 1 & 2: 0
SUM OF ALL RESPONSES TO PHQ QUESTIONS 1-9: 0

## 2025-01-27 NOTE — PROGRESS NOTES
Chief Complaint   Patient presents with    1 Month Follow-Up         Health Maintenance Due   Topic Date Due    Shingles vaccine (1 of 2) Never done    Flu vaccine (1) 08/01/2024    COVID-19 Vaccine (4 - 2023-24 season) 09/01/2024    Annual Wellness Visit (Medicare Advantage)  01/01/2025         \"Have you been to the ER, urgent care clinic since your last visit?  Hospitalized since your last visit?\"    NO    “Have you seen or consulted any other health care providers outside of [facility] since your last visit?”    NO       Jackie Worrell is a 69 y.o. female who presents today for Anticoagulation monitoring.    Indication: PE  INR Goal: 2.0-3.0.  Current dose:  Coumadin 7.5 mg daily, except 11.25 mg Tues/Thurs.  Missed Coumadin Doses:  None  Medication Changes:  no  Dietary Changes:  no    Symptoms: taking coumadin appropriately without any bleeding.    Latest INRs:  Lab Results   Component Value Date/Time    INR 2.4 12/24/2024 10:50 AM    INR 2.3 12/10/2024 09:52 AM    INR 1.7 11/22/2024 09:46 AM    INR 3.1 05/02/2023 09:35 AM    INR 3.4 04/18/2023 09:44 AM    INR 2.9 03/14/2023 03:09 PM        New Coumadin dose:.current treatment plan is effective, no change in therapy.    Next check to be scheduled for  4 weeks.

## 2025-01-27 NOTE — PROGRESS NOTES
LYNNETTE  Jackie Worrell is a 69 y.o. female who presents to follow-up on chronic medical issues.    She has a complaint today of a tremor noticed sometime in the last 6 months in her left hand and in the last few weeks is also noticed in her right hand.  It is not present all the time.  She notices it on occasion.  Family members noticed that on occasion.  It does affect her writing now that it affects her right hand.  7 out of 10 times which she writes she will have a shake to it.  Is not having any problems with balance, energy level or resting tremor.  Seems to be an intention tremor.  To her knowledge does not have a family history of tremor.      She also brought up a recent conversation with her weight loss psychologist about her broken speech pattern.  She does have a tendency to have broken/fragmented speech when she is very stressed.  I have seen this in the past when she is dealing with especially difficult family situations and on an occasion when her romantic relationship was in crisis.  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    Depression     GERD (gastroesophageal reflux disease)     Hypertension     Lymphedema of leg     bilateral    Sleep apnea     CPAP    Thromboembolus (HCC)     x 3; takes coumadin, DVT's    Thyroid disease        Meds:   Current Outpatient Medications   Medication Sig Dispense Refill    spironolactone (ALDACTONE) 50 MG tablet Take 1 tablet by mouth daily 90 tablet 3    venlafaxine (EFFEXOR) 75 MG tablet Take 1 tablet by mouth twice daily 180 tablet 3    lansoprazole (PREVACID) 15 MG delayed release capsule Take 1 capsule by mouth daily      hydroCHLOROthiazide (HYDRODIURIL) 25 MG tablet Take 1 tablet by mouth once daily 90 tablet 3    levothyroxine (SYNTHROID) 100 MCG tablet TAKE 1 TABLET BY MOUTH ONCE DAILY BEFORE BREAKFAST 90 tablet 3    amLODIPine (NORVASC) 5 MG

## 2025-01-28 RX ORDER — LEVOTHYROXINE SODIUM 100 UG/1
100 TABLET ORAL
Qty: 90 TABLET | Refills: 3 | Status: SHIPPED | OUTPATIENT
Start: 2025-01-28

## 2025-02-05 RX ORDER — WARFARIN SODIUM 7.5 MG/1
TABLET ORAL
Qty: 90 TABLET | Refills: 3 | Status: SHIPPED | OUTPATIENT
Start: 2025-02-05

## 2025-02-12 ENCOUNTER — TELEMEDICINE (OUTPATIENT)
Age: 70
End: 2025-02-12
Payer: MEDICARE

## 2025-02-12 DIAGNOSIS — F43.9 TRAUMA AND STRESSOR-RELATED DISORDER: ICD-10-CM

## 2025-02-12 DIAGNOSIS — E66.01 MORBID OBESITY: ICD-10-CM

## 2025-02-12 DIAGNOSIS — F33.41 MAJOR DEPRESSIVE DISORDER, RECURRENT EPISODE, IN PARTIAL REMISSION WITH ANXIOUS DISTRESS (HCC): Primary | ICD-10-CM

## 2025-02-12 DIAGNOSIS — F43.81 PROLONGED GRIEF DISORDER: ICD-10-CM

## 2025-02-12 PROCEDURE — 90834 PSYTX W PT 45 MINUTES: CPT | Performed by: COUNSELOR

## 2025-02-12 PROCEDURE — 1123F ACP DISCUSS/DSCN MKR DOCD: CPT | Performed by: COUNSELOR

## 2025-02-12 RX ORDER — LEVOTHYROXINE SODIUM 100 UG/1
100 TABLET ORAL
Qty: 90 TABLET | Refills: 3 | Status: SHIPPED | OUTPATIENT
Start: 2025-02-12

## 2025-02-12 NOTE — PROGRESS NOTES
days in the [place]. She is going with a friend and her friend's son. She stated that she had discussed with her PCP doctor chato about concerns with an increase in her blood pressure, tremors in her left hand, and fragmented speech. She stated that the doctor believes that these symptoms are specifically connected with stress. Her doctor recommended her walking for 20 minutes daily. She stated that she recently got a cortisone shot and this has been helping her with getting her walking in. She is currently focusing on her diet, drinking more water, and trying different protein drinks to be prepared for her surgery. She discussed doing crossword puzzles, reading, playing games, and walking with her boyfriend to help with stress. She also discussed being very eager to have the surgery. She is hoping that many of her stressors will be relieved with her sister moving out of the house that she owns in [place]. Her plan is to have her sister's family be responsible for her sister financially.     Objective (factual account of what was observed, mental health status):  Mental Health Status: Patient was dressed properly. No abnormal psychomotor movements observed. Intellectual functioning appeared to be intact.  Mood and affect were congruent.  Insight was adequate. Judgment was adequate. Speech was normal, clear. Thought process was coherent.  Patient did not report suicidal or homicidal ideations, intent or plans. Patient denied self-injury behaviors. Patient denied alcohol and other substance abuse. Patient was oriented to the four spheres: self, place, time and situation. Patient response to intervention was appropriate. Patient progress is adequate. Protective factor: self-determination and positivity.     Assessment:              Risk Assessment (SI, HI, DV, unsafe environment):  Denies any SI, HI or DV                          If Yes, identify safety plan:   N/A     Clinical Intervention:  This therapist provided

## 2025-02-27 ENCOUNTER — TELEPHONE (OUTPATIENT)
Age: 70
End: 2025-02-27

## 2025-02-27 ENCOUNTER — NURSE ONLY (OUTPATIENT)
Age: 70
End: 2025-02-27
Payer: MEDICARE

## 2025-02-27 DIAGNOSIS — E61.1 IRON DEFICIENCY: ICD-10-CM

## 2025-02-27 DIAGNOSIS — Z86.711 HX PULMONARY EMBOLISM: ICD-10-CM

## 2025-02-27 DIAGNOSIS — E55.9 VITAMIN D DEFICIENCY: ICD-10-CM

## 2025-02-27 DIAGNOSIS — I26.99 PULMONARY EMBOLISM, UNSPECIFIED CHRONICITY, UNSPECIFIED PULMONARY EMBOLISM TYPE, UNSPECIFIED WHETHER ACUTE COR PULMONALE PRESENT (HCC): Primary | ICD-10-CM

## 2025-02-27 DIAGNOSIS — E53.8 B12 DEFICIENCY: ICD-10-CM

## 2025-02-27 DIAGNOSIS — R73.02 IGT (IMPAIRED GLUCOSE TOLERANCE): ICD-10-CM

## 2025-02-27 DIAGNOSIS — I10 ESSENTIAL (PRIMARY) HYPERTENSION: ICD-10-CM

## 2025-02-27 DIAGNOSIS — E03.9 HYPOTHYROIDISM, UNSPECIFIED TYPE: ICD-10-CM

## 2025-02-27 LAB
POC INR: 2.2
PROTHROMBIN TIME, POC: 26.6

## 2025-02-27 PROCEDURE — 85610 PROTHROMBIN TIME: CPT | Performed by: FAMILY MEDICINE

## 2025-02-27 PROCEDURE — PBSHW AMB POC PT/INR: Performed by: FAMILY MEDICINE

## 2025-02-27 NOTE — PROGRESS NOTES
The patient identity was confirmed with  and First/Last Name. Medication and dose reviewed with patient, as well as any new diagnosis/procedures.     Jackie Worrell is a 69 y.o. female who presents today for Anticoagulation monitoring.    Indication:  Pulmonary Embolism  INR Goal: 2.0-3.0.  Current dose:  Coumadin 11.25mg Tues/Thurs, 7.5mg rest of days.  Missed Coumadin Doses:  None  Medication Changes:  no  Dietary Changes:  no    Symptoms: taking coumadin appropriately without any bleeding.    Latest INRs:  Lab Results   Component Value Date/Time    INR 2.2 2025 10:05 AM    INR 2.6 2025 09:49 AM    INR 2.4 2024 10:50 AM    INR 3.1 2023 09:35 AM    INR 3.4 2023 09:44 AM    INR 2.9 2023 03:09 PM        New Coumadin dose:.current treatment plan is effective, no change in therapy.    Next check to be scheduled for  4 weeks.

## 2025-02-28 LAB
25(OH)D3 SERPL-MCNC: 52.1 NG/ML (ref 30–100)
ALBUMIN SERPL-MCNC: 3.6 G/DL (ref 3.5–5)
ALBUMIN/GLOB SERPL: 0.8 (ref 1.1–2.2)
ALP SERPL-CCNC: 93 U/L (ref 45–117)
ALT SERPL-CCNC: 31 U/L (ref 12–78)
ANION GAP SERPL CALC-SCNC: 14 MMOL/L (ref 2–12)
AST SERPL-CCNC: 15 U/L (ref 15–37)
BASOPHILS # BLD: 0.04 K/UL (ref 0–0.1)
BASOPHILS NFR BLD: 0.4 % (ref 0–1)
BILIRUB SERPL-MCNC: 0.5 MG/DL (ref 0.2–1)
BUN SERPL-MCNC: 22 MG/DL (ref 6–20)
BUN/CREAT SERPL: 21 (ref 12–20)
CALCIUM SERPL-MCNC: 9.4 MG/DL (ref 8.5–10.1)
CHLORIDE SERPL-SCNC: 105 MMOL/L (ref 97–108)
CHOLEST SERPL-MCNC: 173 MG/DL
CO2 SERPL-SCNC: 22 MMOL/L (ref 21–32)
CREAT SERPL-MCNC: 1.05 MG/DL (ref 0.55–1.02)
DIFFERENTIAL METHOD BLD: ABNORMAL
EOSINOPHIL # BLD: 0.15 K/UL (ref 0–0.4)
EOSINOPHIL NFR BLD: 1.6 % (ref 0–7)
ERYTHROCYTE [DISTWIDTH] IN BLOOD BY AUTOMATED COUNT: 13.1 % (ref 11.5–14.5)
EST. AVERAGE GLUCOSE BLD GHB EST-MCNC: 131 MG/DL
FERRITIN SERPL-MCNC: 57 NG/ML (ref 26–388)
FOLATE SERPL-MCNC: 54.3 NG/ML (ref 5–21)
GLOBULIN SER CALC-MCNC: 4.6 G/DL (ref 2–4)
GLUCOSE SERPL-MCNC: 150 MG/DL (ref 65–100)
HBA1C MFR BLD: 6.2 % (ref 4–5.6)
HCT VFR BLD AUTO: 47.4 % (ref 35–47)
HDLC SERPL-MCNC: 68 MG/DL
HDLC SERPL: 2.5 (ref 0–5)
HGB BLD-MCNC: 15 G/DL (ref 11.5–16)
IMM GRANULOCYTES # BLD AUTO: 0.05 K/UL (ref 0–0.04)
IMM GRANULOCYTES NFR BLD AUTO: 0.5 % (ref 0–0.5)
IRON SATN MFR SERPL: 18 % (ref 20–50)
IRON SERPL-MCNC: 82 UG/DL (ref 35–150)
LDLC SERPL CALC-MCNC: 88.4 MG/DL (ref 0–100)
LYMPHOCYTES # BLD: 2.42 K/UL (ref 0.8–3.5)
LYMPHOCYTES NFR BLD: 25.4 % (ref 12–49)
MCH RBC QN AUTO: 28.2 PG (ref 26–34)
MCHC RBC AUTO-ENTMCNC: 31.6 G/DL (ref 30–36.5)
MCV RBC AUTO: 89.3 FL (ref 80–99)
MONOCYTES # BLD: 0.62 K/UL (ref 0–1)
MONOCYTES NFR BLD: 6.5 % (ref 5–13)
NEUTS SEG # BLD: 6.24 K/UL (ref 1.8–8)
NEUTS SEG NFR BLD: 65.6 % (ref 32–75)
NRBC # BLD: 0 K/UL (ref 0–0.01)
NRBC BLD-RTO: 0 PER 100 WBC
PLATELET # BLD AUTO: 317 K/UL (ref 150–400)
PMV BLD AUTO: 9.9 FL (ref 8.9–12.9)
POTASSIUM SERPL-SCNC: 4.1 MMOL/L (ref 3.5–5.1)
PROT SERPL-MCNC: 8.2 G/DL (ref 6.4–8.2)
RBC # BLD AUTO: 5.31 M/UL (ref 3.8–5.2)
SODIUM SERPL-SCNC: 141 MMOL/L (ref 136–145)
T4 FREE SERPL-MCNC: 1.4 NG/DL (ref 0.8–1.5)
TIBC SERPL-MCNC: 454 UG/DL (ref 250–450)
TRIGL SERPL-MCNC: 83 MG/DL
TSH SERPL DL<=0.05 MIU/L-ACNC: 3.22 UIU/ML (ref 0.36–3.74)
VIT B12 SERPL-MCNC: 1714 PG/ML (ref 193–986)
VLDLC SERPL CALC-MCNC: 16.6 MG/DL
WBC # BLD AUTO: 9.5 K/UL (ref 3.6–11)

## 2025-03-03 ENCOUNTER — TELEPHONE (OUTPATIENT)
Age: 70
End: 2025-03-03

## 2025-03-03 NOTE — TELEPHONE ENCOUNTER
I called patient and let her know of results per Dr. Trujillo Letter. No questions or concerns at this time.

## 2025-03-11 ENCOUNTER — CLINICAL DOCUMENTATION (OUTPATIENT)
Age: 70
End: 2025-03-11

## 2025-03-11 NOTE — PROGRESS NOTES
Patient Selection Committee-2/27/25  Patient was presented in committee and recommendations are as follows:  Follow up with Catalina  Neuro eval  Refresher with Roz

## 2025-03-19 ENCOUNTER — TELEPHONE (OUTPATIENT)
Age: 70
End: 2025-03-19

## 2025-03-26 ENCOUNTER — TELEMEDICINE (OUTPATIENT)
Age: 70
End: 2025-03-26

## 2025-03-26 DIAGNOSIS — F43.9 TRAUMA AND STRESSOR-RELATED DISORDER: ICD-10-CM

## 2025-03-26 DIAGNOSIS — F43.81 PROLONGED GRIEF DISORDER: ICD-10-CM

## 2025-03-26 DIAGNOSIS — F33.41 MAJOR DEPRESSIVE DISORDER, RECURRENT EPISODE, IN PARTIAL REMISSION WITH ANXIOUS DISTRESS: Primary | ICD-10-CM

## 2025-03-26 DIAGNOSIS — E66.01 MORBID OBESITY: ICD-10-CM

## 2025-03-26 NOTE — PROGRESS NOTES
therapist engaged her in discussing positive habits she is developing and affirmed her reduction in stress with her sister moving out of the home she owns in [place] so that she and her son can get it back on the market.       Treatment Plan  Goal #1:  Reduce Emotional Eating   Objective #1:  Learning to keep a food diary, learning stress management skills, developing healthy behaviors and identifying difference between physical and emotional hunger.    Goal #2:  Decrease daily levels of depression by 75%  Objective #1:  Patient will learn to identify how sense of self-worth and feelings of isolation are connected with past family dynamics and will learn healthier ways of experiencing self in relation to others.  Objective #2:  Patient will learn and implement strategies to overcome depression     Goal #3:  Work through grief over loss   Objective #1:  Validate the past, adjust to the present, and redefine what patient's future looks like    Goal #4:  Decrease symptoms of trauma   Objective #1:  Engaging client in Trauma-Focused CBT to process childhood trauma, teaching patient to recognize trauma triggers, increasing relaxation through grounding techniques, and DBT skills, and reprocessing trauma beliefs and memories.      Plans:  Continue to use Cognitive Behavioral Therapy and Dialectical Behavioral Therapy  Continue to work with patient on primary goal  Continue to see patient for counseling to address behavioral changes, if patient decides that they would like to seek supportive counseling    Patient verbalized understanding of the information presented.     Next Session:  TBD  Virtual (Y/N):  N/A       Signature: Nichelle Alberto LPC, NCC, CCTP      Date:  3/26/2025

## 2025-03-27 ENCOUNTER — CLINICAL SUPPORT (OUTPATIENT)
Age: 70
End: 2025-03-27
Payer: MEDICARE

## 2025-03-27 ENCOUNTER — TELEPHONE (OUTPATIENT)
Age: 70
End: 2025-03-27

## 2025-03-27 DIAGNOSIS — I26.99 PULMONARY EMBOLISM, UNSPECIFIED CHRONICITY, UNSPECIFIED PULMONARY EMBOLISM TYPE, UNSPECIFIED WHETHER ACUTE COR PULMONALE PRESENT (HCC): Primary | ICD-10-CM

## 2025-03-27 LAB
POC INR: 1.8
PROTHROMBIN TIME, POC: 21.5

## 2025-03-27 PROCEDURE — PBSHW AMB POC PT/INR: Performed by: FAMILY MEDICINE

## 2025-03-27 PROCEDURE — 85610 PROTHROMBIN TIME: CPT | Performed by: FAMILY MEDICINE

## 2025-03-27 NOTE — PROGRESS NOTES
The patient identity was confirmed with  and First/Last Name. Medication and dose reviewed with patient, as well as any new diagnosis/procedures.     Jackie Worrell is a 69 y.o. female who presents today for Anticoagulation monitoring.    Indication:  Pulmonary Embolism  INR Goal: 2.0-3.0.  Current dose:  Coumadin 11.25mg Tues/Thurs, 7.5mg rest of days.  Missed Coumadin Doses:  None  Medication Changes:  no  Dietary Changes:  no    Symptoms: taking coumadin appropriately without any bleeding.    Latest INRs:  Lab Results   Component Value Date/Time    INR 1.8 2025 10:02 AM    INR 2.2 2025 10:05 AM    INR 2.6 2025 09:49 AM    INR 3.1 2023 09:35 AM    INR 3.4 2023 09:44 AM    INR 2.9 2023 03:09 PM        New Coumadin dose:.   11.25 Tues/Wed/Thurs, 7.5 Mon/Fri/Sat/Sun    Next check to be scheduled for  2 weeks.

## 2025-03-28 ENCOUNTER — TELEPHONE (OUTPATIENT)
Age: 70
End: 2025-03-28

## 2025-03-28 ENCOUNTER — CLINICAL DOCUMENTATION (OUTPATIENT)
Age: 70
End: 2025-03-28

## 2025-03-28 NOTE — PROGRESS NOTES
Patient Selection Committee-3/27/25  Patient was presented in committee and recommendations are as follows:  Follow up with Catalina to discuss neurology clearance.  Needs refresher with dietitian.  Nichelle to call patient. Nichelle's recommendations is pending until neurology clearance

## 2025-03-31 NOTE — TELEPHONE ENCOUNTER
This therapist called Jackie Worrell on 3/27/25 and informed her that after meeting with the bariatric team, the recommendation is for her to meet with Catalina Mcguire NP, to further discuss recommendations.

## 2025-04-10 ENCOUNTER — TELEPHONE (OUTPATIENT)
Age: 70
End: 2025-04-10

## 2025-04-10 ENCOUNTER — OFFICE VISIT (OUTPATIENT)
Age: 70
End: 2025-04-10

## 2025-04-10 VITALS
OXYGEN SATURATION: 97 % | HEART RATE: 91 BPM | TEMPERATURE: 98.2 F | DIASTOLIC BLOOD PRESSURE: 83 MMHG | SYSTOLIC BLOOD PRESSURE: 131 MMHG | BODY MASS INDEX: 37.34 KG/M2 | WEIGHT: 260.8 LBS | RESPIRATION RATE: 18 BRPM | HEIGHT: 70 IN

## 2025-04-10 DIAGNOSIS — Z86.711 HX PULMONARY EMBOLISM: ICD-10-CM

## 2025-04-10 DIAGNOSIS — Z86.711 HX PULMONARY EMBOLISM: Primary | ICD-10-CM

## 2025-04-10 DIAGNOSIS — R41.89 COGNITIVE IMPAIRMENT: ICD-10-CM

## 2025-04-10 DIAGNOSIS — E66.01 MORBID OBESITY: Primary | ICD-10-CM

## 2025-04-10 ASSESSMENT — PATIENT HEALTH QUESTIONNAIRE - PHQ9
7. TROUBLE CONCENTRATING ON THINGS, SUCH AS READING THE NEWSPAPER OR WATCHING TELEVISION: NOT AT ALL
1. LITTLE INTEREST OR PLEASURE IN DOING THINGS: NOT AT ALL
4. FEELING TIRED OR HAVING LITTLE ENERGY: NOT AT ALL
SUM OF ALL RESPONSES TO PHQ QUESTIONS 1-9: 0
2. FEELING DOWN, DEPRESSED OR HOPELESS: NOT AT ALL
SUM OF ALL RESPONSES TO PHQ QUESTIONS 1-9: 0
6. FEELING BAD ABOUT YOURSELF - OR THAT YOU ARE A FAILURE OR HAVE LET YOURSELF OR YOUR FAMILY DOWN: NOT AT ALL
SUM OF ALL RESPONSES TO PHQ QUESTIONS 1-9: 0
2. FEELING DOWN, DEPRESSED OR HOPELESS: NOT AT ALL
SUM OF ALL RESPONSES TO PHQ QUESTIONS 1-9: 0
5. POOR APPETITE OR OVEREATING: NOT AT ALL
1. LITTLE INTEREST OR PLEASURE IN DOING THINGS: NOT AT ALL
8. MOVING OR SPEAKING SO SLOWLY THAT OTHER PEOPLE COULD HAVE NOTICED. OR THE OPPOSITE, BEING SO FIGETY OR RESTLESS THAT YOU HAVE BEEN MOVING AROUND A LOT MORE THAN USUAL: NOT AT ALL
9. THOUGHTS THAT YOU WOULD BE BETTER OFF DEAD, OR OF HURTING YOURSELF: NOT AT ALL
SUM OF ALL RESPONSES TO PHQ QUESTIONS 1-9: 0
SUM OF ALL RESPONSES TO PHQ QUESTIONS 1-9: 0
3. TROUBLE FALLING OR STAYING ASLEEP: NOT AT ALL
10. IF YOU CHECKED OFF ANY PROBLEMS, HOW DIFFICULT HAVE THESE PROBLEMS MADE IT FOR YOU TO DO YOUR WORK, TAKE CARE OF THINGS AT HOME, OR GET ALONG WITH OTHER PEOPLE: NOT DIFFICULT AT ALL
SUM OF ALL RESPONSES TO PHQ QUESTIONS 1-9: 0
SUM OF ALL RESPONSES TO PHQ QUESTIONS 1-9: 0

## 2025-04-10 NOTE — PROGRESS NOTES
Met with patient today to discuss patient selection recommendations.   She has been declined at this time from psych standpoint.   Coping, trauma, grief and ?early dementia.   We will plan to refer to neurology for evaluation before clearance for surgery.     JOSE Stuart - NP

## 2025-04-10 NOTE — PROGRESS NOTES
Identified pt with two pt identifiers (name and ). Reviewed chart in preparation for visit and have obtained necessary documentation.    Jackie Worrell is a 69 y.o. female Follow-up (Discuss up coming neuro evaluation)  .    Vitals:    04/10/25 1418   BP: 131/83   BP Site: Left Upper Arm   Patient Position: Sitting   BP Cuff Size: Large Adult   Pulse: 91   Resp: 18   Temp: 98.2 °F (36.8 °C)   TempSrc: Oral   SpO2: 97%   Weight: 118.3 kg (260 lb 12.8 oz)   Height: 1.778 m (5' 10\")          1. Have you been to the ER, urgent care clinic since your last visit?  Hospitalized since your last visit?  no     2. Have you seen or consulted any other health care providers outside of the [facility] since your last visit?  Include any pap smears or colon screening.  no

## 2025-04-11 ENCOUNTER — TELEPHONE (OUTPATIENT)
Age: 70
End: 2025-04-11

## 2025-04-11 NOTE — TELEPHONE ENCOUNTER
Called patient no answer VM was left, my chart message also sent.    Patient called office back, two patient identifiers used for patient verification, name and .  Patient advised of appointment date and time.      Patient voiced thanks and understanding.

## 2025-04-17 ENCOUNTER — RESULTS FOLLOW-UP (OUTPATIENT)
Age: 70
End: 2025-04-17

## 2025-04-17 DIAGNOSIS — Z86.711 HX PULMONARY EMBOLISM: Primary | ICD-10-CM

## 2025-04-17 LAB
INR PPP: 4.3 (ref 0.9–1.2)
PROTHROMBIN TIME: 42.9 SEC (ref 9.1–12)

## 2025-04-17 NOTE — TELEPHONE ENCOUNTER
INR 4.3.  Higher than goal.  This is unexpected, have not made any recent changes.    Does she know why her INR has gone up?    Hold Thursday dose and then resume on Friday at 7.5 mg daily    Recheck 2 weeks

## 2025-04-29 ENCOUNTER — TELEPHONE (OUTPATIENT)
Facility: CLINIC | Age: 70
End: 2025-04-29

## 2025-04-29 NOTE — TELEPHONE ENCOUNTER
Patient called back to schedule her lab appointment to recheck her INR.  Patient wanted to know should she wait until next week for [place] office or come to the [place] office this week?  Please advise.

## 2025-04-29 NOTE — TELEPHONE ENCOUNTER
Patient can have it done at [facility] or [place], would try to schedule her this week or second week of may since we dont know about next week yet.

## 2025-05-12 ENCOUNTER — CLINICAL SUPPORT (OUTPATIENT)
Age: 70
End: 2025-05-12
Payer: MEDICARE

## 2025-05-12 DIAGNOSIS — J45.20 MILD INTERMITTENT ASTHMA WITHOUT COMPLICATION: ICD-10-CM

## 2025-05-12 DIAGNOSIS — J40 BRONCHITIS: ICD-10-CM

## 2025-05-12 DIAGNOSIS — Z86.711 HX PULMONARY EMBOLISM: Primary | ICD-10-CM

## 2025-05-12 LAB
POC INR: 1.6
PROTHROMBIN TIME, POC: 19.5

## 2025-05-12 PROCEDURE — 85610 PROTHROMBIN TIME: CPT | Performed by: FAMILY MEDICINE

## 2025-05-12 PROCEDURE — 1159F MED LIST DOCD IN RCRD: CPT | Performed by: FAMILY MEDICINE

## 2025-05-12 PROCEDURE — PBSHW AMB POC PT/INR: Performed by: FAMILY MEDICINE

## 2025-05-12 PROCEDURE — 99214 OFFICE O/P EST MOD 30 MIN: CPT | Performed by: FAMILY MEDICINE

## 2025-05-12 PROCEDURE — 1123F ACP DISCUSS/DSCN MKR DOCD: CPT | Performed by: FAMILY MEDICINE

## 2025-05-12 RX ORDER — BENZONATATE 200 MG/1
200 CAPSULE ORAL 3 TIMES DAILY PRN
Qty: 30 CAPSULE | Refills: 0 | Status: SHIPPED | OUTPATIENT
Start: 2025-05-12 | End: 2025-05-22

## 2025-05-12 RX ORDER — ALBUTEROL SULFATE 90 UG/1
2 INHALANT RESPIRATORY (INHALATION) EVERY 4 HOURS PRN
Qty: 54 G | Refills: 5 | Status: SHIPPED | OUTPATIENT
Start: 2025-05-12

## 2025-05-12 NOTE — PROGRESS NOTES
HPI  Jackie Worrell is a 69 y.o. female who presents with congestion, cough. Started 4 days ago. ok sleep, drinking fluids. Other symptoms include chesty cough. Denies fever, wheezing.     PMHx:  Past Medical History:   Diagnosis Date    Arthritis     ASD (atrial septal defect)     age 12 septal defect closures MCV    Depression     GERD (gastroesophageal reflux disease)     Hypertension     Lymphedema of leg     bilateral    Sleep apnea     CPAP    Thromboembolus (HCC)     x 3; takes coumadin, DVT's    Thyroid disease        Meds:   Current Outpatient Medications   Medication Sig Dispense Refill    albuterol sulfate HFA (VENTOLIN HFA) 108 (90 Base) MCG/ACT inhaler Inhale 2 puffs into the lungs every 4 hours as needed for Wheezing 54 g 5    benzonatate (TESSALON) 200 MG capsule Take 1 capsule by mouth 3 times daily as needed for Cough 30 capsule 0    levothyroxine (SYNTHROID) 100 MCG tablet Take 1 tablet by mouth every morning (before breakfast) 90 tablet 3    warfarin (COUMADIN) 7.5 MG tablet Take 1 tablet by mouth once daily 90 tablet 3    spironolactone (ALDACTONE) 50 MG tablet Take 1 tablet by mouth daily 90 tablet 3    venlafaxine (EFFEXOR) 75 MG tablet Take 1 tablet by mouth twice daily 180 tablet 3    lansoprazole (PREVACID) 15 MG delayed release capsule Take 1 capsule by mouth daily      hydroCHLOROthiazide (HYDRODIURIL) 25 MG tablet Take 1 tablet by mouth once daily 90 tablet 3    amLODIPine (NORVASC) 5 MG tablet Take 1 tablet by mouth daily      valsartan (DIOVAN) 320 MG tablet Take 1 tablet by mouth once daily 90 tablet 3    acetaminophen (TYLENOL) 325 MG tablet Take 2 tablets by mouth every 6 hours as needed      vitamin D 25 MCG (1000 UT) CAPS Take by mouth daily      cyanocobalamin 1000 MCG tablet Take 1 tablet by mouth daily       No current facility-administered medications for this visit.       Allergies:   Allergies   Allergen Reactions    Copper Hives    Furosemide Hives     Palpitations,

## 2025-05-12 NOTE — PROGRESS NOTES
Jackie Worrell is a 69 y.o. female who presents today for Anticoagulation monitoring.    Indication: PE  INR Goal: 2.0-3.0.  Current dose:  Coumadin see original calendar.  Missed Coumadin Doses:  None  Medication Changes:  no  Dietary Changes:  no    Symptoms: taking coumadin appropriately without any bleeding.    Latest INRs:  Lab Results   Component Value Date/Time    INR 1.6 05/12/2025 01:43 PM    INR 4.3 04/16/2025 02:42 PM    INR 1.8 03/27/2025 10:02 AM    INR 2.2 02/27/2025 10:05 AM    INR 3.1 05/02/2023 09:35 AM    INR 3.4 04/18/2023 09:44 AM    INR 2.9 03/14/2023 03:09 PM        New Coumadin dose:.the following changes are made - see anti-coag calendar.    Next check to be scheduled for  2 weeks.

## 2025-05-21 ENCOUNTER — TELEPHONE (OUTPATIENT)
Age: 70
End: 2025-05-21

## 2025-05-21 NOTE — TELEPHONE ENCOUNTER
Contacted patient regarding upcoming Medicare wellness appointment and completion of HRA questionnaire. Patient states will complete via [facility].

## 2025-05-23 ENCOUNTER — OFFICE VISIT (OUTPATIENT)
Age: 70
End: 2025-05-23
Payer: MEDICARE

## 2025-05-23 VITALS
OXYGEN SATURATION: 97 % | RESPIRATION RATE: 18 BRPM | BODY MASS INDEX: 36.65 KG/M2 | HEART RATE: 96 BPM | HEIGHT: 70 IN | DIASTOLIC BLOOD PRESSURE: 80 MMHG | TEMPERATURE: 98.2 F | WEIGHT: 256 LBS | SYSTOLIC BLOOD PRESSURE: 130 MMHG

## 2025-05-23 DIAGNOSIS — R73.02 IGT (IMPAIRED GLUCOSE TOLERANCE): ICD-10-CM

## 2025-05-23 DIAGNOSIS — Z86.711 HX PULMONARY EMBOLISM: ICD-10-CM

## 2025-05-23 DIAGNOSIS — Z00.00 ROUTINE GENERAL MEDICAL EXAMINATION AT A HEALTH CARE FACILITY: Primary | ICD-10-CM

## 2025-05-23 DIAGNOSIS — E03.9 HYPOTHYROIDISM, UNSPECIFIED TYPE: ICD-10-CM

## 2025-05-23 DIAGNOSIS — I10 ESSENTIAL (PRIMARY) HYPERTENSION: ICD-10-CM

## 2025-05-23 PROCEDURE — G0439 PPPS, SUBSEQ VISIT: HCPCS | Performed by: FAMILY MEDICINE

## 2025-05-23 PROCEDURE — 1159F MED LIST DOCD IN RCRD: CPT | Performed by: FAMILY MEDICINE

## 2025-05-23 PROCEDURE — 3075F SYST BP GE 130 - 139MM HG: CPT | Performed by: FAMILY MEDICINE

## 2025-05-23 PROCEDURE — 1123F ACP DISCUSS/DSCN MKR DOCD: CPT | Performed by: FAMILY MEDICINE

## 2025-05-23 PROCEDURE — 3079F DIAST BP 80-89 MM HG: CPT | Performed by: FAMILY MEDICINE

## 2025-05-23 PROCEDURE — 3017F COLORECTAL CA SCREEN DOC REV: CPT | Performed by: FAMILY MEDICINE

## 2025-05-23 PROCEDURE — 1126F AMNT PAIN NOTED NONE PRSNT: CPT | Performed by: FAMILY MEDICINE

## 2025-05-23 SDOH — ECONOMIC STABILITY: FOOD INSECURITY: WITHIN THE PAST 12 MONTHS, YOU WORRIED THAT YOUR FOOD WOULD RUN OUT BEFORE YOU GOT MONEY TO BUY MORE.: NEVER TRUE

## 2025-05-23 SDOH — ECONOMIC STABILITY: FOOD INSECURITY: WITHIN THE PAST 12 MONTHS, THE FOOD YOU BOUGHT JUST DIDN'T LAST AND YOU DIDN'T HAVE MONEY TO GET MORE.: NEVER TRUE

## 2025-05-23 ASSESSMENT — PATIENT HEALTH QUESTIONNAIRE - PHQ9
6. FEELING BAD ABOUT YOURSELF - OR THAT YOU ARE A FAILURE OR HAVE LET YOURSELF OR YOUR FAMILY DOWN: NOT AT ALL
2. FEELING DOWN, DEPRESSED OR HOPELESS: NOT AT ALL
SUM OF ALL RESPONSES TO PHQ QUESTIONS 1-9: 0
7. TROUBLE CONCENTRATING ON THINGS, SUCH AS READING THE NEWSPAPER OR WATCHING TELEVISION: NOT AT ALL
5. POOR APPETITE OR OVEREATING: NOT AT ALL
3. TROUBLE FALLING OR STAYING ASLEEP: NOT AT ALL
10. IF YOU CHECKED OFF ANY PROBLEMS, HOW DIFFICULT HAVE THESE PROBLEMS MADE IT FOR YOU TO DO YOUR WORK, TAKE CARE OF THINGS AT HOME, OR GET ALONG WITH OTHER PEOPLE: NOT DIFFICULT AT ALL
SUM OF ALL RESPONSES TO PHQ QUESTIONS 1-9: 0
1. LITTLE INTEREST OR PLEASURE IN DOING THINGS: NOT AT ALL
9. THOUGHTS THAT YOU WOULD BE BETTER OFF DEAD, OR OF HURTING YOURSELF: NOT AT ALL
4. FEELING TIRED OR HAVING LITTLE ENERGY: NOT AT ALL
8. MOVING OR SPEAKING SO SLOWLY THAT OTHER PEOPLE COULD HAVE NOTICED. OR THE OPPOSITE, BEING SO FIGETY OR RESTLESS THAT YOU HAVE BEEN MOVING AROUND A LOT MORE THAN USUAL: NOT AT ALL
SUM OF ALL RESPONSES TO PHQ QUESTIONS 1-9: 0
SUM OF ALL RESPONSES TO PHQ QUESTIONS 1-9: 0

## 2025-05-23 ASSESSMENT — LIFESTYLE VARIABLES
HOW MANY STANDARD DRINKS CONTAINING ALCOHOL DO YOU HAVE ON A TYPICAL DAY: PATIENT DOES NOT DRINK
HOW OFTEN DO YOU HAVE A DRINK CONTAINING ALCOHOL: NEVER

## 2025-05-23 NOTE — PROGRESS NOTES
The patient identity was confirmed with  and First/Last Name. Medications and Allergies reviewed with patient, as well as any new diagnosis/procedures. Depression Screening and SDOH Screening done today.    Chief Complaint   Patient presents with    2 Week Follow-Up    Medicare AW        Vitals:    25 1548   BP: 130/80   Pulse: 96   Resp: 18   Temp: 98.2 °F (36.8 °C)   SpO2: 97%       Health Maintenance Due   Topic Date Due    Shingles vaccine (1 of 2) Never done    Respiratory Syncytial Virus (RSV) Pregnant or age 60 yrs+ (1 - Risk 60-74 years 1-dose series) Never done    COVID-19 Vaccine ( season) 2024    Annual Wellness Visit (Medicare Advantage)  2025          \"Have you been to the ER, urgent care clinic since your last visit?  Hospitalized since your last visit?\"    NO    “Have you seen or consulted any other health care providers outside our system since your last visit?”    NO

## 2025-05-23 NOTE — PROGRESS NOTES
Medicare Annual Wellness Visit     I have reviewed the patient's medical history in detail and updated the computerized patient record.     History   Jackie Worrell is a 69 y.o. female who presents to follow-up on chronic medical issues.    Complains of fatigue all the time.  Is using a CPAP machine at night but only using it about 4 hours at home.  Notices that when she goes on vacation she is more likely to use her CPAP machine 7 or 8 hours and does feel substantially better.     Again complains of a tremor.  Started sometime late 2024 first noticed in her left hand and in the last few weeks is also noticed in her right hand.  It is not present all the time.  She notices it on occasion.  Family members noticed that on occasion.  It does affect her writing now that it affects her right hand.  7 out of 10 times which she writes she will have a shake to it.  Is not having any problems with balance, energy level or resting tremor.  Seems to be an intention tremor.  To her knowledge does not have a family history of tremor.        Reviewed and updated this visit:  Allergies  Meds  Sexual Hx      Past Medical History:   Diagnosis Date    Arthritis     ASD (atrial septal defect)     age 12 septal defect closures MCV    Depression     GERD (gastroesophageal reflux disease)     Hypertension     Lymphedema of leg     bilateral    Sleep apnea     CPAP    Thromboembolus (HCC)     x 3; takes coumadin, DVT's    Thyroid disease       Past Surgical History:   Procedure Laterality Date    ASD REPAIR  1967    COLONOSCOPY N/A 11/8/2023    COLONOSCOPY performed by Navid Mccall MD at [facility] ENDOSCOPY    COLONOSCOPY,ADWOA WARD  7/16/2015         ORTHOPEDIC SURGERY      carpal tunnel x3    TUBAL LIGATION       Current Outpatient Medications   Medication Sig Dispense Refill    albuterol sulfate HFA (VENTOLIN HFA) 108 (90 Base) MCG/ACT inhaler Inhale 2 puffs into the lungs every 4 hours as needed for Wheezing 54 g 5

## 2025-05-24 LAB
INR PPP: 4.1 (ref 0.9–1.1)
PROTHROMBIN TIME: 39.8 SEC (ref 9.2–11.2)

## 2025-05-27 ENCOUNTER — RESULTS FOLLOW-UP (OUTPATIENT)
Age: 70
End: 2025-05-27

## 2025-05-27 DIAGNOSIS — Z86.711 HX PULMONARY EMBOLISM: Primary | ICD-10-CM

## 2025-06-04 ENCOUNTER — CLINICAL SUPPORT (OUTPATIENT)
Age: 70
End: 2025-06-04
Payer: MEDICARE

## 2025-06-04 DIAGNOSIS — Z86.711 HX PULMONARY EMBOLISM: Primary | ICD-10-CM

## 2025-06-04 LAB
POC INR: 3.6
PROTHROMBIN TIME, POC: 43.8

## 2025-06-04 PROCEDURE — PBSHW AMB POC PT/INR: Performed by: FAMILY MEDICINE

## 2025-06-04 PROCEDURE — 85610 PROTHROMBIN TIME: CPT | Performed by: FAMILY MEDICINE

## 2025-06-04 NOTE — PROGRESS NOTES
The patient identity was confirmed with  and First/Last Name. Medication and dose reviewed with patient, as well as any new diagnosis/procedures.     Jackie Worrell is a 69 y.o. female who presents today for Anticoagulation monitoring.    Indication: PE  INR Goal: 2.0-3.0.  Current dose:  Coumadin 7.5mg daily.  Missed Coumadin Doses:  None  Medication Changes:  no  Dietary Changes:  no    Symptoms: taking coumadin appropriately without any bleeding.    Latest INRs:  Lab Results   Component Value Date/Time    INR 3.6 2025 10:03 AM    INR 4.1 2025 04:43 PM    INR 1.6 2025 01:43 PM    INR 4.3 2025 02:42 PM    INR 1.8 2025 10:02 AM    INR 3.1 2023 09:35 AM    INR 3.4 2023 09:44 AM    INR 2.9 2023 03:09 PM        New Coumadin dose:.current treatment plan is effective, no change in therapy, the following changes are made - .    Next check to be scheduled for  2 weeks.

## 2025-06-17 ENCOUNTER — TELEPHONE (OUTPATIENT)
Age: 70
End: 2025-06-17

## 2025-06-17 NOTE — TELEPHONE ENCOUNTER
Patient wants to move neurology appt up sooner. Only wanted to talk to Catalina Mcguire NP or Nichelle Alberto. Advised clinical staff would return call.

## 2025-06-17 NOTE — TELEPHONE ENCOUNTER
Returned call to patient.  Two patient identifiers used. Pt would like to schedule for sooner appt than scheduled. Per 4/11/25 mychart documentation was scheduled for 10/2025 to see neurologist. Informed pt will reach out to neurologist office to see if any earlier appts are available or if they offer a waitlist. Provided pt contact # to their office [phone]. Pt made aware she will have to check periodically thereafter for earlier appt if they do not currently. Pt thanked for the assistance.

## 2025-06-17 NOTE — TELEPHONE ENCOUNTER
2 pt identifiers used. Spoke w/ rep Pravin from Dr. Kevin's office @ 1:14pm who confirmed earliest appt currently is 10/30/25. Pt is placed on waitlist as requested.      Called pt @ 1:21pm and informed earliest appt w/ neurology is 10/30/25, pt made aware she is on waitlist. Pt thanked for the update.

## 2025-06-18 ENCOUNTER — CLINICAL SUPPORT (OUTPATIENT)
Age: 70
End: 2025-06-18
Payer: MEDICARE

## 2025-06-18 DIAGNOSIS — Z86.711 HX PULMONARY EMBOLISM: Primary | ICD-10-CM

## 2025-06-18 LAB
POC INR: 3.4
PROTHROMBIN TIME, POC: 40.5

## 2025-06-18 PROCEDURE — 85610 PROTHROMBIN TIME: CPT | Performed by: FAMILY MEDICINE

## 2025-06-18 PROCEDURE — PBSHW AMB POC PT/INR: Performed by: FAMILY MEDICINE

## 2025-06-18 NOTE — PROGRESS NOTES
The patient identity was confirmed with  and First/Last Name. Medication and dose reviewed with patient, as well as any new diagnosis/procedures.     Jackie Worrell is a 69 y.o. female who presents today for Anticoagulation monitoring.    Indication: PE  INR Goal: 2.0-3.0.  Current dose:  Coumadin 7.5mg daily.  Missed Coumadin Doses:  None  Medication Changes:  no  Dietary Changes:  no    Symptoms: taking coumadin appropriately without any bleeding.    Latest INRs:  Lab Results   Component Value Date/Time    INR 3.6 2025 10:03 AM    INR 4.1 2025 04:43 PM    INR 1.6 2025 01:43 PM    INR 4.3 2025 02:42 PM    INR 1.8 2025 10:02 AM    INR 3.1 2023 09:35 AM    INR 3.4 2023 09:44 AM    INR 2.9 2023 03:09 PM        New Coumadin dose:.current treatment plan is effective, no change in therapy, the following changes are made - 3.75mg on Tues&Thurs.    Next check to be scheduled for  2 weeks.

## 2025-07-02 ENCOUNTER — CLINICAL SUPPORT (OUTPATIENT)
Age: 70
End: 2025-07-02
Payer: MEDICARE

## 2025-07-02 DIAGNOSIS — Z86.711 HX PULMONARY EMBOLISM: Primary | ICD-10-CM

## 2025-07-02 LAB
POC INR: 2.9
PROTHROMBIN TIME, POC: 35

## 2025-07-02 PROCEDURE — 85610 PROTHROMBIN TIME: CPT

## 2025-07-02 PROCEDURE — PBSHW AMB POC PT/INR

## 2025-07-02 NOTE — PROGRESS NOTES
Jackie Worrell is a 69 y.o. female who presents today for Anticoagulation monitoring.    Indication: PE  INR Goal: 2.0-3.0.  Current dose:  Coumadin 7.5 mg daily, except 3.75 Tues/Thurs.  Missed Coumadin Doses:  None  Medication Changes:  no  Dietary Changes:  no    Symptoms: taking coumadin appropriately without any bleeding.    Latest INRs:  Lab Results   Component Value Date/Time    INR 3.4 06/18/2025 10:07 AM    INR 3.6 06/04/2025 10:03 AM    INR 4.1 05/23/2025 04:43 PM    INR 1.6 05/12/2025 01:43 PM    INR 4.3 04/16/2025 02:42 PM    INR 3.1 05/02/2023 09:35 AM    INR 3.4 04/18/2023 09:44 AM    INR 2.9 03/14/2023 03:09 PM        New Coumadin dose:.current treatment plan is effective, no change in therapy.    Next check to be scheduled for  2 weeks.

## 2025-07-10 ENCOUNTER — OFFICE VISIT (OUTPATIENT)
Age: 70
End: 2025-07-10
Payer: MEDICARE

## 2025-07-10 VITALS
WEIGHT: 254 LBS | BODY MASS INDEX: 36.36 KG/M2 | HEIGHT: 70 IN | DIASTOLIC BLOOD PRESSURE: 86 MMHG | SYSTOLIC BLOOD PRESSURE: 134 MMHG | HEART RATE: 102 BPM | OXYGEN SATURATION: 97 %

## 2025-07-10 DIAGNOSIS — R53.83 EASY FATIGABILITY: ICD-10-CM

## 2025-07-10 DIAGNOSIS — H02.402 PTOSIS OF EYELID, LEFT: ICD-10-CM

## 2025-07-10 DIAGNOSIS — G25.0 BENIGN ESSENTIAL TREMOR: Primary | ICD-10-CM

## 2025-07-10 PROCEDURE — 3079F DIAST BP 80-89 MM HG: CPT | Performed by: PSYCHIATRY & NEUROLOGY

## 2025-07-10 PROCEDURE — 1090F PRES/ABSN URINE INCON ASSESS: CPT | Performed by: PSYCHIATRY & NEUROLOGY

## 2025-07-10 PROCEDURE — 3075F SYST BP GE 130 - 139MM HG: CPT | Performed by: PSYCHIATRY & NEUROLOGY

## 2025-07-10 PROCEDURE — 3017F COLORECTAL CA SCREEN DOC REV: CPT | Performed by: PSYCHIATRY & NEUROLOGY

## 2025-07-10 PROCEDURE — 99204 OFFICE O/P NEW MOD 45 MIN: CPT | Performed by: PSYCHIATRY & NEUROLOGY

## 2025-07-10 PROCEDURE — G8427 DOCREV CUR MEDS BY ELIG CLIN: HCPCS | Performed by: PSYCHIATRY & NEUROLOGY

## 2025-07-10 PROCEDURE — 1036F TOBACCO NON-USER: CPT | Performed by: PSYCHIATRY & NEUROLOGY

## 2025-07-10 PROCEDURE — G8399 PT W/DXA RESULTS DOCUMENT: HCPCS | Performed by: PSYCHIATRY & NEUROLOGY

## 2025-07-10 PROCEDURE — 1159F MED LIST DOCD IN RCRD: CPT | Performed by: PSYCHIATRY & NEUROLOGY

## 2025-07-10 PROCEDURE — G8417 CALC BMI ABV UP PARAM F/U: HCPCS | Performed by: PSYCHIATRY & NEUROLOGY

## 2025-07-10 PROCEDURE — 1123F ACP DISCUSS/DSCN MKR DOCD: CPT | Performed by: PSYCHIATRY & NEUROLOGY

## 2025-07-10 ASSESSMENT — PATIENT HEALTH QUESTIONNAIRE - PHQ9
SUM OF ALL RESPONSES TO PHQ QUESTIONS 1-9: 0
1. LITTLE INTEREST OR PLEASURE IN DOING THINGS: NOT AT ALL
2. FEELING DOWN, DEPRESSED OR HOPELESS: NOT AT ALL
SUM OF ALL RESPONSES TO PHQ QUESTIONS 1-9: 0

## 2025-07-10 NOTE — PROGRESS NOTES
Chief Complaint   Patient presents with    Neurologic Problem     Tremors in both hands     Vitals:    07/10/25 0858   BP: 134/86   Pulse: (!) 102   SpO2: 97%

## 2025-07-10 NOTE — PROGRESS NOTES
Chief Complaint   Patient presents with    Neurologic Problem     Tremors in both hands       History of Present Illness  The patient presents for evaluation of tremors.    Referred by her geriatrician due to tremors primarily affecting her left hand, particularly when holding objects. This has been ongoing for approximately a year. Occasionally struggles with writing or signing her name. No tremors in other parts of her body. No medication taken for tremors.  Does not seem to be interfering with any activities at this time.    Recently noticed a slight droop in her left eyelid and has an appointment with an ophthalmologist on Monday. No double vision but reports occasional blurriness.  Reports getting tired easily and has difficulty walking in the bigger store/malls etc. without having to sit down frequently.  Attributes this to 50 pound weight gain over the past few years and right knee pain.  Feels shaky at times when standing up.  Mobility otherwise is generally good.       Past Medical History:   Diagnosis Date    Arthritis     ASD (atrial septal defect)     age 12 septal defect closures MCV    Depression     GERD (gastroesophageal reflux disease)     Hypertension     Lymphedema of leg     bilateral    Sleep apnea     CPAP    Thromboembolus (HCC)     x 3; takes coumadin, DVT's    Thyroid disease      Current Outpatient Medications   Medication Sig    albuterol sulfate HFA (VENTOLIN HFA) 108 (90 Base) MCG/ACT inhaler Inhale 2 puffs into the lungs every 4 hours as needed for Wheezing    levothyroxine (SYNTHROID) 100 MCG tablet Take 1 tablet by mouth every morning (before breakfast)    warfarin (COUMADIN) 7.5 MG tablet Take 1 tablet by mouth once daily    spironolactone (ALDACTONE) 50 MG tablet Take 1 tablet by mouth daily    venlafaxine (EFFEXOR) 75 MG tablet Take 1 tablet by mouth twice daily    lansoprazole (PREVACID) 15 MG delayed release capsule Take 1 capsule by mouth daily    hydroCHLOROthiazide

## 2025-07-14 ENCOUNTER — RESULTS FOLLOW-UP (OUTPATIENT)
Age: 70
End: 2025-07-14

## 2025-07-14 LAB
ACHR BIND AB SER-SCNC: 4.06 NMOL/L (ref 0–0.24)
ACHR BLOCK AB SER-ACNC: 37 % (ref 0–25)
ACHR MOD AB SER QL FC: 44 % (ref 0–45)
REFLEX INFORMATION: ABNORMAL

## 2025-07-15 ENCOUNTER — OFFICE VISIT (OUTPATIENT)
Age: 70
End: 2025-07-15
Payer: MEDICARE

## 2025-07-15 ENCOUNTER — TELEPHONE (OUTPATIENT)
Age: 70
End: 2025-07-15

## 2025-07-15 VITALS
HEART RATE: 79 BPM | RESPIRATION RATE: 17 BRPM | SYSTOLIC BLOOD PRESSURE: 134 MMHG | OXYGEN SATURATION: 96 % | HEIGHT: 70 IN | BODY MASS INDEX: 37.56 KG/M2 | WEIGHT: 262.4 LBS | DIASTOLIC BLOOD PRESSURE: 83 MMHG | TEMPERATURE: 97.7 F

## 2025-07-15 DIAGNOSIS — Z01.810 PREOP CARDIOVASCULAR EXAM: Primary | ICD-10-CM

## 2025-07-15 DIAGNOSIS — I10 ESSENTIAL (PRIMARY) HYPERTENSION: ICD-10-CM

## 2025-07-15 DIAGNOSIS — Z86.711 HX PULMONARY EMBOLISM: ICD-10-CM

## 2025-07-15 LAB
POC INR: 3.4
PROTHROMBIN TIME, POC: 40.7

## 2025-07-15 PROCEDURE — G8399 PT W/DXA RESULTS DOCUMENT: HCPCS | Performed by: FAMILY MEDICINE

## 2025-07-15 PROCEDURE — 1159F MED LIST DOCD IN RCRD: CPT | Performed by: FAMILY MEDICINE

## 2025-07-15 PROCEDURE — 1036F TOBACCO NON-USER: CPT | Performed by: FAMILY MEDICINE

## 2025-07-15 PROCEDURE — 3017F COLORECTAL CA SCREEN DOC REV: CPT | Performed by: FAMILY MEDICINE

## 2025-07-15 PROCEDURE — G8427 DOCREV CUR MEDS BY ELIG CLIN: HCPCS | Performed by: FAMILY MEDICINE

## 2025-07-15 PROCEDURE — 99214 OFFICE O/P EST MOD 30 MIN: CPT | Performed by: FAMILY MEDICINE

## 2025-07-15 PROCEDURE — 3079F DIAST BP 80-89 MM HG: CPT | Performed by: FAMILY MEDICINE

## 2025-07-15 PROCEDURE — 1123F ACP DISCUSS/DSCN MKR DOCD: CPT | Performed by: FAMILY MEDICINE

## 2025-07-15 PROCEDURE — 1126F AMNT PAIN NOTED NONE PRSNT: CPT | Performed by: FAMILY MEDICINE

## 2025-07-15 PROCEDURE — 1090F PRES/ABSN URINE INCON ASSESS: CPT | Performed by: FAMILY MEDICINE

## 2025-07-15 PROCEDURE — 85610 PROTHROMBIN TIME: CPT | Performed by: FAMILY MEDICINE

## 2025-07-15 PROCEDURE — 3075F SYST BP GE 130 - 139MM HG: CPT | Performed by: FAMILY MEDICINE

## 2025-07-15 PROCEDURE — G8417 CALC BMI ABV UP PARAM F/U: HCPCS | Performed by: FAMILY MEDICINE

## 2025-07-15 PROCEDURE — PBSHW AMB POC PT/INR: Performed by: FAMILY MEDICINE

## 2025-07-15 NOTE — PROGRESS NOTES
Health Maintenance Due   Topic Date Due    Shingles vaccine (1 of 2) Never done    Respiratory Syncytial Virus (RSV) Pregnant or age 60 yrs+ (1 - Risk 60-74 years 1-dose series) Never done    COVID-19 Vaccine (4 - 2024-25 season) 09/01/2024    DTaP/Tdap/Td vaccine (2 - Td or Tdap) 06/30/2025    Breast cancer screen  07/19/2025     Jackie Worrell is a 69 y.o. female who presents today for Anticoagulation monitoring.    Indication: PE  INR Goal: 2.0-3.0.  Current dose:  Coumadin 7.5 mg daily, except 3.75 mg Tues/Thurs.  Missed Coumadin Doses:  None  Medication Changes:  no  Dietary Changes:  no    Symptoms: taking coumadin appropriately without any bleeding.    Latest INRs:  Lab Results   Component Value Date/Time    INR 2.9 07/02/2025 11:03 AM    INR 3.4 06/18/2025 10:07 AM    INR 3.6 06/04/2025 10:03 AM    INR 4.1 05/23/2025 04:43 PM    INR 4.3 04/16/2025 02:42 PM    INR 3.1 05/02/2023 09:35 AM    INR 3.4 04/18/2023 09:44 AM    INR 2.9 03/14/2023 03:09 PM        New Coumadin dose:.the following changes are made - see anti-coag calendar.    Next check to be scheduled for  2 weeks.

## 2025-07-15 NOTE — TELEPHONE ENCOUNTER
Tried to call patient.  No answer.    She plans to get surgery for a droopy eyelid.  Her neurologist thinks that the droopy eyelid could be related to myasthenia, which was the blood work that she had drawn last week.     Neurologist thought that she might want to consider medication for myasthenia which could potentially help her eyelid before going through with the surgery

## 2025-07-15 NOTE — PROGRESS NOTES
HPI  Jackie Worrell is a 69 y.o. female who presents for preop ptosis surgery.  Last general anesthesia was in 1960 something.  No problems with anesthesia at the time.  Has had conscious sedation since then without difficulty    She consulted with neurology 7/10/2025 for tremor.  Diagnosed essential tremor and we decided against medication management.  She was not bothered enough and there are challenges associated with medication choice.  Primidone interacting with warfarin and beta-blockers interacting with her asthma diagnosis.  Listed potential choices for the future as gabapentin and topiramate benzos.    She also complained of ptosis and easy fatigability times months and neurology ordered an MRI to rule out structural cause and checked acetylcholine receptor antibodies.  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 and they would like to see her in follow-up    PMHx:  Past Medical History:   Diagnosis Date    Arthritis     ASD (atrial septal defect)     age 12 septal defect closures MCV    Depression     GERD (gastroesophageal reflux disease)     Hypertension     Lymphedema of leg     bilateral    Sleep apnea     CPAP    Thromboembolus (HCC)     x 3; takes coumadin, DVT's    Thyroid disease        Meds:   Current Outpatient Medications   Medication Sig Dispense Refill    albuterol sulfate HFA (VENTOLIN HFA) 108 (90 Base) MCG/ACT inhaler Inhale 2 puffs into the lungs every 4 hours as needed for Wheezing 54 g 5    levothyroxine (SYNTHROID) 100 MCG tablet Take 1 tablet by mouth every morning (before breakfast) 90 tablet 3    warfarin (COUMADIN) 7.5 MG tablet Take 1 tablet by mouth once daily 90 tablet 3    spironolactone (ALDACTONE) 50 MG tablet Take 1 tablet by mouth daily 90 tablet 3    venlafaxine (EFFEXOR) 75 MG tablet Take 1 tablet by mouth twice daily 180 tablet 3    lansoprazole (PREVACID) 15 MG delayed release capsule Take 1 capsule by mouth daily      hydroCHLOROthiazide (HYDRODIURIL) 25 MG tablet Take 1

## 2025-07-17 ENCOUNTER — CLINICAL DOCUMENTATION (OUTPATIENT)
Age: 70
End: 2025-07-17

## 2025-07-17 NOTE — RESULT ENCOUNTER NOTE
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  We need to bring her back to discuss this condition and treatment options.    Patient called, verified, notified and scheduled to come in for a sooner follow up on 07/24/2025. VEDA

## 2025-07-17 NOTE — TELEPHONE ENCOUNTER
CARMELA verified.    Notified pt per Dr. Trujillo.  Pt would still like the pre-op submitted and that she would call the neurologist herself to discuss options.

## 2025-07-22 ENCOUNTER — TELEPHONE (OUTPATIENT)
Age: 70
End: 2025-07-22

## 2025-07-22 DIAGNOSIS — Z86.711 HX PULMONARY EMBOLISM: Primary | ICD-10-CM

## 2025-07-22 NOTE — TELEPHONE ENCOUNTER
Pt is calling to inform her surgery on 07/30 has been cancelled.    Pt is requesting a new schedule for her Warfarin    Please call to discuss new schedule

## 2025-07-22 NOTE — TELEPHONE ENCOUNTER
Warfarin  Maintenance plan:3.75 mg every Tue, Thu, Sat; 7.5 mg all other days     Check INR next week around 7/29

## 2025-07-23 ENCOUNTER — HOSPITAL ENCOUNTER (OUTPATIENT)
Facility: HOSPITAL | Age: 70
Discharge: HOME | End: 2025-07-26
Attending: PSYCHIATRY & NEUROLOGY
Payer: MEDICARE

## 2025-07-23 ENCOUNTER — TELEPHONE (OUTPATIENT)
Age: 70
End: 2025-07-23

## 2025-07-23 DIAGNOSIS — G25.0 BENIGN ESSENTIAL TREMOR: ICD-10-CM

## 2025-07-23 DIAGNOSIS — H02.402 PTOSIS OF EYELID, LEFT: ICD-10-CM

## 2025-07-23 PROCEDURE — 70551 MRI BRAIN STEM W/O DYE: CPT

## 2025-07-30 ENCOUNTER — CLINICAL SUPPORT (OUTPATIENT)
Age: 70
End: 2025-07-30
Payer: MEDICARE

## 2025-07-30 DIAGNOSIS — Z86.711 HX PULMONARY EMBOLISM: Primary | ICD-10-CM

## 2025-07-30 PROBLEM — Z86.73 HISTORY OF STROKE: Status: ACTIVE | Noted: 2020-10-08

## 2025-07-30 LAB
POC INR: 3.5
PROTHROMBIN TIME, POC: 42.6

## 2025-07-30 PROCEDURE — 85610 PROTHROMBIN TIME: CPT | Performed by: FAMILY MEDICINE

## 2025-07-30 PROCEDURE — PBSHW AMB POC PT/INR: Performed by: FAMILY MEDICINE

## 2025-07-30 NOTE — PROGRESS NOTES
Jackie Worrell is a 69 y.o. female who presents today for Anticoagulation monitoring.    Indication: PE  INR Goal: 2.0-3.0.  Current dose: Coumadin 7.5 mg daily, except 3.75 Tues/Thurs.   Missed Coumadin Doses:  None  Medication Changes:  no  Dietary Changes:  no    Symptoms: taking coumadin appropriately without any bleeding.    Latest INRs:  Lab Results   Component Value Date/Time    INR 3.4 07/15/2025 10:34 AM    INR 2.9 07/02/2025 11:03 AM    INR 3.4 06/18/2025 10:07 AM    INR 4.1 05/23/2025 04:43 PM    INR 4.3 04/16/2025 02:42 PM    INR 3.1 05/02/2023 09:35 AM    INR 3.4 04/18/2023 09:44 AM    INR 2.9 03/14/2023 03:09 PM        New Coumadin dose:. Mon,Wed ,Fri 1 tablet and 3.75mg all other    Next check to be scheduled for  2 weeks.

## 2025-08-06 ENCOUNTER — OFFICE VISIT (OUTPATIENT)
Age: 70
End: 2025-08-06
Payer: MEDICARE

## 2025-08-06 DIAGNOSIS — R53.83 EASY FATIGABILITY: ICD-10-CM

## 2025-08-06 DIAGNOSIS — G25.0 BENIGN ESSENTIAL TREMOR: ICD-10-CM

## 2025-08-06 DIAGNOSIS — G70.00 MYASTHENIA GRAVIS (HCC): ICD-10-CM

## 2025-08-06 DIAGNOSIS — H02.402 PTOSIS OF EYELID, LEFT: Primary | ICD-10-CM

## 2025-08-06 PROCEDURE — 3078F DIAST BP <80 MM HG: CPT | Performed by: PSYCHIATRY & NEUROLOGY

## 2025-08-06 PROCEDURE — G8427 DOCREV CUR MEDS BY ELIG CLIN: HCPCS | Performed by: PSYCHIATRY & NEUROLOGY

## 2025-08-06 PROCEDURE — 1036F TOBACCO NON-USER: CPT | Performed by: PSYCHIATRY & NEUROLOGY

## 2025-08-06 PROCEDURE — G8399 PT W/DXA RESULTS DOCUMENT: HCPCS | Performed by: PSYCHIATRY & NEUROLOGY

## 2025-08-06 PROCEDURE — 3017F COLORECTAL CA SCREEN DOC REV: CPT | Performed by: PSYCHIATRY & NEUROLOGY

## 2025-08-06 PROCEDURE — G8420 CALC BMI NORM PARAMETERS: HCPCS | Performed by: PSYCHIATRY & NEUROLOGY

## 2025-08-06 PROCEDURE — 99215 OFFICE O/P EST HI 40 MIN: CPT | Performed by: PSYCHIATRY & NEUROLOGY

## 2025-08-06 PROCEDURE — 1090F PRES/ABSN URINE INCON ASSESS: CPT | Performed by: PSYCHIATRY & NEUROLOGY

## 2025-08-06 PROCEDURE — 3074F SYST BP LT 130 MM HG: CPT | Performed by: PSYCHIATRY & NEUROLOGY

## 2025-08-06 PROCEDURE — 1123F ACP DISCUSS/DSCN MKR DOCD: CPT | Performed by: PSYCHIATRY & NEUROLOGY

## 2025-08-06 PROCEDURE — 1159F MED LIST DOCD IN RCRD: CPT | Performed by: PSYCHIATRY & NEUROLOGY

## 2025-08-06 RX ORDER — MYCOPHENOLATE MOFETIL 500 MG/1
500 TABLET ORAL 2 TIMES DAILY
Qty: 60 TABLET | Refills: 3 | Status: SHIPPED | OUTPATIENT
Start: 2025-08-06

## 2025-08-06 RX ORDER — PYRIDOSTIGMINE BROMIDE 60 MG/1
60 TABLET ORAL 3 TIMES DAILY
Qty: 90 TABLET | Refills: 3 | Status: SHIPPED | OUTPATIENT
Start: 2025-08-06

## 2025-08-06 RX ORDER — PREDNISONE 20 MG/1
20 TABLET ORAL DAILY
Qty: 30 TABLET | Refills: 1 | Status: SHIPPED | OUTPATIENT
Start: 2025-08-06 | End: 2025-10-05

## 2025-08-06 ASSESSMENT — PATIENT HEALTH QUESTIONNAIRE - PHQ9
SUM OF ALL RESPONSES TO PHQ QUESTIONS 1-9: 0
2. FEELING DOWN, DEPRESSED OR HOPELESS: NOT AT ALL
SUM OF ALL RESPONSES TO PHQ QUESTIONS 1-9: 0
1. LITTLE INTEREST OR PLEASURE IN DOING THINGS: NOT AT ALL

## 2025-08-08 ENCOUNTER — TELEPHONE (OUTPATIENT)
Age: 70
End: 2025-08-08

## 2025-08-14 ENCOUNTER — OFFICE VISIT (OUTPATIENT)
Age: 70
End: 2025-08-14
Payer: MEDICARE

## 2025-08-14 VITALS
HEIGHT: 70 IN | SYSTOLIC BLOOD PRESSURE: 160 MMHG | DIASTOLIC BLOOD PRESSURE: 90 MMHG | OXYGEN SATURATION: 95 % | WEIGHT: 260.4 LBS | RESPIRATION RATE: 17 BRPM | HEART RATE: 93 BPM | TEMPERATURE: 98.4 F | BODY MASS INDEX: 37.28 KG/M2

## 2025-08-14 DIAGNOSIS — E66.01 MORBID OBESITY (HCC): Primary | ICD-10-CM

## 2025-08-14 PROCEDURE — 99212 OFFICE O/P EST SF 10 MIN: CPT | Performed by: NURSE PRACTITIONER

## 2025-08-14 PROCEDURE — 1125F AMNT PAIN NOTED PAIN PRSNT: CPT | Performed by: NURSE PRACTITIONER

## 2025-08-14 PROCEDURE — 1123F ACP DISCUSS/DSCN MKR DOCD: CPT | Performed by: NURSE PRACTITIONER

## 2025-08-14 PROCEDURE — G8399 PT W/DXA RESULTS DOCUMENT: HCPCS | Performed by: NURSE PRACTITIONER

## 2025-08-14 PROCEDURE — G8417 CALC BMI ABV UP PARAM F/U: HCPCS | Performed by: NURSE PRACTITIONER

## 2025-08-14 PROCEDURE — 1090F PRES/ABSN URINE INCON ASSESS: CPT | Performed by: NURSE PRACTITIONER

## 2025-08-14 PROCEDURE — 3017F COLORECTAL CA SCREEN DOC REV: CPT | Performed by: NURSE PRACTITIONER

## 2025-08-14 PROCEDURE — 1036F TOBACCO NON-USER: CPT | Performed by: NURSE PRACTITIONER

## 2025-08-14 PROCEDURE — 1159F MED LIST DOCD IN RCRD: CPT | Performed by: NURSE PRACTITIONER

## 2025-08-14 PROCEDURE — 3077F SYST BP >= 140 MM HG: CPT | Performed by: NURSE PRACTITIONER

## 2025-08-14 PROCEDURE — G8427 DOCREV CUR MEDS BY ELIG CLIN: HCPCS | Performed by: NURSE PRACTITIONER

## 2025-08-14 PROCEDURE — 3080F DIAST BP >= 90 MM HG: CPT | Performed by: NURSE PRACTITIONER

## 2025-08-14 ASSESSMENT — ENCOUNTER SYMPTOMS
ABDOMINAL PAIN: 0
NAUSEA: 0
VOMITING: 0
STRIDOR: 0

## 2025-08-14 ASSESSMENT — PATIENT HEALTH QUESTIONNAIRE - PHQ9
3. TROUBLE FALLING OR STAYING ASLEEP: NOT AT ALL
2. FEELING DOWN, DEPRESSED OR HOPELESS: NOT AT ALL
5. POOR APPETITE OR OVEREATING: NOT AT ALL
6. FEELING BAD ABOUT YOURSELF - OR THAT YOU ARE A FAILURE OR HAVE LET YOURSELF OR YOUR FAMILY DOWN: NOT AT ALL
SUM OF ALL RESPONSES TO PHQ QUESTIONS 1-9: 0
1. LITTLE INTEREST OR PLEASURE IN DOING THINGS: NOT AT ALL
4. FEELING TIRED OR HAVING LITTLE ENERGY: NOT AT ALL
10. IF YOU CHECKED OFF ANY PROBLEMS, HOW DIFFICULT HAVE THESE PROBLEMS MADE IT FOR YOU TO DO YOUR WORK, TAKE CARE OF THINGS AT HOME, OR GET ALONG WITH OTHER PEOPLE: NOT DIFFICULT AT ALL
7. TROUBLE CONCENTRATING ON THINGS, SUCH AS READING THE NEWSPAPER OR WATCHING TELEVISION: NOT AT ALL
9. THOUGHTS THAT YOU WOULD BE BETTER OFF DEAD, OR OF HURTING YOURSELF: NOT AT ALL
8. MOVING OR SPEAKING SO SLOWLY THAT OTHER PEOPLE COULD HAVE NOTICED. OR THE OPPOSITE, BEING SO FIGETY OR RESTLESS THAT YOU HAVE BEEN MOVING AROUND A LOT MORE THAN USUAL: NOT AT ALL
SUM OF ALL RESPONSES TO PHQ QUESTIONS 1-9: 0

## 2025-08-15 ENCOUNTER — CLINICAL SUPPORT (OUTPATIENT)
Age: 70
End: 2025-08-15
Payer: MEDICARE

## 2025-08-15 DIAGNOSIS — Z86.711 HX PULMONARY EMBOLISM: Primary | ICD-10-CM

## 2025-08-15 LAB
POC INR: 1.5
PROTHROMBIN TIME, POC: 18

## 2025-08-15 PROCEDURE — PBSHW AMB POC PT/INR: Performed by: FAMILY MEDICINE

## 2025-08-15 PROCEDURE — 85610 PROTHROMBIN TIME: CPT | Performed by: FAMILY MEDICINE

## 2025-08-15 RX ORDER — HYDROCHLOROTHIAZIDE 25 MG/1
25 TABLET ORAL DAILY
Qty: 90 TABLET | Refills: 3 | Status: SHIPPED | OUTPATIENT
Start: 2025-08-15

## 2025-08-25 ENCOUNTER — TELEPHONE (OUTPATIENT)
Age: 70
End: 2025-08-25

## 2025-08-25 LAB
ACHR BIND AB SER-SCNC: 4.32 NMOL/L (ref 0–0.24)
ACHR BLOCK AB SER-ACNC: 45 % (ref 0–25)
ACHR MOD AB SER QL FC: 50 % (ref 0–45)
REFLEX INFORMATION: ABNORMAL

## 2025-08-29 ENCOUNTER — CLINICAL SUPPORT (OUTPATIENT)
Age: 70
End: 2025-08-29
Payer: MEDICARE

## 2025-08-29 DIAGNOSIS — Z86.711 HX PULMONARY EMBOLISM: Primary | ICD-10-CM

## 2025-08-29 LAB
POC INR: 2.3
PROTHROMBIN TIME, POC: 27.8

## 2025-08-29 PROCEDURE — PBSHW AMB POC PT/INR

## 2025-08-29 PROCEDURE — 85610 PROTHROMBIN TIME: CPT

## 2025-09-04 DIAGNOSIS — G70.00 MYASTHENIA GRAVIS (HCC): Primary | ICD-10-CM

## 2025-09-04 RX ORDER — MYCOPHENOLATE MOFETIL 500 MG/1
1000 TABLET ORAL 2 TIMES DAILY
Qty: 120 TABLET | Refills: 1 | Status: SHIPPED | OUTPATIENT
Start: 2025-09-04

## (undated) DEVICE — INJECTION THERAPY NEEDLE CATHETER: Brand: INTERJECT

## (undated) DEVICE — SURGICAL PROCEDURE PACK CATRCT CUST

## (undated) DEVICE — SOLUTION IV STRL H2O 500 ML AQUALITE POUR BTL

## (undated) DEVICE — SNARE ENDOSCP POLYP MED STD AD 2.4X27X240 CM 2.8 MM OVL SENS

## (undated) DEVICE — SYR 5ML 1/5 GRAD LL NSAF LF --

## (undated) DEVICE — SUTURE VCRL SZ 10-0 L12IN ABSRB VLT L5.5MM CS160-6 1/2 CIR V448G

## (undated) DEVICE — SOLUTION IV 250ML 0.9% SOD CHL CLR INJ FLX BG CONT PRT CLSR

## (undated) DEVICE — TRAP ENDOSCP POLYP 2 CHMBR DRAWER TYP

## (undated) DEVICE — APPLICATOR,COTTON-TIP,WOOD,3,STRL: Brand: MEDLINE

## (undated) DEVICE — CUFF BLD PRSS AD CLTH SGL TB W/ BAYNT CONN ROUNDED CORNER

## (undated) DEVICE — SYR 3ML LL TIP 1/10ML GRAD --

## (undated) DEVICE — TOWEL SURG W17XL27IN STD BLU COT NONFENESTRATED PREWASHED

## (undated) DEVICE — FIAPC® PROBE W/ FILTER 2200 A OD 2.3MM/6.9FR; L 2.2M/7.2FT: Brand: ERBE

## (undated) DEVICE — NDL FLTR TIP 5 MIC 18GX1.5IN --

## (undated) DEVICE — ELECTRODE PT RET AD L9FT HI MOIST COND ADH HYDRGEL CORDED

## (undated) DEVICE — SURGICAL PROCEDURE PACK EYE CUST DR CHNDLR

## (undated) DEVICE — STERILE POLYISOPRENE POWDER-FREE SURGICAL GLOVES: Brand: PROTEXIS

## (undated) DEVICE — SET GRAV CK VLV NEEDLESS ST 3 GANGED 4WAY STPCOCK HI FLO 10

## (undated) DEVICE — KENDALL DL ECG CABLE AND LEAD WIRE SYSTEM, 3-LEAD, SINGLE PATIENT USE: Brand: KENDALL

## (undated) DEVICE — FIAPC® PROBE W/ FILTER 2200 C OD 2.3MM/6.9FR; L 2.2M/7.2FT: Brand: ERBE

## (undated) DEVICE — ENDOSCOPIC KIT COMPLIANCE ENDOKIT

## (undated) DEVICE — HIGH FLOW RATE EXTENSION SET, LUER LOCK ADAPTERS

## (undated) DEVICE — 3M™ TEGADERM™ TRANSPARENT FILM DRESSING FRAME STYLE, 1624W, 2-3/8 IN X 2-3/4 IN (6 CM X 7 CM), 100/CT 4CT/CASE: Brand: 3M™ TEGADERM™

## (undated) DEVICE — FORCEPS BX L240CM JAW DIA2.4MM ORNG L CAP W/ NDL DISP RAD

## (undated) DEVICE — THE MONARCH® "D" CARTRIDGE IS A SINGLE-USE POLYPROPYLENE CARTRIDGE FOR POSTERIOR CHAMBER IOL DELIVERY: Brand: MONARCH® III

## (undated) DEVICE — CONTAINER SPEC 20 ML LID NEUT BUFF FORMALIN 10 % POLYPR STS

## (undated) DEVICE — IV START KIT: Brand: MEDLINE

## (undated) DEVICE — SNARE ENDOSCP AD L240CM LOOP W10MM SHTH DIA2.4MM RND INSUL

## (undated) DEVICE — TIP SUCT TRNSPAR RIB SURF STD BLB RIG NVENT W/ 5IN1 CONN DYND50138] MEDLINE INDUSTRIES INC]